# Patient Record
Sex: MALE | Race: WHITE | NOT HISPANIC OR LATINO | Employment: OTHER
[De-identification: names, ages, dates, MRNs, and addresses within clinical notes are randomized per-mention and may not be internally consistent; named-entity substitution may affect disease eponyms.]

---

## 2017-01-18 ENCOUNTER — RECORDS - HEALTHEAST (OUTPATIENT)
Dept: ADMINISTRATIVE | Facility: OTHER | Age: 82
End: 2017-01-18

## 2017-02-08 ENCOUNTER — COMMUNICATION - HEALTHEAST (OUTPATIENT)
Dept: INTERNAL MEDICINE | Facility: CLINIC | Age: 82
End: 2017-02-08

## 2017-02-08 DIAGNOSIS — I10 ESSENTIAL HYPERTENSION, MALIGNANT: ICD-10-CM

## 2017-02-09 ENCOUNTER — COMMUNICATION - HEALTHEAST (OUTPATIENT)
Dept: INTERNAL MEDICINE | Facility: CLINIC | Age: 82
End: 2017-02-09

## 2017-02-13 ENCOUNTER — RECORDS - HEALTHEAST (OUTPATIENT)
Dept: ADMINISTRATIVE | Facility: OTHER | Age: 82
End: 2017-02-13

## 2017-03-02 ENCOUNTER — OFFICE VISIT - HEALTHEAST (OUTPATIENT)
Dept: INTERNAL MEDICINE | Facility: CLINIC | Age: 82
End: 2017-03-02

## 2017-03-02 DIAGNOSIS — E11.42 TYPE 2 DIABETES MELLITUS WITH PERIPHERAL NEUROPATHY (H): ICD-10-CM

## 2017-03-02 DIAGNOSIS — M70.72 BURSITIS, HIP, LEFT: ICD-10-CM

## 2017-03-02 DIAGNOSIS — N18.30 CHRONIC KIDNEY DISEASE, STAGE 3 (MODERATE): ICD-10-CM

## 2017-03-02 DIAGNOSIS — I10 ESSENTIAL HYPERTENSION: ICD-10-CM

## 2017-03-02 DIAGNOSIS — N18.9 ANEMIA IN CHRONIC KIDNEY DISEASE: ICD-10-CM

## 2017-03-02 DIAGNOSIS — D63.1 ANEMIA IN CHRONIC KIDNEY DISEASE: ICD-10-CM

## 2017-03-02 DIAGNOSIS — F32.A DEPRESSION: ICD-10-CM

## 2017-03-02 DIAGNOSIS — G47.33 OSA ON CPAP: ICD-10-CM

## 2017-03-02 DIAGNOSIS — E78.5 HYPERLIPIDEMIA: ICD-10-CM

## 2017-03-02 LAB
CHOLEST SERPL-MCNC: 138 MG/DL
FASTING STATUS PATIENT QL REPORTED: YES
HBA1C MFR BLD: 7.2 % (ref 3.5–6)
HDLC SERPL-MCNC: 25 MG/DL
LDLC SERPL CALC-MCNC: 83 MG/DL
TRIGL SERPL-MCNC: 149 MG/DL

## 2017-03-02 ASSESSMENT — MIFFLIN-ST. JEOR: SCORE: 2218.94

## 2017-03-06 ENCOUNTER — COMMUNICATION - HEALTHEAST (OUTPATIENT)
Dept: INTERNAL MEDICINE | Facility: CLINIC | Age: 82
End: 2017-03-06

## 2017-03-08 ENCOUNTER — COMMUNICATION - HEALTHEAST (OUTPATIENT)
Dept: INTERNAL MEDICINE | Facility: CLINIC | Age: 82
End: 2017-03-08

## 2017-03-28 ENCOUNTER — RECORDS - HEALTHEAST (OUTPATIENT)
Dept: ADMINISTRATIVE | Facility: OTHER | Age: 82
End: 2017-03-28

## 2017-04-19 ENCOUNTER — COMMUNICATION - HEALTHEAST (OUTPATIENT)
Dept: INTERNAL MEDICINE | Facility: CLINIC | Age: 82
End: 2017-04-19

## 2017-04-30 ENCOUNTER — COMMUNICATION - HEALTHEAST (OUTPATIENT)
Dept: INTERNAL MEDICINE | Facility: CLINIC | Age: 82
End: 2017-04-30

## 2017-04-30 DIAGNOSIS — I10 ESSENTIAL HYPERTENSION, MALIGNANT: ICD-10-CM

## 2017-05-25 ENCOUNTER — COMMUNICATION - HEALTHEAST (OUTPATIENT)
Dept: INTERNAL MEDICINE | Facility: CLINIC | Age: 82
End: 2017-05-25

## 2017-07-06 ENCOUNTER — OFFICE VISIT - HEALTHEAST (OUTPATIENT)
Dept: INTERNAL MEDICINE | Facility: CLINIC | Age: 82
End: 2017-07-06

## 2017-07-06 DIAGNOSIS — M70.72 BURSITIS, HIP, LEFT: ICD-10-CM

## 2017-07-06 DIAGNOSIS — M51.369 DEGENERATIVE DISC DISEASE, LUMBAR: ICD-10-CM

## 2017-07-06 DIAGNOSIS — D63.1 ANEMIA IN CHRONIC KIDNEY DISEASE: ICD-10-CM

## 2017-07-06 DIAGNOSIS — G47.33 OSA ON CPAP: ICD-10-CM

## 2017-07-06 DIAGNOSIS — N18.9 ANEMIA IN CHRONIC KIDNEY DISEASE: ICD-10-CM

## 2017-07-06 DIAGNOSIS — F32.A DEPRESSION: ICD-10-CM

## 2017-07-06 DIAGNOSIS — R53.83 FATIGUE: ICD-10-CM

## 2017-07-06 DIAGNOSIS — N18.30 CHRONIC KIDNEY DISEASE, STAGE 3 (MODERATE): ICD-10-CM

## 2017-07-06 DIAGNOSIS — I10 ESSENTIAL HYPERTENSION: ICD-10-CM

## 2017-07-06 DIAGNOSIS — J44.9 COPD (CHRONIC OBSTRUCTIVE PULMONARY DISEASE) (H): ICD-10-CM

## 2017-07-06 DIAGNOSIS — L60.0 INGROWN TOENAIL: ICD-10-CM

## 2017-07-06 DIAGNOSIS — E11.42 TYPE 2 DIABETES MELLITUS WITH PERIPHERAL NEUROPATHY (H): ICD-10-CM

## 2017-07-06 DIAGNOSIS — E78.5 HYPERLIPIDEMIA: ICD-10-CM

## 2017-07-06 LAB
CHOLEST SERPL-MCNC: 139 MG/DL
FASTING STATUS PATIENT QL REPORTED: YES
HBA1C MFR BLD: 6.8 % (ref 3.5–6)
HDLC SERPL-MCNC: 28 MG/DL
LDLC SERPL CALC-MCNC: 82 MG/DL
TRIGL SERPL-MCNC: 147 MG/DL

## 2017-07-06 ASSESSMENT — MIFFLIN-ST. JEOR: SCORE: 2210.23

## 2017-07-08 ENCOUNTER — COMMUNICATION - HEALTHEAST (OUTPATIENT)
Dept: INTERNAL MEDICINE | Facility: CLINIC | Age: 82
End: 2017-07-08

## 2017-07-20 ENCOUNTER — COMMUNICATION - HEALTHEAST (OUTPATIENT)
Dept: INTERNAL MEDICINE | Facility: CLINIC | Age: 82
End: 2017-07-20

## 2017-07-22 ENCOUNTER — COMMUNICATION - HEALTHEAST (OUTPATIENT)
Dept: INTERNAL MEDICINE | Facility: CLINIC | Age: 82
End: 2017-07-22

## 2017-07-25 ENCOUNTER — OFFICE VISIT - HEALTHEAST (OUTPATIENT)
Dept: PODIATRY | Age: 82
End: 2017-07-25

## 2017-07-25 DIAGNOSIS — B35.1 NAIL FUNGUS: ICD-10-CM

## 2017-07-25 DIAGNOSIS — L60.0 INGROWN TOENAIL: ICD-10-CM

## 2017-07-25 DIAGNOSIS — L60.2 ONYCHAUXIS: ICD-10-CM

## 2017-07-25 ASSESSMENT — MIFFLIN-ST. JEOR: SCORE: 2210.24

## 2017-08-24 ENCOUNTER — RECORDS - HEALTHEAST (OUTPATIENT)
Dept: ADMINISTRATIVE | Facility: OTHER | Age: 82
End: 2017-08-24

## 2017-08-25 ENCOUNTER — OFFICE VISIT - HEALTHEAST (OUTPATIENT)
Dept: GERIATRICS | Facility: CLINIC | Age: 82
End: 2017-08-25

## 2017-08-25 DIAGNOSIS — F32.A DEPRESSION: ICD-10-CM

## 2017-08-25 DIAGNOSIS — S42.002A CLOSED LEFT CLAVICULAR FRACTURE: ICD-10-CM

## 2017-08-25 DIAGNOSIS — E11.9 TYPE 2 DIABETES MELLITUS (H): ICD-10-CM

## 2017-08-25 DIAGNOSIS — I10 ESSENTIAL HYPERTENSION: ICD-10-CM

## 2017-08-25 DIAGNOSIS — I60.9 SAH (SUBARACHNOID HEMORRHAGE) (H): ICD-10-CM

## 2017-08-25 DIAGNOSIS — D64.9 ANEMIA: ICD-10-CM

## 2017-08-25 DIAGNOSIS — N17.9 AKI (ACUTE KIDNEY INJURY) (H): ICD-10-CM

## 2017-08-28 ENCOUNTER — AMBULATORY - HEALTHEAST (OUTPATIENT)
Dept: ADMINISTRATIVE | Facility: CLINIC | Age: 82
End: 2017-08-28

## 2017-08-28 ENCOUNTER — AMBULATORY - HEALTHEAST (OUTPATIENT)
Dept: GERIATRICS | Facility: CLINIC | Age: 82
End: 2017-08-28

## 2017-08-28 RX ORDER — LATANOPROST 50 UG/ML
1 SOLUTION/ DROPS OPHTHALMIC AT BEDTIME
Status: SHIPPED | COMMUNITY
Start: 2017-08-28

## 2017-08-28 RX ORDER — ACETAMINOPHEN 500 MG
1000 TABLET ORAL 2 TIMES DAILY
Status: SHIPPED | COMMUNITY
Start: 2017-08-28

## 2017-08-29 ENCOUNTER — OFFICE VISIT - HEALTHEAST (OUTPATIENT)
Dept: GERIATRICS | Facility: CLINIC | Age: 82
End: 2017-08-29

## 2017-08-29 ENCOUNTER — RECORDS - HEALTHEAST (OUTPATIENT)
Dept: ADMINISTRATIVE | Facility: OTHER | Age: 82
End: 2017-08-29

## 2017-08-29 DIAGNOSIS — E11.9 TYPE 2 DIABETES MELLITUS (H): ICD-10-CM

## 2017-08-29 DIAGNOSIS — I10 ESSENTIAL HYPERTENSION: ICD-10-CM

## 2017-08-29 DIAGNOSIS — S42.002A CLOSED LEFT CLAVICULAR FRACTURE: ICD-10-CM

## 2017-08-29 DIAGNOSIS — N17.9 AKI (ACUTE KIDNEY INJURY) (H): ICD-10-CM

## 2017-08-29 DIAGNOSIS — J44.9 COPD (CHRONIC OBSTRUCTIVE PULMONARY DISEASE) (H): ICD-10-CM

## 2017-08-29 DIAGNOSIS — F32.A DEPRESSION: ICD-10-CM

## 2017-08-29 DIAGNOSIS — I60.9 SAH (SUBARACHNOID HEMORRHAGE) (H): ICD-10-CM

## 2017-08-29 DIAGNOSIS — G47.33 OSA ON CPAP: ICD-10-CM

## 2017-08-29 DIAGNOSIS — E53.8 B12 DEFICIENCY: ICD-10-CM

## 2017-09-01 ENCOUNTER — OFFICE VISIT - HEALTHEAST (OUTPATIENT)
Dept: GERIATRICS | Facility: CLINIC | Age: 82
End: 2017-09-01

## 2017-09-01 DIAGNOSIS — N17.9 AKI (ACUTE KIDNEY INJURY) (H): ICD-10-CM

## 2017-09-01 DIAGNOSIS — E53.8 B12 DEFICIENCY: ICD-10-CM

## 2017-09-01 DIAGNOSIS — I10 ESSENTIAL HYPERTENSION: ICD-10-CM

## 2017-09-01 DIAGNOSIS — I60.9 SAH (SUBARACHNOID HEMORRHAGE) (H): ICD-10-CM

## 2017-09-01 DIAGNOSIS — E11.9 TYPE 2 DIABETES MELLITUS (H): ICD-10-CM

## 2017-09-01 DIAGNOSIS — G47.33 OSA ON CPAP: ICD-10-CM

## 2017-09-01 DIAGNOSIS — S42.002A CLOSED LEFT CLAVICULAR FRACTURE: ICD-10-CM

## 2017-09-05 ENCOUNTER — HOSPITAL ENCOUNTER (OUTPATIENT)
Dept: CT IMAGING | Facility: CLINIC | Age: 82
Discharge: HOME | End: 2017-09-05
Attending: INTERNAL MEDICINE

## 2017-09-05 DIAGNOSIS — Z91.81 HISTORY OF RECENT FALL: ICD-10-CM

## 2017-09-06 ENCOUNTER — OFFICE VISIT - HEALTHEAST (OUTPATIENT)
Dept: GERIATRICS | Facility: CLINIC | Age: 82
End: 2017-09-06

## 2017-09-06 DIAGNOSIS — E53.8 B12 DEFICIENCY: ICD-10-CM

## 2017-09-06 DIAGNOSIS — S42.002A CLOSED LEFT CLAVICULAR FRACTURE: ICD-10-CM

## 2017-09-06 DIAGNOSIS — E11.9 TYPE 2 DIABETES MELLITUS (H): ICD-10-CM

## 2017-09-06 DIAGNOSIS — I10 ESSENTIAL HYPERTENSION: ICD-10-CM

## 2017-09-06 DIAGNOSIS — N18.3 CRD (CHRONIC RENAL DISEASE), STAGE 3 (MODERATE): ICD-10-CM

## 2017-09-06 DIAGNOSIS — J44.9 COPD (CHRONIC OBSTRUCTIVE PULMONARY DISEASE) (H): ICD-10-CM

## 2017-09-06 DIAGNOSIS — I60.9 SAH (SUBARACHNOID HEMORRHAGE) (H): ICD-10-CM

## 2017-09-06 DIAGNOSIS — D64.9 ANEMIA: ICD-10-CM

## 2017-09-08 ENCOUNTER — HOME CARE/HOSPICE - HEALTHEAST (OUTPATIENT)
Dept: HOME HEALTH SERVICES | Facility: HOME HEALTH | Age: 82
End: 2017-09-08

## 2017-09-08 ENCOUNTER — OFFICE VISIT - HEALTHEAST (OUTPATIENT)
Dept: GERIATRICS | Facility: CLINIC | Age: 82
End: 2017-09-08

## 2017-09-08 DIAGNOSIS — M19.90 OSTEOARTHRITIS: ICD-10-CM

## 2017-09-08 DIAGNOSIS — G47.33 OSA ON CPAP: ICD-10-CM

## 2017-09-08 DIAGNOSIS — J44.9 COPD (CHRONIC OBSTRUCTIVE PULMONARY DISEASE) (H): ICD-10-CM

## 2017-09-08 DIAGNOSIS — E11.42 TYPE 2 DIABETES MELLITUS WITH PERIPHERAL NEUROPATHY (H): ICD-10-CM

## 2017-09-08 DIAGNOSIS — I10 ESSENTIAL HYPERTENSION: ICD-10-CM

## 2017-09-08 DIAGNOSIS — M53.80 OTHER SYMPTOMS REFERABLE TO BACK: ICD-10-CM

## 2017-09-08 DIAGNOSIS — M54.50 LOW BACK PAIN: ICD-10-CM

## 2017-09-09 ENCOUNTER — RECORDS - HEALTHEAST (OUTPATIENT)
Dept: ADMINISTRATIVE | Facility: OTHER | Age: 82
End: 2017-09-09

## 2017-09-11 ENCOUNTER — AMBULATORY - HEALTHEAST (OUTPATIENT)
Dept: GERIATRICS | Facility: CLINIC | Age: 82
End: 2017-09-11

## 2017-09-11 ENCOUNTER — COMMUNICATION - HEALTHEAST (OUTPATIENT)
Dept: GERIATRICS | Facility: CLINIC | Age: 82
End: 2017-09-11

## 2017-09-11 ENCOUNTER — COMMUNICATION - HEALTHEAST (OUTPATIENT)
Dept: INTERNAL MEDICINE | Facility: CLINIC | Age: 82
End: 2017-09-11

## 2017-09-11 ENCOUNTER — HOME CARE/HOSPICE - HEALTHEAST (OUTPATIENT)
Dept: HOME HEALTH SERVICES | Facility: HOME HEALTH | Age: 82
End: 2017-09-11

## 2017-09-13 ENCOUNTER — COMMUNICATION - HEALTHEAST (OUTPATIENT)
Dept: HOME HEALTH SERVICES | Facility: HOME HEALTH | Age: 82
End: 2017-09-13

## 2017-09-13 ENCOUNTER — HOME CARE/HOSPICE - HEALTHEAST (OUTPATIENT)
Dept: HOME HEALTH SERVICES | Facility: HOME HEALTH | Age: 82
End: 2017-09-13

## 2017-09-14 ENCOUNTER — COMMUNICATION - HEALTHEAST (OUTPATIENT)
Dept: PHYSICAL THERAPY | Age: 82
End: 2017-09-14

## 2017-09-14 ENCOUNTER — HOME CARE/HOSPICE - HEALTHEAST (OUTPATIENT)
Dept: HOME HEALTH SERVICES | Facility: HOME HEALTH | Age: 82
End: 2017-09-14

## 2017-09-15 ENCOUNTER — HOME CARE/HOSPICE - HEALTHEAST (OUTPATIENT)
Dept: HOME HEALTH SERVICES | Facility: HOME HEALTH | Age: 82
End: 2017-09-15

## 2017-09-19 ENCOUNTER — HOME CARE/HOSPICE - HEALTHEAST (OUTPATIENT)
Dept: HOME HEALTH SERVICES | Facility: HOME HEALTH | Age: 82
End: 2017-09-19

## 2017-09-20 ENCOUNTER — HOME CARE/HOSPICE - HEALTHEAST (OUTPATIENT)
Dept: HOME HEALTH SERVICES | Facility: HOME HEALTH | Age: 82
End: 2017-09-20

## 2017-09-21 ENCOUNTER — OFFICE VISIT - HEALTHEAST (OUTPATIENT)
Dept: INTERNAL MEDICINE | Facility: CLINIC | Age: 82
End: 2017-09-21

## 2017-09-21 ENCOUNTER — HOME CARE/HOSPICE - HEALTHEAST (OUTPATIENT)
Dept: HOME HEALTH SERVICES | Facility: HOME HEALTH | Age: 82
End: 2017-09-21

## 2017-09-21 DIAGNOSIS — S42.002D CLOSED DISPLACED FRACTURE OF LEFT CLAVICLE WITH ROUTINE HEALING, UNSPECIFIED PART OF CLAVICLE, SUBSEQUENT ENCOUNTER: ICD-10-CM

## 2017-09-21 DIAGNOSIS — E11.42 TYPE 2 DIABETES MELLITUS WITH PERIPHERAL NEUROPATHY (H): ICD-10-CM

## 2017-09-21 DIAGNOSIS — S06.6X1A: ICD-10-CM

## 2017-09-21 DIAGNOSIS — I10 ESSENTIAL HYPERTENSION: ICD-10-CM

## 2017-09-21 ASSESSMENT — MIFFLIN-ST. JEOR: SCORE: 2146.37

## 2017-09-22 ENCOUNTER — HOME CARE/HOSPICE - HEALTHEAST (OUTPATIENT)
Dept: HOME HEALTH SERVICES | Facility: HOME HEALTH | Age: 82
End: 2017-09-22

## 2017-09-25 ENCOUNTER — COMMUNICATION - HEALTHEAST (OUTPATIENT)
Dept: OCCUPATIONAL THERAPY | Facility: CLINIC | Age: 82
End: 2017-09-25

## 2017-09-27 ENCOUNTER — HOME CARE/HOSPICE - HEALTHEAST (OUTPATIENT)
Dept: HOME HEALTH SERVICES | Facility: HOME HEALTH | Age: 82
End: 2017-09-27

## 2017-09-28 ENCOUNTER — HOME CARE/HOSPICE - HEALTHEAST (OUTPATIENT)
Dept: HOME HEALTH SERVICES | Facility: HOME HEALTH | Age: 82
End: 2017-09-28

## 2017-09-28 ENCOUNTER — COMMUNICATION - HEALTHEAST (OUTPATIENT)
Dept: INTERNAL MEDICINE | Facility: CLINIC | Age: 82
End: 2017-09-28

## 2017-09-28 DIAGNOSIS — F32.A DEPRESSION: ICD-10-CM

## 2017-10-03 ENCOUNTER — HOME CARE/HOSPICE - HEALTHEAST (OUTPATIENT)
Dept: HOME HEALTH SERVICES | Facility: HOME HEALTH | Age: 82
End: 2017-10-03

## 2017-10-05 ENCOUNTER — HOME CARE/HOSPICE - HEALTHEAST (OUTPATIENT)
Dept: HOME HEALTH SERVICES | Facility: HOME HEALTH | Age: 82
End: 2017-10-05

## 2017-10-19 ENCOUNTER — AMBULATORY - HEALTHEAST (OUTPATIENT)
Dept: PODIATRY | Facility: CLINIC | Age: 82
End: 2017-10-19

## 2017-10-19 DIAGNOSIS — B35.1 NAIL FUNGUS: ICD-10-CM

## 2017-10-19 DIAGNOSIS — L60.2 ONYCHAUXIS: ICD-10-CM

## 2017-10-19 DIAGNOSIS — Z79.4 TYPE 2 DIABETES MELLITUS WITHOUT COMPLICATION, WITH LONG-TERM CURRENT USE OF INSULIN (H): ICD-10-CM

## 2017-10-19 DIAGNOSIS — E11.9 TYPE 2 DIABETES MELLITUS WITHOUT COMPLICATION, WITH LONG-TERM CURRENT USE OF INSULIN (H): ICD-10-CM

## 2017-10-19 ASSESSMENT — MIFFLIN-ST. JEOR: SCORE: 2146.37

## 2017-11-09 ENCOUNTER — OFFICE VISIT - HEALTHEAST (OUTPATIENT)
Dept: INTERNAL MEDICINE | Facility: CLINIC | Age: 82
End: 2017-11-09

## 2017-11-09 DIAGNOSIS — M15.0 PRIMARY OSTEOARTHRITIS INVOLVING MULTIPLE JOINTS: ICD-10-CM

## 2017-11-09 DIAGNOSIS — E53.8 B12 DEFICIENCY: ICD-10-CM

## 2017-11-09 DIAGNOSIS — F32.A DEPRESSION: ICD-10-CM

## 2017-11-09 DIAGNOSIS — E11.42 TYPE 2 DIABETES MELLITUS WITH PERIPHERAL NEUROPATHY (H): ICD-10-CM

## 2017-11-09 DIAGNOSIS — E55.9 VITAMIN D DEFICIENCY: ICD-10-CM

## 2017-11-09 DIAGNOSIS — D63.1 ANEMIA IN STAGE 3 CHRONIC KIDNEY DISEASE (H): ICD-10-CM

## 2017-11-09 DIAGNOSIS — E78.5 HYPERLIPIDEMIA: ICD-10-CM

## 2017-11-09 DIAGNOSIS — H91.93 BILATERAL HEARING LOSS: ICD-10-CM

## 2017-11-09 DIAGNOSIS — S06.6X1D TRAUMATIC SUBARACHNOID HEMORRHAGE WITH LOSS OF CONSCIOUSNESS OF 30 MINUTES OR LESS, SUBSEQUENT ENCOUNTER: ICD-10-CM

## 2017-11-09 DIAGNOSIS — Z23 NEED FOR IMMUNIZATION AGAINST INFLUENZA: ICD-10-CM

## 2017-11-09 DIAGNOSIS — R53.82 CHRONIC FATIGUE: ICD-10-CM

## 2017-11-09 DIAGNOSIS — H40.9 GLAUCOMA: ICD-10-CM

## 2017-11-09 DIAGNOSIS — J45.20 INTERMITTENT ASTHMA WITHOUT COMPLICATION: ICD-10-CM

## 2017-11-09 DIAGNOSIS — N18.30 CHRONIC KIDNEY DISEASE, STAGE 3 (MODERATE): ICD-10-CM

## 2017-11-09 DIAGNOSIS — Z12.5 SCREENING FOR MALIGNANT NEOPLASM OF PROSTATE: ICD-10-CM

## 2017-11-09 DIAGNOSIS — Z00.00 MEDICARE ANNUAL WELLNESS VISIT, INITIAL: ICD-10-CM

## 2017-11-09 DIAGNOSIS — C44.92 SQUAMOUS CELL SKIN CANCER: ICD-10-CM

## 2017-11-09 DIAGNOSIS — I10 ESSENTIAL HYPERTENSION: ICD-10-CM

## 2017-11-09 DIAGNOSIS — S42.002A CLOSED DISPLACED FRACTURE OF LEFT CLAVICLE: ICD-10-CM

## 2017-11-09 DIAGNOSIS — N18.30 ANEMIA IN STAGE 3 CHRONIC KIDNEY DISEASE (H): ICD-10-CM

## 2017-11-09 DIAGNOSIS — G47.33 OSA ON CPAP: ICD-10-CM

## 2017-11-09 LAB
CHOLEST SERPL-MCNC: 141 MG/DL
FASTING STATUS PATIENT QL REPORTED: YES
HBA1C MFR BLD: 7 % (ref 3.5–6)
HDLC SERPL-MCNC: 28 MG/DL
LDLC SERPL CALC-MCNC: 81 MG/DL
PSA SERPL-MCNC: 1.6 NG/ML (ref 0–6.5)
TRIGL SERPL-MCNC: 162 MG/DL

## 2017-11-09 ASSESSMENT — MIFFLIN-ST. JEOR: SCORE: 2164.51

## 2017-11-10 ENCOUNTER — COMMUNICATION - HEALTHEAST (OUTPATIENT)
Dept: INTERNAL MEDICINE | Facility: CLINIC | Age: 82
End: 2017-11-10

## 2017-12-13 ENCOUNTER — COMMUNICATION - HEALTHEAST (OUTPATIENT)
Dept: INTERNAL MEDICINE | Facility: CLINIC | Age: 82
End: 2017-12-13

## 2017-12-13 DIAGNOSIS — F32.A DEPRESSION: ICD-10-CM

## 2018-01-02 ENCOUNTER — COMMUNICATION - HEALTHEAST (OUTPATIENT)
Dept: INTERNAL MEDICINE | Facility: CLINIC | Age: 83
End: 2018-01-02

## 2018-01-09 ENCOUNTER — OFFICE VISIT - HEALTHEAST (OUTPATIENT)
Dept: INTERNAL MEDICINE | Facility: CLINIC | Age: 83
End: 2018-01-09

## 2018-01-09 DIAGNOSIS — N18.30 CHRONIC KIDNEY DISEASE, STAGE 3 (MODERATE): ICD-10-CM

## 2018-01-09 DIAGNOSIS — H02.402 PTOSIS OF EYELID, LEFT: ICD-10-CM

## 2018-01-09 DIAGNOSIS — G47.33 OSA ON CPAP: ICD-10-CM

## 2018-01-09 DIAGNOSIS — Z01.818 PRE-OP EXAM: ICD-10-CM

## 2018-01-09 DIAGNOSIS — I10 ESSENTIAL HYPERTENSION: ICD-10-CM

## 2018-01-09 DIAGNOSIS — E11.42 TYPE 2 DIABETES MELLITUS WITH PERIPHERAL NEUROPATHY (H): ICD-10-CM

## 2018-01-09 LAB
ANION GAP SERPL CALCULATED.3IONS-SCNC: 10 MMOL/L (ref 5–18)
BUN SERPL-MCNC: 28 MG/DL (ref 8–28)
CALCIUM SERPL-MCNC: 9 MG/DL (ref 8.5–10.5)
CHLORIDE BLD-SCNC: 102 MMOL/L (ref 98–107)
CO2 SERPL-SCNC: 23 MMOL/L (ref 22–31)
CREAT SERPL-MCNC: 1.45 MG/DL (ref 0.7–1.3)
GFR SERPL CREATININE-BSD FRML MDRD: 46 ML/MIN/1.73M2
GLUCOSE BLD-MCNC: 231 MG/DL (ref 70–125)
POTASSIUM BLD-SCNC: 4.8 MMOL/L (ref 3.5–5)
SODIUM SERPL-SCNC: 135 MMOL/L (ref 136–145)

## 2018-01-09 ASSESSMENT — MIFFLIN-ST. JEOR: SCORE: 2173.58

## 2018-01-10 ENCOUNTER — RECORDS - HEALTHEAST (OUTPATIENT)
Dept: ADMINISTRATIVE | Facility: OTHER | Age: 83
End: 2018-01-10

## 2018-01-11 ENCOUNTER — AMBULATORY - HEALTHEAST (OUTPATIENT)
Dept: PODIATRY | Facility: CLINIC | Age: 83
End: 2018-01-11

## 2018-01-11 DIAGNOSIS — B35.1 NAIL FUNGUS: ICD-10-CM

## 2018-01-11 DIAGNOSIS — L60.2 ONYCHAUXIS: ICD-10-CM

## 2018-01-11 DIAGNOSIS — E11.9 TYPE 2 DIABETES MELLITUS WITHOUT COMPLICATION, WITHOUT LONG-TERM CURRENT USE OF INSULIN (H): ICD-10-CM

## 2018-01-16 ENCOUNTER — RECORDS - HEALTHEAST (OUTPATIENT)
Dept: ADMINISTRATIVE | Facility: OTHER | Age: 83
End: 2018-01-16

## 2018-01-18 ENCOUNTER — RECORDS - HEALTHEAST (OUTPATIENT)
Dept: ADMINISTRATIVE | Facility: OTHER | Age: 83
End: 2018-01-18

## 2018-02-15 ENCOUNTER — OFFICE VISIT - HEALTHEAST (OUTPATIENT)
Dept: INTERNAL MEDICINE | Facility: CLINIC | Age: 83
End: 2018-02-15

## 2018-02-15 DIAGNOSIS — E78.5 HYPERLIPIDEMIA: ICD-10-CM

## 2018-02-15 DIAGNOSIS — H02.402 PTOSIS OF EYELID, LEFT: ICD-10-CM

## 2018-02-15 DIAGNOSIS — E11.42 TYPE 2 DIABETES MELLITUS WITH PERIPHERAL NEUROPATHY (H): ICD-10-CM

## 2018-02-15 DIAGNOSIS — D63.1 ANEMIA IN STAGE 3 CHRONIC KIDNEY DISEASE (H): ICD-10-CM

## 2018-02-15 DIAGNOSIS — M75.122 COMPLETE TEAR OF LEFT ROTATOR CUFF: ICD-10-CM

## 2018-02-15 DIAGNOSIS — G47.33 OSA ON CPAP: ICD-10-CM

## 2018-02-15 DIAGNOSIS — I10 ESSENTIAL HYPERTENSION: ICD-10-CM

## 2018-02-15 DIAGNOSIS — E53.8 B12 DEFICIENCY: ICD-10-CM

## 2018-02-15 DIAGNOSIS — N18.30 CHRONIC KIDNEY DISEASE, STAGE 3 (MODERATE): ICD-10-CM

## 2018-02-15 DIAGNOSIS — J45.20 MILD INTERMITTENT ASTHMA WITHOUT COMPLICATION: ICD-10-CM

## 2018-02-15 DIAGNOSIS — N18.30 ANEMIA IN STAGE 3 CHRONIC KIDNEY DISEASE (H): ICD-10-CM

## 2018-02-15 LAB
ALBUMIN SERPL-MCNC: 3.5 G/DL (ref 3.5–5)
ALP SERPL-CCNC: 79 U/L (ref 45–120)
ALT SERPL W P-5'-P-CCNC: 31 U/L (ref 0–45)
ANION GAP SERPL CALCULATED.3IONS-SCNC: 12 MMOL/L (ref 5–18)
AST SERPL W P-5'-P-CCNC: 21 U/L (ref 0–40)
BILIRUB DIRECT SERPL-MCNC: 0.2 MG/DL
BILIRUB SERPL-MCNC: 0.5 MG/DL (ref 0–1)
BUN SERPL-MCNC: 19 MG/DL (ref 8–28)
CALCIUM SERPL-MCNC: 9.3 MG/DL (ref 8.5–10.5)
CHLORIDE BLD-SCNC: 102 MMOL/L (ref 98–107)
CHOLEST SERPL-MCNC: 144 MG/DL
CO2 SERPL-SCNC: 21 MMOL/L (ref 22–31)
CREAT SERPL-MCNC: 1.38 MG/DL (ref 0.7–1.3)
FASTING STATUS PATIENT QL REPORTED: YES
GFR SERPL CREATININE-BSD FRML MDRD: 49 ML/MIN/1.73M2
GLUCOSE BLD-MCNC: 101 MG/DL (ref 70–125)
HBA1C MFR BLD: 7.4 % (ref 3.5–6)
HDLC SERPL-MCNC: 32 MG/DL
HGB BLD-MCNC: 13.1 G/DL (ref 14–18)
LDLC SERPL CALC-MCNC: 83 MG/DL
POTASSIUM BLD-SCNC: 5.1 MMOL/L (ref 3.5–5)
PROT SERPL-MCNC: 7 G/DL (ref 6–8)
SODIUM SERPL-SCNC: 135 MMOL/L (ref 136–145)
TRIGL SERPL-MCNC: 146 MG/DL
VIT B12 SERPL-MCNC: 471 PG/ML (ref 213–816)

## 2018-02-15 ASSESSMENT — MIFFLIN-ST. JEOR: SCORE: 2159.98

## 2018-02-17 ENCOUNTER — COMMUNICATION - HEALTHEAST (OUTPATIENT)
Dept: INTERNAL MEDICINE | Facility: CLINIC | Age: 83
End: 2018-02-17

## 2018-04-05 ENCOUNTER — COMMUNICATION - HEALTHEAST (OUTPATIENT)
Dept: INTERNAL MEDICINE | Facility: CLINIC | Age: 83
End: 2018-04-05

## 2018-04-11 ENCOUNTER — RECORDS - HEALTHEAST (OUTPATIENT)
Dept: ADMINISTRATIVE | Facility: OTHER | Age: 83
End: 2018-04-11

## 2018-04-12 ENCOUNTER — AMBULATORY - HEALTHEAST (OUTPATIENT)
Dept: PODIATRY | Facility: CLINIC | Age: 83
End: 2018-04-12

## 2018-04-12 DIAGNOSIS — Z79.4 TYPE 2 DIABETES MELLITUS WITHOUT COMPLICATION, WITH LONG-TERM CURRENT USE OF INSULIN (H): ICD-10-CM

## 2018-04-12 DIAGNOSIS — E11.9 TYPE 2 DIABETES MELLITUS WITHOUT COMPLICATION, WITH LONG-TERM CURRENT USE OF INSULIN (H): ICD-10-CM

## 2018-04-12 DIAGNOSIS — B35.1 NAIL FUNGUS: ICD-10-CM

## 2018-04-12 DIAGNOSIS — L60.2 ONYCHAUXIS: ICD-10-CM

## 2018-05-11 ENCOUNTER — OFFICE VISIT - HEALTHEAST (OUTPATIENT)
Dept: INTERNAL MEDICINE | Facility: CLINIC | Age: 83
End: 2018-05-11

## 2018-05-11 ENCOUNTER — COMMUNICATION - HEALTHEAST (OUTPATIENT)
Dept: SCHEDULING | Facility: CLINIC | Age: 83
End: 2018-05-11

## 2018-05-11 DIAGNOSIS — M79.671 PAIN OF RIGHT HEEL: ICD-10-CM

## 2018-05-11 DIAGNOSIS — E11.42 TYPE 2 DIABETES MELLITUS WITH PERIPHERAL NEUROPATHY (H): ICD-10-CM

## 2018-05-11 LAB
ERYTHROCYTE [DISTWIDTH] IN BLOOD BY AUTOMATED COUNT: 12.2 % (ref 11–14.5)
HCT VFR BLD AUTO: 37.4 % (ref 40–54)
HGB BLD-MCNC: 12.4 G/DL (ref 14–18)
MCH RBC QN AUTO: 29.9 PG (ref 27–34)
MCHC RBC AUTO-ENTMCNC: 33 G/DL (ref 32–36)
MCV RBC AUTO: 91 FL (ref 80–100)
PLATELET # BLD AUTO: 166 THOU/UL (ref 140–440)
PMV BLD AUTO: 8.3 FL (ref 7–10)
RBC # BLD AUTO: 4.14 MILL/UL (ref 4.4–6.2)
URATE SERPL-MCNC: 7 MG/DL (ref 3–8)
WBC: 6.6 THOU/UL (ref 4–11)

## 2018-05-11 ASSESSMENT — MIFFLIN-ST. JEOR: SCORE: 2159.98

## 2018-05-22 ENCOUNTER — OFFICE VISIT - HEALTHEAST (OUTPATIENT)
Dept: INTERNAL MEDICINE | Facility: CLINIC | Age: 83
End: 2018-05-22

## 2018-05-22 DIAGNOSIS — E78.5 HYPERLIPIDEMIA: ICD-10-CM

## 2018-05-22 DIAGNOSIS — G47.33 OSA ON CPAP: ICD-10-CM

## 2018-05-22 DIAGNOSIS — I10 ESSENTIAL HYPERTENSION: ICD-10-CM

## 2018-05-22 DIAGNOSIS — N18.30 ANEMIA IN STAGE 3 CHRONIC KIDNEY DISEASE (H): ICD-10-CM

## 2018-05-22 DIAGNOSIS — N18.30 CHRONIC KIDNEY DISEASE, STAGE 3 (MODERATE): ICD-10-CM

## 2018-05-22 DIAGNOSIS — M79.671 PAIN OF RIGHT HEEL: ICD-10-CM

## 2018-05-22 DIAGNOSIS — F32.A DEPRESSION: ICD-10-CM

## 2018-05-22 DIAGNOSIS — E11.42 TYPE 2 DIABETES MELLITUS WITH PERIPHERAL NEUROPATHY (H): ICD-10-CM

## 2018-05-22 DIAGNOSIS — D63.1 ANEMIA IN STAGE 3 CHRONIC KIDNEY DISEASE (H): ICD-10-CM

## 2018-05-22 LAB
ALBUMIN SERPL-MCNC: 3.4 G/DL (ref 3.5–5)
ALP SERPL-CCNC: 79 U/L (ref 45–120)
ALT SERPL W P-5'-P-CCNC: 18 U/L (ref 0–45)
ANION GAP SERPL CALCULATED.3IONS-SCNC: 10 MMOL/L (ref 5–18)
AST SERPL W P-5'-P-CCNC: 16 U/L (ref 0–40)
BILIRUB DIRECT SERPL-MCNC: 0.2 MG/DL
BILIRUB SERPL-MCNC: 0.6 MG/DL (ref 0–1)
BUN SERPL-MCNC: 32 MG/DL (ref 8–28)
CALCIUM SERPL-MCNC: 9.3 MG/DL (ref 8.5–10.5)
CHLORIDE BLD-SCNC: 104 MMOL/L (ref 98–107)
CHOLEST SERPL-MCNC: 121 MG/DL
CO2 SERPL-SCNC: 23 MMOL/L (ref 22–31)
CREAT SERPL-MCNC: 1.38 MG/DL (ref 0.7–1.3)
FASTING STATUS PATIENT QL REPORTED: ABNORMAL
GFR SERPL CREATININE-BSD FRML MDRD: 49 ML/MIN/1.73M2
GLUCOSE BLD-MCNC: 99 MG/DL (ref 70–125)
HBA1C MFR BLD: 8.3 % (ref 3.5–6)
HDLC SERPL-MCNC: 27 MG/DL
HGB BLD-MCNC: 13 G/DL (ref 14–18)
LDLC SERPL CALC-MCNC: 62 MG/DL
POTASSIUM BLD-SCNC: 4.9 MMOL/L (ref 3.5–5)
PROT SERPL-MCNC: 6.5 G/DL (ref 6–8)
SODIUM SERPL-SCNC: 137 MMOL/L (ref 136–145)
TRIGL SERPL-MCNC: 160 MG/DL

## 2018-05-22 ASSESSMENT — MIFFLIN-ST. JEOR: SCORE: 2159.98

## 2018-05-23 ENCOUNTER — COMMUNICATION - HEALTHEAST (OUTPATIENT)
Dept: INTERNAL MEDICINE | Facility: CLINIC | Age: 83
End: 2018-05-23

## 2018-05-31 ENCOUNTER — COMMUNICATION - HEALTHEAST (OUTPATIENT)
Dept: INTERNAL MEDICINE | Facility: CLINIC | Age: 83
End: 2018-05-31

## 2018-05-31 DIAGNOSIS — I10 ESSENTIAL HYPERTENSION, MALIGNANT: ICD-10-CM

## 2018-07-13 ENCOUNTER — COMMUNICATION - HEALTHEAST (OUTPATIENT)
Dept: INTERNAL MEDICINE | Facility: CLINIC | Age: 83
End: 2018-07-13

## 2018-07-13 DIAGNOSIS — E11.9 DIABETES (H): ICD-10-CM

## 2018-07-19 ENCOUNTER — AMBULATORY - HEALTHEAST (OUTPATIENT)
Dept: INTERNAL MEDICINE | Facility: CLINIC | Age: 83
End: 2018-07-19

## 2018-07-19 ENCOUNTER — COMMUNICATION - HEALTHEAST (OUTPATIENT)
Dept: INTERNAL MEDICINE | Facility: CLINIC | Age: 83
End: 2018-07-19

## 2018-07-30 ENCOUNTER — COMMUNICATION - HEALTHEAST (OUTPATIENT)
Dept: INTERNAL MEDICINE | Facility: CLINIC | Age: 83
End: 2018-07-30

## 2018-07-31 ENCOUNTER — OFFICE VISIT - HEALTHEAST (OUTPATIENT)
Dept: INTERNAL MEDICINE | Facility: CLINIC | Age: 83
End: 2018-07-31

## 2018-07-31 DIAGNOSIS — N18.30 CHRONIC KIDNEY DISEASE, STAGE 3 (MODERATE): ICD-10-CM

## 2018-07-31 DIAGNOSIS — Z01.818 ENCOUNTER FOR PREOPERATIVE EXAMINATION FOR GENERAL SURGICAL PROCEDURE: ICD-10-CM

## 2018-07-31 DIAGNOSIS — G47.33 OSA ON CPAP: ICD-10-CM

## 2018-07-31 DIAGNOSIS — I10 ESSENTIAL HYPERTENSION: ICD-10-CM

## 2018-07-31 DIAGNOSIS — E11.42 TYPE 2 DIABETES MELLITUS WITH PERIPHERAL NEUROPATHY (H): ICD-10-CM

## 2018-07-31 DIAGNOSIS — H02.005 ENTROPION OF LEFT LOWER EYELID: ICD-10-CM

## 2018-07-31 LAB
ANION GAP SERPL CALCULATED.3IONS-SCNC: 10 MMOL/L (ref 5–18)
BUN SERPL-MCNC: 25 MG/DL (ref 8–28)
CALCIUM SERPL-MCNC: 9.1 MG/DL (ref 8.5–10.5)
CHLORIDE BLD-SCNC: 103 MMOL/L (ref 98–107)
CO2 SERPL-SCNC: 24 MMOL/L (ref 22–31)
CREAT SERPL-MCNC: 1.59 MG/DL (ref 0.7–1.3)
GFR SERPL CREATININE-BSD FRML MDRD: 42 ML/MIN/1.73M2
GLUCOSE BLD-MCNC: 253 MG/DL (ref 70–125)
POTASSIUM BLD-SCNC: 5 MMOL/L (ref 3.5–5)
SODIUM SERPL-SCNC: 137 MMOL/L (ref 136–145)

## 2018-07-31 ASSESSMENT — MIFFLIN-ST. JEOR: SCORE: 2187.19

## 2018-08-01 ENCOUNTER — COMMUNICATION - HEALTHEAST (OUTPATIENT)
Dept: INTERNAL MEDICINE | Facility: CLINIC | Age: 83
End: 2018-08-01

## 2018-08-01 DIAGNOSIS — E11.42 TYPE 2 DIABETES MELLITUS WITH PERIPHERAL NEUROPATHY (H): ICD-10-CM

## 2018-08-16 ENCOUNTER — RECORDS - HEALTHEAST (OUTPATIENT)
Dept: ADMINISTRATIVE | Facility: OTHER | Age: 83
End: 2018-08-16

## 2018-08-24 ENCOUNTER — COMMUNICATION - HEALTHEAST (OUTPATIENT)
Dept: INTERNAL MEDICINE | Facility: CLINIC | Age: 83
End: 2018-08-24

## 2018-08-24 ENCOUNTER — OFFICE VISIT - HEALTHEAST (OUTPATIENT)
Dept: INTERNAL MEDICINE | Facility: CLINIC | Age: 83
End: 2018-08-24

## 2018-08-24 DIAGNOSIS — D63.1 ANEMIA IN STAGE 3 CHRONIC KIDNEY DISEASE (H): ICD-10-CM

## 2018-08-24 DIAGNOSIS — N18.30 CHRONIC KIDNEY DISEASE, STAGE 3 (MODERATE): ICD-10-CM

## 2018-08-24 DIAGNOSIS — E11.42 TYPE 2 DIABETES MELLITUS WITH PERIPHERAL NEUROPATHY (H): ICD-10-CM

## 2018-08-24 DIAGNOSIS — E78.5 HYPERLIPIDEMIA: ICD-10-CM

## 2018-08-24 DIAGNOSIS — G47.33 OSA ON CPAP: ICD-10-CM

## 2018-08-24 DIAGNOSIS — E66.01 MORBID OBESITY (H): ICD-10-CM

## 2018-08-24 DIAGNOSIS — M75.122 COMPLETE TEAR OF LEFT ROTATOR CUFF: ICD-10-CM

## 2018-08-24 DIAGNOSIS — E53.8 B12 DEFICIENCY: ICD-10-CM

## 2018-08-24 DIAGNOSIS — M70.61 TROCHANTERIC BURSITIS OF RIGHT HIP: ICD-10-CM

## 2018-08-24 DIAGNOSIS — J45.20 MILD INTERMITTENT ASTHMA WITHOUT COMPLICATION: ICD-10-CM

## 2018-08-24 DIAGNOSIS — I10 ESSENTIAL HYPERTENSION: ICD-10-CM

## 2018-08-24 DIAGNOSIS — N18.30 ANEMIA IN STAGE 3 CHRONIC KIDNEY DISEASE (H): ICD-10-CM

## 2018-08-24 LAB
ALBUMIN SERPL-MCNC: 3.8 G/DL (ref 3.5–5)
ALP SERPL-CCNC: 74 U/L (ref 45–120)
ALT SERPL W P-5'-P-CCNC: 22 U/L (ref 0–45)
ANION GAP SERPL CALCULATED.3IONS-SCNC: 8 MMOL/L (ref 5–18)
AST SERPL W P-5'-P-CCNC: 18 U/L (ref 0–40)
BILIRUB DIRECT SERPL-MCNC: 0.2 MG/DL
BILIRUB SERPL-MCNC: 0.7 MG/DL (ref 0–1)
BUN SERPL-MCNC: 24 MG/DL (ref 8–28)
CALCIUM SERPL-MCNC: 9.7 MG/DL (ref 8.5–10.5)
CHLORIDE BLD-SCNC: 105 MMOL/L (ref 98–107)
CHOLEST SERPL-MCNC: 133 MG/DL
CO2 SERPL-SCNC: 25 MMOL/L (ref 22–31)
CREAT SERPL-MCNC: 1.35 MG/DL (ref 0.7–1.3)
FASTING STATUS PATIENT QL REPORTED: YES
GFR SERPL CREATININE-BSD FRML MDRD: 50 ML/MIN/1.73M2
GLUCOSE BLD-MCNC: 102 MG/DL (ref 70–125)
HBA1C MFR BLD: 7.5 % (ref 3.5–6)
HDLC SERPL-MCNC: 28 MG/DL
HGB BLD-MCNC: 13.4 G/DL (ref 14–18)
LDLC SERPL CALC-MCNC: 72 MG/DL
POTASSIUM BLD-SCNC: 4.6 MMOL/L (ref 3.5–5)
PROT SERPL-MCNC: 7 G/DL (ref 6–8)
SODIUM SERPL-SCNC: 138 MMOL/L (ref 136–145)
TRIGL SERPL-MCNC: 167 MG/DL

## 2018-08-24 ASSESSMENT — MIFFLIN-ST. JEOR: SCORE: 2173.58

## 2018-08-30 ENCOUNTER — RECORDS - HEALTHEAST (OUTPATIENT)
Dept: ADMINISTRATIVE | Facility: OTHER | Age: 83
End: 2018-08-30

## 2018-09-18 ENCOUNTER — COMMUNICATION - HEALTHEAST (OUTPATIENT)
Dept: INTERNAL MEDICINE | Facility: CLINIC | Age: 83
End: 2018-09-18

## 2018-10-08 ENCOUNTER — AMBULATORY - HEALTHEAST (OUTPATIENT)
Dept: INTERNAL MEDICINE | Facility: CLINIC | Age: 83
End: 2018-10-08

## 2018-10-09 ENCOUNTER — OFFICE VISIT - HEALTHEAST (OUTPATIENT)
Dept: INTERNAL MEDICINE | Facility: CLINIC | Age: 83
End: 2018-10-09

## 2018-10-09 DIAGNOSIS — Z23 NEED FOR IMMUNIZATION AGAINST INFLUENZA: ICD-10-CM

## 2018-10-09 DIAGNOSIS — E11.42 TYPE 2 DIABETES MELLITUS WITH PERIPHERAL NEUROPATHY (H): ICD-10-CM

## 2018-10-09 DIAGNOSIS — G62.9 PERIPHERAL NEUROPATHY: ICD-10-CM

## 2018-10-09 ASSESSMENT — MIFFLIN-ST. JEOR: SCORE: 2196.26

## 2018-11-30 ENCOUNTER — COMMUNICATION - HEALTHEAST (OUTPATIENT)
Dept: INTERNAL MEDICINE | Facility: CLINIC | Age: 83
End: 2018-11-30

## 2018-11-30 ENCOUNTER — OFFICE VISIT - HEALTHEAST (OUTPATIENT)
Dept: INTERNAL MEDICINE | Facility: CLINIC | Age: 83
End: 2018-11-30

## 2018-11-30 DIAGNOSIS — I10 ESSENTIAL HYPERTENSION: ICD-10-CM

## 2018-11-30 DIAGNOSIS — F32.A DEPRESSION, UNSPECIFIED DEPRESSION TYPE: ICD-10-CM

## 2018-11-30 DIAGNOSIS — R53.83 FATIGUE, UNSPECIFIED TYPE: ICD-10-CM

## 2018-11-30 DIAGNOSIS — Z00.00 MEDICARE ANNUAL WELLNESS VISIT, SUBSEQUENT: ICD-10-CM

## 2018-11-30 DIAGNOSIS — Z12.5 SCREENING FOR PROSTATE CANCER: ICD-10-CM

## 2018-11-30 DIAGNOSIS — D63.1 ANEMIA IN STAGE 3 CHRONIC KIDNEY DISEASE (H): ICD-10-CM

## 2018-11-30 DIAGNOSIS — E53.8 B12 DEFICIENCY: ICD-10-CM

## 2018-11-30 DIAGNOSIS — R41.89 COGNITIVE CHANGES: ICD-10-CM

## 2018-11-30 DIAGNOSIS — E11.42 TYPE 2 DIABETES MELLITUS WITH PERIPHERAL NEUROPATHY (H): ICD-10-CM

## 2018-11-30 DIAGNOSIS — Z51.81 MEDICATION MONITORING ENCOUNTER: ICD-10-CM

## 2018-11-30 DIAGNOSIS — N18.30 CHRONIC KIDNEY DISEASE, STAGE 3 (MODERATE): ICD-10-CM

## 2018-11-30 DIAGNOSIS — E66.01 MORBID OBESITY (H): ICD-10-CM

## 2018-11-30 DIAGNOSIS — N18.30 ANEMIA IN STAGE 3 CHRONIC KIDNEY DISEASE (H): ICD-10-CM

## 2018-11-30 DIAGNOSIS — G47.33 OSA ON CPAP: ICD-10-CM

## 2018-11-30 DIAGNOSIS — C44.92 SQUAMOUS CELL SKIN CANCER: ICD-10-CM

## 2018-11-30 DIAGNOSIS — E11.42 DIABETIC PERIPHERAL NEUROPATHY (H): ICD-10-CM

## 2018-11-30 DIAGNOSIS — M15.0 PRIMARY OSTEOARTHRITIS INVOLVING MULTIPLE JOINTS: ICD-10-CM

## 2018-11-30 DIAGNOSIS — J45.20 MILD INTERMITTENT ASTHMA WITHOUT COMPLICATION: ICD-10-CM

## 2018-11-30 DIAGNOSIS — E78.2 MIXED HYPERLIPIDEMIA: ICD-10-CM

## 2018-11-30 LAB
ALBUMIN SERPL-MCNC: 3.9 G/DL (ref 3.5–5)
ALP SERPL-CCNC: 99 U/L (ref 45–120)
ALT SERPL W P-5'-P-CCNC: 19 U/L (ref 0–45)
ANION GAP SERPL CALCULATED.3IONS-SCNC: 14 MMOL/L (ref 5–18)
AST SERPL W P-5'-P-CCNC: 18 U/L (ref 0–40)
BILIRUB DIRECT SERPL-MCNC: 0.2 MG/DL
BILIRUB SERPL-MCNC: 0.7 MG/DL (ref 0–1)
BUN SERPL-MCNC: 25 MG/DL (ref 8–28)
CALCIUM SERPL-MCNC: 9.5 MG/DL (ref 8.5–10.5)
CHLORIDE BLD-SCNC: 103 MMOL/L (ref 98–107)
CHOLEST SERPL-MCNC: 140 MG/DL
CO2 SERPL-SCNC: 22 MMOL/L (ref 22–31)
CREAT SERPL-MCNC: 1.39 MG/DL (ref 0.7–1.3)
CREAT UR-MCNC: 119.8 MG/DL
FASTING STATUS PATIENT QL REPORTED: YES
GFR SERPL CREATININE-BSD FRML MDRD: 49 ML/MIN/1.73M2
GLUCOSE BLD-MCNC: 124 MG/DL (ref 70–125)
HBA1C MFR BLD: 7.6 % (ref 3.5–6)
HDLC SERPL-MCNC: 29 MG/DL
HGB BLD-MCNC: 13.8 G/DL (ref 14–18)
LDLC SERPL CALC-MCNC: 76 MG/DL
MICROALBUMIN UR-MCNC: 8.59 MG/DL (ref 0–1.99)
MICROALBUMIN/CREAT UR: 71.7 MG/G
POTASSIUM BLD-SCNC: 5 MMOL/L (ref 3.5–5)
PROT SERPL-MCNC: 6.8 G/DL (ref 6–8)
PSA SERPL-MCNC: 1.3 NG/ML (ref 0–6.5)
SODIUM SERPL-SCNC: 139 MMOL/L (ref 136–145)
TRIGL SERPL-MCNC: 177 MG/DL
TSH SERPL DL<=0.005 MIU/L-ACNC: 3.74 UIU/ML (ref 0.3–5)

## 2018-11-30 ASSESSMENT — MIFFLIN-ST. JEOR: SCORE: 2196.26

## 2018-12-03 ENCOUNTER — COMMUNICATION - HEALTHEAST (OUTPATIENT)
Dept: EDUCATION SERVICES | Facility: CLINIC | Age: 83
End: 2018-12-03

## 2018-12-20 ENCOUNTER — COMMUNICATION - HEALTHEAST (OUTPATIENT)
Dept: INTERNAL MEDICINE | Facility: CLINIC | Age: 83
End: 2018-12-20

## 2018-12-20 DIAGNOSIS — E11.9 DM (DIABETES MELLITUS), TYPE 2 (H): ICD-10-CM

## 2018-12-21 ENCOUNTER — COMMUNICATION - HEALTHEAST (OUTPATIENT)
Dept: INTERNAL MEDICINE | Facility: CLINIC | Age: 83
End: 2018-12-21

## 2018-12-21 DIAGNOSIS — E11.9 DM (DIABETES MELLITUS), TYPE 2 (H): ICD-10-CM

## 2019-01-04 ENCOUNTER — COMMUNICATION - HEALTHEAST (OUTPATIENT)
Dept: INTERNAL MEDICINE | Facility: CLINIC | Age: 84
End: 2019-01-04

## 2019-01-04 DIAGNOSIS — E11.9 DM (DIABETES MELLITUS), TYPE 2 (H): ICD-10-CM

## 2019-01-07 ENCOUNTER — COMMUNICATION - HEALTHEAST (OUTPATIENT)
Dept: INTERNAL MEDICINE | Facility: CLINIC | Age: 84
End: 2019-01-07

## 2019-01-10 ENCOUNTER — RECORDS - HEALTHEAST (OUTPATIENT)
Dept: ADMINISTRATIVE | Facility: OTHER | Age: 84
End: 2019-01-10

## 2019-01-16 ENCOUNTER — RECORDS - HEALTHEAST (OUTPATIENT)
Dept: ADMINISTRATIVE | Facility: OTHER | Age: 84
End: 2019-01-16

## 2019-01-23 ENCOUNTER — COMMUNICATION - HEALTHEAST (OUTPATIENT)
Dept: INTERNAL MEDICINE | Facility: CLINIC | Age: 84
End: 2019-01-23

## 2019-01-23 DIAGNOSIS — E78.2 MIXED HYPERLIPIDEMIA: ICD-10-CM

## 2019-03-04 ENCOUNTER — OFFICE VISIT - HEALTHEAST (OUTPATIENT)
Dept: INTERNAL MEDICINE | Facility: CLINIC | Age: 84
End: 2019-03-04

## 2019-03-04 DIAGNOSIS — D63.1 ANEMIA IN STAGE 3 CHRONIC KIDNEY DISEASE (H): ICD-10-CM

## 2019-03-04 DIAGNOSIS — E11.42 TYPE 2 DIABETES MELLITUS WITH PERIPHERAL NEUROPATHY (H): ICD-10-CM

## 2019-03-04 DIAGNOSIS — I10 ESSENTIAL HYPERTENSION: ICD-10-CM

## 2019-03-04 DIAGNOSIS — N18.30 CHRONIC KIDNEY DISEASE, STAGE 3 (MODERATE): ICD-10-CM

## 2019-03-04 DIAGNOSIS — R26.81 UNSTEADY GAIT: ICD-10-CM

## 2019-03-04 DIAGNOSIS — E66.01 MORBID OBESITY (H): ICD-10-CM

## 2019-03-04 DIAGNOSIS — E78.5 HYPERLIPIDEMIA, UNSPECIFIED HYPERLIPIDEMIA TYPE: ICD-10-CM

## 2019-03-04 DIAGNOSIS — C44.92 SQUAMOUS CELL SKIN CANCER: ICD-10-CM

## 2019-03-04 DIAGNOSIS — G47.33 OSA ON CPAP: ICD-10-CM

## 2019-03-04 DIAGNOSIS — R41.89 COGNITIVE CHANGES: ICD-10-CM

## 2019-03-04 DIAGNOSIS — N18.30 ANEMIA IN STAGE 3 CHRONIC KIDNEY DISEASE (H): ICD-10-CM

## 2019-03-04 LAB
ANION GAP SERPL CALCULATED.3IONS-SCNC: 10 MMOL/L (ref 5–18)
BUN SERPL-MCNC: 29 MG/DL (ref 8–28)
CALCIUM SERPL-MCNC: 9.9 MG/DL (ref 8.5–10.5)
CHLORIDE BLD-SCNC: 101 MMOL/L (ref 98–107)
CO2 SERPL-SCNC: 27 MMOL/L (ref 22–31)
CREAT SERPL-MCNC: 1.44 MG/DL (ref 0.7–1.3)
GFR SERPL CREATININE-BSD FRML MDRD: 47 ML/MIN/1.73M2
GLUCOSE BLD-MCNC: 111 MG/DL (ref 70–125)
HBA1C MFR BLD: 7.6 % (ref 3.5–6)
HGB BLD-MCNC: 13.6 G/DL (ref 14–18)
POTASSIUM BLD-SCNC: 4.7 MMOL/L (ref 3.5–5)
SODIUM SERPL-SCNC: 138 MMOL/L (ref 136–145)

## 2019-03-04 ASSESSMENT — MIFFLIN-ST. JEOR: SCORE: 2182.66

## 2019-03-05 ENCOUNTER — COMMUNICATION - HEALTHEAST (OUTPATIENT)
Dept: INTERNAL MEDICINE | Facility: CLINIC | Age: 84
End: 2019-03-05

## 2019-03-22 ENCOUNTER — RECORDS - HEALTHEAST (OUTPATIENT)
Dept: ADMINISTRATIVE | Facility: OTHER | Age: 84
End: 2019-03-22

## 2019-03-29 ENCOUNTER — COMMUNICATION - HEALTHEAST (OUTPATIENT)
Dept: INTERNAL MEDICINE | Facility: CLINIC | Age: 84
End: 2019-03-29

## 2019-03-29 DIAGNOSIS — F32.A DEPRESSION: ICD-10-CM

## 2019-04-09 ENCOUNTER — RECORDS - HEALTHEAST (OUTPATIENT)
Dept: ADMINISTRATIVE | Facility: OTHER | Age: 84
End: 2019-04-09

## 2019-05-29 ENCOUNTER — COMMUNICATION - HEALTHEAST (OUTPATIENT)
Dept: INTERNAL MEDICINE | Facility: CLINIC | Age: 84
End: 2019-05-29

## 2019-05-29 DIAGNOSIS — E11.42 DIABETIC PERIPHERAL NEUROPATHY (H): ICD-10-CM

## 2019-06-03 ENCOUNTER — OFFICE VISIT - HEALTHEAST (OUTPATIENT)
Dept: INTERNAL MEDICINE | Facility: CLINIC | Age: 84
End: 2019-06-03

## 2019-06-03 DIAGNOSIS — E11.42 TYPE 2 DIABETES MELLITUS WITH PERIPHERAL NEUROPATHY (H): ICD-10-CM

## 2019-06-03 DIAGNOSIS — R53.83 FATIGUE, UNSPECIFIED TYPE: ICD-10-CM

## 2019-06-03 DIAGNOSIS — G47.33 OSA ON CPAP: ICD-10-CM

## 2019-06-03 DIAGNOSIS — F32.A DEPRESSION: ICD-10-CM

## 2019-06-03 DIAGNOSIS — E66.01 MORBID OBESITY (H): ICD-10-CM

## 2019-06-03 DIAGNOSIS — I10 ESSENTIAL HYPERTENSION: ICD-10-CM

## 2019-06-03 DIAGNOSIS — D63.1 ANEMIA IN STAGE 3 CHRONIC KIDNEY DISEASE (H): ICD-10-CM

## 2019-06-03 DIAGNOSIS — R41.89 COGNITIVE CHANGES: ICD-10-CM

## 2019-06-03 DIAGNOSIS — R26.81 UNSTEADY GAIT: ICD-10-CM

## 2019-06-03 DIAGNOSIS — N18.30 ANEMIA IN STAGE 3 CHRONIC KIDNEY DISEASE (H): ICD-10-CM

## 2019-06-03 DIAGNOSIS — N18.30 CHRONIC KIDNEY DISEASE, STAGE 3 (MODERATE): ICD-10-CM

## 2019-06-03 DIAGNOSIS — E78.5 HYPERLIPIDEMIA, UNSPECIFIED HYPERLIPIDEMIA TYPE: ICD-10-CM

## 2019-06-03 DIAGNOSIS — F32.1 MODERATE MAJOR DEPRESSION (H): ICD-10-CM

## 2019-06-03 LAB
ANION GAP SERPL CALCULATED.3IONS-SCNC: 12 MMOL/L (ref 5–18)
BUN SERPL-MCNC: 30 MG/DL (ref 8–28)
CALCIUM SERPL-MCNC: 9.6 MG/DL (ref 8.5–10.5)
CHLORIDE BLD-SCNC: 102 MMOL/L (ref 98–107)
CO2 SERPL-SCNC: 23 MMOL/L (ref 22–31)
CREAT SERPL-MCNC: 1.4 MG/DL (ref 0.7–1.3)
GFR SERPL CREATININE-BSD FRML MDRD: 48 ML/MIN/1.73M2
GLUCOSE BLD-MCNC: 118 MG/DL (ref 70–125)
HBA1C MFR BLD: 7.5 % (ref 3.5–6)
HGB BLD-MCNC: 13.6 G/DL (ref 14–18)
POTASSIUM BLD-SCNC: 4.7 MMOL/L (ref 3.5–5)
SODIUM SERPL-SCNC: 137 MMOL/L (ref 136–145)

## 2019-06-03 ASSESSMENT — MIFFLIN-ST. JEOR: SCORE: 2187.19

## 2019-06-04 ENCOUNTER — COMMUNICATION - HEALTHEAST (OUTPATIENT)
Dept: INTERNAL MEDICINE | Facility: CLINIC | Age: 84
End: 2019-06-04

## 2019-07-23 ENCOUNTER — COMMUNICATION - HEALTHEAST (OUTPATIENT)
Dept: INTERNAL MEDICINE | Facility: CLINIC | Age: 84
End: 2019-07-23

## 2019-07-23 DIAGNOSIS — E11.9 DIABETES (H): ICD-10-CM

## 2019-07-23 DIAGNOSIS — I10 ESSENTIAL HYPERTENSION: ICD-10-CM

## 2019-08-16 ENCOUNTER — COMMUNICATION - HEALTHEAST (OUTPATIENT)
Dept: INTERNAL MEDICINE | Facility: CLINIC | Age: 84
End: 2019-08-16

## 2019-08-16 DIAGNOSIS — E11.42 TYPE 2 DIABETES MELLITUS WITH PERIPHERAL NEUROPATHY (H): ICD-10-CM

## 2019-09-06 ENCOUNTER — OFFICE VISIT - HEALTHEAST (OUTPATIENT)
Dept: INTERNAL MEDICINE | Facility: CLINIC | Age: 84
End: 2019-09-06

## 2019-09-06 DIAGNOSIS — I10 ESSENTIAL HYPERTENSION: ICD-10-CM

## 2019-09-06 DIAGNOSIS — N18.30 CHRONIC KIDNEY DISEASE, STAGE 3 (MODERATE): ICD-10-CM

## 2019-09-06 DIAGNOSIS — E11.42 TYPE 2 DIABETES MELLITUS WITH PERIPHERAL NEUROPATHY (H): ICD-10-CM

## 2019-09-06 DIAGNOSIS — G47.33 OSA ON CPAP: ICD-10-CM

## 2019-09-06 DIAGNOSIS — E78.2 MIXED HYPERLIPIDEMIA: ICD-10-CM

## 2019-09-06 DIAGNOSIS — Z51.81 MEDICATION MONITORING ENCOUNTER: ICD-10-CM

## 2019-09-06 DIAGNOSIS — D63.1 ANEMIA IN STAGE 3 CHRONIC KIDNEY DISEASE (H): ICD-10-CM

## 2019-09-06 DIAGNOSIS — F32.1 MODERATE MAJOR DEPRESSION (H): ICD-10-CM

## 2019-09-06 DIAGNOSIS — N18.30 ANEMIA IN STAGE 3 CHRONIC KIDNEY DISEASE (H): ICD-10-CM

## 2019-09-06 DIAGNOSIS — G45.9 TIA (TRANSIENT ISCHEMIC ATTACK): ICD-10-CM

## 2019-09-06 LAB
HBA1C MFR BLD: 7.8 % (ref 3.5–6)
HGB BLD-MCNC: 12.7 G/DL (ref 14–18)

## 2019-09-06 ASSESSMENT — MIFFLIN-ST. JEOR: SCORE: 2191.73

## 2019-09-07 LAB
ANION GAP SERPL CALCULATED.3IONS-SCNC: 15 MMOL/L (ref 5–18)
BUN SERPL-MCNC: 23 MG/DL (ref 8–28)
CALCIUM SERPL-MCNC: 9.3 MG/DL (ref 8.5–10.5)
CHLORIDE BLD-SCNC: 103 MMOL/L (ref 98–107)
CO2 SERPL-SCNC: 20 MMOL/L (ref 22–31)
CREAT SERPL-MCNC: 1.47 MG/DL (ref 0.7–1.3)
GFR SERPL CREATININE-BSD FRML MDRD: 46 ML/MIN/1.73M2
GLUCOSE BLD-MCNC: 158 MG/DL (ref 70–125)
MAGNESIUM SERPL-MCNC: 1.6 MG/DL (ref 1.8–2.6)
POTASSIUM BLD-SCNC: 5.2 MMOL/L (ref 3.5–5)
SODIUM SERPL-SCNC: 138 MMOL/L (ref 136–145)

## 2019-09-08 ENCOUNTER — AMBULATORY - HEALTHEAST (OUTPATIENT)
Dept: INTERNAL MEDICINE | Facility: CLINIC | Age: 84
End: 2019-09-08

## 2019-09-08 ENCOUNTER — COMMUNICATION - HEALTHEAST (OUTPATIENT)
Dept: INTERNAL MEDICINE | Facility: CLINIC | Age: 84
End: 2019-09-08

## 2019-09-08 DIAGNOSIS — E83.42 HYPOMAGNESEMIA: ICD-10-CM

## 2019-09-12 ENCOUNTER — HOSPITAL ENCOUNTER (OUTPATIENT)
Dept: CARDIOLOGY | Facility: CLINIC | Age: 84
Discharge: HOME | End: 2019-09-12
Attending: INTERNAL MEDICINE

## 2019-09-12 ENCOUNTER — HOSPITAL ENCOUNTER (OUTPATIENT)
Dept: ULTRASOUND IMAGING | Facility: CLINIC | Age: 84
Discharge: HOME | End: 2019-09-12
Attending: INTERNAL MEDICINE

## 2019-09-12 ENCOUNTER — HOSPITAL ENCOUNTER (OUTPATIENT)
Dept: CT IMAGING | Facility: CLINIC | Age: 84
Discharge: HOME | End: 2019-09-12
Attending: INTERNAL MEDICINE

## 2019-09-12 DIAGNOSIS — G45.9 TIA (TRANSIENT ISCHEMIC ATTACK): ICD-10-CM

## 2019-09-12 ASSESSMENT — MIFFLIN-ST. JEOR: SCORE: 2191.73

## 2019-09-13 LAB
AORTIC ROOT: 3.6 CM
AORTIC VALVE MEAN VELOCITY: 92.3 CM/S
ASCENDING AORTA: 3.9 CM
AV DIMENSIONLESS INDEX VTI: 0.8
AV MEAN GRADIENT: 4 MMHG
AV PEAK GRADIENT: 9.5 MMHG
AV VALVE AREA: 3.2 CM2
AV VELOCITY RATIO: 0.7
BSA FOR ECHO PROCEDURE: 2.73 M2
CV BLOOD PRESSURE: ABNORMAL MMHG
CV ECHO HEIGHT: 70 IN
CV ECHO WEIGHT: 333 LBS
DOP CALC AO PEAK VEL: 154 CM/S
DOP CALC AO VTI: 33.9 CM
DOP CALC LVOT AREA: 3.8 CM2
DOP CALC LVOT DIAMETER: 2.2 CM
DOP CALC LVOT PEAK VEL: 109 CM/S
DOP CALC LVOT STROKE VOLUME: 108.7 CM3
DOP CALC MV VTI: 51.4 CM
DOP CALCLVOT PEAK VEL VTI: 28.6 CM
EJECTION FRACTION: 50 % (ref 55–75)
FRACTIONAL SHORTENING: 20.4 % (ref 28–44)
INTERVENTRICULAR SEPTUM IN END DIASTOLE: 1.3 CM (ref 0.6–1)
IVS/PW RATIO: 1.2
LA AREA 1: 32.4 CM2
LA AREA 2: 33.9 CM2
LEFT ATRIUM LENGTH: 7.35 CM
LEFT ATRIUM SIZE: 4.3 CM
LEFT ATRIUM TO AORTIC ROOT RATIO: 1.19 NO UNITS
LEFT ATRIUM VOLUME INDEX: 46.5 ML/M2
LEFT ATRIUM VOLUME: 127 ML
LEFT VENTRICLE CARDIAC INDEX: 2.9 L/MIN/M2
LEFT VENTRICLE CARDIAC OUTPUT: 8 L/MIN
LEFT VENTRICLE DIASTOLIC VOLUME INDEX: 50.9 CM3/M2 (ref 34–74)
LEFT VENTRICLE DIASTOLIC VOLUME: 139 CM3 (ref 62–150)
LEFT VENTRICLE HEART RATE: 74 BPM
LEFT VENTRICLE MASS INDEX: 82.9 G/M2
LEFT VENTRICLE SYSTOLIC VOLUME INDEX: 25.3 CM3/M2 (ref 11–31)
LEFT VENTRICLE SYSTOLIC VOLUME: 69 CM3 (ref 21–61)
LEFT VENTRICULAR INTERNAL DIMENSION IN DIASTOLE: 4.9 CM (ref 4.2–5.8)
LEFT VENTRICULAR INTERNAL DIMENSION IN SYSTOLE: 3.9 CM (ref 2.5–4)
LEFT VENTRICULAR MASS: 226.4 G
LEFT VENTRICULAR OUTFLOW TRACT MEAN GRADIENT: 2 MMHG
LEFT VENTRICULAR OUTFLOW TRACT MEAN VELOCITY: 73 CM/S
LEFT VENTRICULAR OUTFLOW TRACT PEAK GRADIENT: 5 MMHG
LEFT VENTRICULAR POSTERIOR WALL IN END DIASTOLE: 1.1 CM (ref 0.6–1)
LV STROKE VOLUME INDEX: 39.8 ML/M2
MITRAL VALVE DECELERATION SLOPE: 4720 MM/S2
MITRAL VALVE E/A RATIO: 1.3
MITRAL VALVE MEAN INFLOW VELOCITY: 116 CM/S
MITRAL VALVE PEAK VELOCITY: 170 CM/S
MITRAL VALVE PRESSURE HALF-TIME: 85 MS
MV AREA VTI: 2.11 CM2
MV AVERAGE E/E' RATIO: 22.5 CM/S
MV DECELERATION TIME: 341 MS
MV E'TISSUE VEL-LAT: 7.31 CM/S
MV E'TISSUE VEL-MED: 7.21 CM/S
MV LATERAL E/E' RATIO: 22.3
MV MEAN GRADIENT: 6 MMHG
MV MEDIAL E/E' RATIO: 22.6
MV PEAK A VELOCITY: 127 CM/S
MV PEAK E VELOCITY: 163 CM/S
MV PEAK GRADIENT: 11.6 MMHG
MV VALVE AREA BY CONTINUITY EQUATION: 2.1 CM2
MV VALVE AREA PRESSURE 1/2 METHOD: 2.6 CM2
NUC REST DIASTOLIC VOLUME INDEX: 5328 LBS
NUC REST SYSTOLIC VOLUME INDEX: 70 IN
TRICUSPID VALVE ANULAR PLANE SYSTOLIC EXCURSION: 2.1 CM

## 2019-09-16 ENCOUNTER — AMBULATORY - HEALTHEAST (OUTPATIENT)
Dept: INTERNAL MEDICINE | Facility: CLINIC | Age: 84
End: 2019-09-16

## 2019-09-16 ENCOUNTER — COMMUNICATION - HEALTHEAST (OUTPATIENT)
Dept: INTERNAL MEDICINE | Facility: CLINIC | Age: 84
End: 2019-09-16

## 2019-09-16 ENCOUNTER — COMMUNICATION - HEALTHEAST (OUTPATIENT)
Dept: SCHEDULING | Facility: CLINIC | Age: 84
End: 2019-09-16

## 2019-09-16 DIAGNOSIS — G45.9 TIA (TRANSIENT ISCHEMIC ATTACK): ICD-10-CM

## 2019-09-19 ENCOUNTER — RECORDS - HEALTHEAST (OUTPATIENT)
Dept: ADMINISTRATIVE | Facility: OTHER | Age: 84
End: 2019-09-19

## 2019-09-25 ENCOUNTER — COMMUNICATION - HEALTHEAST (OUTPATIENT)
Dept: INTERNAL MEDICINE | Facility: CLINIC | Age: 84
End: 2019-09-25

## 2019-09-26 ENCOUNTER — AMBULATORY - HEALTHEAST (OUTPATIENT)
Dept: INTERNAL MEDICINE | Facility: CLINIC | Age: 84
End: 2019-09-26

## 2019-09-26 DIAGNOSIS — E78.5 HYPERLIPIDEMIA, UNSPECIFIED HYPERLIPIDEMIA TYPE: ICD-10-CM

## 2019-09-27 ENCOUNTER — RECORDS - HEALTHEAST (OUTPATIENT)
Dept: HEALTH INFORMATION MANAGEMENT | Facility: CLINIC | Age: 84
End: 2019-09-27

## 2019-09-30 ENCOUNTER — COMMUNICATION - HEALTHEAST (OUTPATIENT)
Dept: INTERNAL MEDICINE | Facility: CLINIC | Age: 84
End: 2019-09-30

## 2019-09-30 DIAGNOSIS — E11.42 TYPE 2 DIABETES MELLITUS WITH PERIPHERAL NEUROPATHY (H): ICD-10-CM

## 2019-10-10 ENCOUNTER — OFFICE VISIT - HEALTHEAST (OUTPATIENT)
Dept: INTERNAL MEDICINE | Facility: CLINIC | Age: 84
End: 2019-10-10

## 2019-10-10 DIAGNOSIS — Z23 NEED FOR IMMUNIZATION AGAINST INFLUENZA: ICD-10-CM

## 2019-10-10 DIAGNOSIS — E11.42 TYPE 2 DIABETES MELLITUS WITH PERIPHERAL NEUROPATHY (H): ICD-10-CM

## 2019-10-10 DIAGNOSIS — I10 ESSENTIAL HYPERTENSION: ICD-10-CM

## 2019-10-10 DIAGNOSIS — M15.0 PRIMARY OSTEOARTHRITIS INVOLVING MULTIPLE JOINTS: ICD-10-CM

## 2019-10-10 DIAGNOSIS — G45.9 TIA (TRANSIENT ISCHEMIC ATTACK): ICD-10-CM

## 2019-10-10 ASSESSMENT — MIFFLIN-ST. JEOR: SCORE: 2205.34

## 2019-10-16 ENCOUNTER — COMMUNICATION - HEALTHEAST (OUTPATIENT)
Dept: INTERNAL MEDICINE | Facility: CLINIC | Age: 84
End: 2019-10-16

## 2019-10-16 DIAGNOSIS — E11.9 DIABETES (H): ICD-10-CM

## 2019-10-29 ENCOUNTER — RECORDS - HEALTHEAST (OUTPATIENT)
Dept: ADMINISTRATIVE | Facility: OTHER | Age: 84
End: 2019-10-29

## 2019-11-27 ENCOUNTER — COMMUNICATION - HEALTHEAST (OUTPATIENT)
Dept: INTERNAL MEDICINE | Facility: CLINIC | Age: 84
End: 2019-11-27

## 2019-11-27 DIAGNOSIS — E11.42 DIABETIC PERIPHERAL NEUROPATHY (H): ICD-10-CM

## 2019-11-29 ENCOUNTER — HOSPITAL ENCOUNTER (OUTPATIENT)
Dept: NEUROLOGY | Facility: CLINIC | Age: 84
Discharge: HOME | End: 2019-11-29

## 2019-11-29 DIAGNOSIS — G45.9 TIA ON MEDICATION: ICD-10-CM

## 2020-01-03 ENCOUNTER — RECORDS - HEALTHEAST (OUTPATIENT)
Dept: ADMINISTRATIVE | Facility: OTHER | Age: 85
End: 2020-01-03

## 2020-01-14 ENCOUNTER — OFFICE VISIT - HEALTHEAST (OUTPATIENT)
Dept: INTERNAL MEDICINE | Facility: CLINIC | Age: 85
End: 2020-01-14
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
## 2020-01-14 DIAGNOSIS — Z51.81 ENCOUNTER FOR THERAPEUTIC DRUG MONITORING: ICD-10-CM

## 2020-01-14 DIAGNOSIS — G47.33 OSA ON CPAP: ICD-10-CM

## 2020-01-14 DIAGNOSIS — I10 ESSENTIAL HYPERTENSION: ICD-10-CM

## 2020-01-14 DIAGNOSIS — E78.5 HYPERLIPIDEMIA, UNSPECIFIED HYPERLIPIDEMIA TYPE: ICD-10-CM

## 2020-01-14 DIAGNOSIS — T18.128D FOOD IMPACTION OF ESOPHAGUS, SUBSEQUENT ENCOUNTER: ICD-10-CM

## 2020-01-14 DIAGNOSIS — N18.30 CHRONIC KIDNEY DISEASE, STAGE 3 (MODERATE): ICD-10-CM

## 2020-01-14 DIAGNOSIS — E11.42 TYPE 2 DIABETES MELLITUS WITH PERIPHERAL NEUROPATHY (H): ICD-10-CM

## 2020-01-14 DIAGNOSIS — F32.1 MODERATE MAJOR DEPRESSION (H): ICD-10-CM

## 2020-01-14 DIAGNOSIS — N18.30 ANEMIA IN STAGE 3 CHRONIC KIDNEY DISEASE (H): ICD-10-CM

## 2020-01-14 DIAGNOSIS — D63.1 ANEMIA IN STAGE 3 CHRONIC KIDNEY DISEASE (H): ICD-10-CM

## 2020-01-14 LAB
ALBUMIN SERPL-MCNC: 3.7 G/DL (ref 3.5–5)
ALP SERPL-CCNC: 97 U/L (ref 45–120)
ALT SERPL W P-5'-P-CCNC: 44 U/L (ref 0–45)
ANION GAP SERPL CALCULATED.3IONS-SCNC: 10 MMOL/L (ref 5–18)
AST SERPL W P-5'-P-CCNC: 29 U/L (ref 0–40)
BILIRUB DIRECT SERPL-MCNC: 0.2 MG/DL
BILIRUB SERPL-MCNC: 0.5 MG/DL (ref 0–1)
BUN SERPL-MCNC: 28 MG/DL (ref 8–28)
CALCIUM SERPL-MCNC: 9.2 MG/DL (ref 8.5–10.5)
CHLORIDE BLD-SCNC: 105 MMOL/L (ref 98–107)
CHOLEST SERPL-MCNC: 118 MG/DL
CO2 SERPL-SCNC: 23 MMOL/L (ref 22–31)
CREAT SERPL-MCNC: 1.38 MG/DL (ref 0.7–1.3)
CREAT UR-MCNC: 167.2 MG/DL
ERYTHROCYTE [DISTWIDTH] IN BLOOD BY AUTOMATED COUNT: 13.2 % (ref 11–14.5)
FASTING STATUS PATIENT QL REPORTED: ABNORMAL
GFR SERPL CREATININE-BSD FRML MDRD: 49 ML/MIN/1.73M2
GLUCOSE BLD-MCNC: 67 MG/DL (ref 70–125)
HBA1C MFR BLD: 7.1 % (ref 3.5–6)
HCT VFR BLD AUTO: 39.5 % (ref 40–54)
HDLC SERPL-MCNC: 28 MG/DL
HGB BLD-MCNC: 13 G/DL (ref 14–18)
LDLC SERPL CALC-MCNC: 68 MG/DL
MCH RBC QN AUTO: 30.4 PG (ref 27–34)
MCHC RBC AUTO-ENTMCNC: 32.9 G/DL (ref 32–36)
MCV RBC AUTO: 93 FL (ref 80–100)
MICROALBUMIN UR-MCNC: 18.52 MG/DL (ref 0–1.99)
MICROALBUMIN/CREAT UR: 110.8 MG/G
PLATELET # BLD AUTO: 199 THOU/UL (ref 140–440)
PMV BLD AUTO: 11.8 FL (ref 8.5–12.5)
POTASSIUM BLD-SCNC: 4.6 MMOL/L (ref 3.5–5)
PROT SERPL-MCNC: 6.7 G/DL (ref 6–8)
RBC # BLD AUTO: 4.27 MILL/UL (ref 4.4–6.2)
SODIUM SERPL-SCNC: 138 MMOL/L (ref 136–145)
TRIGL SERPL-MCNC: 110 MG/DL
WBC: 7.4 THOU/UL (ref 4–11)

## 2020-01-14 ASSESSMENT — PATIENT HEALTH QUESTIONNAIRE - PHQ9: SUM OF ALL RESPONSES TO PHQ QUESTIONS 1-9: 8

## 2020-01-14 ASSESSMENT — MIFFLIN-ST. JEOR: SCORE: 2228.01

## 2020-01-15 ENCOUNTER — COMMUNICATION - HEALTHEAST (OUTPATIENT)
Dept: INTERNAL MEDICINE | Facility: CLINIC | Age: 85
End: 2020-01-15

## 2020-02-03 ENCOUNTER — RECORDS - HEALTHEAST (OUTPATIENT)
Dept: ADMINISTRATIVE | Facility: OTHER | Age: 85
End: 2020-02-03

## 2020-02-06 ENCOUNTER — AMBULATORY - HEALTHEAST (OUTPATIENT)
Dept: INTERNAL MEDICINE | Facility: CLINIC | Age: 85
End: 2020-02-06

## 2020-02-06 DIAGNOSIS — R55 NEAR SYNCOPE: ICD-10-CM

## 2020-02-07 ENCOUNTER — OFFICE VISIT - HEALTHEAST (OUTPATIENT)
Dept: GERIATRICS | Facility: CLINIC | Age: 85
End: 2020-02-07

## 2020-02-07 DIAGNOSIS — E11.42 TYPE 2 DIABETES MELLITUS WITH PERIPHERAL NEUROPATHY (H): ICD-10-CM

## 2020-02-07 DIAGNOSIS — N18.30 CHRONIC KIDNEY DISEASE, STAGE 3 (MODERATE): ICD-10-CM

## 2020-02-07 DIAGNOSIS — G45.9 TIA (TRANSIENT ISCHEMIC ATTACK): ICD-10-CM

## 2020-02-07 DIAGNOSIS — I10 ESSENTIAL HYPERTENSION: ICD-10-CM

## 2020-02-07 DIAGNOSIS — R26.81 UNSTEADY GAIT: ICD-10-CM

## 2020-02-07 DIAGNOSIS — E11.42 DIABETIC PERIPHERAL NEUROPATHY (H): ICD-10-CM

## 2020-02-07 DIAGNOSIS — G47.33 OSA ON CPAP: ICD-10-CM

## 2020-02-07 DIAGNOSIS — E66.01 MORBID OBESITY (H): ICD-10-CM

## 2020-02-07 DIAGNOSIS — F32.1 MODERATE MAJOR DEPRESSION (H): ICD-10-CM

## 2020-02-11 ENCOUNTER — HOSPITAL ENCOUNTER (OUTPATIENT)
Dept: CARDIOLOGY | Facility: CLINIC | Age: 85
Discharge: HOME | End: 2020-02-11
Attending: INTERNAL MEDICINE

## 2020-02-11 DIAGNOSIS — R55 NEAR SYNCOPE: ICD-10-CM

## 2020-02-12 ENCOUNTER — OFFICE VISIT - HEALTHEAST (OUTPATIENT)
Dept: GERIATRICS | Facility: CLINIC | Age: 85
End: 2020-02-12

## 2020-02-12 DIAGNOSIS — E66.01 MORBID OBESITY (H): ICD-10-CM

## 2020-02-12 DIAGNOSIS — G45.9 TIA (TRANSIENT ISCHEMIC ATTACK): ICD-10-CM

## 2020-02-12 DIAGNOSIS — R26.81 UNSTEADY GAIT: ICD-10-CM

## 2020-02-12 DIAGNOSIS — R53.81 PHYSICAL DECONDITIONING: ICD-10-CM

## 2020-02-12 DIAGNOSIS — R41.89 COGNITIVE CHANGES: ICD-10-CM

## 2020-02-12 DIAGNOSIS — Z71.89 ADVANCED CARE PLANNING/COUNSELING DISCUSSION: ICD-10-CM

## 2020-02-13 ENCOUNTER — RECORDS - HEALTHEAST (OUTPATIENT)
Dept: ADMINISTRATIVE | Facility: OTHER | Age: 85
End: 2020-02-13

## 2020-02-13 ENCOUNTER — HOME CARE/HOSPICE - HEALTHEAST (OUTPATIENT)
Dept: HOME HEALTH SERVICES | Facility: HOME HEALTH | Age: 85
End: 2020-02-13

## 2020-02-13 ENCOUNTER — OFFICE VISIT - HEALTHEAST (OUTPATIENT)
Dept: GERIATRICS | Facility: CLINIC | Age: 85
End: 2020-02-13

## 2020-02-13 DIAGNOSIS — G47.33 OSA ON CPAP: ICD-10-CM

## 2020-02-13 DIAGNOSIS — G45.9 TIA (TRANSIENT ISCHEMIC ATTACK): ICD-10-CM

## 2020-02-13 DIAGNOSIS — E11.42 DIABETIC PERIPHERAL NEUROPATHY (H): ICD-10-CM

## 2020-02-13 DIAGNOSIS — M15.0 PRIMARY OSTEOARTHRITIS INVOLVING MULTIPLE JOINTS: ICD-10-CM

## 2020-02-13 DIAGNOSIS — E78.5 HYPERLIPIDEMIA, UNSPECIFIED HYPERLIPIDEMIA TYPE: ICD-10-CM

## 2020-02-13 DIAGNOSIS — N18.30 CHRONIC KIDNEY DISEASE, STAGE 3 (MODERATE): ICD-10-CM

## 2020-02-13 DIAGNOSIS — R41.89 COGNITIVE CHANGES: ICD-10-CM

## 2020-02-13 DIAGNOSIS — F32.1 MODERATE MAJOR DEPRESSION (H): ICD-10-CM

## 2020-02-14 ENCOUNTER — OFFICE VISIT - HEALTHEAST (OUTPATIENT)
Dept: GERIATRICS | Facility: CLINIC | Age: 85
End: 2020-02-14

## 2020-02-14 DIAGNOSIS — R26.81 UNSTEADY GAIT: ICD-10-CM

## 2020-02-14 DIAGNOSIS — R53.81 PHYSICAL DECONDITIONING: ICD-10-CM

## 2020-02-14 DIAGNOSIS — R53.83 FATIGUE, UNSPECIFIED TYPE: ICD-10-CM

## 2020-02-14 DIAGNOSIS — G45.9 TIA (TRANSIENT ISCHEMIC ATTACK): ICD-10-CM

## 2020-02-15 ENCOUNTER — COMMUNICATION - HEALTHEAST (OUTPATIENT)
Dept: INTERNAL MEDICINE | Facility: CLINIC | Age: 85
End: 2020-02-15

## 2020-02-15 DIAGNOSIS — E11.9 DM (DIABETES MELLITUS), TYPE 2 (H): ICD-10-CM

## 2020-02-16 ENCOUNTER — COMMUNICATION - HEALTHEAST (OUTPATIENT)
Dept: HOME HEALTH SERVICES | Facility: HOME HEALTH | Age: 85
End: 2020-02-16

## 2020-02-16 ENCOUNTER — COMMUNICATION - HEALTHEAST (OUTPATIENT)
Dept: INTERNAL MEDICINE | Facility: CLINIC | Age: 85
End: 2020-02-16

## 2020-02-16 DIAGNOSIS — E11.9 DM (DIABETES MELLITUS), TYPE 2 (H): ICD-10-CM

## 2020-02-17 ENCOUNTER — AMBULATORY - HEALTHEAST (OUTPATIENT)
Dept: GERIATRICS | Facility: CLINIC | Age: 85
End: 2020-02-17

## 2020-02-17 ENCOUNTER — COMMUNICATION - HEALTHEAST (OUTPATIENT)
Dept: GERIATRICS | Facility: CLINIC | Age: 85
End: 2020-02-17

## 2020-02-18 ENCOUNTER — HOME CARE/HOSPICE - HEALTHEAST (OUTPATIENT)
Dept: HOME HEALTH SERVICES | Facility: HOME HEALTH | Age: 85
End: 2020-02-18

## 2020-02-18 ENCOUNTER — AMBULATORY - HEALTHEAST (OUTPATIENT)
Dept: INTERNAL MEDICINE | Facility: CLINIC | Age: 85
End: 2020-02-18

## 2020-02-18 ENCOUNTER — COMMUNICATION - HEALTHEAST (OUTPATIENT)
Dept: INTERNAL MEDICINE | Facility: CLINIC | Age: 85
End: 2020-02-18

## 2020-02-18 ENCOUNTER — COMMUNICATION - HEALTHEAST (OUTPATIENT)
Dept: HOME HEALTH SERVICES | Facility: HOME HEALTH | Age: 85
End: 2020-02-18

## 2020-02-18 DIAGNOSIS — E11.42 TYPE 2 DIABETES MELLITUS WITH PERIPHERAL NEUROPATHY (H): ICD-10-CM

## 2020-02-18 DIAGNOSIS — E83.42 HYPOMAGNESEMIA: ICD-10-CM

## 2020-02-19 RX ORDER — GLUCOSAMINE HCL/CHONDROITIN SU 500-400 MG
CAPSULE ORAL
Qty: 300 STRIP | Refills: 3 | Status: SHIPPED | OUTPATIENT
Start: 2020-02-19 | End: 2021-07-14

## 2020-02-20 ENCOUNTER — HOME CARE/HOSPICE - HEALTHEAST (OUTPATIENT)
Dept: HOME HEALTH SERVICES | Facility: HOME HEALTH | Age: 85
End: 2020-02-20

## 2020-02-20 ENCOUNTER — RECORDS - HEALTHEAST (OUTPATIENT)
Dept: ADMINISTRATIVE | Facility: OTHER | Age: 85
End: 2020-02-20

## 2020-02-21 ENCOUNTER — OFFICE VISIT - HEALTHEAST (OUTPATIENT)
Dept: INTERNAL MEDICINE | Facility: CLINIC | Age: 85
End: 2020-02-21

## 2020-02-21 DIAGNOSIS — F03.90 DEMENTIA WITHOUT BEHAVIORAL DISTURBANCE, UNSPECIFIED DEMENTIA TYPE: ICD-10-CM

## 2020-02-21 DIAGNOSIS — E11.42 DIABETIC PERIPHERAL NEUROPATHY (H): ICD-10-CM

## 2020-02-21 DIAGNOSIS — E83.42 HYPOMAGNESEMIA: ICD-10-CM

## 2020-02-21 DIAGNOSIS — N18.30 CHRONIC KIDNEY DISEASE, STAGE 3 (MODERATE): ICD-10-CM

## 2020-02-21 DIAGNOSIS — F32.1 MODERATE MAJOR DEPRESSION (H): ICD-10-CM

## 2020-02-21 DIAGNOSIS — E11.42 TYPE 2 DIABETES MELLITUS WITH PERIPHERAL NEUROPATHY (H): ICD-10-CM

## 2020-02-21 DIAGNOSIS — R53.81 PHYSICAL DECONDITIONING: ICD-10-CM

## 2020-02-21 DIAGNOSIS — G45.9 TIA (TRANSIENT ISCHEMIC ATTACK): ICD-10-CM

## 2020-02-21 LAB
ANION GAP SERPL CALCULATED.3IONS-SCNC: 10 MMOL/L (ref 5–18)
BUN SERPL-MCNC: 25 MG/DL (ref 8–28)
CALCIUM SERPL-MCNC: 9.1 MG/DL (ref 8.5–10.5)
CHLORIDE BLD-SCNC: 104 MMOL/L (ref 98–107)
CO2 SERPL-SCNC: 24 MMOL/L (ref 22–31)
CREAT SERPL-MCNC: 1.38 MG/DL (ref 0.7–1.3)
GFR SERPL CREATININE-BSD FRML MDRD: 49 ML/MIN/1.73M2
GLUCOSE BLD-MCNC: 156 MG/DL (ref 70–125)
HGB BLD-MCNC: 12.4 G/DL (ref 14–18)
MAGNESIUM SERPL-MCNC: 1.6 MG/DL (ref 1.8–2.6)
POTASSIUM BLD-SCNC: 5 MMOL/L (ref 3.5–5)
SODIUM SERPL-SCNC: 138 MMOL/L (ref 136–145)

## 2020-02-21 ASSESSMENT — MIFFLIN-ST. JEOR: SCORE: 2191.73

## 2020-02-22 ENCOUNTER — COMMUNICATION - HEALTHEAST (OUTPATIENT)
Dept: INTERNAL MEDICINE | Facility: CLINIC | Age: 85
End: 2020-02-22

## 2020-02-22 ENCOUNTER — AMBULATORY - HEALTHEAST (OUTPATIENT)
Dept: INTERNAL MEDICINE | Facility: CLINIC | Age: 85
End: 2020-02-22

## 2020-02-22 DIAGNOSIS — E83.42 HYPOMAGNESEMIA: ICD-10-CM

## 2020-02-22 RX ORDER — MAGNESIUM OXIDE 400 MG/1
400 TABLET ORAL 2 TIMES DAILY
Qty: 100 TABLET | Refills: 1 | Status: SHIPPED | COMMUNITY
Start: 2020-02-22 | End: 2022-01-01

## 2020-02-24 ENCOUNTER — HOME CARE/HOSPICE - HEALTHEAST (OUTPATIENT)
Dept: HOME HEALTH SERVICES | Facility: HOME HEALTH | Age: 85
End: 2020-02-24

## 2020-02-24 ENCOUNTER — COMMUNICATION - HEALTHEAST (OUTPATIENT)
Dept: HOME HEALTH SERVICES | Facility: HOME HEALTH | Age: 85
End: 2020-02-24

## 2020-02-25 ENCOUNTER — HOME CARE/HOSPICE - HEALTHEAST (OUTPATIENT)
Dept: HOME HEALTH SERVICES | Facility: HOME HEALTH | Age: 85
End: 2020-02-25

## 2020-02-27 ENCOUNTER — HOME CARE/HOSPICE - HEALTHEAST (OUTPATIENT)
Dept: HOME HEALTH SERVICES | Facility: HOME HEALTH | Age: 85
End: 2020-02-27

## 2020-02-28 ENCOUNTER — HOME CARE/HOSPICE - HEALTHEAST (OUTPATIENT)
Dept: HOME HEALTH SERVICES | Facility: HOME HEALTH | Age: 85
End: 2020-02-28

## 2020-03-02 ENCOUNTER — HOME CARE/HOSPICE - HEALTHEAST (OUTPATIENT)
Dept: HOME HEALTH SERVICES | Facility: HOME HEALTH | Age: 85
End: 2020-03-02

## 2020-03-02 ENCOUNTER — COMMUNICATION - HEALTHEAST (OUTPATIENT)
Dept: SCHEDULING | Facility: CLINIC | Age: 85
End: 2020-03-02

## 2020-03-05 ENCOUNTER — HOME CARE/HOSPICE - HEALTHEAST (OUTPATIENT)
Dept: HOME HEALTH SERVICES | Facility: HOME HEALTH | Age: 85
End: 2020-03-05

## 2020-03-09 ENCOUNTER — HOME CARE/HOSPICE - HEALTHEAST (OUTPATIENT)
Dept: HOME HEALTH SERVICES | Facility: HOME HEALTH | Age: 85
End: 2020-03-09

## 2020-03-12 ENCOUNTER — AMBULATORY - HEALTHEAST (OUTPATIENT)
Dept: INTERNAL MEDICINE | Facility: CLINIC | Age: 85
End: 2020-03-12

## 2020-03-12 ENCOUNTER — HOME CARE/HOSPICE - HEALTHEAST (OUTPATIENT)
Dept: HOME HEALTH SERVICES | Facility: HOME HEALTH | Age: 85
End: 2020-03-12

## 2020-03-12 ENCOUNTER — COMMUNICATION - HEALTHEAST (OUTPATIENT)
Dept: INTERNAL MEDICINE | Facility: CLINIC | Age: 85
End: 2020-03-12

## 2020-03-16 ENCOUNTER — HOME CARE/HOSPICE - HEALTHEAST (OUTPATIENT)
Dept: HOME HEALTH SERVICES | Facility: HOME HEALTH | Age: 85
End: 2020-03-16

## 2020-03-24 ENCOUNTER — AMBULATORY - HEALTHEAST (OUTPATIENT)
Dept: INTERNAL MEDICINE | Facility: CLINIC | Age: 85
End: 2020-03-24

## 2020-03-24 ENCOUNTER — COMMUNICATION - HEALTHEAST (OUTPATIENT)
Dept: INTERNAL MEDICINE | Facility: CLINIC | Age: 85
End: 2020-03-24

## 2020-03-24 DIAGNOSIS — I49.5 SSS (SICK SINUS SYNDROME) (H): ICD-10-CM

## 2020-03-24 DIAGNOSIS — F03.90 DEMENTIA WITHOUT BEHAVIORAL DISTURBANCE, UNSPECIFIED DEMENTIA TYPE: ICD-10-CM

## 2020-04-09 ENCOUNTER — COMMUNICATION - HEALTHEAST (OUTPATIENT)
Dept: INTERNAL MEDICINE | Facility: CLINIC | Age: 85
End: 2020-04-09

## 2020-04-09 DIAGNOSIS — E11.42 TYPE 2 DIABETES MELLITUS WITH PERIPHERAL NEUROPATHY (H): ICD-10-CM

## 2020-05-07 ENCOUNTER — OFFICE VISIT - HEALTHEAST (OUTPATIENT)
Dept: INTERNAL MEDICINE | Facility: CLINIC | Age: 85
End: 2020-05-07

## 2020-05-07 DIAGNOSIS — E11.42 TYPE 2 DIABETES MELLITUS WITH PERIPHERAL NEUROPATHY (H): ICD-10-CM

## 2020-05-07 DIAGNOSIS — F03.90 DEMENTIA WITHOUT BEHAVIORAL DISTURBANCE, UNSPECIFIED DEMENTIA TYPE: ICD-10-CM

## 2020-05-07 DIAGNOSIS — I49.5 SICK SINUS SYNDROME (H): ICD-10-CM

## 2020-05-07 DIAGNOSIS — Z86.73 HISTORY OF RECURRENT TIAS: ICD-10-CM

## 2020-05-08 ENCOUNTER — COMMUNICATION - HEALTHEAST (OUTPATIENT)
Dept: SCHEDULING | Facility: CLINIC | Age: 85
End: 2020-05-08

## 2020-05-15 ENCOUNTER — RECORDS - HEALTHEAST (OUTPATIENT)
Dept: ADMINISTRATIVE | Facility: OTHER | Age: 85
End: 2020-05-15

## 2020-05-26 ENCOUNTER — COMMUNICATION - HEALTHEAST (OUTPATIENT)
Dept: INTERNAL MEDICINE | Facility: CLINIC | Age: 85
End: 2020-05-26

## 2020-05-26 DIAGNOSIS — E11.42 TYPE 2 DIABETES MELLITUS WITH PERIPHERAL NEUROPATHY (H): ICD-10-CM

## 2020-05-29 RX ORDER — PEN NEEDLE, DIABETIC 30 GX5/16"
NEEDLE, DISPOSABLE MISCELLANEOUS
Qty: 300 EACH | Refills: 3 | Status: SHIPPED | OUTPATIENT
Start: 2020-05-29 | End: 2021-07-14

## 2020-06-03 ENCOUNTER — COMMUNICATION - HEALTHEAST (OUTPATIENT)
Dept: CARDIOLOGY | Facility: CLINIC | Age: 85
End: 2020-06-03

## 2020-06-04 ENCOUNTER — OFFICE VISIT - HEALTHEAST (OUTPATIENT)
Dept: CARDIOLOGY | Facility: CLINIC | Age: 85
End: 2020-06-04

## 2020-06-04 DIAGNOSIS — G45.9 TIA (TRANSIENT ISCHEMIC ATTACK): ICD-10-CM

## 2020-06-04 DIAGNOSIS — N18.30 CHRONIC KIDNEY DISEASE, STAGE 3 (MODERATE): ICD-10-CM

## 2020-06-04 DIAGNOSIS — R26.81 UNSTEADY GAIT: ICD-10-CM

## 2020-06-04 DIAGNOSIS — I48.0 PAROXYSMAL ATRIAL FIBRILLATION (H): ICD-10-CM

## 2020-06-04 DIAGNOSIS — G47.33 OSA ON CPAP: ICD-10-CM

## 2020-06-04 DIAGNOSIS — I49.5 SICK SINUS SYNDROME (H): ICD-10-CM

## 2020-06-04 DIAGNOSIS — E11.42 TYPE 2 DIABETES MELLITUS WITH PERIPHERAL NEUROPATHY (H): ICD-10-CM

## 2020-06-04 DIAGNOSIS — E66.01 MORBID OBESITY (H): ICD-10-CM

## 2020-06-04 DIAGNOSIS — E78.5 HYPERLIPIDEMIA, UNSPECIFIED HYPERLIPIDEMIA TYPE: ICD-10-CM

## 2020-06-18 ENCOUNTER — RECORDS - HEALTHEAST (OUTPATIENT)
Dept: ADMINISTRATIVE | Facility: OTHER | Age: 85
End: 2020-06-18

## 2020-06-18 LAB — RETINOPATHY: NORMAL

## 2020-07-08 ENCOUNTER — OFFICE VISIT - HEALTHEAST (OUTPATIENT)
Dept: INTERNAL MEDICINE | Facility: CLINIC | Age: 85
End: 2020-07-08

## 2020-07-08 DIAGNOSIS — E11.42 DIABETIC PERIPHERAL NEUROPATHY (H): ICD-10-CM

## 2020-07-08 DIAGNOSIS — N18.30 ANEMIA IN STAGE 3 CHRONIC KIDNEY DISEASE (H): ICD-10-CM

## 2020-07-08 DIAGNOSIS — R26.81 UNSTEADY GAIT: ICD-10-CM

## 2020-07-08 DIAGNOSIS — E78.2 MIXED HYPERLIPIDEMIA: ICD-10-CM

## 2020-07-08 DIAGNOSIS — E55.9 VITAMIN D DEFICIENCY: ICD-10-CM

## 2020-07-08 DIAGNOSIS — F01.50 VASCULAR DEMENTIA WITHOUT BEHAVIORAL DISTURBANCE (H): ICD-10-CM

## 2020-07-08 DIAGNOSIS — I49.5 SICK SINUS SYNDROME (H): ICD-10-CM

## 2020-07-08 DIAGNOSIS — I48.0 PAROXYSMAL ATRIAL FIBRILLATION (H): ICD-10-CM

## 2020-07-08 DIAGNOSIS — D63.1 ANEMIA IN STAGE 3 CHRONIC KIDNEY DISEASE (H): ICD-10-CM

## 2020-07-08 DIAGNOSIS — N18.30 CHRONIC KIDNEY DISEASE, STAGE 3 (MODERATE): ICD-10-CM

## 2020-07-08 DIAGNOSIS — E11.42 TYPE 2 DIABETES MELLITUS WITH PERIPHERAL NEUROPATHY (H): ICD-10-CM

## 2020-07-08 DIAGNOSIS — F32.1 MODERATE MAJOR DEPRESSION (H): ICD-10-CM

## 2020-07-08 DIAGNOSIS — E83.42 HYPOMAGNESEMIA: ICD-10-CM

## 2020-07-08 DIAGNOSIS — G47.33 OSA ON CPAP: ICD-10-CM

## 2020-07-08 LAB
ANION GAP SERPL CALCULATED.3IONS-SCNC: 11 MMOL/L (ref 5–18)
BUN SERPL-MCNC: 27 MG/DL (ref 8–28)
CALCIUM SERPL-MCNC: 9.1 MG/DL (ref 8.5–10.5)
CHLORIDE BLD-SCNC: 103 MMOL/L (ref 98–107)
CO2 SERPL-SCNC: 22 MMOL/L (ref 22–31)
CREAT SERPL-MCNC: 1.58 MG/DL (ref 0.7–1.3)
ERYTHROCYTE [DISTWIDTH] IN BLOOD BY AUTOMATED COUNT: 12.4 % (ref 11–14.5)
GFR SERPL CREATININE-BSD FRML MDRD: 42 ML/MIN/1.73M2
GLUCOSE BLD-MCNC: 123 MG/DL (ref 70–125)
HBA1C MFR BLD: 7.3 % (ref 3.5–6)
HCT VFR BLD AUTO: 36.8 % (ref 40–54)
HGB BLD-MCNC: 12.6 G/DL (ref 14–18)
MAGNESIUM SERPL-MCNC: 2 MG/DL (ref 1.8–2.6)
MCH RBC QN AUTO: 30.6 PG (ref 27–34)
MCHC RBC AUTO-ENTMCNC: 34.3 G/DL (ref 32–36)
MCV RBC AUTO: 89 FL (ref 80–100)
PLATELET # BLD AUTO: 216 THOU/UL (ref 140–440)
PMV BLD AUTO: 8.5 FL (ref 7–10)
POTASSIUM BLD-SCNC: 5.4 MMOL/L (ref 3.5–5)
RBC # BLD AUTO: 4.13 MILL/UL (ref 4.4–6.2)
SODIUM SERPL-SCNC: 136 MMOL/L (ref 136–145)
WBC: 8.7 THOU/UL (ref 4–11)

## 2020-07-08 ASSESSMENT — MIFFLIN-ST. JEOR: SCORE: 2187.19

## 2020-07-09 ENCOUNTER — COMMUNICATION - HEALTHEAST (OUTPATIENT)
Dept: INTERNAL MEDICINE | Facility: CLINIC | Age: 85
End: 2020-07-09

## 2020-07-09 LAB — 25(OH)D3 SERPL-MCNC: 37.7 NG/ML (ref 30–80)

## 2020-07-28 ENCOUNTER — OFFICE VISIT - HEALTHEAST (OUTPATIENT)
Dept: INTERNAL MEDICINE | Facility: CLINIC | Age: 85
End: 2020-07-28

## 2020-07-28 DIAGNOSIS — G45.9 TIA (TRANSIENT ISCHEMIC ATTACK): ICD-10-CM

## 2020-07-28 DIAGNOSIS — Z00.00 MEDICARE ANNUAL WELLNESS VISIT, SUBSEQUENT: ICD-10-CM

## 2020-07-28 DIAGNOSIS — H90.3 BILATERAL SENSORINEURAL HEARING LOSS: ICD-10-CM

## 2020-07-28 ASSESSMENT — PATIENT HEALTH QUESTIONNAIRE - PHQ9: SUM OF ALL RESPONSES TO PHQ QUESTIONS 1-9: 6

## 2020-08-21 ENCOUNTER — COMMUNICATION - HEALTHEAST (OUTPATIENT)
Dept: INTERNAL MEDICINE | Facility: CLINIC | Age: 85
End: 2020-08-21

## 2020-08-21 DIAGNOSIS — F32.A DEPRESSION: ICD-10-CM

## 2020-08-21 DIAGNOSIS — E10.9 TYPE I DIABETES MELLITUS (H): ICD-10-CM

## 2020-08-24 RX ORDER — LOSARTAN POTASSIUM 100 MG/1
TABLET ORAL
Qty: 90 TABLET | Refills: 3 | Status: SHIPPED | OUTPATIENT
Start: 2020-08-24 | End: 2021-07-14

## 2020-08-24 RX ORDER — HYDROCHLOROTHIAZIDE 25 MG/1
TABLET ORAL
Qty: 90 TABLET | Refills: 3 | Status: SHIPPED | OUTPATIENT
Start: 2020-08-24 | End: 2021-11-02

## 2020-08-24 RX ORDER — CITALOPRAM HYDROBROMIDE 20 MG/1
TABLET ORAL
Qty: 90 TABLET | Refills: 3 | Status: SHIPPED | OUTPATIENT
Start: 2020-08-24 | End: 2021-07-14

## 2020-09-09 ENCOUNTER — COMMUNICATION - HEALTHEAST (OUTPATIENT)
Dept: INTERNAL MEDICINE | Facility: CLINIC | Age: 85
End: 2020-09-09

## 2020-09-09 DIAGNOSIS — E11.42 TYPE 2 DIABETES MELLITUS WITH PERIPHERAL NEUROPATHY (H): ICD-10-CM

## 2020-10-07 ENCOUNTER — RECORDS - HEALTHEAST (OUTPATIENT)
Dept: ADMINISTRATIVE | Facility: OTHER | Age: 85
End: 2020-10-07

## 2020-10-16 ENCOUNTER — COMMUNICATION - HEALTHEAST (OUTPATIENT)
Dept: INTERNAL MEDICINE | Facility: CLINIC | Age: 85
End: 2020-10-16

## 2020-10-16 DIAGNOSIS — E78.5 HYPERLIPIDEMIA, UNSPECIFIED HYPERLIPIDEMIA TYPE: ICD-10-CM

## 2020-10-19 RX ORDER — PRAVASTATIN SODIUM 20 MG
TABLET ORAL
Qty: 90 TABLET | Refills: 3 | Status: SHIPPED | OUTPATIENT
Start: 2020-10-19 | End: 2021-07-14

## 2020-11-09 ENCOUNTER — OFFICE VISIT - HEALTHEAST (OUTPATIENT)
Dept: INTERNAL MEDICINE | Facility: CLINIC | Age: 85
End: 2020-11-09

## 2020-11-09 DIAGNOSIS — D63.1 ANEMIA IN STAGE 3 CHRONIC KIDNEY DISEASE, UNSPECIFIED WHETHER STAGE 3A OR 3B CKD (H): ICD-10-CM

## 2020-11-09 DIAGNOSIS — F03.90 DEMENTIA WITHOUT BEHAVIORAL DISTURBANCE, UNSPECIFIED DEMENTIA TYPE: ICD-10-CM

## 2020-11-09 DIAGNOSIS — N18.30 ANEMIA IN STAGE 3 CHRONIC KIDNEY DISEASE, UNSPECIFIED WHETHER STAGE 3A OR 3B CKD (H): ICD-10-CM

## 2020-11-09 DIAGNOSIS — G47.33 OSA ON CPAP: ICD-10-CM

## 2020-11-09 DIAGNOSIS — I49.5 SICK SINUS SYNDROME (H): ICD-10-CM

## 2020-11-09 DIAGNOSIS — E11.42 DIABETIC PERIPHERAL NEUROPATHY (H): ICD-10-CM

## 2020-11-09 DIAGNOSIS — E11.42 TYPE 2 DIABETES MELLITUS WITH PERIPHERAL NEUROPATHY (H): ICD-10-CM

## 2020-11-09 DIAGNOSIS — Z23 NEED FOR IMMUNIZATION AGAINST INFLUENZA: ICD-10-CM

## 2020-11-09 DIAGNOSIS — I48.0 PAROXYSMAL ATRIAL FIBRILLATION (H): ICD-10-CM

## 2020-11-09 DIAGNOSIS — E78.2 MIXED HYPERLIPIDEMIA: ICD-10-CM

## 2020-11-09 DIAGNOSIS — N18.30 STAGE 3 CHRONIC KIDNEY DISEASE, UNSPECIFIED WHETHER STAGE 3A OR 3B CKD (H): ICD-10-CM

## 2020-11-09 DIAGNOSIS — F32.1 MODERATE MAJOR DEPRESSION (H): ICD-10-CM

## 2020-11-09 LAB
ANION GAP SERPL CALCULATED.3IONS-SCNC: 11 MMOL/L (ref 5–18)
BUN SERPL-MCNC: 27 MG/DL (ref 8–28)
CALCIUM SERPL-MCNC: 9 MG/DL (ref 8.5–10.5)
CHLORIDE BLD-SCNC: 104 MMOL/L (ref 98–107)
CO2 SERPL-SCNC: 24 MMOL/L (ref 22–31)
CREAT SERPL-MCNC: 1.39 MG/DL (ref 0.7–1.3)
GFR SERPL CREATININE-BSD FRML MDRD: 48 ML/MIN/1.73M2
GLUCOSE BLD-MCNC: 90 MG/DL (ref 70–125)
HBA1C MFR BLD: 6.6 %
HGB BLD-MCNC: 13.4 G/DL (ref 14–18)
POTASSIUM BLD-SCNC: 4.9 MMOL/L (ref 3.5–5)
SODIUM SERPL-SCNC: 139 MMOL/L (ref 136–145)

## 2020-11-09 ASSESSMENT — MIFFLIN-ST. JEOR: SCORE: 2164.51

## 2020-11-11 ENCOUNTER — COMMUNICATION - HEALTHEAST (OUTPATIENT)
Dept: INTERNAL MEDICINE | Facility: CLINIC | Age: 85
End: 2020-11-11

## 2020-11-23 ENCOUNTER — RECORDS - HEALTHEAST (OUTPATIENT)
Dept: HEALTH INFORMATION MANAGEMENT | Facility: CLINIC | Age: 85
End: 2020-11-23

## 2020-11-30 ENCOUNTER — COMMUNICATION - HEALTHEAST (OUTPATIENT)
Dept: INTERNAL MEDICINE | Facility: CLINIC | Age: 85
End: 2020-11-30

## 2020-11-30 DIAGNOSIS — E11.9 DIABETES (H): ICD-10-CM

## 2020-12-17 ENCOUNTER — RECORDS - HEALTHEAST (OUTPATIENT)
Dept: ADMINISTRATIVE | Facility: OTHER | Age: 85
End: 2020-12-17

## 2020-12-17 LAB — RETINOPATHY: NEGATIVE

## 2020-12-21 ENCOUNTER — RECORDS - HEALTHEAST (OUTPATIENT)
Dept: HEALTH INFORMATION MANAGEMENT | Facility: CLINIC | Age: 85
End: 2020-12-21

## 2021-01-01 ENCOUNTER — OFFICE VISIT (OUTPATIENT)
Dept: INTERNAL MEDICINE | Facility: CLINIC | Age: 86
End: 2021-01-01
Payer: MEDICARE

## 2021-01-01 ENCOUNTER — TELEPHONE (OUTPATIENT)
Dept: INTERNAL MEDICINE | Facility: CLINIC | Age: 86
End: 2021-01-01
Payer: MEDICARE

## 2021-01-01 VITALS
HEART RATE: 53 BPM | SYSTOLIC BLOOD PRESSURE: 138 MMHG | BODY MASS INDEX: 40.09 KG/M2 | WEIGHT: 296 LBS | DIASTOLIC BLOOD PRESSURE: 68 MMHG | OXYGEN SATURATION: 98 % | HEIGHT: 72 IN

## 2021-01-01 DIAGNOSIS — N30.00 ACUTE CYSTITIS WITHOUT HEMATURIA: ICD-10-CM

## 2021-01-01 DIAGNOSIS — E78.5 HYPERLIPIDEMIA, UNSPECIFIED HYPERLIPIDEMIA TYPE: ICD-10-CM

## 2021-01-01 DIAGNOSIS — Z86.73 HISTORY OF CVA (CEREBROVASCULAR ACCIDENT): ICD-10-CM

## 2021-01-01 DIAGNOSIS — E11.42 DIABETIC PERIPHERAL NEUROPATHY (H): ICD-10-CM

## 2021-01-01 DIAGNOSIS — I10 ESSENTIAL HYPERTENSION: ICD-10-CM

## 2021-01-01 DIAGNOSIS — F32.5 MAJOR DEPRESSIVE DISORDER IN FULL REMISSION, UNSPECIFIED WHETHER RECURRENT (H): ICD-10-CM

## 2021-01-01 DIAGNOSIS — E78.5 HYPERLIPIDEMIA, UNSPECIFIED HYPERLIPIDEMIA TYPE: Primary | ICD-10-CM

## 2021-01-01 DIAGNOSIS — N18.31 CHRONIC KIDNEY DISEASE, STAGE 3A (H): ICD-10-CM

## 2021-01-01 DIAGNOSIS — G47.33 OSA ON CPAP: ICD-10-CM

## 2021-01-01 DIAGNOSIS — E11.42 TYPE 2 DIABETES MELLITUS WITH DIABETIC POLYNEUROPATHY, WITH LONG-TERM CURRENT USE OF INSULIN (H): Primary | ICD-10-CM

## 2021-01-01 DIAGNOSIS — I48.0 PAROXYSMAL ATRIAL FIBRILLATION (H): ICD-10-CM

## 2021-01-01 DIAGNOSIS — E55.9 VITAMIN D DEFICIENCY: ICD-10-CM

## 2021-01-01 DIAGNOSIS — F01.50 VASCULAR DEMENTIA WITHOUT BEHAVIORAL DISTURBANCE (H): ICD-10-CM

## 2021-01-01 DIAGNOSIS — N39.0 URINARY TRACT INFECTION WITHOUT HEMATURIA, SITE UNSPECIFIED: Primary | ICD-10-CM

## 2021-01-01 DIAGNOSIS — Z79.4 TYPE 2 DIABETES MELLITUS WITH DIABETIC POLYNEUROPATHY, WITH LONG-TERM CURRENT USE OF INSULIN (H): Primary | ICD-10-CM

## 2021-01-01 DIAGNOSIS — Z23 HIGH PRIORITY FOR 2019-NCOV VACCINE: ICD-10-CM

## 2021-01-01 LAB
ALBUMIN SERPL-MCNC: 3 G/DL (ref 3.5–5)
ALBUMIN UR-MCNC: 100 MG/DL
ALP SERPL-CCNC: 117 U/L (ref 45–120)
ALT SERPL W P-5'-P-CCNC: 39 U/L (ref 0–45)
ANION GAP SERPL CALCULATED.3IONS-SCNC: 10 MMOL/L (ref 5–18)
APPEARANCE UR: CLEAR
AST SERPL W P-5'-P-CCNC: 40 U/L (ref 0–40)
BILIRUB SERPL-MCNC: 0.4 MG/DL (ref 0–1)
BILIRUB UR QL STRIP: NEGATIVE
BUN SERPL-MCNC: 27 MG/DL (ref 8–28)
CALCIUM SERPL-MCNC: 8.7 MG/DL (ref 8.5–10.5)
CHLORIDE BLD-SCNC: 100 MMOL/L (ref 98–107)
CHOLEST SERPL-MCNC: 117 MG/DL
CO2 SERPL-SCNC: 26 MMOL/L (ref 22–31)
COLOR UR AUTO: YELLOW
CREAT SERPL-MCNC: 1.71 MG/DL (ref 0.7–1.3)
ERYTHROCYTE [DISTWIDTH] IN BLOOD BY AUTOMATED COUNT: 12.2 % (ref 10–15)
FASTING STATUS PATIENT QL REPORTED: YES
GFR SERPL CREATININE-BSD FRML MDRD: 35 ML/MIN/1.73M2
GLUCOSE BLD-MCNC: 198 MG/DL (ref 70–125)
GLUCOSE UR STRIP-MCNC: NEGATIVE MG/DL
HBA1C MFR BLD: 7.9 % (ref 0–5.6)
HCT VFR BLD AUTO: 35.1 % (ref 40–53)
HDLC SERPL-MCNC: 26 MG/DL
HGB BLD-MCNC: 11.5 G/DL (ref 13.3–17.7)
HGB UR QL STRIP: ABNORMAL
HYALINE CASTS #/AREA URNS LPF: ABNORMAL /LPF
KETONES UR STRIP-MCNC: NEGATIVE MG/DL
LDLC SERPL CALC-MCNC: 56 MG/DL
LEUKOCYTE ESTERASE UR QL STRIP: NEGATIVE
MCH RBC QN AUTO: 29.3 PG (ref 26.5–33)
MCHC RBC AUTO-ENTMCNC: 32.8 G/DL (ref 31.5–36.5)
MCV RBC AUTO: 89 FL (ref 78–100)
MUCOUS THREADS #/AREA URNS LPF: PRESENT /LPF
NITRATE UR QL: NEGATIVE
PH UR STRIP: 7 [PH] (ref 5–8)
PLATELET # BLD AUTO: 311 10E3/UL (ref 150–450)
POTASSIUM BLD-SCNC: 5.5 MMOL/L (ref 3.5–5)
PROT SERPL-MCNC: 6.1 G/DL (ref 6–8)
RBC # BLD AUTO: 3.93 10E6/UL (ref 4.4–5.9)
RBC #/AREA URNS AUTO: ABNORMAL /HPF
SODIUM SERPL-SCNC: 136 MMOL/L (ref 136–145)
SP GR UR STRIP: 1.02 (ref 1–1.03)
TRIGL SERPL-MCNC: 176 MG/DL
UROBILINOGEN UR STRIP-ACNC: 0.2 E.U./DL
WBC # BLD AUTO: 7.9 10E3/UL (ref 4–11)
WBC #/AREA URNS AUTO: ABNORMAL /HPF

## 2021-01-01 PROCEDURE — 80061 LIPID PANEL: CPT | Performed by: INTERNAL MEDICINE

## 2021-01-01 PROCEDURE — 83036 HEMOGLOBIN GLYCOSYLATED A1C: CPT | Performed by: INTERNAL MEDICINE

## 2021-01-01 PROCEDURE — 91306 COVID-19,PF,MODERNA (18+ YRS BOOSTER .25ML): CPT | Performed by: INTERNAL MEDICINE

## 2021-01-01 PROCEDURE — 0064A COVID-19,PF,MODERNA (18+ YRS BOOSTER .25ML): CPT | Performed by: INTERNAL MEDICINE

## 2021-01-01 PROCEDURE — 99215 OFFICE O/P EST HI 40 MIN: CPT | Mod: 25 | Performed by: INTERNAL MEDICINE

## 2021-01-01 PROCEDURE — 80053 COMPREHEN METABOLIC PANEL: CPT | Performed by: INTERNAL MEDICINE

## 2021-01-01 PROCEDURE — 36415 COLL VENOUS BLD VENIPUNCTURE: CPT | Performed by: INTERNAL MEDICINE

## 2021-01-01 PROCEDURE — G0008 ADMIN INFLUENZA VIRUS VAC: HCPCS | Performed by: INTERNAL MEDICINE

## 2021-01-01 PROCEDURE — 90662 IIV NO PRSV INCREASED AG IM: CPT | Performed by: INTERNAL MEDICINE

## 2021-01-01 PROCEDURE — 85027 COMPLETE CBC AUTOMATED: CPT | Performed by: INTERNAL MEDICINE

## 2021-01-01 PROCEDURE — 81001 URINALYSIS AUTO W/SCOPE: CPT | Performed by: INTERNAL MEDICINE

## 2021-01-01 RX ORDER — PREGABALIN 50 MG/1
CAPSULE ORAL
Qty: 180 CAPSULE | Refills: 3 | Status: SHIPPED | OUTPATIENT
Start: 2021-01-01 | End: 2021-01-01

## 2021-01-01 RX ORDER — PRAVASTATIN SODIUM 20 MG
20 TABLET ORAL AT BEDTIME
Qty: 90 TABLET | Refills: 1 | Status: SHIPPED | OUTPATIENT
Start: 2021-01-01 | End: 2022-01-01

## 2021-01-01 RX ORDER — CIPROFLOXACIN 500 MG/1
500 TABLET, FILM COATED ORAL 2 TIMES DAILY
Qty: 14 TABLET | Refills: 0 | Status: SHIPPED | OUTPATIENT
Start: 2021-01-01 | End: 2022-01-01

## 2021-01-01 RX ORDER — INSULIN GLARGINE 100 [IU]/ML
44 INJECTION, SOLUTION SUBCUTANEOUS AT BEDTIME
Qty: 45 ML | Refills: 3
Start: 2021-01-01 | End: 2022-01-01

## 2021-01-01 RX ORDER — PREGABALIN 50 MG/1
50-100 CAPSULE ORAL AT BEDTIME
Qty: 180 CAPSULE | Refills: 3
Start: 2021-01-01 | End: 2022-01-01

## 2021-01-01 ASSESSMENT — MIFFLIN-ST. JEOR: SCORE: 2055.65

## 2021-01-12 ENCOUNTER — RECORDS - HEALTHEAST (OUTPATIENT)
Dept: LAB | Facility: CLINIC | Age: 86
End: 2021-01-12

## 2021-01-13 LAB
ALBUMIN SERPL-MCNC: 3.1 G/DL (ref 3.5–5)
ALP SERPL-CCNC: 95 U/L (ref 45–120)
ALT SERPL W P-5'-P-CCNC: 86 U/L (ref 0–45)
ANION GAP SERPL CALCULATED.3IONS-SCNC: 10 MMOL/L (ref 5–18)
AST SERPL W P-5'-P-CCNC: 80 U/L (ref 0–40)
BILIRUB SERPL-MCNC: 0.4 MG/DL (ref 0–1)
BUN SERPL-MCNC: 37 MG/DL (ref 8–28)
CALCIUM SERPL-MCNC: 8.4 MG/DL (ref 8.5–10.5)
CHLORIDE BLD-SCNC: 102 MMOL/L (ref 98–107)
CO2 SERPL-SCNC: 25 MMOL/L (ref 22–31)
CREAT SERPL-MCNC: 1.65 MG/DL (ref 0.7–1.3)
ERYTHROCYTE [DISTWIDTH] IN BLOOD BY AUTOMATED COUNT: 13.1 % (ref 11–14.5)
GFR SERPL CREATININE-BSD FRML MDRD: 40 ML/MIN/1.73M2
GLUCOSE BLD-MCNC: 71 MG/DL (ref 70–125)
HBA1C MFR BLD: 7.1 %
HCT VFR BLD AUTO: 36.6 % (ref 40–54)
HGB BLD-MCNC: 12.2 G/DL (ref 14–18)
MAGNESIUM SERPL-MCNC: 1.9 MG/DL (ref 1.8–2.6)
MCH RBC QN AUTO: 29.5 PG (ref 27–34)
MCHC RBC AUTO-ENTMCNC: 33.3 G/DL (ref 32–36)
MCV RBC AUTO: 89 FL (ref 80–100)
PLATELET # BLD AUTO: 368 THOU/UL (ref 140–440)
PMV BLD AUTO: 9.8 FL (ref 8.5–12.5)
POTASSIUM BLD-SCNC: 4.9 MMOL/L (ref 3.5–5)
PROT SERPL-MCNC: 6 G/DL (ref 6–8)
RBC # BLD AUTO: 4.13 MILL/UL (ref 4.4–6.2)
SODIUM SERPL-SCNC: 137 MMOL/L (ref 136–145)
TSH SERPL DL<=0.005 MIU/L-ACNC: 2.07 UIU/ML (ref 0.3–5)
VIT B12 SERPL-MCNC: 1189 PG/ML (ref 213–816)
WBC: 8.4 THOU/UL (ref 4–11)

## 2021-01-16 ENCOUNTER — RECORDS - HEALTHEAST (OUTPATIENT)
Dept: LAB | Facility: CLINIC | Age: 86
End: 2021-01-16

## 2021-01-18 LAB
ALBUMIN SERPL-MCNC: 3 G/DL (ref 3.5–5)
ALP SERPL-CCNC: 78 U/L (ref 45–120)
ALT SERPL W P-5'-P-CCNC: 55 U/L (ref 0–45)
ANION GAP SERPL CALCULATED.3IONS-SCNC: 9 MMOL/L (ref 5–18)
AST SERPL W P-5'-P-CCNC: 44 U/L (ref 0–40)
BILIRUB SERPL-MCNC: 0.4 MG/DL (ref 0–1)
BUN SERPL-MCNC: 35 MG/DL (ref 8–28)
CALCIUM SERPL-MCNC: 8.6 MG/DL (ref 8.5–10.5)
CHLORIDE BLD-SCNC: 102 MMOL/L (ref 98–107)
CO2 SERPL-SCNC: 23 MMOL/L (ref 22–31)
CREAT SERPL-MCNC: 1.88 MG/DL (ref 0.7–1.3)
GFR SERPL CREATININE-BSD FRML MDRD: 34 ML/MIN/1.73M2
GLUCOSE BLD-MCNC: 39 MG/DL (ref 70–125)
HGB BLD-MCNC: 12.1 G/DL (ref 14–18)
IRON SATN MFR SERPL: 26 % (ref 20–50)
IRON SERPL-MCNC: 59 UG/DL (ref 42–175)
POTASSIUM BLD-SCNC: 4.8 MMOL/L (ref 3.5–5)
PROT SERPL-MCNC: 6.2 G/DL (ref 6–8)
SODIUM SERPL-SCNC: 134 MMOL/L (ref 136–145)
TIBC SERPL-MCNC: 225 UG/DL (ref 313–563)
TRANSFERRIN SERPL-MCNC: 170 MG/DL (ref 212–360)

## 2021-01-22 ENCOUNTER — RECORDS - HEALTHEAST (OUTPATIENT)
Dept: LAB | Facility: CLINIC | Age: 86
End: 2021-01-22

## 2021-01-25 LAB
ALBUMIN SERPL-MCNC: 3.4 G/DL (ref 3.5–5)
ALP SERPL-CCNC: 89 U/L (ref 45–120)
ALT SERPL W P-5'-P-CCNC: 41 U/L (ref 0–45)
ANION GAP SERPL CALCULATED.3IONS-SCNC: 8 MMOL/L (ref 5–18)
AST SERPL W P-5'-P-CCNC: 37 U/L (ref 0–40)
BILIRUB SERPL-MCNC: 0.7 MG/DL (ref 0–1)
BUN SERPL-MCNC: 25 MG/DL (ref 8–28)
CALCIUM SERPL-MCNC: 8.9 MG/DL (ref 8.5–10.5)
CHLORIDE BLD-SCNC: 102 MMOL/L (ref 98–107)
CO2 SERPL-SCNC: 24 MMOL/L (ref 22–31)
CREAT SERPL-MCNC: 1.37 MG/DL (ref 0.7–1.3)
GFR SERPL CREATININE-BSD FRML MDRD: 49 ML/MIN/1.73M2
GLUCOSE BLD-MCNC: 145 MG/DL (ref 70–125)
POTASSIUM BLD-SCNC: 5.2 MMOL/L (ref 3.5–5)
PROT SERPL-MCNC: 6.8 G/DL (ref 6–8)
SODIUM SERPL-SCNC: 134 MMOL/L (ref 136–145)

## 2021-01-29 ENCOUNTER — RECORDS - HEALTHEAST (OUTPATIENT)
Dept: LAB | Facility: CLINIC | Age: 86
End: 2021-01-29

## 2021-02-01 LAB
ANION GAP SERPL CALCULATED.3IONS-SCNC: 9 MMOL/L (ref 5–18)
BUN SERPL-MCNC: 25 MG/DL (ref 8–28)
CALCIUM SERPL-MCNC: 9 MG/DL (ref 8.5–10.5)
CHLORIDE BLD-SCNC: 103 MMOL/L (ref 98–107)
CO2 SERPL-SCNC: 24 MMOL/L (ref 22–31)
CREAT SERPL-MCNC: 1.2 MG/DL (ref 0.7–1.3)
GFR SERPL CREATININE-BSD FRML MDRD: 57 ML/MIN/1.73M2
GLUCOSE BLD-MCNC: 69 MG/DL (ref 70–125)
HGB BLD-MCNC: 13.2 G/DL (ref 14–18)
POTASSIUM BLD-SCNC: 4.9 MMOL/L (ref 3.5–5)
SODIUM SERPL-SCNC: 136 MMOL/L (ref 136–145)

## 2021-02-08 ENCOUNTER — RECORDS - HEALTHEAST (OUTPATIENT)
Dept: LAB | Facility: CLINIC | Age: 86
End: 2021-02-08

## 2021-02-08 LAB
ANION GAP SERPL CALCULATED.3IONS-SCNC: 11 MMOL/L (ref 5–18)
BUN SERPL-MCNC: 37 MG/DL (ref 8–28)
CALCIUM SERPL-MCNC: 9.2 MG/DL (ref 8.5–10.5)
CHLORIDE BLD-SCNC: 104 MMOL/L (ref 98–107)
CO2 SERPL-SCNC: 23 MMOL/L (ref 22–31)
CREAT SERPL-MCNC: 1.5 MG/DL (ref 0.7–1.3)
GFR SERPL CREATININE-BSD FRML MDRD: 44 ML/MIN/1.73M2
GLUCOSE BLD-MCNC: 177 MG/DL (ref 70–125)
POTASSIUM BLD-SCNC: 5.9 MMOL/L (ref 3.5–5)
SODIUM SERPL-SCNC: 138 MMOL/L (ref 136–145)

## 2021-02-09 ENCOUNTER — RECORDS - HEALTHEAST (OUTPATIENT)
Dept: LAB | Facility: CLINIC | Age: 86
End: 2021-02-09

## 2021-02-09 LAB
ERYTHROCYTE [DISTWIDTH] IN BLOOD BY AUTOMATED COUNT: 13.3 % (ref 11–14.5)
HCT VFR BLD AUTO: 34.7 % (ref 40–54)
HGB BLD-MCNC: 11.3 G/DL (ref 14–18)
MCH RBC QN AUTO: 29.4 PG (ref 27–34)
MCHC RBC AUTO-ENTMCNC: 32.6 G/DL (ref 32–36)
MCV RBC AUTO: 90 FL (ref 80–100)
PLATELET # BLD AUTO: 213 THOU/UL (ref 140–440)
PMV BLD AUTO: 11.4 FL (ref 8.5–12.5)
RBC # BLD AUTO: 3.84 MILL/UL (ref 4.4–6.2)
WBC: 9.5 THOU/UL (ref 4–11)

## 2021-02-10 ENCOUNTER — RECORDS - HEALTHEAST (OUTPATIENT)
Dept: LAB | Facility: CLINIC | Age: 86
End: 2021-02-10

## 2021-02-10 LAB
ANION GAP SERPL CALCULATED.3IONS-SCNC: 7 MMOL/L (ref 5–18)
BUN SERPL-MCNC: 37 MG/DL (ref 8–28)
CALCIUM SERPL-MCNC: 8.4 MG/DL (ref 8.5–10.5)
CHLORIDE BLD-SCNC: 106 MMOL/L (ref 98–107)
CO2 SERPL-SCNC: 23 MMOL/L (ref 22–31)
CREAT SERPL-MCNC: 1.33 MG/DL (ref 0.7–1.3)
GFR SERPL CREATININE-BSD FRML MDRD: 51 ML/MIN/1.73M2
GLUCOSE BLD-MCNC: 57 MG/DL (ref 70–125)
MAGNESIUM SERPL-MCNC: 1.9 MG/DL (ref 1.8–2.6)
POTASSIUM BLD-SCNC: 5.4 MMOL/L (ref 3.5–5)
POTASSIUM BLD-SCNC: 5.5 MMOL/L (ref 3.5–5)
SODIUM SERPL-SCNC: 136 MMOL/L (ref 136–145)

## 2021-02-11 LAB
BASOPHILS # BLD AUTO: 0.1 THOU/UL (ref 0–0.2)
BASOPHILS NFR BLD AUTO: 1 % (ref 0–2)
EOSINOPHIL # BLD AUTO: 0.8 THOU/UL (ref 0–0.4)
EOSINOPHIL NFR BLD AUTO: 8 % (ref 0–6)
ERYTHROCYTE [DISTWIDTH] IN BLOOD BY AUTOMATED COUNT: 13.2 % (ref 11–14.5)
HCT VFR BLD AUTO: 40.6 % (ref 40–54)
HGB BLD-MCNC: 13.1 G/DL (ref 14–18)
IMM GRANULOCYTES # BLD: 0 THOU/UL
IMM GRANULOCYTES NFR BLD: 0 %
LYMPHOCYTES # BLD AUTO: 1.4 THOU/UL (ref 0.8–4.4)
LYMPHOCYTES NFR BLD AUTO: 15 % (ref 20–40)
MCH RBC QN AUTO: 29 PG (ref 27–34)
MCHC RBC AUTO-ENTMCNC: 32.3 G/DL (ref 32–36)
MCV RBC AUTO: 90 FL (ref 80–100)
MONOCYTES # BLD AUTO: 0.6 THOU/UL (ref 0–0.9)
MONOCYTES NFR BLD AUTO: 6 % (ref 2–10)
NEUTROPHILS # BLD AUTO: 6.3 THOU/UL (ref 2–7.7)
NEUTROPHILS NFR BLD AUTO: 70 % (ref 50–70)
PLATELET # BLD AUTO: 252 THOU/UL (ref 140–440)
PMV BLD AUTO: 11.2 FL (ref 8.5–12.5)
RBC # BLD AUTO: 4.51 MILL/UL (ref 4.4–6.2)
WBC: 9.1 THOU/UL (ref 4–11)

## 2021-02-12 ENCOUNTER — RECORDS - HEALTHEAST (OUTPATIENT)
Dept: LAB | Facility: CLINIC | Age: 86
End: 2021-02-12

## 2021-02-15 LAB — HGB BLD-MCNC: 12.3 G/DL (ref 14–18)

## 2021-02-16 ENCOUNTER — COMMUNICATION - HEALTHEAST (OUTPATIENT)
Dept: ADMINISTRATIVE | Facility: CLINIC | Age: 86
End: 2021-02-16

## 2021-02-22 ENCOUNTER — COMMUNICATION - HEALTHEAST (OUTPATIENT)
Dept: ADMINISTRATIVE | Facility: CLINIC | Age: 86
End: 2021-02-22

## 2021-02-22 ENCOUNTER — COMMUNICATION - HEALTHEAST (OUTPATIENT)
Dept: INTERNAL MEDICINE | Facility: CLINIC | Age: 86
End: 2021-02-22

## 2021-02-22 DIAGNOSIS — I10 ESSENTIAL HYPERTENSION: ICD-10-CM

## 2021-02-26 ENCOUNTER — RECORDS - HEALTHEAST (OUTPATIENT)
Dept: ADMINISTRATIVE | Facility: OTHER | Age: 86
End: 2021-02-26

## 2021-03-05 ENCOUNTER — OFFICE VISIT - HEALTHEAST (OUTPATIENT)
Dept: INTERNAL MEDICINE | Facility: CLINIC | Age: 86
End: 2021-03-05

## 2021-03-05 DIAGNOSIS — N18.31 STAGE 3A CHRONIC KIDNEY DISEASE (H): ICD-10-CM

## 2021-03-05 DIAGNOSIS — R29.6 RECURRENT FALLS: ICD-10-CM

## 2021-03-05 DIAGNOSIS — F03.90 DEMENTIA WITHOUT BEHAVIORAL DISTURBANCE, UNSPECIFIED DEMENTIA TYPE: ICD-10-CM

## 2021-03-05 DIAGNOSIS — E11.42 TYPE 2 DIABETES MELLITUS WITH PERIPHERAL NEUROPATHY (H): ICD-10-CM

## 2021-03-05 DIAGNOSIS — N39.0 SEPSIS SECONDARY TO UTI (H): ICD-10-CM

## 2021-03-05 DIAGNOSIS — I10 ESSENTIAL HYPERTENSION: ICD-10-CM

## 2021-03-05 DIAGNOSIS — A41.9 SEPSIS SECONDARY TO UTI (H): ICD-10-CM

## 2021-03-05 ASSESSMENT — PATIENT HEALTH QUESTIONNAIRE - PHQ9: SUM OF ALL RESPONSES TO PHQ QUESTIONS 1-9: 0

## 2021-03-08 ENCOUNTER — RECORDS - HEALTHEAST (OUTPATIENT)
Dept: ADMINISTRATIVE | Facility: OTHER | Age: 86
End: 2021-03-08

## 2021-03-16 ENCOUNTER — RECORDS - HEALTHEAST (OUTPATIENT)
Dept: ADMINISTRATIVE | Facility: OTHER | Age: 86
End: 2021-03-16

## 2021-03-18 ENCOUNTER — RECORDS - HEALTHEAST (OUTPATIENT)
Dept: ADMINISTRATIVE | Facility: OTHER | Age: 86
End: 2021-03-18

## 2021-03-24 ENCOUNTER — AMBULATORY - HEALTHEAST (OUTPATIENT)
Dept: CARDIOLOGY | Facility: CLINIC | Age: 86
End: 2021-03-24

## 2021-03-26 ENCOUNTER — RECORDS - HEALTHEAST (OUTPATIENT)
Dept: ADMINISTRATIVE | Facility: OTHER | Age: 86
End: 2021-03-26

## 2021-03-31 ENCOUNTER — OFFICE VISIT - HEALTHEAST (OUTPATIENT)
Dept: CARDIOLOGY | Facility: CLINIC | Age: 86
End: 2021-03-31

## 2021-03-31 DIAGNOSIS — E66.01 MORBID OBESITY (H): ICD-10-CM

## 2021-03-31 DIAGNOSIS — N18.31 STAGE 3A CHRONIC KIDNEY DISEASE (H): ICD-10-CM

## 2021-03-31 DIAGNOSIS — I49.5 SICK SINUS SYNDROME (H): ICD-10-CM

## 2021-03-31 DIAGNOSIS — F03.90 DEMENTIA WITHOUT BEHAVIORAL DISTURBANCE, UNSPECIFIED DEMENTIA TYPE: ICD-10-CM

## 2021-03-31 DIAGNOSIS — I10 ESSENTIAL HYPERTENSION: ICD-10-CM

## 2021-03-31 DIAGNOSIS — E11.42 TYPE 2 DIABETES MELLITUS WITH PERIPHERAL NEUROPATHY (H): ICD-10-CM

## 2021-03-31 DIAGNOSIS — G47.33 OSA ON CPAP: ICD-10-CM

## 2021-03-31 DIAGNOSIS — R26.81 UNSTEADY GAIT: ICD-10-CM

## 2021-03-31 DIAGNOSIS — I48.0 PAROXYSMAL ATRIAL FIBRILLATION (H): ICD-10-CM

## 2021-03-31 ASSESSMENT — MIFFLIN-ST. JEOR: SCORE: 2141.83

## 2021-04-05 ENCOUNTER — COMMUNICATION - HEALTHEAST (OUTPATIENT)
Dept: INTERNAL MEDICINE | Facility: CLINIC | Age: 86
End: 2021-04-05

## 2021-04-05 DIAGNOSIS — G45.9 TIA (TRANSIENT ISCHEMIC ATTACK): ICD-10-CM

## 2021-04-05 DIAGNOSIS — E11.42 DIABETIC PERIPHERAL NEUROPATHY (H): ICD-10-CM

## 2021-04-05 RX ORDER — PREGABALIN 50 MG/1
CAPSULE ORAL
Qty: 180 CAPSULE | Refills: 3 | Status: SHIPPED | OUTPATIENT
Start: 2021-04-05 | End: 2021-09-17

## 2021-04-05 RX ORDER — CLOPIDOGREL BISULFATE 75 MG/1
TABLET ORAL
Qty: 90 TABLET | Refills: 1 | Status: SHIPPED | OUTPATIENT
Start: 2021-04-05 | End: 2021-09-17

## 2021-04-14 ENCOUNTER — HOSPITAL ENCOUNTER (OUTPATIENT)
Dept: CARDIOLOGY | Facility: CLINIC | Age: 86
Discharge: HOME | End: 2021-04-14
Attending: INTERNAL MEDICINE

## 2021-04-14 DIAGNOSIS — I48.0 PAROXYSMAL ATRIAL FIBRILLATION (H): ICD-10-CM

## 2021-04-14 DIAGNOSIS — I49.5 SICK SINUS SYNDROME (H): ICD-10-CM

## 2021-04-17 ENCOUNTER — NURSE TRIAGE (OUTPATIENT)
Dept: NURSING | Facility: CLINIC | Age: 86
End: 2021-04-17

## 2021-04-17 NOTE — TELEPHONE ENCOUNTER
Nely (wife) calling said Sonu had serious UTI earlier this year, needed to change antibiotic. She is concerned another one is starting. Recommended he be seen in [facility] so culture could be started if recommended. She verbalized understanding and they plan to come to [facility] this am.

## 2021-04-28 ENCOUNTER — RECORDS - HEALTHEAST (OUTPATIENT)
Dept: INTERNAL MEDICINE | Facility: CLINIC | Age: 86
End: 2021-04-28

## 2021-04-28 DIAGNOSIS — E11.9 DIABETES (H): ICD-10-CM

## 2021-04-29 RX ORDER — METFORMIN HCL 500 MG
TABLET, EXTENDED RELEASE 24 HR ORAL
Qty: 270 TABLET | Refills: 3 | Status: SHIPPED | OUTPATIENT
Start: 2021-04-29 | End: 2021-07-14

## 2021-05-04 ENCOUNTER — NURSE TRIAGE (OUTPATIENT)
Dept: NURSING | Facility: CLINIC | Age: 86
End: 2021-05-04

## 2021-05-04 ENCOUNTER — COMMUNICATION - HEALTHEAST (OUTPATIENT)
Dept: SCHEDULING | Facility: CLINIC | Age: 86
End: 2021-05-04

## 2021-05-05 ENCOUNTER — OFFICE VISIT - HEALTHEAST (OUTPATIENT)
Dept: INTERNAL MEDICINE | Facility: CLINIC | Age: 86
End: 2021-05-05

## 2021-05-05 DIAGNOSIS — N39.0 URINARY TRACT INFECTION: ICD-10-CM

## 2021-05-05 DIAGNOSIS — N41.9 PROSTATITIS, UNSPECIFIED PROSTATITIS TYPE: ICD-10-CM

## 2021-05-05 LAB
ALBUMIN UR-MCNC: ABNORMAL G/DL
APPEARANCE UR: CLEAR
BACTERIA #/AREA URNS HPF: ABNORMAL /[HPF]
BILIRUB UR QL STRIP: NEGATIVE
COLOR UR AUTO: YELLOW
GLUCOSE UR STRIP-MCNC: NEGATIVE MG/DL
HGB UR QL STRIP: NEGATIVE
KETONES UR STRIP-MCNC: NEGATIVE MG/DL
LEUKOCYTE ESTERASE UR QL STRIP: NEGATIVE
NITRATE UR QL: NEGATIVE
PH UR STRIP: 6 [PH] (ref 5–8)
RBC #/AREA URNS AUTO: ABNORMAL HPF
SP GR UR STRIP: 1.02 (ref 1–1.03)
SQUAMOUS #/AREA URNS AUTO: ABNORMAL LPF
UROBILINOGEN UR STRIP-ACNC: ABNORMAL
WBC #/AREA URNS AUTO: ABNORMAL HPF

## 2021-05-05 ASSESSMENT — MIFFLIN-ST. JEOR: SCORE: 2159.97

## 2021-05-07 ENCOUNTER — OFFICE VISIT - HEALTHEAST (OUTPATIENT)
Dept: INTERNAL MEDICINE | Facility: CLINIC | Age: 86
End: 2021-05-07

## 2021-05-07 ENCOUNTER — COMMUNICATION - HEALTHEAST (OUTPATIENT)
Dept: INTERNAL MEDICINE | Facility: CLINIC | Age: 86
End: 2021-05-07

## 2021-05-07 DIAGNOSIS — E11.42 TYPE 2 DIABETES MELLITUS WITH PERIPHERAL NEUROPATHY (H): ICD-10-CM

## 2021-05-07 DIAGNOSIS — N41.9 PROSTATITIS, UNSPECIFIED PROSTATITIS TYPE: ICD-10-CM

## 2021-05-07 DIAGNOSIS — N18.31 CHRONIC KIDNEY DISEASE, STAGE 3A (H): ICD-10-CM

## 2021-05-07 DIAGNOSIS — R60.0 PERIPHERAL EDEMA: ICD-10-CM

## 2021-05-07 DIAGNOSIS — I10 ESSENTIAL HYPERTENSION: ICD-10-CM

## 2021-05-07 DIAGNOSIS — F03.90 DEMENTIA WITHOUT BEHAVIORAL DISTURBANCE, UNSPECIFIED DEMENTIA TYPE: ICD-10-CM

## 2021-05-07 DIAGNOSIS — G47.33 OSA ON CPAP: ICD-10-CM

## 2021-05-07 DIAGNOSIS — I49.5 SICK SINUS SYNDROME (H): ICD-10-CM

## 2021-05-07 DIAGNOSIS — E78.2 MIXED HYPERLIPIDEMIA: ICD-10-CM

## 2021-05-07 DIAGNOSIS — E11.42 DIABETIC PERIPHERAL NEUROPATHY (H): ICD-10-CM

## 2021-05-07 DIAGNOSIS — I48.0 PAROXYSMAL ATRIAL FIBRILLATION (H): ICD-10-CM

## 2021-05-07 DIAGNOSIS — F32.1 MODERATE MAJOR DEPRESSION (H): ICD-10-CM

## 2021-05-07 LAB — HBA1C MFR BLD: 6.8 %

## 2021-05-07 RX ORDER — AMLODIPINE BESYLATE 2.5 MG/1
2.5 TABLET ORAL DAILY
Qty: 90 TABLET | Refills: 3 | Status: SHIPPED | OUTPATIENT
Start: 2021-05-07 | End: 2022-01-01

## 2021-05-11 ENCOUNTER — AMBULATORY - HEALTHEAST (OUTPATIENT)
Dept: LAB | Facility: CLINIC | Age: 86
End: 2021-05-11

## 2021-05-11 DIAGNOSIS — I10 ESSENTIAL HYPERTENSION: ICD-10-CM

## 2021-05-11 DIAGNOSIS — N18.31 CHRONIC KIDNEY DISEASE, STAGE 3A (H): ICD-10-CM

## 2021-05-11 DIAGNOSIS — E78.2 MIXED HYPERLIPIDEMIA: ICD-10-CM

## 2021-05-11 LAB
ALBUMIN SERPL-MCNC: 3.8 G/DL (ref 3.5–5)
ALP SERPL-CCNC: 106 U/L (ref 45–120)
ALT SERPL W P-5'-P-CCNC: 25 U/L (ref 0–45)
ANION GAP SERPL CALCULATED.3IONS-SCNC: 11 MMOL/L (ref 5–18)
AST SERPL W P-5'-P-CCNC: 19 U/L (ref 0–40)
BILIRUB SERPL-MCNC: 0.5 MG/DL (ref 0–1)
BUN SERPL-MCNC: 31 MG/DL (ref 8–28)
CALCIUM SERPL-MCNC: 9.1 MG/DL (ref 8.5–10.5)
CHLORIDE BLD-SCNC: 101 MMOL/L (ref 98–107)
CHOLEST SERPL-MCNC: 141 MG/DL
CO2 SERPL-SCNC: 22 MMOL/L (ref 22–31)
CREAT SERPL-MCNC: 1.52 MG/DL (ref 0.7–1.3)
ERYTHROCYTE [DISTWIDTH] IN BLOOD BY AUTOMATED COUNT: 13 % (ref 11–14.5)
FASTING STATUS PATIENT QL REPORTED: YES
GFR SERPL CREATININE-BSD FRML MDRD: 44 ML/MIN/1.73M2
GLUCOSE BLD-MCNC: 130 MG/DL (ref 70–125)
HCT VFR BLD AUTO: 37.8 % (ref 40–54)
HDLC SERPL-MCNC: 33 MG/DL
HGB BLD-MCNC: 12.3 G/DL (ref 14–18)
LDLC SERPL CALC-MCNC: 82 MG/DL
MCH RBC QN AUTO: 29.8 PG (ref 27–34)
MCHC RBC AUTO-ENTMCNC: 32.5 G/DL (ref 32–36)
MCV RBC AUTO: 92 FL (ref 80–100)
PLATELET # BLD AUTO: 235 THOU/UL (ref 140–440)
PMV BLD AUTO: 10.1 FL (ref 7–10)
POTASSIUM BLD-SCNC: 5.2 MMOL/L (ref 3.5–5)
PROT SERPL-MCNC: 6.8 G/DL (ref 6–8)
RBC # BLD AUTO: 4.13 MILL/UL (ref 4.4–6.2)
SODIUM SERPL-SCNC: 134 MMOL/L (ref 136–145)
TRIGL SERPL-MCNC: 130 MG/DL
WBC: 9.3 THOU/UL (ref 4–11)

## 2021-05-13 ENCOUNTER — COMMUNICATION - HEALTHEAST (OUTPATIENT)
Dept: INTERNAL MEDICINE | Facility: CLINIC | Age: 86
End: 2021-05-13

## 2021-05-14 ENCOUNTER — COMMUNICATION - HEALTHEAST (OUTPATIENT)
Dept: INTERNAL MEDICINE | Facility: CLINIC | Age: 86
End: 2021-05-14

## 2021-05-14 ENCOUNTER — RECORDS - HEALTHEAST (OUTPATIENT)
Dept: INTERNAL MEDICINE | Facility: CLINIC | Age: 86
End: 2021-05-14

## 2021-05-14 DIAGNOSIS — E11.42 TYPE 2 DIABETES MELLITUS WITH PERIPHERAL NEUROPATHY (H): ICD-10-CM

## 2021-05-17 RX ORDER — INSULIN GLARGINE 100 [IU]/ML
55 INJECTION, SOLUTION SUBCUTANEOUS DAILY
Qty: 15 ML | Refills: 3 | Status: SHIPPED | OUTPATIENT
Start: 2021-05-17 | End: 2021-07-14

## 2021-05-24 ENCOUNTER — RECORDS - HEALTHEAST (OUTPATIENT)
Dept: ADMINISTRATIVE | Facility: CLINIC | Age: 86
End: 2021-05-24

## 2021-05-26 ENCOUNTER — RECORDS - HEALTHEAST (OUTPATIENT)
Dept: ADMINISTRATIVE | Facility: CLINIC | Age: 86
End: 2021-05-26

## 2021-05-26 VITALS
SYSTOLIC BLOOD PRESSURE: 135 MMHG | RESPIRATION RATE: 18 BRPM | TEMPERATURE: 97.1 F | OXYGEN SATURATION: 94 % | DIASTOLIC BLOOD PRESSURE: 62 MMHG | HEART RATE: 57 BPM

## 2021-05-26 VITALS
HEART RATE: 60 BPM | TEMPERATURE: 98.7 F | SYSTOLIC BLOOD PRESSURE: 132 MMHG | RESPIRATION RATE: 16 BRPM | DIASTOLIC BLOOD PRESSURE: 64 MMHG | OXYGEN SATURATION: 98 %

## 2021-05-26 ASSESSMENT — PATIENT HEALTH QUESTIONNAIRE - PHQ9: SUM OF ALL RESPONSES TO PHQ QUESTIONS 1-9: 8

## 2021-05-27 ENCOUNTER — RECORDS - HEALTHEAST (OUTPATIENT)
Dept: ADMINISTRATIVE | Facility: CLINIC | Age: 86
End: 2021-05-27

## 2021-05-27 VITALS
TEMPERATURE: 96.6 F | DIASTOLIC BLOOD PRESSURE: 60 MMHG | SYSTOLIC BLOOD PRESSURE: 124 MMHG | OXYGEN SATURATION: 97 % | HEART RATE: 55 BPM

## 2021-05-27 VITALS
HEART RATE: 58 BPM | DIASTOLIC BLOOD PRESSURE: 53 MMHG | OXYGEN SATURATION: 96 % | TEMPERATURE: 98 F | SYSTOLIC BLOOD PRESSURE: 144 MMHG

## 2021-05-27 VITALS
HEART RATE: 62 BPM | RESPIRATION RATE: 16 BRPM | SYSTOLIC BLOOD PRESSURE: 126 MMHG | TEMPERATURE: 98.7 F | DIASTOLIC BLOOD PRESSURE: 66 MMHG

## 2021-05-27 VITALS
SYSTOLIC BLOOD PRESSURE: 128 MMHG | DIASTOLIC BLOOD PRESSURE: 78 MMHG | HEART RATE: 64 BPM | RESPIRATION RATE: 16 BRPM | TEMPERATURE: 98.6 F

## 2021-05-27 VITALS
WEIGHT: 315 LBS | HEART RATE: 62 BPM | OXYGEN SATURATION: 96 % | HEIGHT: 72 IN | SYSTOLIC BLOOD PRESSURE: 132 MMHG | DIASTOLIC BLOOD PRESSURE: 78 MMHG | BODY MASS INDEX: 42.66 KG/M2 | TEMPERATURE: 96.8 F

## 2021-05-27 VITALS
SYSTOLIC BLOOD PRESSURE: 120 MMHG | HEART RATE: 61 BPM | RESPIRATION RATE: 16 BRPM | TEMPERATURE: 98.6 F | DIASTOLIC BLOOD PRESSURE: 64 MMHG

## 2021-05-27 ASSESSMENT — PATIENT HEALTH QUESTIONNAIRE - PHQ9
SUM OF ALL RESPONSES TO PHQ QUESTIONS 1-9: 0
SUM OF ALL RESPONSES TO PHQ QUESTIONS 1-9: 6

## 2021-05-27 NOTE — TELEPHONE ENCOUNTER
Refill Approved    Rx renewed per Medication Renewal Policy. Medication was last renewed on 12/15/2017 with 3 refills.  Last [facility] visit: 3[facility]/30/2019     Requested Prescriptions   Pending Prescriptions Disp Refills     citalopram (CELEXA) 20 MG tablet [Pharmacy Med Name: CITALOPRAM 20MG TAB TABLET] 90 tablet 3     Sig: TAKE ONE TABLET BY MOUTH ONCE DAILY    SS[facility] Refill Protocol  Passed - 3/29/2019 11:15 AM       Passed - PCP or prescribing provider visit in last year    Last office visit with prescriber/PCP: 3/4/2019 Ld Moy MD OR same dept: 3/4/2019 Ld Moy MD OR same specialty: 3/4/2019 Ld Moy MD  Last physical: 11/30/2018 Last MTM visit: Visit date not found   Next visit within 3 mo: Visit date not found  Next physical within 3 mo: Visit date not found  Prescriber OR PCP: Ld Moy MD  Last diagnosis associated with med order: 1. Depression  - citalopram (CELEXA) 20 MG tablet [Pharmacy Med Name: CITALOPRAM 20MG TAB TABLET]; TAKE ONE TABLET BY MOUTH ONCE DAILY  Dispense: 90 tablet; Refill: 3    If protocol passes may refill for 12 months if within 3 months of last provider visit (or a total of 15 months).

## 2021-05-28 ENCOUNTER — RECORDS - HEALTHEAST (OUTPATIENT)
Dept: ADMINISTRATIVE | Facility: CLINIC | Age: 86
End: 2021-05-28

## 2021-05-28 ASSESSMENT — ASTHMA QUESTIONNAIRES
ACT_TOTALSCORE: 21
ACT_TOTALSCORE: 25

## 2021-05-29 NOTE — TELEPHONE ENCOUNTER
Prescription Monitoring Program activity reviewed with no discrepancies noted.      Last fill per : 10/9/18  Quantity/days supply: 60 tablets for 30 days    Controlled Substance Agreement on file: No  Date: N/A    Last office visit with provider:  3/4/2019 Ld Moy MD    Please advise.

## 2021-05-29 NOTE — PROGRESS NOTES
Office Visit - Follow Up   Robert E Schamberger   85 y.o. male    Date of Visit: 6/3/2019    Chief Complaint   Patient presents with     Hypertension     medication question     celexa        Assessment and Plan   1. 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  Reviewed diabetic log.  Will check A1c.  Continue annual eye exams.  He will follow-up with his podiatrist.  He remains on a statin.  - Glycosylated Hemoglobin A1c    2. Moderate major depression (H)  His mood is generally stable.  Citalopram could certainly be causing some fatigue and it is reasonable to cut the dose back to 10 mg daily and switch to taking it at bedtime.    3. Chronic kidney disease, stage 3 (moderate) (H)  Monitor renal function.  Avoid NSAIDs  - Basic Metabolic Panel    4. 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 Morbid obesity (H)  Continue efforts at getting regular exercise and modifying diet    6. Fatigue, unspecified type  Multifactorial.  Citalopram could be contributing factor as above    7. Cognitive changes  We will continue to monitor.  Wife reports no significant change in cognitive function over the last few months    8. Essential hypertension  Blood pressure reasonably well controlled    9. Hyperlipidemia, unspecified hyperlipidemia type  Continue current dose of pravastatin.  Lipids well controlled at his physical in November    10. Unsteady gait  Related to peripheral neuropathy.  Using cane or walker whenever up ambulating.  No falls in the last 3 months.    11. QUETA on CPAP  Wearing CPAP faithfully at night    Return in about 3 months (around 9/3/2019) for Recheck.     History of Present Illness   This 85 y.o. old gentleman is here to follow-up chronic medical problems including type 2 diabetes, peripheral neuropathy, unsteady gait, hyperlipidemia, chronic kidney disease, and depression.  The pain is wife are asking  about his dose of citalopram and whether this can be discontinued.  Questioning whether it is contributing to some of his symptoms of fatigue and even some of his cognitive changes.  Mood is relatively stable with PHQ 9 score of 7 today.  He stopped the medication for 1 week and seemed to feel partially better during that time.  I reviewed his diabetic log and his sugars look to be under good control most days.  He is up-to-date with his eye exams.  He sees a foot doctor regularly with peripheral neuropathy.  No new sores or lesions in his feet.  Ongoing problems with unsteady gait using a cane or walker whenever up ambulating.  Wearing his CPAP faithfully at night.  Wife reports no significant changes in cognitive decline over the last few months.    Review of Systems:  Otherwise, a comprehensive review of systems was negative except as noted.     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)     Hyperlipidemia     Osteoarthritis     Low back pain     HTN (hypertension)     Glaucoma     QUETA on CPAP     Erectile dysfunction     Morbid obesity (H)     Degenerative disc disease, lumbar     Osteoarthritis of both knees     Osteoarthritis of both hands     Squamous cell skin cancer     Anemia in chronic kidney disease     Bilateral hearing loss     Chronic kidney disease, stage 3 (moderate) (H)     Bursitis, hip, left     Fatigue     B12 deficiency     Vitamin D deficiency     Intermittent asthma without complication     Complete tear of left rotator cuff     Entropion of left lower eyelid     Trochanteric bursitis of right hip     Cognitive changes     Diabetic peripheral neuropathy (H)     Unsteady gait     Moderate major depression (H)       He has a past surgical history that includes pr removal gallbladder; pr revise median n/carpal tunnel surg; pr excis stomach ulcer,lesn;local; Total knee  arthroplasty (Right, 2014); Carpal tunnel release (Bilateral); Eye surgery; Colonoscopy (2003); and Blepharoplasty (2018).    Allergies   Allergen Reactions     Actos [Pioglitazone] Swelling     Edema     Gabapentin Diarrhea     Diarrhea     Lipitor [Atorvastatin]      Back pain     Niacin      Hyperglycemia     Simvastatin      Back pain       Current Outpatient Medications   Medication Sig Dispense Refill     acetaminophen (TYLENOL) 500 MG tablet Take 1,000 mg by mouth 3 (three) times a day. Special Instructions: max: 4000 mg in 24 hours Dx: pain  Three Times A Day; 08:00 AM, 12:00 PM, 04:00 PM       aspirin 81 mg chewable tablet Chew 81 mg daily.       BASAGLAR KWIKPEN U-100 INSULIN 100 unit/mL (3 mL) pen INJECT 75 UNITS UNDER THE SKIN AT BEDTIME -REPLACES LANTUS 60 mL 11     blood glucose test (GLUCOSE BLOOD) strips Test up to 3 times daily 300 strip 3     cholecalciferol, vitamin D3, (VITAMIN D3) 2,000 unit Tab Take 1 tablet (2,000 Units total) by mouth daily. 100 tablet 3     citalopram (CELEXA) 20 MG tablet Take 1 tablet (20 mg total) by mouth daily. 90 tablet 3     cyanocobalamin 1000 MCG tablet Take 1 tablet (1,000 mcg total) by mouth daily. 100 tablet 3     diclofenac sodium (VOLTAREN) 1 % Gel Place 2-4 g on the skin.       latanoprost (XALATAN) 0.005 % ophthalmic solution Administer 1 drop to both eyes at bedtime.       losartan-hydrochlorothiazide (HYZAAR) 100-25 mg per tablet Take 1 tablet by mouth daily. 90 tablet 3     LYRICA 50 mg capsule TAKE 1-2 CAPSULES ( MG TOTAL) BY MOUTH AT BEDTIME. 60 capsule 11     metFORMIN (GLUCOPHAGE-XR) 500 MG 24 hr tablet TAKE THREE TABLETS (1500MG) BY MOUTH DAILY 270 tablet 3     NOVOLOG FLEXPEN U-100 INSULIN 100 unit/mL injection pen INJECT 20 UNITS SQ WITH BREAKFAST 25 UNITS WITH LUNCH AND 25 UNITS WITH DINNER (Patient taking differently: INJECT 20 UNITS SQ WITH BREAKFAST 20 UNITS WITH LUNCH AND 20 UNITS WITH DINNER) 60 mL 11     pravastatin (PRAVACHOL) 10 MG  "tablet TAKE ONE TABLET BY MOUTH AT BEDTIME 90 tablet 2     UNIFINE PENTIPS 31 gauge x 5/16\" Ndle USE AS DIRECTED 3 TIMES DAILY 300 each 3     No current facility-administered medications for this visit.         Physical Exam   General Appearance:   Obese elderly male who otherwise appears well    /80   Pulse 73   Ht 5' 10\" (1.778 m)   Wt (!) 332 lb (150.6 kg)   SpO2 98%   BMI 47.64 kg/m        Respiratory: Normal respiratory effort.  Lungs are clear with no rales or wheezes.  Heart: Regular rate and rhythm without murmurs, rubs, or gallops.  No carotid bruits.  Extremities: No peripheral edema.  Neurologic: Grossly nonfocal  Skin: No cyanosis or pallor  Psych: PHQ 9 score is 7         Additional Information   Social History     Tobacco Use     Smoking status: Former Smoker     Smokeless tobacco: Never Used   Substance Use Topics     Alcohol use: Yes     Alcohol/week: 8.4 oz     Types: 7 Cans of beer, 7 Shots of liquor per week     Drug use: No            Ld Moy MD  "

## 2021-05-29 NOTE — TELEPHONE ENCOUNTER
Controlled Substance Refill Request  Medication:   Requested Prescriptions     Pending Prescriptions Disp Refills     LYRICA 50 mg capsule [Pharmacy Med Name: LYRICA 50MG CAPS CAPSULE] 60 capsule      Sig: TAKE 1-2 CAPSULES ( MG TOTAL) BY MOUTH AT BEDTIME.     Date Last Fill: 10/9/18  Pharmacy: [facility]   Submit electronically to pharmacy  Controlled Substance Agreement on File:   Encounter-Level CSA Scan Date:    There are no encounter-level csa scan date.       Last office visit: Last office visit pertaining to requested medication was 3/4/19.

## 2021-05-30 ENCOUNTER — RECORDS - HEALTHEAST (OUTPATIENT)
Dept: ADMINISTRATIVE | Facility: CLINIC | Age: 86
End: 2021-05-30

## 2021-05-30 VITALS — BODY MASS INDEX: 45.1 KG/M2 | HEIGHT: 70 IN | WEIGHT: 315 LBS

## 2021-05-30 NOTE — TELEPHONE ENCOUNTER
Refill Approved    Rx renewed per Medication Renewal Policy. Medication was last renewed on 7/19/18.    Taniya Mcpherson, [facility] Triage/Med Refill 7/24/2019     Requested Prescriptions   Pending Prescriptions Disp Refills     losartan-hydrochlorothiazide (HYZAAR) 100-25 mg per tablet [Pharmacy Med Name: LOSARTAN HCTZTB 100/2590NSTAR@ 100MG-25MG TABLET] 90 tablet 3     Sig: TAKE ONE TABLET BY MOUTH ONCE DAILY       Diuretics/Combination Diuretics Refill Protocol  Passed - 7/23/2019 11:32 AM        Passed - Visit with PCP or prescribing provider visit in past 12 months     Last office visit with prescriber/PCP: 6/3/2019 Ld Moy MD OR same dept: 6/3/2019 Ld Moy MD OR same specialty: 6/3/2019 Ld Moy MD  Last physical: 11/30/2018 Last MTM visit: Visit date not found   Next visit within 3 mo: Visit date not found  Next physical within 3 mo: Visit date not found  Prescriber OR PCP: Ld Moy MD  Last diagnosis associated with med order: 1. Diabetes (H)  - metFORMIN (GLUCOPHAGE-XR) 500 MG 24 hr tablet [Pharmacy Med Name: METFORMIN ER 500MG TAB TABLET]; TAKE THREE TABLETS (1500MG) BY MOUTH DAILY  Dispense: 270 tablet; Refill: 3    If protocol passes may refill for 12 months if within 3 months of last provider visit (or a total of 15 months).             Passed - Serum Potassium in past 12 months      Lab Results   Component Value Date    Potassium 4.7 06/03/2019             Passed - Serum Sodium in past 12 months      Lab Results   Component Value Date    Sodium 137 06/03/2019             Passed - Blood pressure on file in past 12 months     BP Readings from Last 1 Encounters:   06/03/19 138/80             Passed - Serum Creatinine in past 12 months      Creatinine   Date Value Ref Range Status   06/03/2019 1.40 (H) 0.70 - 1.30 mg/dL Final             metFORMIN (GLUCOPHAGE-XR) 500 MG 24 hr tablet [Pharmacy Med Name: METFORMIN ER 500MG TAB TABLET] 270 tablet 3      Sig: TAKE THREE TABLETS (1500MG) BY MOUTH DAILY       Metformin Refill Protocol Passed - 7/23/2019 11:32 AM        Passed - Blood pressure in last 12 months     BP Readings from Last 1 Encounters:   06/03/19 138/80             Passed - LFT or AST or ALT in last 12 months     Albumin   Date Value Ref Range Status   11/30/2018 3.9 3.5 - 5.0 g/dL Final     Bilirubin, Total   Date Value Ref Range Status   11/30/2018 0.7 0.0 - 1.0 mg/dL Final     Bilirubin, Direct   Date Value Ref Range Status   11/30/2018 0.2 <=0.5 mg/dL Final     Alkaline Phosphatase   Date Value Ref Range Status   11/30/2018 99 45 - 120 U/L Final     AST   Date Value Ref Range Status   11/30/2018 18 0 - 40 U/L Final     ALT   Date Value Ref Range Status   11/30/2018 19 0 - 45 U/L Final     Protein, Total   Date Value Ref Range Status   11/30/2018 6.8 6.0 - 8.0 g/dL Final                Passed - GFR or Serum Creatinine in last 6 months     GFR MDRD Non Af Amer   Date Value Ref Range Status   06/03/2019 48 (L) >60 mL/min/1.73m2 Final     GFR MDRD Af Amer   Date Value Ref Range Status   06/03/2019 58 (L) >60 mL/min/1.73m2 Final             Passed - Visit with PCP or prescribing provider visit in last 6 months or next 3 months     Last office visit with prescriber/PCP: 6/3/2019 OR same dept: 6/3/2019 Ld Moy MD OR same specialty: 6/3/2019 Ld Moy MD Last physical: Visit date not found Last MTM visit: Visit date not found         Next appt within 3 mo: Visit date not found  Next physical within 3 mo: Visit date not found  Prescriber OR PCP: Ld Moy MD  Last diagnosis associated with med order: 1. Diabetes (H)  - metFORMIN (GLUCOPHAGE-XR) 500 MG 24 hr tablet [Pharmacy Med Name: METFORMIN ER 500MG TAB TABLET]; TAKE THREE TABLETS (1500MG) BY MOUTH DAILY  Dispense: 270 tablet; Refill: 3     If protocol passes may refill for 12 months if within 3 months of last provider visit (or a total of 15 months).           Passed -  A1C in last 6 months     Hemoglobin A1c   Date Value Ref Range Status   06/03/2019 7.5 (H) 3.5 - 6.0 % Final               Passed - Microalbumin in last year      Microalbumin, Random Urine   Date Value Ref Range Status   11/30/2018 8.59 (H) 0.00 - 1.99 mg/dL Final

## 2021-05-31 VITALS — WEIGHT: 315 LBS | HEIGHT: 70 IN | BODY MASS INDEX: 45.1 KG/M2

## 2021-05-31 VITALS — WEIGHT: 315 LBS | BODY MASS INDEX: 45.1 KG/M2 | HEIGHT: 70 IN

## 2021-05-31 VITALS — BODY MASS INDEX: 45.43 KG/M2 | WEIGHT: 315 LBS

## 2021-05-31 VITALS — BODY MASS INDEX: 45 KG/M2 | WEIGHT: 313.6 LBS

## 2021-05-31 VITALS — HEIGHT: 70 IN | BODY MASS INDEX: 45.1 KG/M2 | WEIGHT: 315 LBS

## 2021-05-31 VITALS — BODY MASS INDEX: 45.57 KG/M2 | WEIGHT: 315 LBS

## 2021-05-31 VITALS — BODY MASS INDEX: 45.1 KG/M2 | HEIGHT: 70 IN | WEIGHT: 315 LBS

## 2021-05-31 NOTE — TELEPHONE ENCOUNTER
RN cannot approve Refill Request    RN can NOT refill this medication Pt reports taking differently than Rx directions. PCP to clarify. . Last office visit: 6/3/2019 Ld Moy MD Last Physical: 11/30/2018 Last MTM visit: Visit date not found Last visit same specialty: 6/3/2019 Ld Moy MD.  Next visit within 3 mo: Visit date not found  Next physical within 3 mo: Visit date not found      [facility] Triage/Med Refill 8/16/2019    Requested Prescriptions   Pending Prescriptions Disp Refills     NOVOLOG FLEXPEN U-100 INSULIN 100 unit/mL (3 mL) injection pen [Pharmacy Med Name: NOVOLOG FLEXPEN 3ML MG INJECTABLE] 60 mL 11     Sig: INJECT 20 UNITS SQ WITH BREAKFAST 25 UNITS WITH LUNCH AND 25 UNITS WITH DINNER       Insulin/GLP-1 Refill Protocol Passed - 8/16/2019 10:34 AM        Passed - Visit with PCP or prescribing provider visit in last 6 months     Last office visit with prescriber/PCP: 6/3/2019 OR same dept: 6/3/2019 Ld Moy MD OR same specialty: 6/3/2019 Ld Moy MD Last physical: Visit date not found Last MTM visit: Visit date not found     Next appt within 3 mo: Visit date not found  Next physical within 3 mo: Visit date not found  Prescriber OR PCP: Ld Moy MD  Last diagnosis associated with med order: 1. 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)  - NOVOLOG FLEXPEN U-100 INSULIN 100 unit/mL (3 mL) injection pen [Pharmacy Med Name: NOVOLOG FLEXPEN 3ML MG INJECTABLE]; INJECT 20 UNITS SQ WITH BREAKFAST 25 UNITS WITH LUNCH AND 25 UNITS WITH DINNER  Dispense: 60 mL; Refill: 11    If protocol passes may refill for 6 months if within 3 months of last provider visit (or a total of 9 months).              Passed - A1C in last 6 months     Hemoglobin A1c   Date Value Ref Range Status   06/03/2019 7.5 (H) 3.5 - 6.0 % Final               Passed - Microalbumin in last year     Microalbumin, Random Urine   Date Value Ref Range  Status   11/30/2018 8.59 (H) 0.00 - 1.99 mg/dL Final                  Passed - Blood pressure in last year     BP Readings from Last 1 Encounters:   06/03/19 138/80             Passed - Creatinine done in last year     Creatinine   Date Value Ref Range Status   06/03/2019 1.40 (H) 0.70 - 1.30 mg/dL Final

## 2021-06-01 VITALS — HEIGHT: 70 IN | WEIGHT: 315 LBS | BODY MASS INDEX: 45.1 KG/M2

## 2021-06-01 VITALS — WEIGHT: 315 LBS | HEIGHT: 70 IN | BODY MASS INDEX: 45.1 KG/M2

## 2021-06-01 VITALS — BODY MASS INDEX: 45.1 KG/M2 | HEIGHT: 70 IN | WEIGHT: 315 LBS

## 2021-06-01 VITALS — WEIGHT: 315 LBS | BODY MASS INDEX: 45.1 KG/M2 | HEIGHT: 70 IN

## 2021-06-01 VITALS — HEIGHT: 70 IN | BODY MASS INDEX: 45.1 KG/M2 | WEIGHT: 315 LBS

## 2021-06-01 NOTE — TELEPHONE ENCOUNTER
RN Triage:    Spoke with pt's wife, Nely, about 85 yr old Sonu who was dx with TIA and evaluated 9/6/19 by PCP.     Wife reports pt had testing last Thursday, 9/12/19, and is looking for results.    Wife reports pt also had another TIA spell yesterday morning around 9:00 am, similar to previous episodes.    Pt would start a sentence and had difficulty coming up with words.  Symptom resolved after 10 minutes.    Wife and pt deny any weakness on one side of the body or any neurological symptoms currently.    PLAN:  Protocol advises OV now.  Will consult with PCP regarding test results per patient request and also level of care.  OV today for yesterday's symptoms or other instructions. Please advise.   Advised patient/wife to call back if symptoms worsen.    Samia Bauer RN   [facility] RN Triage    Reason for Disposition    Neurologic deficit that was brief (now gone), ANY of the following: * Weakness of the face, arm, or leg on one side of the body * Numbness of the face, arm, or leg on one side of the body * Loss of speech or garbled speech    Protocols used: NEUROLOGIC DEFICIT-A-OH

## 2021-06-01 NOTE — TELEPHONE ENCOUNTER
Refill Approved    Rx renewed per Medication Renewal Policy. Medication was last renewed on 9/19/18.    Geovanna Hull, [facility] Triage/Med Refill 9/30/2019     Requested Prescriptions   Pending Prescriptions Disp Refills     insulin glargine (BASAGLAR KWIKPEN) 100 unit/mL (3 mL) pen [Pharmacy Med Name: BASAGLAR KWIKPEN 100 U/ML MG INJECTABLE]       Sig: INJECT 75 UNITS UNDER THE SKIN AT BEDTIME -REPLACES LANTUS       Insulin/GLP-1 Refill Protocol Passed - 9/30/2019 10:17 AM        Passed - Visit with PCP or prescribing provider visit in last 6 months     Last office visit with prescriber/PCP: 9/6/2019 OR same dept: 9/6/2019 Ld Moy MD OR same specialty: 9/6/2019 Ld Moy MD Last physical: Visit date not found Last MTM visit: Visit date not found     Next appt within 3 mo: Visit date not found  Next physical within 3 mo: Visit date not found  Prescriber OR PCP: Ld Moy MD  Last diagnosis associated with med order: 1. 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)  - insulin glargine (BASAGLAR KWIKPEN) 100 unit/mL (3 mL) pen [Pharmacy Med Name: BASAGLAR KWIKPEN 100 U/ML MG INJECTABLE]; INJECT 75 UNITS UNDER THE SKIN AT BEDTIME -REPLACES LANTUS    If protocol passes may refill for 6 months if within 3 months of last provider visit (or a total of 9 months).              Passed - A1C in last 6 months     Hemoglobin A1c   Date Value Ref Range Status   09/06/2019 7.8 (H) 3.5 - 6.0 % Final               Passed - Microalbumin in last year     Microalbumin, Random Urine   Date Value Ref Range Status   11/30/2018 8.59 (H) 0.00 - 1.99 mg/dL Final                  Passed - Blood pressure in last year     BP Readings from Last 1 Encounters:   09/12/19 134/80             Passed - Creatinine done in last year     Creatinine   Date Value Ref Range Status   09/06/2019 1.47 (H) 0.70 - 1.30 mg/dL Final

## 2021-06-01 NOTE — TELEPHONE ENCOUNTER
Please forward message to Sophie.  He is to be called with results of his studies all of which looked okay.  I will refer him to neurology.

## 2021-06-01 NOTE — TELEPHONE ENCOUNTER
Medication Request  Medication name: Pravastatin 20 mg  Pharmacy Name and Location: [facility]  Reason for request: patient states they were told to increase to 20 mg daily, please send updated prescription to pharmacy  When did you use medication last?:  currently   Patient offered appointment:  n/a  Okay to leave a detailed message: yes

## 2021-06-01 NOTE — TELEPHONE ENCOUNTER
----- Message from Ld Moy MD sent at 9/13/2019  6:13 PM CDT -----  In addition to his carotid ultrasound and CT, let him and his wife know that his echo looked okay.  No clots seen

## 2021-06-01 NOTE — TELEPHONE ENCOUNTER
----- Message from Ld Moy MD sent at 9/13/2019  6:03 PM CDT -----  Please call Domenico and his wife.  Carotid ultrasound showing no severe blockage.  CT scan of head showing old chronic strokes but nothing new.

## 2021-06-01 NOTE — PROGRESS NOTES
Office Visit - Follow Up   Robert E Schamberger   85 y.o. male    Date of Visit: 9/6/2019    Chief Complaint   Patient presents with     Diabetes     hard time finishing sentences     possible mini strokes?        Assessment and Plan   1. TIA (transient ischemic attack)  Symptoms concerning for recurrent TIA.  We discussed the potential risk for stroke.  I agree with his decision to no longer drive.  Will get CT scan of head and arrange for bilateral carotid ultrasound and echocardiogram.  Increase aspirin to 325 mg daily and try to push dose of statin to 20 mg.  He has not tolerated other statins because of side effects including back pain and myalgia.  We discussed that 911 should be called if any recurrent symptoms lasting longer than 15 minutes.  - CT Head Without Contrast; Future  - US Carotid Bilateral; Future  - Echo Complete; Future  - aspirin 325 MG EC tablet; Take 1 tablet (325 mg total) by mouth daily.  Dispense: 100 tablet; Refill: 3    2. 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  Continue current insulin regimen with NovoLog and Basaglar.  Remains on statin.  Was getting his eyes examined annually.  - Glycosylated Hemoglobin A1c    3. Chronic kidney disease, stage 3 (moderate) (H)  Monitor renal function  - Basic Metabolic Panel    4. Moderate major depression (H)  Mood is stable with current dose of citalopram which was reduced to 10 mg daily last visit.  Less fatigue since making that change.    5. Anemia in stage 3 chronic kidney disease (H)  Monitor hemoglobin  - Hemoglobin    6. Essential hypertension  Blood pressure well controlled with current medications    7. Mixed hyperlipidemia  Will increase dose of pravastatin to 20 mg  - pravastatin (PRAVACHOL) 10 MG tablet; Take 2 tablets (20 mg total) by mouth at bedtime.  Dispense: 90 tablet; Refill: 2    8. QUETA on CPAP  Wearing CPAP faithfully    9. Medication monitoring encounter  Monitor electrolytes while on  diuretic  - Magnesium    Return in about 4 weeks (around 10/4/2019) for Recheck.     History of Present Illness   This 85 y.o. old male with type 2 diabetes, hypertension, morbid obesity, hyperlipidemia, depression, and chronic kidney disease here to follow-up these problems and to discuss new concerns including possible TIA.  On 2 separate occasions, he developed sudden inability to complete his sentences.  Sounding like expressive aphasia.  He would say a couple words and then nothing more would come out.  No other associated neurologic symptoms such as vision change or unilateral weakness or facial droop.  No associated headache.  On one occasion, it occurred while he was driving.  Symptoms lasted less than 10 minutes.  He already takes aspirin 81 mg daily and is on 10 mg of pravastatin.  No history of atrial fibrillation.  Denies any palpitations.  No previous history of CVA.    We discussed his diabetes control.  Last A1c 7.5%.  Continues on same insulin regimen.  I reviewed his diabetic log and sugars look reasonably well controlled most mornings although occasionally with hyperglycemia.  He has had hypoglycemia and those symptoms are different than what he experienced during the above episodes.     Denies any chest pain.  No increasing dyspnea.  Wearing CPAP faithfully at night.    Review of Systems:  Otherwise, a comprehensive review of systems was negative except as noted.     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)     Hyperlipidemia     Osteoarthritis     Low back pain     HTN (hypertension)     Glaucoma     QUETA on CPAP     Erectile dysfunction     Morbid obesity (H)     Degenerative disc disease, lumbar     Osteoarthritis of both knees     Osteoarthritis of both hands     Squamous cell skin cancer     Anemia in chronic kidney disease     Bilateral hearing loss     Chronic kidney  disease, stage 3 (moderate) (H)     Bursitis of left hip     Fatigue     B12 deficiency     Vitamin D deficiency     Intermittent asthma without complication     Complete tear of left rotator cuff     Entropion of left lower eyelid     Trochanteric bursitis of right hip     Cognitive changes     Diabetic peripheral neuropathy (H)     Unsteady gait     Moderate major depression (H)     TIA (transient ischemic attack)       He has a past surgical history that includes pr removal gallbladder; pr revise median n/carpal tunnel surg; pr excis stomach ulcer,lesn;local; Total knee arthroplasty (Right, 2014); Carpal tunnel release (Bilateral); Eye surgery; Colonoscopy (2003); and Blepharoplasty (2018).    Allergies   Allergen Reactions     Actos [Pioglitazone] Swelling     Edema     Gabapentin Diarrhea     Diarrhea     Lipitor [Atorvastatin]      Back pain     Niacin      Hyperglycemia     Simvastatin      Back pain       Current Outpatient Medications   Medication Sig Dispense Refill     acetaminophen (TYLENOL) 500 MG tablet Take 1,000 mg by mouth 3 (three) times a day. Special Instructions: max: 4000 mg in 24 hours Dx: pain  Three Times A Day; 08:00 AM, 12:00 PM, 04:00 PM       BASAGLAR KWIKPEN U-100 INSULIN 100 unit/mL (3 mL) pen INJECT 75 UNITS UNDER THE SKIN AT BEDTIME -REPLACES LANTUS 60 mL 11     blood glucose test (GLUCOSE BLOOD) strips Test up to 3 times daily 300 strip 3     cholecalciferol, vitamin D3, (VITAMIN D3) 2,000 unit Tab Take 1 tablet (2,000 Units total) by mouth daily. 100 tablet 3     citalopram (CELEXA) 20 MG tablet Take 0.5 tablets (10 mg total) by mouth at bedtime. 90 tablet 3     cyanocobalamin 1000 MCG tablet Take 1 tablet (1,000 mcg total) by mouth daily. 100 tablet 3     diclofenac sodium (VOLTAREN) 1 % Gel Place 2-4 g on the skin.       latanoprost (XALATAN) 0.005 % ophthalmic solution Administer 1 drop to both eyes at bedtime.       losartan-hydrochlorothiazide (HYZAAR) 100-25 mg per tablet  "TAKE ONE TABLET BY MOUTH ONCE DAILY 90 tablet 3     LYRICA 50 mg capsule TAKE 1-2 CAPSULES ( MG TOTAL) BY MOUTH AT BEDTIME. 60 capsule 11     metFORMIN (GLUCOPHAGE-XR) 500 MG 24 hr tablet TAKE THREE TABLETS (1500MG) BY MOUTH DAILY 270 tablet 0     NOVOLOG FLEXPEN U-100 INSULIN 100 unit/mL (3 mL) injection pen INJECT 20 UNITS SQ WITH BREAKFAST 25 UNITS WITH LUNCH AND 25 UNITS WITH DINNER 60 mL 11     pravastatin (PRAVACHOL) 10 MG tablet Take 2 tablets (20 mg total) by mouth at bedtime. 90 tablet 2     UNIFINE PENTIPS 31 gauge x 5/16\" Ndle USE AS DIRECTED 3 TIMES DAILY 300 each 3     aspirin 325 MG EC tablet Take 1 tablet (325 mg total) by mouth daily. 100 tablet 3     No current facility-administered medications for this visit.         Physical Exam   General Appearance:   Pleasant morbidly obese elderly male    /60   Pulse 74   Ht 5' 10\" (1.778 m)   Wt (!) 333 lb (151 kg)   SpO2 95%   BMI 47.78 kg/m      HEENT: Normal  Respiratory: Normal respiratory effort.  Lungs are clear with no rales or wheezes.  Heart: Regular rate and rhythm without murmurs, rubs, or gallops.  No carotid bruits.  Extremities: 1+ edema  Neurologic: Grossly nonfocal  Skin: No cyanosis or pallor  Psych: Alert and oriented ×3, mood appropriate         Additional Information   Social History     Tobacco Use     Smoking status: Former Smoker     Smokeless tobacco: Never Used   Substance Use Topics     Alcohol use: Yes     Alcohol/week: 8.4 oz     Types: 7 Cans of beer, 7 Shots of liquor per week     Drug use: No         Review and/or order of clinical lab tests: Rechecking A1c and BMP  Review and/or order of radiology tests: Ordering bilateral carotid ultrasound and CT scan of head  Review and/or order of medicine tests: Ordering echocardiogram      Time: total time spent with the patient was 40 minutes of which >50% was spent in counseling and coordination of care     Ld Moy MD  "

## 2021-06-02 VITALS — HEIGHT: 70 IN | WEIGHT: 315 LBS | BODY MASS INDEX: 45.1 KG/M2

## 2021-06-02 VITALS — WEIGHT: 315 LBS | HEIGHT: 70 IN | BODY MASS INDEX: 45.1 KG/M2

## 2021-06-02 NOTE — TELEPHONE ENCOUNTER
Refill Approved    Rx renewed per Medication Renewal Policy. Medication was last renewed on 7/24/19.    Geovanna Hull, [facility] Triage/Med Refill 10/17/2019     Requested Prescriptions   Pending Prescriptions Disp Refills     metFORMIN (GLUCOPHAGE-XR) 500 MG 24 hr tablet [Pharmacy Med Name: METFORMIN ER 500MG TAB 500MG ER TABLET] 270 tablet 0     Sig: TAKE THREE TABLETS (1500MG) BY MOUTH DAILY       Metformin Refill Protocol Passed - 10/16/2019 11:40 AM        Passed - Blood pressure in last 12 months     BP Readings from Last 1 Encounters:   10/10/19 130/70             Passed - LFT or AST or ALT in last 12 months     Albumin   Date Value Ref Range Status   11/30/2018 3.9 3.5 - 5.0 g/dL Final     Bilirubin, Total   Date Value Ref Range Status   11/30/2018 0.7 0.0 - 1.0 mg/dL Final     Bilirubin, Direct   Date Value Ref Range Status   11/30/2018 0.2 <=0.5 mg/dL Final     Alkaline Phosphatase   Date Value Ref Range Status   11/30/2018 99 45 - 120 U/L Final     AST   Date Value Ref Range Status   11/30/2018 18 0 - 40 U/L Final     ALT   Date Value Ref Range Status   11/30/2018 19 0 - 45 U/L Final     Protein, Total   Date Value Ref Range Status   11/30/2018 6.8 6.0 - 8.0 g/dL Final                Passed - GFR or Serum Creatinine in last 6 months     GFR MDRD Non Af Amer   Date Value Ref Range Status   09/06/2019 46 (L) >60 mL/min/1.73m2 Final     GFR MDRD Af Amer   Date Value Ref Range Status   09/06/2019 55 (L) >60 mL/min/1.73m2 Final             Passed - Visit with PCP or prescribing provider visit in last 6 months or next 3 months     Last office visit with prescriber/PCP: 10/10/2019 OR same dept: 10/10/2019 Ld Moy MD OR same specialty: 10/10/2019 Ld Moy MD Last physical: Visit date not found Last MTM visit: Visit date not found         Next appt within 3 mo: Visit date not found  Next physical within 3 mo: Visit date not found  Prescriber OR PCP: Ld Moy MD  Last  diagnosis associated with med order: 1. Diabetes (H)  - metFORMIN (GLUCOPHAGE-XR) 500 MG 24 hr tablet [Pharmacy Med Name: METFORMIN ER 500MG TAB 500MG ER TABLET]; TAKE THREE TABLETS (1500MG) BY MOUTH DAILY  Dispense: 270 tablet; Refill: 0     If protocol passes may refill for 12 months if within 3 months of last provider visit (or a total of 15 months).           Passed - A1C in last 6 months     Hemoglobin A1c   Date Value Ref Range Status   09/06/2019 7.8 (H) 3.5 - 6.0 % Final               Passed - Microalbumin in last year      Microalbumin, Random Urine   Date Value Ref Range Status   11/30/2018 8.59 (H) 0.00 - 1.99 mg/dL Final

## 2021-06-02 NOTE — PROGRESS NOTES
Office Visit - Follow Up   Robert E Schamberger   85 y.o. male    Date of Visit: 10/10/2019    Chief Complaint   Patient presents with     Follow-up     following up on tests he had        Assessment and Plan   1. TIA (transient ischemic attack)  2 brief episodes of expressive aphasia concerning for TIA.  Carotid ultrasound with plaque but no severe stenosis.  CT head with chronic lacunar infarcts but no acute findings.  No recurrent symptoms over the last 4 weeks.  He will see neurology.  We will continue higher dose of pravastatin.  Reluctant to increase further than 20 mg as he has not tolerated statins in the past causing muscle pain.  He will continue on higher dose of aspirin 325 mg daily.  Blood pressure looks well controlled.  Needing better diabetes control.    2. 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)  A1c 7.8%.  Needs to modify diet.  Continue current insulin regimen.  I have asked his wife to check his sugar if any recurrent symptoms looking like TIA as this could also have represented hypoglycemia.    3. Essential hypertension  Blood pressure looks well controlled with current medications    4. Primary osteoarthritis involving multiple joints  Recommending that he try extra strength Tylenol 1000 mg 3 times daily schedule daily to help control his chronic pain related to arthritis including chronic low back pain.  He should avoid ibuprofen    5. Need for immunization against influenza    - Influenza High Dose, Seasonal 65+ yrs    Return in about 3 months (around 1/10/2020) for Recheck.     History of Present Illness   This 85 y.o. old gentleman with type 2 diabetes, hypertension, osteoarthritis and morbid obesity.  Here to follow-up after evaluation last month with concerns for possible TIA.  At that time, on 2 separate occasions, he suddenly developed inability to complete his sentences.  No other associated neurologic symptoms.  No associated headache.  Symptoms lasted less than 10  minutes.  Aspirin increased to 325 mg daily and pravastatin increased to 20 mg daily.  He has not tolerated higher doses of statin previously.  He was sent for CT of head showing no acute findings.  Bilateral carotid ultrasound with plaque but no severe stenosis.  He has had no recurrent symptoms since his last evaluation.  He is scheduled to see neurology later this month.    We also discussed his chronic pain related to arthritis including chronic low back pain.  We discussed diabetes with A1c higher at 7.8% last month.  He continues on same insulin regimen and is trying to watch his diet more carefully.  Sugars seem better controlled.    Review of Systems:  Otherwise, a comprehensive review of systems was negative except as noted.     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)     Hyperlipidemia     Osteoarthritis     Low back pain     HTN (hypertension)     Glaucoma     QUETA on CPAP     Erectile dysfunction     Morbid obesity (H)     Degenerative disc disease, lumbar     Osteoarthritis of both knees     Osteoarthritis of both hands     Squamous cell skin cancer     Anemia in chronic kidney disease     Bilateral hearing loss     Chronic kidney disease, stage 3 (moderate) (H)     Bursitis of left hip     Fatigue     B12 deficiency     Vitamin D deficiency     Intermittent asthma without complication     Complete tear of left rotator cuff     Entropion of left lower eyelid     Trochanteric bursitis of right hip     Cognitive changes     Diabetic peripheral neuropathy (H)     Unsteady gait     Moderate major depression (H)     TIA (transient ischemic attack)       He has a past surgical history that includes pr removal gallbladder; pr revise median n/carpal tunnel surg; pr excis stomach ulcer,lesn;local; Total knee arthroplasty (Right, 2014); Carpal tunnel release (Bilateral); Eye surgery;  Colonoscopy (2003); and Blepharoplasty (2018).    Allergies   Allergen Reactions     Actos [Pioglitazone] Swelling     Edema     Gabapentin Diarrhea     Diarrhea     Lipitor [Atorvastatin]      Back pain     Niacin      Hyperglycemia     Simvastatin      Back pain       Current Outpatient Medications   Medication Sig Dispense Refill     acetaminophen (TYLENOL) 500 MG tablet Take 1,000 mg by mouth 3 (three) times a day. Special Instructions: max: 4000 mg in 24 hours Dx: pain  Three Times A Day; 08:00 AM, 12:00 PM, 04:00 PM       aspirin 325 MG EC tablet Take 1 tablet (325 mg total) by mouth daily. 100 tablet 3     blood glucose test (GLUCOSE BLOOD) strips Test up to 3 times daily 300 strip 3     cholecalciferol, vitamin D3, (VITAMIN D3) 2,000 unit Tab Take 1 tablet (2,000 Units total) by mouth daily. 100 tablet 3     citalopram (CELEXA) 20 MG tablet Take 0.5 tablets (10 mg total) by mouth at bedtime. 90 tablet 3     cyanocobalamin 1000 MCG tablet Take 1 tablet (1,000 mcg total) by mouth daily. 100 tablet 3     diclofenac sodium (VOLTAREN) 1 % Gel Place 2-4 g on the skin.       insulin glargine (BASAGLAR KWIKPEN) 100 unit/mL (3 mL) pen INJECT 75 UNITS UNDER THE SKIN AT BEDTIME -REPLACES LANTUS 60 mL 1     latanoprost (XALATAN) 0.005 % ophthalmic solution Administer 1 drop to both eyes at bedtime.       losartan-hydrochlorothiazide (HYZAAR) 100-25 mg per tablet TAKE ONE TABLET BY MOUTH ONCE DAILY 90 tablet 3     LYRICA 50 mg capsule TAKE 1-2 CAPSULES ( MG TOTAL) BY MOUTH AT BEDTIME. 60 capsule 11     magnesium oxide (MAGOX) 400 mg (241.3 mg magnesium) tablet Take 1 tablet (400 mg total) by mouth daily. 100 tablet 1     metFORMIN (GLUCOPHAGE-XR) 500 MG 24 hr tablet TAKE THREE TABLETS (1500MG) BY MOUTH DAILY 270 tablet 0     NOVOLOG FLEXPEN U-100 INSULIN 100 unit/mL (3 mL) injection pen INJECT 20 UNITS SQ WITH BREAKFAST 25 UNITS WITH LUNCH AND 25 UNITS WITH DINNER 60 mL 11     pravastatin (PRAVACHOL) 20 MG tablet  "Take 1 tablet (20 mg total) by mouth daily. 90 tablet 3     UNIFINE PENTIPS 31 gauge x 5/16\" Ndle USE AS DIRECTED 3 TIMES DAILY 300 each 3     No current facility-administered medications for this visit.         Physical Exam   General Appearance:   Obese elderly male who otherwise appears well    /70   Pulse 75   Ht 5' 10\" (1.778 m)   Wt (!) 336 lb (152.4 kg)   SpO2 95%   BMI 48.21 kg/m        Neurologic exam grossly nonfocal     Additional Information   Social History     Tobacco Use     Smoking status: Former Smoker     Smokeless tobacco: Never Used   Substance Use Topics     Alcohol use: Yes     Alcohol/week: 14.0 standard drinks     Types: 7 Cans of beer, 7 Shots of liquor per week     Drug use: No              Ld Moy MD  "

## 2021-06-03 ENCOUNTER — RECORDS - HEALTHEAST (OUTPATIENT)
Dept: ADMINISTRATIVE | Facility: CLINIC | Age: 86
End: 2021-06-03

## 2021-06-03 VITALS
OXYGEN SATURATION: 95 % | WEIGHT: 315 LBS | HEART RATE: 74 BPM | SYSTOLIC BLOOD PRESSURE: 120 MMHG | HEIGHT: 70 IN | BODY MASS INDEX: 45.1 KG/M2 | DIASTOLIC BLOOD PRESSURE: 60 MMHG

## 2021-06-03 VITALS — HEIGHT: 70 IN | WEIGHT: 315 LBS | BODY MASS INDEX: 45.1 KG/M2

## 2021-06-03 VITALS
HEART RATE: 75 BPM | HEIGHT: 70 IN | WEIGHT: 315 LBS | BODY MASS INDEX: 45.1 KG/M2 | OXYGEN SATURATION: 95 % | SYSTOLIC BLOOD PRESSURE: 130 MMHG | DIASTOLIC BLOOD PRESSURE: 70 MMHG

## 2021-06-03 NOTE — TELEPHONE ENCOUNTER
Controlled Substance Refill Request  Medication:   Requested Prescriptions     Pending Prescriptions Disp Refills     pregabalin (LYRICA) 50 MG capsule [Pharmacy Med Name: PREGABALIN   CAP 50MG CAPSULE] 60 capsule      Sig: TAKE 1-2 CAPSULES ( MG TOTAL) BY MOUTH AT BEDTIME.     Date Last Fill: 5/30/19  Pharmacy: [facility]   Submit electronically to pharmacy  Controlled Substance Agreement on File:   Encounter-Level CSA Scan Date:    There are no encounter-level csa scan date.       Last office visit: 10/10/2019 Ld Moy MD Leslie White, RN BSBA [facility] Triage/Med Refill 11/29/2019 8:50 AM

## 2021-06-04 VITALS
HEART RATE: 61 BPM | SYSTOLIC BLOOD PRESSURE: 140 MMHG | OXYGEN SATURATION: 97 % | WEIGHT: 315 LBS | BODY MASS INDEX: 43.4 KG/M2 | RESPIRATION RATE: 14 BRPM | DIASTOLIC BLOOD PRESSURE: 62 MMHG

## 2021-06-04 VITALS
WEIGHT: 315 LBS | SYSTOLIC BLOOD PRESSURE: 158 MMHG | HEART RATE: 63 BPM | DIASTOLIC BLOOD PRESSURE: 63 MMHG | RESPIRATION RATE: 18 BRPM | OXYGEN SATURATION: 96 % | TEMPERATURE: 97.1 F | BODY MASS INDEX: 43.54 KG/M2

## 2021-06-04 VITALS
HEART RATE: 78 BPM | HEIGHT: 72 IN | DIASTOLIC BLOOD PRESSURE: 60 MMHG | WEIGHT: 315 LBS | SYSTOLIC BLOOD PRESSURE: 136 MMHG | OXYGEN SATURATION: 96 % | BODY MASS INDEX: 42.66 KG/M2

## 2021-06-04 VITALS
WEIGHT: 315 LBS | BODY MASS INDEX: 43.37 KG/M2 | DIASTOLIC BLOOD PRESSURE: 63 MMHG | HEART RATE: 63 BPM | RESPIRATION RATE: 18 BRPM | SYSTOLIC BLOOD PRESSURE: 158 MMHG | OXYGEN SATURATION: 96 % | TEMPERATURE: 97.1 F

## 2021-06-04 VITALS
DIASTOLIC BLOOD PRESSURE: 60 MMHG | WEIGHT: 315 LBS | BODY MASS INDEX: 42.66 KG/M2 | HEART RATE: 59 BPM | HEIGHT: 72 IN | SYSTOLIC BLOOD PRESSURE: 124 MMHG | OXYGEN SATURATION: 97 %

## 2021-06-04 VITALS
RESPIRATION RATE: 15 BRPM | BODY MASS INDEX: 43.45 KG/M2 | SYSTOLIC BLOOD PRESSURE: 125 MMHG | DIASTOLIC BLOOD PRESSURE: 55 MMHG | HEART RATE: 60 BPM | WEIGHT: 315 LBS | TEMPERATURE: 97.7 F | OXYGEN SATURATION: 94 %

## 2021-06-04 VITALS
BODY MASS INDEX: 42.66 KG/M2 | HEART RATE: 54 BPM | OXYGEN SATURATION: 94 % | HEIGHT: 72 IN | WEIGHT: 315 LBS | DIASTOLIC BLOOD PRESSURE: 80 MMHG | SYSTOLIC BLOOD PRESSURE: 140 MMHG

## 2021-06-05 VITALS
HEART RATE: 56 BPM | DIASTOLIC BLOOD PRESSURE: 50 MMHG | SYSTOLIC BLOOD PRESSURE: 126 MMHG | WEIGHT: 315 LBS | BODY MASS INDEX: 42.66 KG/M2 | RESPIRATION RATE: 16 BRPM | HEIGHT: 72 IN

## 2021-06-05 VITALS
HEART RATE: 51 BPM | DIASTOLIC BLOOD PRESSURE: 60 MMHG | SYSTOLIC BLOOD PRESSURE: 128 MMHG | HEIGHT: 72 IN | BODY MASS INDEX: 42.66 KG/M2 | OXYGEN SATURATION: 99 % | WEIGHT: 315 LBS

## 2021-06-05 NOTE — PROGRESS NOTES
[facility]    DATE: 2020    NAME:  Robert E Schamberger             :  1934  MRN: [id number]  CODE STATUS:  FULL CODE    VISIT TYPE: Problem Visit (hospital f/u)     FACILITY:  [facility] [[id number]]       CHIEF COMPLAIN/REASON FOR VISIT:    Chief Complaint   Patient presents with     Problem Visit     hospital f/u               HISTORY OF PRESENT ILLNESS: Robert E Schamberger is a 85 y.o. male who was admitted 2/3- for numbness and tingling in bue and ble, garbled speech, unsteady gait. CT negative and later MRI negative as well. His vitals were stable and blood glucose within normal limits. He was suspected of having TIA, as he has previously had. Neurology followed and EEG showed mild encephalopathy. Echo and carotid US stable. He passed swallow eval. He did have some bradycardia with concerns for sick sinus syndrome. He was recommended 30 day cardiac monitor. He was started on aspirin x 3 weeks with plavix and then will continue only plavix. He scored 10/30 on slums. His novolog dose was reduced with meals due to hypoglycemic during inpatient stay. [facility]. He has PMH of obesity, type 2 DM, OA, HTN.     Today Mr. Schamberger is seen for hospital follow up. He says his speech has been fine since he has been here and no further numbness or tingling in his arms or legs. He is not having any headaches or difficulty swallowing. He denies recent cough or cold symptoms. He thinks therapy is going well. He thinks his bowels are moving fine. He does not feel constipated. His appetite is great and no issues there. No breathing concerns or pain today. He says he is wearing his cpap at night and no issues with his machine. He did not sleep last night but says he does not sleep well in places like this. He denies any blood in stool or urine and no urinary issues. He offers no acute complaints today.     REVIEW OF  SYSTEMS:  PROBLEMS AND REVIEW OF SYSTEMS:   Today on ROS:   Currently, no fever, chills, or rigors. Decreased vision and hearing. Denies any chest pain, headaches, palpitations, lightheadedness, dizziness, shortness of breath, or cough. Appetite is good. Denies any GERD symptoms. Denies any difficulty with swallowing, nausea, or vomiting.  Denies any abdominal pain, diarrhea or constipation. Denies any urinary symptoms. No active bleeding. No rash. Positive for insomnia, cpap use      Allergies   Allergen Reactions     Actos [Pioglitazone] Swelling     Edema     Gabapentin Diarrhea     Diarrhea     Lipitor [Atorvastatin]      Back pain     Niacin      Hyperglycemia     Simvastatin      Back pain     Current Outpatient Medications   Medication Sig     acetaminophen (TYLENOL) 500 MG tablet Take 1,000 mg by mouth 3 (three) times a day. Special Instructions: max: 4000 mg in 24 hours Dx: pain  Three Times A Day; 08:00 AM, 12:00 PM, 04:00 PM     aspirin 81 MG EC tablet Take 1 tablet (81 mg total) by mouth daily for 21 days. Then discontinue     blood glucose test (GLUCOSE BLOOD) strips Test up to 3 times daily     cholecalciferol, vitamin D3, (VITAMIN D3) 2,000 unit Tab Take 1 tablet (2,000 Units total) by mouth daily.     citalopram (CELEXA) 20 MG tablet Take 0.5 tablets (10 mg total) by mouth at bedtime.     clopidogreL (PLAVIX) 75 mg tablet Take 1 tablet (75 mg total) by mouth daily.     cyanocobalamin 1000 MCG tablet Take 1 tablet (1,000 mcg total) by mouth daily.     diclofenac sodium (VOLTAREN) 1 % Gel Place 2-4 g on the skin 4 (four) times a day as needed.      hydroCHLOROthiazide (HYDRODIURIL) 25 MG tablet Take 25 mg by mouth daily.     insulin aspart U-100 (NOVOLOG FLEXPEN U-100 INSULIN) 100 unit/mL (3 mL) injection pen Inject 10 Units under the skin 3 (three) times a day before meals.     insulin glargine (BASAGLAR KWIKPEN) 100 unit/mL (3 mL) pen INJECT 75 UNITS UNDER THE SKIN AT BEDTIME -REPLACES LANTUS  "(Patient taking differently: Inject 75 Units under the skin at bedtime. )     latanoprost (XALATAN) 0.005 % ophthalmic solution Administer 1 drop to both eyes at bedtime.     losartan (COZAAR) 100 MG tablet Take 100 mg by mouth daily.     magnesium oxide (MAGOX) 400 mg (241.3 mg magnesium) tablet Take 1 tablet (400 mg total) by mouth 2 (two) times a day.     metFORMIN (GLUCOPHAGE-XR) 500 MG 24 hr tablet TAKE THREE TABLETS (1500MG) BY MOUTH DAILY     pravastatin (PRAVACHOL) 20 MG tablet Take 1 tablet (20 mg total) by mouth daily.     pregabalin (LYRICA) 50 MG capsule Take 1 capsule (50 mg total) by mouth at bedtime.     UNIFINE PENTIPS 31 gauge x 5/16\" Ndle USE AS DIRECTED 3 TIMES DAILY     Past Medical History:    Past Medical History:   Diagnosis Date     Anemia in chronic kidney disease      B12 deficiency 11/9/2017     Bilateral hearing loss 5/9/2016     Bursitis, hip, left 3/2/2017     Chest pain     Normal GXT 2006     Chronic kidney disease, stage 3 (moderate) (H)     Worsening with hyperkalemia on Relafen-discontinued August 2016     Closed displaced fracture of left clavicle 9/21/2017     Cognitive changes 11/30/2018    SLUMS 20/30 Nov 2018     Complete tear of left rotator cuff 2/15/2018    Associated with fall and clavicular fracture that occurred 2017, evaluated by orthopedics, given cortisone injection and physical therapy recommended     Degenerative disc disease, lumbar      Depression      Diabetic peripheral neuropathy (H) 11/30/2018     Erectile dysfunction      Fatigue 7/6/2017     Forearm tendonitis 11/29/2016     Glaucoma      Gout attack     Left foot     HTN (hypertension)      Hyperlipidemia      Ingrown toenail 7/6/2017     Intermittent asthma without complication 11/9/2017     Left leg cellulitis 2014     Low back pain      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     Morbid obesity (H)      QUETA on CPAP      Osteoarthritis      Osteoarthritis of both hands      Osteoarthritis of both knees     " Right TKA     Pain of right heel 5/11/2018     Peripheral neuropathy 10/9/2018     Rib fracture 2005     Right-sided chest pain 5/9/2016     Screening     No AAA on CT scan 2009     Squamous cell skin cancer 11/2/2015     TIA (transient ischemic attack) 09/06/2019    CT head showing chronic lacunar infarcts.  Carotid ultrasound with atherosclerotic plaque but no severe stenosis.     Traumatic subarachnoid hemorrhage with loss of consciousness of 30 minutes or less (H) 9/21/2017    Fall down the stairs following alcohol use with traumatic subarachnoid hemorrhage treated conservatively at [facility] followed by stay at [facility] with no residual neurologic deficits     Trochanteric bursitis of right hip 8/24/2018     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)      Unsteady gait 3/4/2019     Vitamin D deficiency 11/9/2017           PHYSICAL EXAMINATION  Vitals:    02/07/20 0700   BP: 135/62   Pulse: (!) 57   Resp: 18   Temp: 97.1  F (36.2  C)   SpO2: 94%       Today on physical exam:     GENERAL: Awake, Alert, not in any form of acute distress, answers questions appropriately, follows simple commands, conversant, obese  HEENT: Head is normocephalic with normal hair distribution. No evidence of trauma. Ears: No acute purulent discharge. Eyes: Conjunctivae pink with no scleral jaundice. Nose: Normal mucosa and septum. NECK: Supple with no cervical or supraclavicular lymphadenopathy. Trachea is midline. [place], decreased vision  CHEST: No tenderness or deformity, no crepitus  LUNG: dim to auscultation with good chest expansion. There are no crackles or wheezes, normal AP diameter.   BACK: No kyphosis of the thoracic spine. Symmetric, no curvature, ROM normal, no CVA tenderness, no spinal tenderness   CVS: There is good S1  S2, regular rhythm, there are no murmurs, rubs, gallops, or heaves,  2+ pulses symmetric in all extremities.  ABDOMEN: obese and soft, nontender to palpation, non distended, no masses, no  organomegaly, good bowel sounds, no rebound or guarding, no peritoneal signs.   EXTREMITIES: trace ble pedal edema, no numb/tingling   SKIN: Warm and dry, dry and cracked skin on ble.  NEUROLOGICAL: The patient is oriented to person, place, forgetful. Most recent slums 10/30.            LABS:   Recent Results (from the past 168 hour(s))   ECG 12 lead nursing unit performed   Result Value Ref Range    SYSTOLIC BLOOD PRESSURE 149 mmHg    DIASTOLIC BLOOD PRESSURE 71 mmHg    VENTRICULAR RATE 79 BPM    ATRIAL RATE 79 BPM    P-R INTERVAL 222 ms    QRS DURATION 96 ms    Q-T INTERVAL 378 ms    QTC CALCULATION (BEZET) 433 ms    P Axis 63 degrees    R AXIS 60 degrees    T AXIS 62 degrees    MUSE DIAGNOSIS       Sinus rhythm with sinus arrhythmia with 1st degree A-V block  Otherwise normal ECG  No previous ECGs available  Confirmed by SEE ED PROVIDER NOTE FOR, ECG INTERPRETATION ([id number]),  Elmo Borrego ([id number]) on 2/3/2020 2:04:06 PM     HM2(CBC w/o Differential)   Result Value Ref Range    WBC 8.3 4.0 - 11.0 thou/uL    RBC 4.23 (L) 4.40 - 6.20 mill/uL    Hemoglobin 12.9 (L) 14.0 - 18.0 g/dL    Hematocrit 39.3 (L) 40.0 - 54.0 %    MCV 93 80 - 100 fL    MCH 30.5 27.0 - 34.0 pg    MCHC 32.8 32.0 - 36.0 g/dL    RDW 13.0 11.0 - 14.5 %    Platelets 220 140 - 440 thou/uL    MPV 11.6 8.5 - 12.5 fL   Comprehensive Metabolic Panel   Result Value Ref Range    Sodium 136 136 - 145 mmol/L    Potassium 4.6 3.5 - 5.0 mmol/L    Chloride 100 98 - 107 mmol/L    CO2 25 22 - 31 mmol/L    Anion Gap, Calculation 11 5 - 18 mmol/L    Glucose 179 (H) 70 - 125 mg/dL    BUN 28 8 - 28 mg/dL    Creatinine 1.48 (H) 0.70 - 1.30 mg/dL    GFR MDRD Af Amer 55 (L) >60 mL/min/1.73m2    GFR MDRD Non Af Amer 45 (L) >60 mL/min/1.73m2    Bilirubin, Total 0.6 0.0 - 1.0 mg/dL    Calcium 8.9 8.5 - 10.5 mg/dL    Protein, Total 6.6 6.0 - 8.0 g/dL    Albumin 3.5 3.5 - 5.0 g/dL    Alkaline Phosphatase 109 45 - 120 U/L    AST 22 0 - 40 U/L    ALT 35 0 - 45 U/L    POCT Glucose   Result Value Ref Range    Glucose 163 (H) 70 - 139 mg/dL   POCT Glucose   Result Value Ref Range    Glucose 212 (H) 70 - 139 mg/dL   POCT Glucose   Result Value Ref Range    Glucose 194 (H) 70 - 139 mg/dL   Echo Complete   Result Value Ref Range    LV volume diastolic 160 (!) 62 - 150 cm3    LV volume systolic 59.3 21 - 61 cm3    LVOT diam 2.3 cm    LVOT mean gradient 3 mmHg    LVOT peak VTI 29.1 cm    LVOT mean judson 82.1 cm/s    LVOT peak judson 122 cm/s    LVOT peak gradient 6 mmHg    MV E' lat judson 6.2 cm/s    MV E' med judson 5.66 cm/s    AV mean judson 78.5 cm/s    AV mean gradient 3 mmHg    AV VTI 30.3 cm    AV peak judson 130 cm/s    AO root 4.1 cm    AO ascending 3.8 cm    MV decel slope 3,910 mm/s2    MV decel time 384 ms    MV P 1/2 time 112 ms    MV peak A judson 153 cm/s    MV peak E judson 150 cm/s    BSA 2.75 m2    Hieght 72 in    Weight 5,196.8 lbs    /73 mmHg    HR 51 bpm    Echo LVEF calculated 63 55 - 75 %    LA volume 117.6 mL    AV area 4.0 cm2    AV DIM IND judson 0.9     MV area p 1/2 time 2.0 cm2    MV E/A Ratio 1.0     LVOT area 4.15 cm2    LVOT .8 cm3    AV peak gradient 6.8 mmHg    LV systolic volume index 21.6 11 - 31 cm3/m2    LV diastolic volume index 58.2 34 - 74 cm3/m2    LA volume index 42.7 mL/m2    LV SVi 43.9 ml/m2    TAPSE 2.3 cm    MV med E/e' ratio 26.5     MV lat E/e' ratio 24.2     LV CO 6.2 l/min    LV Ci 2.2 l/min/m2    LA area 2 37.2 cm2    LA area 1 29.0 cm2    Height 72.0 in    Weight 325 lbs    MV Avg E/e' Ratio 25.3 cm/s    LA length 7.8 cm    AV DIM IND VTI 1.0    POCT Glucose   Result Value Ref Range    Glucose 185 (H) 70 - 139 mg/dL   POCT Glucose   Result Value Ref Range    Glucose 163 (H) 70 - 139 mg/dL   POCT Glucose   Result Value Ref Range    Glucose 247 (H) 70 - 139 mg/dL   POCT Glucose   Result Value Ref Range    Glucose 94 70 - 139 mg/dL   Magnesium   Result Value Ref Range    Magnesium 1.7 (L) 1.8 - 2.6 mg/dL   Basic Metabolic Panel   Result Value Ref  Range    Sodium 137 136 - 145 mmol/L    Potassium 4.4 3.5 - 5.0 mmol/L    Chloride 104 98 - 107 mmol/L    CO2 25 22 - 31 mmol/L    Anion Gap, Calculation 8 5 - 18 mmol/L    Glucose 98 70 - 125 mg/dL    Calcium 8.7 8.5 - 10.5 mg/dL    BUN 24 8 - 28 mg/dL    Creatinine 1.41 (H) 0.70 - 1.30 mg/dL    GFR MDRD Af Amer 58 (L) >60 mL/min/1.73m2    GFR MDRD Non Af Amer 48 (L) >60 mL/min/1.73m2   Platelet Count - every other day x 3   Result Value Ref Range    Platelets 195 140 - 440 thou/uL   POCT Glucose   Result Value Ref Range    Glucose 78 70 - 139 mg/dL   POCT Glucose   Result Value Ref Range    Glucose 156 (H) 70 - 139 mg/dL   POCT Glucose   Result Value Ref Range    Glucose 184 (H) 70 - 139 mg/dL   POCT Glucose   Result Value Ref Range    Glucose 108 70 - 139 mg/dL   POCT Glucose   Result Value Ref Range    Glucose 183 (H) 70 - 139 mg/dL     Results for orders placed or performed during the hospital encounter of 02/03/20   Basic Metabolic Panel   Result Value Ref Range    Sodium 137 136 - 145 mmol/L    Potassium 4.4 3.5 - 5.0 mmol/L    Chloride 104 98 - 107 mmol/L    CO2 25 22 - 31 mmol/L    Anion Gap, Calculation 8 5 - 18 mmol/L    Glucose 98 70 - 125 mg/dL    Calcium 8.7 8.5 - 10.5 mg/dL    BUN 24 8 - 28 mg/dL    Creatinine 1.41 (H) 0.70 - 1.30 mg/dL    GFR MDRD Af Amer 58 (L) >60 mL/min/1.73m2    GFR MDRD Non Af Amer 48 (L) >60 mL/min/1.73m2         Lab Results   Component Value Date    WBC 8.3 02/03/2020    HGB 12.9 (L) 02/03/2020    HCT 39.3 (L) 02/03/2020    MCV 93 02/03/2020     02/05/2020       Lab Results   Component Value Date    [id number] 471 02/15/2018     Lab Results   Component Value Date    HGBA1C 7.1 (H) 01/14/2020     Lab Results   Component Value Date    INR 1.13 (H) 01/09/2014    INR 1.13 (H) 01/08/2014    INR 1.07 01/07/2014     Vitamin D, Total (25-Hydroxy)   Date Value Ref Range Status   08/29/2017 20.9 (L) 30.0 - 80.0 ng/mL Final     Lab Results   Component Value Date    TSH 3.74  11/30/2018           ASSESSMENT/PLAN:    1. TIA, recurrent: Garbled speech, numb/tingling bue and ble resolved. No dysarthria today. On aspirin x 3 weeks, plavix, pravastatin. F/u with neuro prn.   2. Type 2 DM: Accuchecks 128-231 recently, novolog recently reduced. On metformin, lantus.    3. QUETA: ON CPAP. No concerns.   4. HTN: SBP 130s. On losartan, HCTZ.   5. Cognitive impairment: a/o x2 today, forgetful and confused at times. Slums 10/30. ST and Ot following. Provide safe environment.   6. CKD stage 3: Cr 1.41 on 2/5. Stable.   7. Dry skin: cracked and dry on legs. Will order eucerin cream at bedtime and daily prn.   8. Depression: controlled on citalopram.   9. Chronic pain, diabetic neuropathy: on tylenol, lyrica. Denies pain today.   10. Obesity: Weight 325lbs. Counseling provided on weight loss, improving fruit and vegetable intake, healthier dietary habits and increasing physical activity.      Electronically signed by: Brittany Davalos NP

## 2021-06-05 NOTE — PROGRESS NOTES
Office Visit - Follow Up   Robert E Schamberger   85 y.o. male    Date of Visit: 1/14/2020    Chief Complaint   Patient presents with     Diabetes     Throat issue     was in ER        Assessment and Plan   1. 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)  His last A1c was 7.8%.  Continues on 75 units of Basaglar at night and NovoLog 20 units with meals.  Trying to watch his diet more carefully.  Rechecking A1c today.  Continue annual diabetic eye exams.  Will perform foot exam at his upcoming physical.  - Glycosylated Hemoglobin A1c  - Microalbumin, Random Urine    2. Moderate major depression (H)  Mood appears stable with PHQ 9 score of 8 taking citalopram daily    3. Chronic kidney disease, stage 3 (moderate) (H)  Monitor renal function.  Avoid NSAIDs  - Basic Metabolic Panel    4. Anemia in stage 3 chronic kidney disease (H)  Monitor CBC  - HM2(CBC w/o Differential)    5. Food impaction of esophagus, subsequent encounter  ER evaluation with food impacted in esophagus.  Will make arrangements for evaluation by gastroenterology.  May need EGD versus swallowing study.  He normally does not have dysphagia.  We discussed the importance of chewing his food more diligently and eating slower.  - Ambulatory referral to Gastroenterology    6. Essential hypertension  Good blood pressure control with current medication    7. QUETA on CPAP  Wearing CPAP faithfully at night    8. Hyperlipidemia, unspecified hyperlipidemia type  Recheck lipid profile on higher dose of pravastatin  - Lipid Cascade    9. Encounter for therapeutic drug monitoring  Monitor LFTs while on statin  - Hepatic Profile    10.  Probable TIA with recurrent episodes of aphasia.  Will review neurologic consult and recommendations.  It sounds like 30-day monitor may have been recommended.    Return in about 3 months (around 4/14/2020) for Annual physical.     History of Present Illness   This 85 y.o. old gentleman with type 2 diabetes requiring  insulin, hypertension, morbid obesity, osteoarthritis, peripheral neuropathy, depression, and sleep apnea here to follow-up and to discuss these problems and several other concerns.  Recent episodes of aphasia worrisome for TIA.  Occurred on 2 separate occasions this fall.  CT showed lacunar infarcts.  Carotid ultrasound with plaque but no severe stenosis.  Evaluated by neurology but I do not have these records available.  Continues on higher dose of aspirin 325 mg every day dose of statin at 20 mg of pravastatin daily.  Fortunately, he has had no recurrent similar episodes.    He was in the ER last week with food impacted in his esophagus.  He had been eating pork chops and hashbrowns and felt something suddenly stuck in his throat.  Viscous lidocaine administered.  He was able to cough up a piece of pork chop and felt much better.  Imaging studies canceled.  Recommended to follow-up with gastroenterology for consideration of EGD.  He denies any regular problems with dysphagia.    Diabetic log reviewed.  Checking his sugar every morning but usually not later in the day as instructed.  Morning sugars look generally well controlled often in the 70s but occasionally over 200.  No hypoglycemia.  Continues to use 75 units of Basaglar every night and is taking NovoLog 20 units before meals.  His last A1c was 7.8%.    Review of Systems:  Otherwise, a comprehensive review of systems was negative except as noted.     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)     Hyperlipidemia     Osteoarthritis     Low back pain     HTN (hypertension)     Glaucoma     QUETA on CPAP     Erectile dysfunction     Morbid obesity (H)     Degenerative disc disease, lumbar     Osteoarthritis of both knees     Osteoarthritis of both hands     Squamous cell skin cancer     Anemia in chronic kidney disease     Bilateral hearing  loss     Chronic kidney disease, stage 3 (moderate) (H)     Bursitis of left hip     Fatigue     B12 deficiency     Vitamin D deficiency     Intermittent asthma without complication     Complete tear of left rotator cuff     Entropion of left lower eyelid     Trochanteric bursitis of right hip     Cognitive changes     Diabetic peripheral neuropathy (H)     Unsteady gait     Moderate major depression (H)     TIA (transient ischemic attack)       He has a past surgical history that includes pr removal gallbladder; pr revise median n/carpal tunnel surg; pr excis stomach ulcer,lesn;local; Total knee arthroplasty (Right, 2014); Carpal tunnel release (Bilateral); Eye surgery; Colonoscopy (2003); and Blepharoplasty (2018).    Allergies   Allergen Reactions     Actos [Pioglitazone] Swelling     Edema     Gabapentin Diarrhea     Diarrhea     Lipitor [Atorvastatin]      Back pain     Niacin      Hyperglycemia     Simvastatin      Back pain       Current Outpatient Medications   Medication Sig Dispense Refill     acetaminophen (TYLENOL) 500 MG tablet Take 1,000 mg by mouth 3 (three) times a day. Special Instructions: max: 4000 mg in 24 hours Dx: pain  Three Times A Day; 08:00 AM, 12:00 PM, 04:00 PM       aspirin 325 MG EC tablet Take 1 tablet (325 mg total) by mouth daily. 100 tablet 3     blood glucose test (GLUCOSE BLOOD) strips Test up to 3 times daily 300 strip 3     cholecalciferol, vitamin D3, (VITAMIN D3) 2,000 unit Tab Take 1 tablet (2,000 Units total) by mouth daily. 100 tablet 3     citalopram (CELEXA) 20 MG tablet Take 0.5 tablets (10 mg total) by mouth at bedtime. 90 tablet 3     cyanocobalamin 1000 MCG tablet Take 1 tablet (1,000 mcg total) by mouth daily. 100 tablet 3     diclofenac sodium (VOLTAREN) 1 % Gel Place 2-4 g on the skin.       insulin glargine (BASAGLAR KWIKPEN) 100 unit/mL (3 mL) pen INJECT 75 UNITS UNDER THE SKIN AT BEDTIME -REPLACES LANTUS 60 mL 1     latanoprost (XALATAN) 0.005 % ophthalmic  "solution Administer 1 drop to both eyes at bedtime.       losartan-hydrochlorothiazide (HYZAAR) 100-25 mg per tablet TAKE ONE TABLET BY MOUTH ONCE DAILY 90 tablet 3     magnesium oxide (MAGOX) 400 mg (241.3 mg magnesium) tablet Take 1 tablet (400 mg total) by mouth daily. 100 tablet 1     metFORMIN (GLUCOPHAGE-XR) 500 MG 24 hr tablet TAKE THREE TABLETS (1500MG) BY MOUTH DAILY 270 tablet 3     NOVOLOG FLEXPEN U-100 INSULIN 100 unit/mL (3 mL) injection pen INJECT 20 UNITS SQ WITH BREAKFAST 25 UNITS WITH LUNCH AND 25 UNITS WITH DINNER 60 mL 11     pravastatin (PRAVACHOL) 20 MG tablet Take 1 tablet (20 mg total) by mouth daily. 90 tablet 3     pregabalin (LYRICA) 50 MG capsule TAKE 1-2 CAPSULES ( MG TOTAL) BY MOUTH AT BEDTIME. 60 capsule 11     UNIFINE PENTIPS 31 gauge x 5/16\" Ndle USE AS DIRECTED 3 TIMES DAILY 300 each 3     No current facility-administered medications for this visit.         Physical Exam   General Appearance:   Pleasant but morbidly obese elderly male    /60   Pulse 78   Ht 6' (1.829 m)   Wt (!) 334 lb (151.5 kg)   SpO2 96%   BMI 45.30 kg/m        Respiratory: Normal respiratory effort.  Lungs are clear with no rales or wheezes.  Heart: Regular rate and rhythm without murmurs, rubs, or gallops.    Extremities: No peripheral edema.  Neurologic: Grossly nonfocal  Skin: No cyanosis or pallor  Psych: Alert and oriented ×3, mood appropriate         Additional Information   Social History     Tobacco Use     Smoking status: Former Smoker     Smokeless tobacco: Never Used   Substance Use Topics     Alcohol use: Yes     Alcohol/week: 14.0 standard drinks     Types: 7 Cans of beer, 7 Shots of liquor per week     Drug use: No         Review and/or order of clinical lab tests: Ordering A1c, BMP, lipid profile and microalbumin    Decision to obtain old records and/or obtain history from someone other than the patient: We will obtain records from recent neurology evaluation regarding recurrent " TIA symptoms    Review and summarization of old records and/or obtaining history from someone other than the patient and.or discussion of case with another health care provider: Reviewed records from ER evaluation on January 7 presenting with food impaction of esophagus       Ld Moy MD

## 2021-06-06 NOTE — PROGRESS NOTES
"[facility]    Facility:   [facility] [[id number]]   Code Status: FULL CODE      CHIEF COMPLAINT/REASON FOR VISIT:  Chief Complaint   Patient presents with     H & P       HISTORY:      HPI: Mr. 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    Hospital course: Patient was admitted at [facility] between February 3 and February 6 presenting with bilateral \"numbness tingling\" as well as change in speech he says slurring hospital notes as gibberish.  Evaluation included negative head CT followed by a nonacute brain MRI.  He also had negative VFSS.  Echocardiogram showed severe MAC with EF 65% normal LV function.  He had negative carotid ultrasounds.  He had EEG showing encephalopathy but no evidence of seizures.  He was seen by neurology who felt that this represented TIA/CVA.  Dual antiplatelet therapy was recommended for 3 weeks then Plavix alone to follow.    Subjective/review of systems:  -Compromised by patient's memory deficit  -Augmented by discussion with staff, record review     Patient reports he feels good today.  He thinks he is back to his baseline.  He is noticing no problems with his speech.  He denies headaches.  He denies change in vision swallowing hearing.  He denies chest pain orthopnea PND peripheral edema cough wheezing abdominal pain nausea vomiting diarrhea melena bright red blood per rectum dysuria.  He does have some bladder urgency which is chronic and no dysuria.  Denies falls or injuries.  Denies fever sweats or chills.  Denies exposure to known or named illness.  He says " that his blood sugars at home running really quite good 80-1 15 on his fasting check in the morning with no known hypoglycemia.  He says he does not always check it again but if he does it around suppertime and he hopes to be a 200 or less.  He says he does not feel like he is depressed.  Remainder 13 system ROS negative    Social history  Patient and his wife have lived on [address] [facility] in their home for 58 years.  He is going to be moving in September to the new facility at the [address] of [facility] which is been held right now, they have a unit reserved.  He says they are doing this more for his wife who has difficulty with steps at home than for him.  He says he has 1 son who lives in [place] and is helpful when needed.    Past Medical History:   Diagnosis Date     Anemia in chronic kidney disease      B12 deficiency 11/9/2017     Bilateral hearing loss 5/9/2016     Bursitis, hip, left 3/2/2017     Chest pain     Normal GXT 2006     Chronic kidney disease, stage 3 (moderate) (H)     Worsening with hyperkalemia on Relafen-discontinued August 2016     Closed displaced fracture of left clavicle 9/21/2017     Cognitive changes 11/30/2018    SLUMS 20/30 Nov 2018     Complete tear of left rotator cuff 2/15/2018    Associated with fall and clavicular fracture that occurred 2017, evaluated by orthopedics, given cortisone injection and physical therapy recommended     Degenerative disc disease, lumbar      Depression      Diabetic peripheral neuropathy (H) 11/30/2018     Erectile dysfunction      Fatigue 7/6/2017     Forearm tendonitis 11/29/2016     Glaucoma      Gout attack     Left foot     HTN (hypertension)      Hyperlipidemia      Ingrown toenail 7/6/2017     Intermittent asthma without complication 11/9/2017     Left leg cellulitis 2014     Low back pain      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     Morbid obesity (H)      QUETA on CPAP      Osteoarthritis      Osteoarthritis of both hands       Osteoarthritis of both knees     Right TKA     Pain of right heel 5/11/2018     Peripheral neuropathy 10/9/2018     Rib fracture 2005     Right-sided chest pain 5/9/2016     Screening     No AAA on CT scan 2009     Squamous cell skin cancer 11/2/2015     TIA (transient ischemic attack) 09/06/2019    CT head showing chronic lacunar infarcts.  Carotid ultrasound with atherosclerotic plaque but no severe stenosis.     Traumatic subarachnoid hemorrhage with loss of consciousness of 30 minutes or less (H) 9/21/2017    Fall down the stairs following alcohol use with traumatic subarachnoid hemorrhage treated conservatively at [facility] followed by stay at TCU with no residual neurologic deficits     Trochanteric bursitis of right hip 8/24/2018     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)      Unsteady gait 3/4/2019     Vitamin D deficiency 11/9/2017             No family history on file.  Social History     Socioeconomic History     Marital status:      Spouse name: None     Number of children: None     Years of education: None     Highest education level: None   Occupational History     None   Social Needs     Financial resource strain: None     Food insecurity:     Worry: None     Inability: None     Transportation needs:     Medical: None     Non-medical: None   Tobacco Use     Smoking status: Former Smoker     Smokeless tobacco: Never Used   Substance and Sexual Activity     Alcohol use: Yes     Alcohol/week: 14.0 standard drinks     Types: 7 Cans of beer, 7 Shots of liquor per week     Drug use: No     Sexual activity: None   Lifestyle     Physical activity:     Days per week: None     Minutes per session: None     Stress: None   Relationships     Social connections:     Talks on phone: None     Gets together: None     Attends Zoroastrian service: None     Active member of club or organization: None     Attends meetings of clubs or organizations: None     Relationship status: None      Intimate partner violence:     Fear of current or ex partner: None     Emotionally abused: None     Physically abused: None     Forced sexual activity: None   Other Topics Concern     None   Social History Narrative    Semi retired     x 47 years                 Review of Systems as above    Vitals:    02/13/20 1957   BP: 158/63   Pulse: 63   Resp: 18   Temp: 97.1  F (36.2  C)   SpO2: 96%   Weight: (!) 321 lb (145.6 kg)       Physical Exam  Patient is alert oriented x3 extremely pleasant and normally conversant.  His cognitive impairment does not seem to be as severe as his slums score would indicate an casual conversation, I did not do formal MMSE today.  Normocephalic atraumatic sclera clear nonicteric EOMI no facial droop.  Tongue movements are normal.  Oropharynx is crowded but without erythema or exudate.  Neck is supple no adenopathy or mass I cannot see neck veins.  Heart is distant S1-S2 but regular without audible murmur gallop or rub.  Lungs are clear and he moves air easily.  Difficult abdominal exam with very large taut abdomen but no guarding with normal bowel sounds no organomegaly or mass.  He has impressive T shaped abdominal surgical scar which she says was from his gastrectomy now 40 or 50 years ago.  Extremities show large thick legs but no pitting edema.  Skin is warm and pink with good cap refill in hands and and legs.  Antigravity strength in his legs.  LABS:   Results for SCHAMBERGER, ROBERT E (MRN [id number]) as of 2/13/2020 20:08   Ref. Range 2/5/2020 08:38   Sodium Latest Ref Range: 136 - 145 mmol/L 137   Potassium Latest Ref Range: 3.5 - 5.0 mmol/L 4.4   Chloride Latest Ref Range: 98 - 107 mmol/L 104   CO2 Latest Ref Range: 22 - 31 mmol/L 25   Anion Gap, Calculation Latest Ref Range: 5 - 18 mmol/L 8   BUN Latest Ref Range: 8 - 28 mg/dL 24   Creatinine Latest Ref Range: 0.70 - 1.30 mg/dL 1.41 (H)   GFR MDRD Af Amer Latest Ref Range: >60 mL/min/1.73m2 58 (L)   GFR MDRD Non Af Amer  Latest Ref Range: >60 mL/min/1.73m2 48 (L)   Calcium Latest Ref Range: 8.5 - 10.5 mg/dL 8.7   Magnesium Latest Ref Range: 1.8 - 2.6 mg/dL 1.7 (L)   Glucose Latest Ref Range: 70 - 125 mg/dL 98   Platelets Latest Ref Range: 140 - 440 thou/uL 195       ASSESSMENT:      ICD-10-CM    1. Diabetic peripheral neuropathy (H) E11.42    2. QUETA on CPAP G47.33     Z99.89    3. Hyperlipidemia, unspecified hyperlipidemia type E78.5    4. TIA (transient ischemic attack) G45.9    5. Primary osteoarthritis involving multiple joints M15.0    6. Chronic kidney disease, stage 3 (moderate) (H) N18.3    7. Cognitive changes R41.89    8. Moderate major depression (H) F32.1        Assessment/plan    Cerebrovascular disease  TIA/CVA     Patient appears back to baseline by history.  He has on aspirin 325+ Plavix 75 for 3 weeks and will be on Plavix alone at 75 mg daily thereafter.  -PT, OT,   -Statin therapy  -Blood pressure therapy  -Blood sugar control    Hypertension     Blood pressures look adequately controlled, continue hydrochlorothiazide and losartan.    Hyperlipidemia     Continue pravastatin 20 daily, outpatient follow-up    Insulin-dependent type 2 diabetes    Reports excellent control, supported by A1c below.   Lab Results   Component Value Date    HGBA1C 7.1 (H) 01/14/2020      Continue Lantus 75 at bedtime barring hypoglycemia.  Continue metformin 1500 daily.    Peripheral neuropathy     Continue Lyrica 50 mg    Cognitive impairment     Slums score was only 10 which is a bit surprising to me based on my conversation with him today.  OT consultation is ordered.  Social service consult ordered.    Asthma     By history patient is asymptomatic    Depression     Continue citalopram 10 mg daily    Glaucoma/gout/osteoarthritis, obesity, other chronic medical problems are reviewed, no changes were made today.        Electronically signed by: Wilmar Horowitz MD

## 2021-06-06 NOTE — TELEPHONE ENCOUNTER
Dr. Moy,  Spoke with the patient and relayed message below to the patient's wife, patient gave verbal permission to relay message.  She states that the patient's diarrhea has been better since stopping the aricept, but it is not completely resolved.  Patient has been off of the medication for 5 days.  Ellie GUARDADO CMA/GROVER....................3:08 PM

## 2021-06-06 NOTE — TELEPHONE ENCOUNTER
Consent to communicate in chart for wife but says scheduling only.    Wife calling stating patient stopped taking Aricept 5 days ago due to is recurrent diarrhea.  He has this issue often.  She wanted to update provider.    Pharmacy confirmed.    Vanessa Rae RN [facility] Triage Nurse Advisor    Reason for Disposition    Caller has URGENT medication question about med that PCP prescribed and triager unable to answer question    Protocols used: MEDICATION QUESTION CALL-A-AH

## 2021-06-06 NOTE — TELEPHONE ENCOUNTER
Refill Approved    Rx renewed per Medication Renewal Policy. Medication was last renewed on 2/19/2020: message sent to pharmacy.    Muna Murray, [facility] Triage/Med Refill 2/19/2020     Requested Prescriptions   Refused Prescriptions Disp Refills     blood glucose test strips [Pharmacy Med Name: OneTouch Verio In Vitro Strip] 50 strip 2     Sig: TEST ONCE DAILY       Diabetic Supplies Refill Protocol Passed - 2/16/2020  9:08 PM        Passed - Visit with PCP or prescribing provider visit in last 6 months     Last office visit with prescriber/PCP: 1/14/2020 Ld Moy MD OR same dept: 1/14/2020 Ld Moy MD OR same specialty: 1/14/2020 Ld Moy MD  Last physical: 11/30/2018 Last MTM visit: Visit date not found   Next visit within 3 mo: Visit date not found  Next physical within 3 mo: Visit date not found  Prescriber OR PCP: Ld Moy MD  Last diagnosis associated with med order: 1. DM (diabetes mellitus), type 2 (H)  - blood glucose test strips [Pharmacy Med Name: OneTouch Verio In Vitro Strip]; TEST ONCE DAILY  Dispense: 50 strip; Refill: 2    If protocol passes may refill for 12 months if within 3 months of last provider visit (or a total of 15 months).             Passed - A1C in last 6 months     Hemoglobin A1c   Date Value Ref Range Status   01/14/2020 7.1 (H) 3.5 - 6.0 % Final

## 2021-06-06 NOTE — PROGRESS NOTES
"[facility]    Facility:   [facility] [[id number]]   Code Status: FULL CODE and POLST AVAILABLE      CHIEF COMPLAINT/REASON FOR VISIT:  Chief Complaint   Patient presents with     Review Of Multiple Medical Conditions     TCU intake       HISTORY:      HPI: Sonu is a 85 y.o. male who  has a past medical history of Anemia in chronic kidney disease, B12 deficiency (11/9/2017), Bilateral hearing loss (5/9/2016), Bursitis, hip, left (3/2/2017), Chest pain, Chronic kidney disease, stage 3 (moderate) (H), Closed displaced fracture of left clavicle (9/21/2017), Cognitive changes (11/30/2018), Complete tear of left rotator cuff (2/15/2018), Degenerative disc disease, lumbar, Depression, Diabetic peripheral neuropathy (H) (11/30/2018), Erectile dysfunction, Fatigue (7/6/2017), Forearm tendonitis (11/29/2016), Glaucoma, Gout attack, HTN (hypertension), Hyperlipidemia, Ingrown toenail (7/6/2017), Intermittent asthma without complication (11/9/2017), Left leg cellulitis (2014), Low back pain, 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), Morbid obesity (H), QUETA on CPAP, Osteoarthritis, Osteoarthritis of both hands, Osteoarthritis of both knees, Pain of right heel (5/11/2018), Peripheral neuropathy (10/9/2018), Rib fracture (2005), Right-sided chest pain (5/9/2016), Screening, Squamous cell skin cancer (11/2/2015), TIA (transient ischemic attack) (09/06/2019), Traumatic subarachnoid hemorrhage with loss of consciousness of 30 minutes or less (H) (9/21/2017), Trochanteric bursitis of right hip (8/24/2018), 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  Sonu was recently admitted to the hospital for bilateral leg weakness and was found to have a possible TIA.  He was admitted at [facility] from 2/3/2020 to 2/6/2020.  The discharging provider summarized the hospitalization as follows:    \"85-year-old male with " hypertension, morbid obesity, type 2 diabetes, and severe osteoarthritis who presented with concerns for possible TIA.  Awoke feeling numbness and tingling in both arms and legs.  Unsteady on his feet when trying to ambulate.  Speech was gibberish.  He has had several similar episodes over the last 2 years usually lasting under 30 minutes although usually difficulties with word finding.  No associated headache, chest pain or palpitations.  He reported that blood sugar at home while symptomatic was normal.  Blood pressure was okay when paramedics arrived.  No reported bradycardia.  Work-up in ER included CT scan of head showing no acute findings.  Subsequent MRI of brain without infarct or other abnormality.  Neurology consulted. EEG showed mild encephalopathy.  Echocardiogram with normal left ventricular systolic function.  Carotid ultrasound without significant stenosis.  Normal swallowing study.  Monitored on cardiac telemetry.  A couple occasions with heart rate in the high 40s raising question of sick sinus syndrome.  Neurology recommending adding Plavix 75 mg daily and taking in combination with aspirin for the next 3 weeks.  Thereafter, he should discontinue aspirin but continue Plavix indefinitely.  He will continue his current dose of pravastatin.  Blood pressure adequately controlled.  No recurrent neurologic symptoms but did poorly with cognitive assessment performed by Occupational Therapy scoring 10/30 on SLUMS exam.  Also unsteady on his feet during physical therapy assessment and recommendation to continue PT/OT in a TCU setting before returning home.  Blood sugars running lower during hospitalization and cut back on his dose of NovoLog before meals.  He will continue 75 units of Lantus at bedtime.  He should wear CPAP at bedtime for sleep apnea.  Because of above concern for possible sick sinus syndrome as an alternative explanation for his recurrent symptoms, my office will arrange for a 30-day event  "monitor as an outpatient.  He is discharging to the TCU in stable condition.  I would like him to follow-up with me in 1 week after discharge.\"    Today Sonu is being evaluated for an intake into the transitional care unit.  He states that he has been doing well and has no new concerns or issues to report.  He denies any ongoing issues with TIA symptoms, weakness, or memory loss.  He states that he has been doing relatively well.  He has been working with physical and occupational therapies and feels that he is getting stronger.  He continues to eat well.  He has no problems with urinating or having bowel movements.  He will follow-up with cardiology and did have a Holter monitor placed.   Sonu denies any chest symptoms. Sonu denies any other concerns including fevers/chills, cough or cold symptoms, headaches, vision changes, chest pain/pressure, difficulty breathing, SOB, abdominal pain, nausea, vomiting, diarrhea, dysuria, increasing weakness, increasing pain.     Advanced Care Planning  Spoke with Sonu regarding code status and advanced care planning.  Sonu consented to discussion and is aware of possible copay. They are also aware of the necessity of this discussion due to TCU admission. Discussed that he would like to have full resuscitation efforts. he would also like to have treatment if he  were to fall ill. he would like full medical treatment for all medical issues.  His wife Nely would decide for her/him if he  was unable to make medical decisions. he agrees to IV/IM antibiotics.  He does not want a feeding tube but is agreeable if necessary and is appropriate.  He does request to have it noted that he is Denominational.    Past Medical History:   Diagnosis Date     Anemia in chronic kidney disease      B12 deficiency 11/9/2017     Bilateral hearing loss 5/9/2016     Bursitis, hip, left 3/2/2017     Chest pain     Normal GXT 2006     Chronic kidney disease, stage 3 (moderate) (H)     Worsening with " hyperkalemia on Relafen-discontinued August 2016     Closed displaced fracture of left clavicle 9/21/2017     Cognitive changes 11/30/2018    SLUMS 20/30 Nov 2018     Complete tear of left rotator cuff 2/15/2018    Associated with fall and clavicular fracture that occurred 2017, evaluated by orthopedics, given cortisone injection and physical therapy recommended     Degenerative disc disease, lumbar      Depression      Diabetic peripheral neuropathy (H) 11/30/2018     Erectile dysfunction      Fatigue 7/6/2017     Forearm tendonitis 11/29/2016     Glaucoma      Gout attack     Left foot     HTN (hypertension)      Hyperlipidemia      Ingrown toenail 7/6/2017     Intermittent asthma without complication 11/9/2017     Left leg cellulitis 2014     Low back pain      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     Morbid obesity (H)      QUETA on CPAP      Osteoarthritis      Osteoarthritis of both hands      Osteoarthritis of both knees     Right TKA     Pain of right heel 5/11/2018     Peripheral neuropathy 10/9/2018     Rib fracture 2005     Right-sided chest pain 5/9/2016     Screening     No AAA on CT scan 2009     Squamous cell skin cancer 11/2/2015     TIA (transient ischemic attack) 09/06/2019    CT head showing chronic lacunar infarcts.  Carotid ultrasound with atherosclerotic plaque but no severe stenosis.     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  Social History     Socioeconomic History     Marital status:      Spouse name: Not on file     Number of  children: Not on file     Years of education: Not on file     Highest education level: Not on file   Occupational History     Not on file   Social Needs     Financial resource strain: Not on file     Food insecurity:     Worry: Not on file     Inability: Not on file     Transportation needs:     Medical: Not on file     Non-medical: Not on file   Tobacco Use     Smoking status: Former Smoker     Smokeless tobacco: Never Used   Substance and Sexual Activity     Alcohol use: Yes     Alcohol/week: 14.0 standard drinks     Types: 7 Cans of beer, 7 Shots of liquor per week     Drug use: No     Sexual activity: Not on file   Lifestyle     Physical activity:     Days per week: Not on file     Minutes per session: Not on file     Stress: Not on file   Relationships     Social connections:     Talks on phone: Not on file     Gets together: Not on file     Attends Gnosticist service: Not on file     Active member of club or organization: Not on file     Attends meetings of clubs or organizations: Not on file     Relationship status: Not on file     Intimate partner violence:     Fear of current or ex partner: Not on file     Emotionally abused: Not on file     Physically abused: Not on file     Forced sexual activity: Not on file   Other Topics Concern     Not on file   Social History Narrative    Semi retired     x 47 years               REVIEW OF SYSTEM:  Per HPI    PHYSICAL EXAM:   /63   Pulse 63   Temp 97.1  F (36.2  C)   Resp 18   Wt (!) 319 lb 12.8 oz (145.1 kg)   SpO2 96%   BMI 43.37 kg/m     Morbid obesity significantly limits examination.'  General appearance: alert, appears stated age and cooperative  HEENT: Head is normocephalic with normal hair distribution. No evidence of trauma. Ears: Without lesions or deformity. No acute purulent discharge. Eyes: Conjunctivae pink with no scleral icterus or erythema. Nose: Normal mucosa and septum. Oropharnyx: mmm, no lesions present.  Lungs: clear to  auscultation bilaterally however diminished at bases, respirations without effort  Heart: regular rate and rhythm, S1, S2 normal, no murmur, click, rub or gallop  Abdomen: Obese, soft, non-tender; bowel sounds normal; no masses,  no organomegaly  Extremities: extremities normal, atraumatic, no cyanosis or edema  Pulses: 2+ and symmetric  Skin: Skin color, texture, turgor normal. No rashes or lesions  Neurologic: Grossly normal   Psych: interacts well with caregivers, exhibits logical thought processes and connections, pleasant    LABS:   None today.    ASSESSMENT:      ICD-10-CM    1. TIA (transient ischemic attack) G45.9    2. Physical deconditioning R53.81    3. Morbid obesity (H) E66.01    4. Cognitive changes R41.89    5. Unsteady gait R26.81    6. Advanced care planning/counseling discussion Z71.89        PLAN:    Physical Deconditioning  -Continue PT/OT and other therapies as per care plan.  -Encouraged good nutrition and movement habits.   -Discussed care plan and expected course of stay.   -Continue to follow-up per routine schedule or sooner if needed.     TIA/cognitive changes/unsteady gait  -SLP to eval and treat.  - mg by mouth daily for 21 days for TIA.  -Plavix 75 mg by mouth once daily.  -Pravastatin 20 mg by mouth daily.    Morbid obesity  -Dietitian to eval and treat.    Advanced Care Planning  -POLST reviewed and signed.   -Full Code.     Admission history and physical per MD in the next 30 days. At this time continue current care plan for all chronic medical conditions, as they are stable. Encouraged patient to engage in PT/OT for strengthening and conditioning. Encouraged patient to work closely with nursing staff to ensure any medical complaints are quickly addressed. Follow up this week or sooner if needed. Will continue to monitor patient and work with nursing staff collaboratively to work toward positive patient outcomes.    Total unit/floor time of 35 minutes time consisted of the  following: time spent with patient, examination of patient, reviewing the record including pertinent labs and completing documentation. More than 50% of this time was spent in coordination of care time with nursing staff and other healthcare providers, this time was spent on discussion/counseling on current care plan including medical management of chronic health problems and acute health problems, education pertaining to plan, and discussion of the goals of care pertaining to the current outlined plan with nursing staff and patient. An additional 16 minutes of time was spent discussing code status, wishes for end of life care and reviewing POLST from 11 35-11 51. POLST signed and left with nursing staff.     Electronically signed by: Tosha Albright CNP

## 2021-06-06 NOTE — TELEPHONE ENCOUNTER
Request for Orders    Who s Requesting: Home Care Physical Therapist    Orders being requested:  PT 1 more time this week, then 2x/wk x 2 weeks, then 1x/wk x 1 week for strengthening, gait and balance    Where to send Orders: Please reply through Epic    Thank you

## 2021-06-06 NOTE — PROGRESS NOTES
[facility]    Facility:   [facility] [[id number]]   Code Status: POLST AVAILABLE  PCP: Ld Moy MD   Phone: [phone]   Fax: [phone]      CHIEF COMPLAINT/REASON FOR VISIT:  Chief Complaint   Patient presents with     Discharge Summary       HISTORY COURSE:  Sonu is a 85 y.o. male who  has a past medical history of Anemia in chronic kidney disease, B12 deficiency (11/9/2017), Bilateral hearing loss (5/9/2016), Bursitis, hip, left (3/2/2017), Chest pain, Chronic kidney disease, stage 3 (moderate) (H), Closed displaced fracture of left clavicle (9/21/2017), Cognitive changes (11/30/2018), Complete tear of left rotator cuff (2/15/2018), Degenerative disc disease, lumbar, Depression, Diabetic peripheral neuropathy (H) (11/30/2018), Erectile dysfunction, Fatigue (7/6/2017), Forearm tendonitis (11/29/2016), Glaucoma, Gout attack, HTN (hypertension), Hyperlipidemia, Ingrown toenail (7/6/2017), Intermittent asthma without complication (11/9/2017), Left leg cellulitis (2014), Low back pain, 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), Morbid obesity (H), QUETA on CPAP, Osteoarthritis, Osteoarthritis of both hands, Osteoarthritis of both knees, Pain of right heel (5/11/2018), Peripheral neuropathy (10/9/2018), Rib fracture (2005), Right-sided chest pain (5/9/2016), Screening, Squamous cell skin cancer (11/2/2015), TIA (transient ischemic attack) (09/06/2019), Traumatic subarachnoid hemorrhage with loss of consciousness of 30 minutes or less (H) (9/21/2017), Trochanteric bursitis of right hip (8/24/2018), 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  Sonu was recently admitted to the hospital for bilateral leg weakness and was found to have a possible TIA.  He was admitted at [facility] from 2/3/2020 to 2/6/2020.  The discharging provider summarized the hospitalization in previous  notes.    Today Sonu is being evaluated for a discharge examination. Sonu states he has been doing quite well. He is very happy to be going home. He feels as if he will be successful. He does not have any new issues or problems to report. No significant medications changes while in the TCU. He will be on  mg by mouth daily for 21 days. Sonu denies any other concerns including fevers/chills, cough or cold symptoms, headaches, vision changes, chest pain/pressure, difficulty breathing, SOB, abdominal pain, nausea, vomiting, diarrhea, dysuria, increasing weakness, increasing pain.     PHYSICAL EXAM:   /55   Pulse 60   Temp 97.7  F (36.5  C)   Resp 15   Wt (!) 320 lb 6.4 oz (145.3 kg)   SpO2 94%   BMI 43.45 kg/m    Morbid obesity significantly limits examination.  General appearance: alert, appears stated age and cooperative  HEENT: Head is normocephalic with normal hair distribution. No evidence of trauma. Ears: Without lesions or deformity. No acute purulent discharge. Eyes: Conjunctivae pink with no scleral icterus or erythema. Nose: Normal mucosa and septum. Oropharnyx: mmm, no lesions present.  Lungs: clear to auscultation bilaterally, respirations without effort  Heart: regular rate and rhythm, S1, S2 normal, no murmur, click, rub or gallop  Abdomen: soft, non-tender; bowel sounds normal; no masses,  no organomegaly  Extremities: extremities normal, atraumatic, no cyanosis or edema  Pulses: 2+ and symmetric  Skin: Skin color, texture, turgor normal. No rashes or lesions  Neurologic: Grossly normal   Psych: interacts well with caregivers, exhibits logical thought processes and connections, pleasant    MEDICATION LIST:  Current Outpatient Medications   Medication Sig     acetaminophen (TYLENOL) 500 MG tablet Take 1,000 mg by mouth 3 (three) times a day. Special Instructions: max: 4000 mg in 24 hours Dx: pain  Three Times A Day; 08:00 AM, 12:00 PM, 04:00 PM     aspirin 81 MG EC tablet Take 1  "tablet (81 mg total) by mouth daily for 21 days. Then discontinue     blood glucose test (GLUCOSE BLOOD) strips Test up to 3 times daily     cholecalciferol, vitamin D3, (VITAMIN D3) 2,000 unit Tab Take 1 tablet (2,000 Units total) by mouth daily.     citalopram (CELEXA) 20 MG tablet Take 0.5 tablets (10 mg total) by mouth at bedtime.     clopidogreL (PLAVIX) 75 mg tablet Take 1 tablet (75 mg total) by mouth daily.     cyanocobalamin 1000 MCG tablet Take 1 tablet (1,000 mcg total) by mouth daily.     diclofenac sodium (VOLTAREN) 1 % Gel Place 2-4 g on the skin 4 (four) times a day as needed.      hydroCHLOROthiazide (HYDRODIURIL) 25 MG tablet Take 25 mg by mouth daily.     insulin aspart U-100 (NOVOLOG FLEXPEN U-100 INSULIN) 100 unit/mL (3 mL) injection pen Inject 10 Units under the skin 3 (three) times a day before meals.     insulin glargine (BASAGLAR KWIKPEN) 100 unit/mL (3 mL) pen INJECT 75 UNITS UNDER THE SKIN AT BEDTIME -REPLACES LANTUS (Patient taking differently: Inject 75 Units under the skin at bedtime. )     latanoprost (XALATAN) 0.005 % ophthalmic solution Administer 1 drop to both eyes at bedtime.     losartan (COZAAR) 100 MG tablet Take 100 mg by mouth daily.     magnesium oxide (MAGOX) 400 mg (241.3 mg magnesium) tablet Take 1 tablet (400 mg total) by mouth 2 (two) times a day.     metFORMIN (GLUCOPHAGE-XR) 500 MG 24 hr tablet TAKE THREE TABLETS (1500MG) BY MOUTH DAILY     pravastatin (PRAVACHOL) 20 MG tablet Take 1 tablet (20 mg total) by mouth daily.     pregabalin (LYRICA) 50 MG capsule Take 1 capsule (50 mg total) by mouth at bedtime.     UNIFINE PENTIPS 31 gauge x 5/16\" Ndle USE AS DIRECTED 3 TIMES DAILY       DISCHARGE DIAGNOSIS:    ICD-10-CM    1. Physical deconditioning R53.81    2. TIA (transient ischemic attack) G45.9    3. Unsteady gait R26.81    4. Fatigue, unspecified type R53.83      Physical Deconditioning  -Continue PT/OT and other therapies as per care plan.  -Encouraged good " nutrition and movement habits.   -Discussed care plan and expected course of stay.   -Continue to follow-up per routine schedule or sooner if needed.     TIA/cognitive changes/unsteady gait/Fatigue  -SLP to eval and treat.  - mg by mouth daily for 21 days for TIA.  -Plavix 75 mg by mouth once daily.  -Pravastatin 20 mg by mouth daily.    MEDICAL EQUIPMENT NEEDS:  None today.     DISCHARGE PLAN/FACE TO FACE:  I certify that services are/were furnished while this patient was under the care of a physician and that a physician or an allowed non-physician practitioner (NPP), had a face-to-face encounter that meets the physician face-to-face encounter requirements. The encounter was in whole, or in part, related to the primary reason for home health. The patient is confined to his/her home and needs intermittent skilled nursing, physical therapy, speech-language pathology, or the continued need for occupational therapy. A plan of care has been established by a physician and is periodically reviewed by a physician.  Date of Face-to-Face Encounter: 2/14/2020    I certify that, based on my findings, the following services are medically necessary home health services: home health aid for bathing and ADLs, skilled nursing (RN) for medication set-up and teaching, symptoms and disease process monitoring and education, PT for strengthening, balance, endurance and safety within the home, OT for strengthening, ADL needs, adaptive equipment and safety.    My clinical findings support the need for the above skilled services because: home health aid for bathing and ADLs, skilled nursing (RN) for medication set-up and teaching, symptoms and disease process monitoring and education, PT for strengthening, balance, endurance and safety within the home, OT for strengthening, ADL needs, adaptive equipment and safety.    This patient is homebound because: n/a    The patient is, or has been, under my care and I have initiated the  establishment of the plan of care. This patient will be followed by a physician who will periodically review the plan of care. Schedule follow up visit with primary care provider within 7 days to reestablish care.    Total unit/floor time of 40 minutes time spent on discharge planning, discharge follow-up discussion and discharge medication review.    Electronically signed by: Tosha Albright CNP

## 2021-06-06 NOTE — TELEPHONE ENCOUNTER
Dr. Moy or covering provider:    Opened patient up to [facility] today, requesting ongoing orders below:    Request for Orders    Who s Requesting: Home Care Registered Nurse    Orders being requested:     Skilled Nursing 2wk2 with emphasis on Medication management, Disease management and education, Diabetes education and assessment, pain management and assessment.     OT eval and treat with emphasis on safety with ADL's, need for assistive devices in home and cognitive assessment.     PT eval and treat with emphasis on gait, strengthening and endurance.     MSW eval with emphasis on looking into possible assistive living options, currently on wait list for new facility on [address] but would like to see if there are cheaper options.     Where to send Orders: Please respond here if in agreement to requested orders.     Thank you!  Rocío YEPEZ RN   [facility]

## 2021-06-06 NOTE — TELEPHONE ENCOUNTER
New verbal order needed for Skilled Nursing start of care on 2/18/20 due to agency capacity for 2/17. Pt/spouse declined to be vended to partnering agency. Spouse agreeable to a start of care visit on 2/18.    This is outside of the original 48 hour referral order from TCU.    Lina, RN  [facility]  [phone]

## 2021-06-06 NOTE — PROGRESS NOTES
[facility] Patient Outreach:     Discharge Date::  2/15/20      Reason for TCU stay (discharge diagnosis)::  TIA/CVA      Are you feeling better, the same or worse since your discharge?:  Patient is feeling better          As part of your discharge plan, did they discuss home care with you?: Yes        Have your seen them yet, or are they scheduled to visit?: Yes                Do you have any follow up visits scheduled with your PCP or Specialist?:  Yes, with PCP      (RN) Is it scheduled soon enough (3-5 days)?: Yes

## 2021-06-06 NOTE — TELEPHONE ENCOUNTER
Refill Approved    Rx renewed per Medication Renewal Policy. Medication was last renewed on 1/7/2019    Dhara Andersen, [facility] Triage/Med Refill 2/19/2020     Requested Prescriptions   Pending Prescriptions Disp Refills     blood glucose test (GLUCOSE BLOOD) strips 300 strip 3     Sig: Test up to 3 times daily       Diabetic Supplies Refill Protocol Passed - 2/15/2020 10:50 AM        Passed - Visit with PCP or prescribing provider visit in last 6 months     Last office visit with prescriber/PCP: 1/14/2020 Ld Moy MD OR same dept: 1/14/2020 Ld Moy MD OR same specialty: 1/14/2020 Ld Moy MD  Last physical: 11/30/2018 Last MTM visit: Visit date not found   Next visit within 3 mo: Visit date not found  Next physical within 3 mo: Visit date not found  Prescriber OR PCP: Ld Moy MD  Last diagnosis associated with med order: 1. DM (diabetes mellitus), type 2 (H)  - blood glucose test (GLUCOSE BLOOD) strips; Test up to 3 times daily  Dispense: 300 strip; Refill: 3    If protocol passes may refill for 12 months if within 3 months of last provider visit (or a total of 15 months).             Passed - A1C in last 6 months     Hemoglobin A1c   Date Value Ref Range Status   01/14/2020 7.1 (H) 3.5 - 6.0 % Final

## 2021-06-06 NOTE — TELEPHONE ENCOUNTER
I would continue to hold the Aricept for another 1 to 2 weeks and give me another update at that time

## 2021-06-06 NOTE — TELEPHONE ENCOUNTER
Spoke with the patient's wife and relayed message below from Dr. Moy.  She verbalized understanding and had no further questions at this time.  Ellie GUARDDAO, ROGELIO/GROVER....................3:40 PM

## 2021-06-06 NOTE — TELEPHONE ENCOUNTER
He should discontinue the donepezil if it is causing diarrhea.  It is not worth taking as there is some uncertainty whether any medication would be helpful.

## 2021-06-06 NOTE — PROGRESS NOTES
Office Visit - Follow Up   Robert E Schamberger   85 y.o. male    Date of Visit: 2/21/2020    Chief Complaint   Patient presents with     Hospital Visit Follow Up     TCU follow up        Assessment and Plan   1. Dementia without behavioral disturbance, unspecified dementia type (H)  He appears to have more than just some mild cognitive impairment and I suspect dementia.  Unclear if Alzheimer's versus related to vascular dementia.  In addition to scoring 17/30 family does report changes in his ability to perform usual activities at home including getting mixed up with his medications.  Recommending that he start donepezil 5 mg every night.  Potential side effects discussed.  Will reassess in 3 months and increase dose to 10 mg if tolerating.  - donepeziL (ARICEPT) 5 MG tablet; Take 1 tablet (5 mg total) by mouth at bedtime.  Dispense: 90 tablet; Refill: 3    2. 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)  Blood sugars are running low even after returning home.  Glucose was only 53 this morning.  Will cut back on his Basaglar to only 70 units at bedtime and possibly decrease to 65 units if sugars continue to run under 70 periodically.  Will use only 15 units of NovoLog before meals rather than his usual 20 units.  - insulin aspart U-100 (NOVOLOG FLEXPEN U-100 INSULIN) 100 unit/mL (3 mL) injection pen; Inject 15 Units under the skin 3 (three) times a day before meals.  Dispense: 60 mL; Refill: 11  - insulin glargine (BASAGLAR KWIKPEN) 100 unit/mL (3 mL) pen; Inject 70 Units under the skin at bedtime.  Dispense: 60 mL; Refill: 1    3. TIA (transient ischemic attack)  Hospitalization with altered mental status and altered speech suspicious for TIA.  Extensive neurologic evaluation.  MRI without infarct identified.  EEG with mild encephalopathy.  Echocardiogram and carotid ultrasound normal.  Now on aspirin with Plavix.  He will continue 325 mg of aspirin for another week.  Thereafter I would recommend  continuing combination of aspirin and Plavix indefinitely although he should change to just 81 mg of aspirin.  Continue pravastatin at current dose.    He has a 30-day event monitor in place evaluating for possible sick sinus syndrome    4. Chronic kidney disease, stage 3 (moderate) (H)  Monitor renal function  - Basic Metabolic Panel  - Hemoglobin    5. Moderate major depression (H)  Stable with current dose of citalopram    6. Diabetic peripheral neuropathy (H)  Continue Lyrica at bedtime    7. Hypomagnesemia  Recheck magnesium which was low during hospitalization.  He is on magnesium supplement  - Magnesium    8. Physical deconditioning  He did benefit from his stay at [facility] where he received physical and occupational therapy.  Now receiving home care.    Return in about 3 months (around 5/21/2020) for Annual physical.     History of Present Illness   85-year-old male with hypertension, morbid obesity, type 2 diabetes, and severe osteoarthritis here to follow-up after recent hospitalization with TIA..  Awoke feeling numbness and tingling in both arms and legs.  Unsteady on his feet when trying to ambulate.  Speech was gibberish.  He has had several similar episodes over the last 2 years usually lasting under 30 minutes although usually difficulties with word finding.  No associated headache, chest pain or palpitations.  He reported that blood sugar at home while symptomatic was normal.  Blood pressure was okay when paramedics arrived.  No reported bradycardia.  Work-up in ER included CT scan of head showing no acute findings.  Subsequent MRI of brain without infarct or other abnormality.  Neurology consulted. EEG showed mild encephalopathy.  Echocardiogram with normal left ventricular systolic function.  Carotid ultrasound without significant stenosis.  Normal swallowing study.  Monitored on cardiac telemetry.  A couple occasions with heart rate in the high 40s raising question of sick sinus syndrome.  Neurology  recommending adding Plavix 75 mg daily and taking in combination with aspirin for the next 3 weeks.  Thereafter, he should discontinue aspirin but continue Plavix indefinitely.  He will continue his current dose of pravastatin.  Blood pressure adequately controlled.  No recurrent neurologic symptoms but did poorly with cognitive assessment performed by Occupational Therapy scoring 10/30 on SLUMS exam.  Also unsteady on his feet during physical therapy assessment and recommendation to continue PT/OT in a TCU setting before returning home.  Blood sugars running lower during hospitalization and cut back on his dose of NovoLog before meals.  He was continued on 75 units of Lantus at bedtime.  Wearing CPAP at bedtime for sleep apnea.  Placed on 30-day event monitor as question of sick sinus syndrome was raised during hospitalization.    Now discharged from TCU.  Doing well up ambulating better.  Feels stronger.  No recurrent TIA symptoms.  Cognition seems improved as well according to son and wife.  However, they do notice changes of the last 2 years including changes in him performing his usual activities.  He gets easily mixed up with his medications which is unlike him.          Review of Systems:  Otherwise, a comprehensive review of systems was negative except as noted.     Medications, Allergies and Problem List   Patient Active Problem List   Diagnosis     Lumbar Facet Syndrome     Hyperlipidemia     Osteoarthritis     Low back pain     Glaucoma     QUETA on CPAP     Erectile dysfunction     Morbid obesity (H)     Degenerative disc disease, lumbar     Osteoarthritis of both knees     Osteoarthritis of both hands     Squamous cell skin cancer     Anemia in chronic kidney disease     Bilateral hearing loss     Chronic kidney disease, stage 3 (moderate) (H)     Bursitis of left hip     B12 deficiency     Vitamin D deficiency     Complete tear of left rotator cuff     Entropion of left lower eyelid     Trochanteric  bursitis of right hip     Diabetic peripheral neuropathy (H)     Unsteady gait     Moderate major depression (H)     TIA (transient ischemic attack)     Physical deconditioning     Advanced care planning/counseling discussion     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)     Dementia without behavioral disturbance (H)       He has a past surgical history that includes pr removal gallbladder; pr revise median n/carpal tunnel surg; pr excis stomach ulcer,lesn;local; Total knee arthroplasty (Right, 2014); Carpal tunnel release (Bilateral); Eye surgery; Colonoscopy (2003); and Blepharoplasty (2018).    Allergies   Allergen Reactions     Actos [Pioglitazone] Swelling     Edema     Gabapentin Diarrhea     Diarrhea     Lipitor [Atorvastatin]      Back pain     Niacin      Hyperglycemia     Simvastatin      Back pain       Current Outpatient Medications   Medication Sig Dispense Refill     acetaminophen (TYLENOL) 500 MG tablet Take 1,000 mg by mouth 3 (three) times a day. Special Instructions: max: 4000 mg in 24 hours Dx: pain  Three Times A Day; 08:00 AM, 12:00 PM, 04:00 PM       aspirin 81 MG EC tablet Take 1 tablet (81 mg total) by mouth daily for 21 days. Then discontinue 150 tablet 2     blood glucose test (GLUCOSE BLOOD) strips Test up to 3 times daily 300 strip 3     blood glucose test strips Use one test strip daily to monitor blood glucose three times a day. 300 strip 3     cholecalciferol, vitamin D3, (VITAMIN D3) 2,000 unit Tab Take 1 tablet (2,000 Units total) by mouth daily. 100 tablet 3     citalopram (CELEXA) 20 MG tablet Take 0.5 tablets (10 mg total) by mouth at bedtime. 90 tablet 3     clopidogreL (PLAVIX) 75 mg tablet Take 1 tablet (75 mg total) by mouth daily. 90 tablet 3     cyanocobalamin 1000 MCG tablet Take 1 tablet (1,000 mcg total) by mouth daily. 100 tablet 3     diclofenac sodium (VOLTAREN) 1 % Gel Place 2-4 g on the skin 4 (four) times a day as needed.        hydroCHLOROthiazide  "(HYDRODIURIL) 25 MG tablet Take 25 mg by mouth daily.       insulin aspart U-100 (NOVOLOG FLEXPEN U-100 INSULIN) 100 unit/mL (3 mL) injection pen Inject 15 Units under the skin 3 (three) times a day before meals. 60 mL 11     insulin glargine (BASAGLAR KWIKPEN) 100 unit/mL (3 mL) pen Inject 70 Units under the skin at bedtime. 60 mL 1     latanoprost (XALATAN) 0.005 % ophthalmic solution Administer 1 drop to both eyes at bedtime.       losartan (COZAAR) 100 MG tablet Take 100 mg by mouth daily.       magnesium oxide (MAGOX) 400 mg (241.3 mg magnesium) tablet Take 1 tablet (400 mg total) by mouth daily. 100 tablet 1     metFORMIN (GLUCOPHAGE-XR) 500 MG 24 hr tablet TAKE THREE TABLETS (1500MG) BY MOUTH DAILY 270 tablet 3     pravastatin (PRAVACHOL) 20 MG tablet Take 1 tablet (20 mg total) by mouth daily. 90 tablet 3     pregabalin (LYRICA) 50 MG capsule Take 1 capsule (50 mg total) by mouth at bedtime. 30 capsule 5     UNIFINE PENTIPS 31 gauge x 5/16\" Ndle USE AS DIRECTED 3 TIMES DAILY 300 each 3     donepeziL (ARICEPT) 5 MG tablet Take 1 tablet (5 mg total) by mouth at bedtime. 90 tablet 3     No current facility-administered medications for this visit.         Physical Exam   General Appearance:   Obese but otherwise well-appearing elderly male    /60 (Patient Site: Left Arm, Patient Position: Sitting, Cuff Size: Adult Large)   Pulse (!) 59   Ht 6' (1.829 m)   Wt (!) 326 lb (147.9 kg)   SpO2 97%   BMI 44.21 kg/m        Respiratory: Normal respiratory effort.  Lungs are clear with no rales or wheezes.  Heart: Regular rate and rhythm   Neurologic exam grossly nonfocal    SLUMS 17/30 remembering only 2 of 5 objects and struggling to drawing a clock correctly     Additional Information   Social History     Tobacco Use     Smoking status: Former Smoker     Smokeless tobacco: Never Used   Substance Use Topics     Alcohol use: Yes     Alcohol/week: 14.0 standard drinks     Types: 7 Cans of beer, 7 Shots of liquor " per week     Drug use: No               Time: total time spent with the patient was 40 minutes of which >50% was spent in counseling and coordination of care discussing recent hospitalization with TIA and concerns for dementia as well as discussing management of type 2 diabetes     Ld Moy MD

## 2021-06-06 NOTE — TELEPHONE ENCOUNTER
Since stopping Aricept is his symptoms of diarrhea any better?  How long has he been off the medication?

## 2021-06-07 NOTE — TELEPHONE ENCOUNTER
Refill Approved    Rx renewed per Medication Renewal Policy. Medication was last renewed on 2/21/20.    Taniya Mcpherson, [facility] Triage/Med Refill 4/10/2020     Requested Prescriptions   Pending Prescriptions Disp Refills     insulin glargine (BASAGLAR KWIKPEN) 100 unit/mL (3 mL) pen [Pharmacy Med Name: BASAGLAR KWIKPEN 100 U/ML MG INJECTABLE] 5 pen 0     Sig: INJECT 75 UNITS UNDER THE SKIN AT BEDTIME -REPLACES LANTUS       Insulin/GLP-1 Refill Protocol Passed - 4/9/2020  9:54 AM        Passed - Visit with PCP or prescribing provider visit in last 6 months     Last office visit with prescriber/PCP: 2/21/2020 OR same dept: 2/21/2020 Ld Moy MD OR same specialty: 2/21/2020 Ld Moy MD Last physical: Visit date not found Last MTM visit: Visit date not found     Next appt within 3 mo: Visit date not found  Next physical within 3 mo: Visit date not found  Prescriber OR PCP: Ld Moy MD  Last diagnosis associated with med order: 1. 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)  - insulin glargine (BASAGLAR KWIKPEN) 100 unit/mL (3 mL) pen [Pharmacy Med Name: BASAGLAR KWIKPEN 100 U/ML MG INJECTABLE]; INJECT 75 UNITS UNDER THE SKIN AT BEDTIME -REPLACES LANTUS  Dispense: 5 pen; Refill: 0    If protocol passes may refill for 6 months if within 3 months of last provider visit (or a total of 9 months).              Passed - A1C in last 6 months     Hemoglobin A1c   Date Value Ref Range Status   01/14/2020 7.1 (H) 3.5 - 6.0 % Final               Passed - Microalbumin in last year     Microalbumin, Random Urine   Date Value Ref Range Status   01/14/2020 18.52 (H) 0.00 - 1.99 mg/dL Final                  Passed - Blood pressure in last year     BP Readings from Last 1 Encounters:   02/28/20 120/64             Passed - Creatinine done in last year     Creatinine   Date Value Ref Range Status   02/21/2020 1.38 (H) 0.70 - 1.30 mg/dL Final

## 2021-06-07 NOTE — TELEPHONE ENCOUNTER
Discussed results of event monitor with Domenico and his wife.  Concern for sick sinus syndrome.  There was a 2.8 second sinus pause during which time he was asymptomatic.  He has had no recurrent episodes similar to what led to hospitalization in February.  Mild symptoms do not correlate with any abnormalities on monitor.  I would like him to meet with cardiology to further discuss but it would also be reasonable to wait until this summer if he remains asymptomatic.  We also discussed his side effects from donepezil causing diarrhea.  We will try Namenda as an alternative for cognitive impairment/early dementia.

## 2021-06-08 NOTE — PROGRESS NOTES
"Robert E Schamberger is a 86 y.o. male who is being evaluated via a billable telephone visit.      The patient has been notified of following:     \"This telephone visit will be conducted via a call between you and your physician/provider. We have found that certain health care needs can be provided without the need for a physical exam.  This service lets us provide the care you need with a short phone conversation.  If a prescription is necessary we can send it directly to your pharmacy.  If lab work is needed we can place an order for that and you can then stop by our lab to have the test done at a later time.    Telephone visits are billed at different rates depending on your insurance coverage. During this emergency period, for some insurers they may be billed the same as an in-person visit.  Please reach out to your insurance provider with any questions.    If during the course of the call the physician/provider feels a telephone visit is not appropriate, you will not be charged for this service.\"    Patient has given verbal consent to a Telephone visit? Yes    What phone number would you like to be contacted at? [phone]        Additional provider notes:   86-year-old male with hypertension, type 2 diabetes, osteoarthritis and hospitalization earlier this year with probable TIA.  He has had similar episodes over the past 2 years usually lasting under 30 minutes with word finding difficulty and confusion.  Not associated with hypoglycemia.  MRI without acute infarct although chronic microvascular ischemic changes noted along with moderate generalized cerebral atrophy.  Carotid ultrasound without significant stenosis.  EEG showing mild encephalopathy.  Neurology consulted.  Discharged with combination of aspirin and Plavix and continues on statin.  He had a couple brief recurrent episodes since his discharge.  Underwent 30-day event monitor which did show a 2.8-second sinus pause although not correlating with any " symptoms.  He was to meet with cardiology although this was interrupted with the current COVID-19 outbreak.  Also found to have significant dementia scoring 17/30 on slums evaluation.  Vascular dementia is suspected.  However, tried Aricept and Namenda but could not tolerate either both causing diarrhea.  Wife reports that he is doing about the same but he is clearly lost ground over the last 2 years much less talkative and interactive on the phone today than normal.  We discussed his diabetes control.  Blood sugars generally well controlled.  Lowest morning sugar was 64.  He has cut back on his Basaglar dose to only 65 units and is taking 15 units of NovoLog with meals.    Assessment/Plan:  1. History of recurrent TIAs  Hospitalization earlier this year with TIA suspected.  He has had recurrent episodes of altered mental status and confusion.  Suspect he is having recurrent TIAs.  Remains on Plavix and will resume aspirin 81 mg daily.  Continue statin.  Prognosis is guarded.    2. Sick sinus syndrome (H)  Abnormal event monitor with 2.8-second sinus pause suggesting sick sinus syndrome.  He does not take any beta-blockers or calcium channel blockers.  This did not correlate to any symptoms reported.  Nevertheless, I would like him to discuss with cardiology.  - Ambulatory referral to Cardiology    3. Dementia without behavioral disturbance, unspecified dementia type (H)  I suspect vascular dementia with history of recurrent TIAs.  Unable to tolerate Namenda or Aricept.  Unclear if these would have been helpful anyway.  Continue current management to try to reduce risk of future TIA/CVA with aspirin, Plavix, good blood pressure control and statin.    4. 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)  Blood sugars sounding under good control and now on lower doses of insulin with 65 units of Basaglar and only 15 units of NovoLog with meals to avoid hypoglycemia.    Recommending that he schedule a follow-up  appointment at the office in 2 months.    Phone call duration:  21 minutes    Ld Moy MD

## 2021-06-08 NOTE — TELEPHONE ENCOUNTER
Wellness Screening Tool  Symptom Screening:  Do you have one of the following NEW symptoms:    Fever (subjective or >100.0)?  No    A new cough?  No    Shortness of breath?  No     Chills? No     New loss of taste or smell? No     Generalized body aches? No     New persistent headache? No     New sore throat? No     Nausea, vomiting, or diarrhea?  No    Within the past 3 weeks, have you been exposed to someone with a known positive illness below:    COVID-19 (known or suspected)?  No    Chicken pox?  No    Mealses?  No    Pertussis?  No    Patient notified of visitor restrictions: Yes --- has dementia. Wife will be coming with and she completed the wellness screen for the patient. She asked if their son could come with- informed her that no., we have a strict no visitor policy unless it is needed as a caretaker role. She verbalized understanding that he could be placed on speaker phone.   Pt informed to wear a mask: Yes  Patient's appointment status: Patient will be seen in clinic as scheduled on 6/4/2020 11:10 am

## 2021-06-08 NOTE — PATIENT INSTRUCTIONS - HE
Sonu E Schamberger,  I enjoyed visiting with you again today.  I am sorry to hear about the mini strokes and suspect it is coming from an abnormal heart beat like the one you had 2007 and would need stronger blood then plavix but would not use given the falls.   Based on the heart monitor you do not need a pacer yet.  Per our conversation if you start passing out let me know.  I will plan on seeing you 6 months.  Jass Blank

## 2021-06-08 NOTE — TELEPHONE ENCOUNTER
"Refill Approved    Rx renewed per Medication Renewal Policy. Medication was last renewed on 8/1/18.    Taniya Mcpherson, [facility] Triage/Med Refill 5/29/2020     Requested Prescriptions   Pending Prescriptions Disp Refills     PEN NEEDLE 31 gauge x 5/16\" Ndle [Pharmacy Med Name: EASY GLIDE 31G 8MM PEN NEEDLE MG INJECTABLE] 300 each 3     Sig: USE AS DIRECTED 3 TIMES DAILY       Diabetic Supplies Refill Protocol Passed - 5/26/2020  1:33 PM        Passed - Visit with PCP or prescribing provider visit in last 6 months     Last office visit with prescriber/PCP: 2/21/2020 Ld Moy MD OR same dept: 2/21/2020 Ld Moy MD OR same specialty: 2/21/2020 Ld Moy MD  Last physical: 11/30/2018 Last MTM visit: Visit date not found   Next visit within 3 mo: Visit date not found  Next physical within 3 mo: Visit date not found  Prescriber OR PCP: Ld Moy MD  Last diagnosis associated with med order: 1. 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)  - PEN NEEDLE 31 gauge x 5/16\" Ndle [Pharmacy Med Name: EASY GLIDE 31G 8MM PEN NEEDLE MG INJECTABLE]; USE AS DIRECTED 3 TIMES DAILY  Dispense: 300 each; Refill: 3    If protocol passes may refill for 12 months if within 3 months of last provider visit (or a total of 15 months).             Passed - A1C in last 6 months     Hemoglobin A1c   Date Value Ref Range Status   01/14/2020 7.1 (H) 3.5 - 6.0 % Final                              "

## 2021-06-08 NOTE — TELEPHONE ENCOUNTER
"Triage Call  Call from wife and reports patient fell down in the garage yesterday afternoon  Reports having virtual visit with Dr Moy yesterday prior to fall - wants to update provider  Was sitting on chair, when he got up he fell down  Didn't pass out, was alert and oriented  Not sure why he fell, or if he tripped  Denies feeling dizzy at time of fall but reported feeling dizzy when he got up  Patient did hit his head on the floor  Reports a \"small bump and a couple red spots\" - behind and above left ear  Wife reports calling 911 and EMS arrived - did not feel patient needed to be evaluated at ER  Reports feeling good today, alert and oriented  Tenderness to head when touched  Reports icing head after fall  Hasn't needed OTC medication for pain or discomfort    Disposition  Home cares. Will update Dr Moy and if further assessment needed patient will be contacted by support staff. Educated per Care Advice, wife verbalized understanding.    Reason for Disposition    Taking Coumadin (warfarin) or other strong blood thinner, or known bleeding disorder (e.g., thrombocytopenia)     On PLAVIX    Protocols used: HEAD INJURY-A-[place]    COVID 19 Nurse Triage Plan/Patient Instructions    Please be aware that novel coronavirus (COVID-19) may be circulating in the community. If you develop symptoms such as fever, cough, or SOB or if you have concerns about the presence of another infection including coronavirus (COVID-19), please contact your health care provider or visit [email or web address].     Disposition/Instructions    Patient to stay at home and follow home care protocol based instructions.    Thank you for limiting contact with others, wearing a simple mask to cover your cough, practice good hand hygiene habits and accessing our virtual services where possible to limit the spread of this virus.    For more information about COVID19 and options for caring for yourself at home, please visit the CDC website at " https://www.cdc.gov/coronavirus/2019-ncov/about/steps-when-sick.html  For more options for care at [facility], please visit our website at [email or web address]    For more information, please use the [place] Department of Health COVID-19 Website: [email or web address]  [place] Department of Health ([facility]) COVID-19 Hotlines (Interpreters available):      Health questions: Phone Number: [phone] or [phone] and Hours: 7 a.m. to 7 p.m.    Schools and  questions: Phone Number: [phone] or [phone] and Hours 7 a.m. to 7 p.m.    Sophie Feliciano RN [facility] 5/8/2020 9:40 AM

## 2021-06-09 NOTE — PROGRESS NOTES
Office Visit - Follow Up   Robert E Schamberger   86 y.o. male    Date of Visit: 7/8/2020    Chief Complaint   Patient presents with     Diabetes     Hypertension        Assessment and Plan   1. 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)  Reviewed diabetic log with sugars looking generally well controlled.  Last A1c was 7.1% and get this rechecked today.  Continues on 65 units of Basaglar and 15 units of NovoLog before meals.  Continue annual eye exams.  Diabetic foot exam completed today.  - Glycosylated Hemoglobin A1c    2. Diabetic peripheral neuropathy (H)  Increasing pain secondary to peripheral neuropathy.  Will increase Lyrica to 100 mg at bedtime  - pregabalin (LYRICA) 50 MG capsule; Take 2 capsules (100 mg total) by mouth at bedtime.  Dispense: 180 capsule; Refill: 3    3. Vascular dementia without behavioral disturbance (H)  Recurrent TIAs with recurrent episodes of altered mental status and confusion.  Slums completed earlier this year scoring 17/30.  Wife reports no significant worsening of cognition over the last 6 months.  Continue to address risk factors keeping blood pressure well controlled, continue statin and clopidogrel.    4. Sick sinus syndrome (H)  Evaluated by cardiology with abnormal event monitor showing 2.8 sec sinus pause.  Reviewed records from Dr. Blank.  Given asymptomatic nature of the event and lack of type II second-degree AV block or third-degree AV block or symptomatic bradycardia, permanent pacemaker not recommended at this time.  Follow-up in 6 months.    5. Paroxysmal atrial fibrillation (H)  History of cardioversion 2007.  I would not recommend additional anticoagulation given high risk for falling and head injury.    6. Moderate major depression (H)  Stable with citalopram    7. Chronic kidney disease, stage 3 (moderate) (H)  Monitor renal function  - Basic Metabolic Panel    8. Anemia in stage 3 chronic kidney disease (H)    - HM2(CBC w/o Differential)    9.  QUETA on CPAP  Wearing CPAP faithfully    10. Unsteady gait  Encouraging use of cane or walker at all times.  High risk for falls    11. Vitamin D deficiency  Recheck vitamin D level  - Vitamin D, Total (25-Hydroxy)    12. Mixed hyperlipidemia  Continues on pravastatin.  Lipids checked in January.    13. Hypomagnesemia  Monitor magnesium  - Magnesium    Return in about 4 months (around 11/8/2020) for Recheck.     History of Present Illness   This 86 y.o. old gentleman with type 2 diabetes requiring insulin, hypertension, paroxysmal atrial fibrillation with sick sinus syndrome, osteoarthritis, chronic kidney disease, and vascular dementia.  Here to follow-up all of this and multiple other concerns.  Diabetes has been generally well controlled.  Reviewed diabetic log.  Most mornings well controlled although occasionally over 200.  Last A1c was 7.1%.  Using 65 units of Basaglar daily and 15 units of NovoLog with each meal.  He has had no hypoglycemia.  He is up-to-date with his diabetic eye exams.  He is having more peripheral neuropathy symptoms and is currently using 50 mg of Lyrica at bedtime.  Recent evaluation by cardiology after abnormal event monitor showing 2.8-second pause.  No second-degree AV block or third-degree AV block and no symptomatic sinus bradycardia.  Permanent pacemaker not recommended at this time.  Concern for vascular dementia with history of recurrent TIA suspected.  Recurrent episodes of altered mental status and confusion but none in the last 6 months.  Continues combination of aspirin with Plavix along with pravastatin.  Try to maintain good blood pressure control as well as diabetes control.  Wife reports no significant change in his cognition over the past 6 months.  Remains unsteady with his walking using cane or walker at all times.    Review of Systems:  Otherwise, a comprehensive review of systems was negative except as noted.     Medications, Allergies and Problem List   Patient Active  Problem List   Diagnosis     Lumbar Facet Syndrome     Hyperlipidemia     Low back pain     Glaucoma     QUETA on CPAP     Erectile dysfunction     Morbid obesity (H)     Degenerative disc disease, lumbar     Osteoarthritis of both knees     Osteoarthritis of both hands     Squamous cell skin cancer     Anemia in chronic kidney disease     Bilateral hearing loss     Chronic kidney disease, stage 3 (moderate) (H)     Bursitis of left hip     B12 deficiency     Vitamin D deficiency     Complete tear of left rotator cuff     Entropion of left lower eyelid     Trochanteric bursitis of right hip     Diabetic peripheral neuropathy (H)     Unsteady gait     Moderate major depression (H)     TIA (transient ischemic attack)     Physical deconditioning     Advanced care planning/counseling discussion     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)     Dementia without behavioral disturbance (H)     Sick sinus syndrome (H)     Paroxysmal atrial fibrillation (H)       He has a past surgical history that includes pr removal gallbladder; pr revise median n/carpal tunnel surg; pr excis stomach ulcer,lesn;local; Total knee arthroplasty (Right, 2014); Carpal tunnel release (Bilateral); Eye surgery; Colonoscopy (2003); and Blepharoplasty (2018).    Allergies   Allergen Reactions     Actos [Pioglitazone] Swelling     Edema     Donepezil      Diarrhea     Gabapentin Diarrhea     Diarrhea     Lipitor [Atorvastatin]      Back pain     Niacin      Hyperglycemia     Simvastatin      Back pain       Current Outpatient Medications   Medication Sig Dispense Refill     acetaminophen (TYLENOL) 500 MG tablet Take 1,000 mg by mouth 3 (three) times a day. Special Instructions: max: 4000 mg in 24 hours Dx: pain  Three Times A Day; 08:00 AM, 12:00 PM, 04:00 PM       blood glucose test (GLUCOSE BLOOD) strips Test up to 3 times daily 300 strip 3     blood glucose test strips Use one test strip daily to monitor blood glucose three times a  "day. 300 strip 3     cholecalciferol, vitamin D3, (VITAMIN D3) 2,000 unit Tab Take 1 tablet (2,000 Units total) by mouth daily. 100 tablet 3     citalopram (CELEXA) 20 MG tablet Take 0.5 tablets (10 mg total) by mouth at bedtime. 90 tablet 3     clopidogreL (PLAVIX) 75 mg tablet Take 1 tablet (75 mg total) by mouth daily. 90 tablet 3     cyanocobalamin 1000 MCG tablet Take 1 tablet (1,000 mcg total) by mouth daily. 100 tablet 3     diclofenac sodium (VOLTAREN) 1 % Gel Place 2-4 g on the skin 4 (four) times a day as needed.        hydroCHLOROthiazide (HYDRODIURIL) 25 MG tablet Take 25 mg by mouth daily.       insulin aspart U-100 (NOVOLOG FLEXPEN U-100 INSULIN) 100 unit/mL (3 mL) injection pen Inject 15 Units under the skin 3 (three) times a day before meals. 60 mL 11     insulin glargine (BASAGLAR KWIKPEN) 100 unit/mL (3 mL) pen Inject 65 Units under the skin daily. 5 pen 6     latanoprost (XALATAN) 0.005 % ophthalmic solution Administer 1 drop to both eyes at bedtime.       losartan (COZAAR) 100 MG tablet Take 100 mg by mouth daily.       magnesium oxide (MAGOX) 400 mg (241.3 mg magnesium) tablet Take 1 tablet (400 mg total) by mouth 2 (two) times a day. 100 tablet 1     metFORMIN (GLUCOPHAGE-XR) 500 MG 24 hr tablet TAKE THREE TABLETS (1500MG) BY MOUTH DAILY 270 tablet 3     PEN NEEDLE 31 gauge x 5/16\" Ndle USE AS DIRECTED 3 TIMES DAILY 300 each 3     pravastatin (PRAVACHOL) 20 MG tablet Take 1 tablet (20 mg total) by mouth daily. 90 tablet 3     pregabalin (LYRICA) 50 MG capsule Take 2 capsules (100 mg total) by mouth at bedtime. 180 capsule 3     aspirin 81 MG EC tablet Take 1 tablet (81 mg total) by mouth daily for 21 days. Then discontinue 150 tablet 2     No current facility-administered medications for this visit.         Physical Exam   General Appearance:   Morbidly obese elderly male    /80   Pulse (!) 54   Ht 6' (1.829 m)   Wt (!) 325 lb (147.4 kg)   SpO2 94%   BMI 44.08 kg/m  "       Respiratory: Normal respiratory effort.  Lungs are clear with no rales or wheezes.  Heart: Regular rate and rhythm without murmurs, rubs, or gallops.  No carotid bruits.  Extremities: Trace bilateral edema.  Diabetic foot exam: Good pedal pulses.  Absent vibratory sensation and diminished pinprick sensation.  No open lesions or other abnormalities.  Skin: No cyanosis or pallor           Additional Information   Social History     Tobacco Use     Smoking status: Former Smoker     Smokeless tobacco: Never Used   Substance Use Topics     Alcohol use: Yes     Alcohol/week: 14.0 standard drinks     Types: 7 Cans of beer, 7 Shots of liquor per week     Drug use: No       Over 40 minutes spent in the management for this patient with over 50% of the time spent counseling and coordination of care     Ld Moy MD

## 2021-06-09 NOTE — PROGRESS NOTES
Office Visit - Follow Up   Robert E Schamberger   82 y.o. male    Date of Visit: 3/2/2017    Chief Complaint   Patient presents with     Diabetes     Hip Pain     Back Pain        Assessment and Plan   1. Type 2 diabetes mellitus with peripheral neuropathy  Sugars not as well-controlled as previously.  We'll have him increase his Lantus to 90 units each night and continue current doses of NovoLog.  His chronic pain may be contributing to hyperglycemia.  No other obvious etiology.  He continues to see his eye doctor regularly.  He needed recent surgery.  Foot exam at next appointment.  - Glycosylated Hemoglobin A1c    2. QUETA on CPAP  I would like him to follow-up with pulmonary to see if current settings are correct as he is having difficulty keeping his mask on at night  - Ambulatory referral to Sleep Medicine    3. Hyperlipidemia  Remains on pravastatin.  Continue aspirin 81 mg daily  - Lipid Cascade  - Hepatic Profile    4. Essential hypertension  Blood pressure looks adequately controlled with current medication    5. Depression  Stable with current dose of citalopram    6. Chronic kidney disease, stage 3 (moderate)  Monitor renal function  - Basic Metabolic Panel    7. Anemia in chronic kidney disease    - Hemoglobin    8. Bursitis, hip, left  Will likely benefit from cortisone injection  - Ambulatory referral to Orthopedics    Return in about 3 months (around 6/2/2017) for Recheck.     History of Present Illness   This 82 y.o. old gentleman with type 2 diabetes requiring insulin, hypertension, hyperlipidemia, sleep apnea, and osteoarthritis who is here to follow-up all of these problems.  His sugars have been running higher the last month for unclear reasons.  No change in his diet and he continues on 80 units of Lantus every night along with NovoLog before meals.  He is living with much pain.  This pain includes worsening symptoms in his left hip.  He takes tramadol which doesn't provide much relief.  He is  "also having trouble tolerating his new CPAP mask.  In the past, he has seen Dr. Paresh Howell but this was 7 years ago.  His mood is stable with current dose of citalopram.  Blood pressure remains under control.  He is tolerating pravastatin to manage his lipids.     Review of Systems: No exertional chest pain.  No increasing shortness of breath.  His weight is stable.  No abdominal pain.       Medications, Allergies and Problem List   Patient Active Problem List   Diagnosis     Lumbar Facet Syndrome     Type 2 diabetes mellitus with peripheral neuropathy     Hyperlipidemia     Osteoarthritis     Low back pain     HTN (hypertension)     Glaucoma     QUETA on CPAP     Asthma     Erectile dysfunction     Morbid obesity     Degenerative disc disease, lumbar     Osteoarthritis of both knees     Depression     Osteoarthritis of both hands     Squamous cell skin cancer     Anemia in chronic kidney disease     Right ankle pain     Bilateral hearing loss     Chronic kidney disease, stage 3 (moderate)     Bursitis, hip, left       He has a past surgical history that includes removal gallbladder; revise median n/carpal tunnel surg; excis stomach ulcer,lesn;local; Total knee arthroplasty (Right, 2014); Carpal tunnel release (Bilateral); Eye surgery; and Colonoscopy (2003).    Allergies   Allergen Reactions     Actos [Pioglitazone] Swelling     Edema     Gabapentin Diarrhea     Diarrhea     Lipitor [Atorvastatin]      Back pain     Niacin      Hyperglycemia     Simvastatin      Back pain       Current Outpatient Prescriptions   Medication Sig Dispense Refill     albuterol (PROVENTIL HFA;VENTOLIN HFA) 90 mcg/actuation inhaler Inhale 2 puffs every 6 (six) hours as needed for wheezing. 3 Inhaler 3     aspirin 81 MG EC tablet Take 81 mg by mouth daily.       BD INSULIN PEN NEEDLE UF SHORT 31 gauge x 5/16\" Ndle USE THREE TIMES A  each 11     blood glucose test (ONETOUCH ULTRA TEST) strips One Touch Ultra 2 teststrips. Check " "three times daily. 300 each 3     citalopram (CELEXA) 20 MG tablet Take 1 tablet (20 mg total) by mouth daily. 90 tablet 3     insulin glargine (LANTUS) 100 unit/mL injection Inject 80 Units under the skin bedtime. 80 units at night       insulin syringe-needle U-100 (BD INSULIN SYRINGE ULT-FINE II) 1 mL 31 x 5/16\" Syrg Use 4 daily 500 each 3     latanoprost (XALATAN) 0.005 % ophthalmic solution 1 drop bedtime.       metFORMIN (GLUCOPHAGE-XR) 500 MG 24 hr tablet Take 3 tablets (1,500 mg total) by mouth daily. 270 tablet 3     NOVOLOG FLEXPEN 100 unit/mL injection pen 20 units with breakfast 25 units with lunch, 25 with dinner 60 mL 11     pravastatin (PRAVACHOL) 10 MG tablet TAKE 1 TABLET (10 MG TOTAL) BY MOUTH AT BEDTIME. 90 tablet 3     prednisoLONE acetate (PRED-FORTE) 1 % ophthalmic suspension   1     tobramycin-dexamethasone (TOBRADEX) ophthalmic solution Administer 1 drop into the left eye 2 (two) times a day.  0     traMADol (ULTRAM) 50 mg tablet TAKE 1-2 TABLETS ( MG TOTAL) BY MOUTH EVERY 6 (SIX) HOURS AS NEEDED FOR PAIN. 100 tablet 0     valsartan-hydrochlorothiazide (DIOVAN HCT) 320-25 mg per tablet Take 1 tablet by mouth daily. 90 tablet 0     VIGAMOX 0.5 % ophthalmic solution   1     No current facility-administered medications for this visit.         Physical Exam   General Appearance:   Obese but otherwise well-appearing elderly male    Visit Vitals     /66 (Patient Site: Right Arm, Patient Position: Sitting, Cuff Size: Thigh)     Pulse 69     Ht 5' 10\" (1.778 m)     Wt (!) 339 lb (153.8 kg)     SpO2 94%     BMI 48.64 kg/m2       HEENT: Normal  Respiratory: Normal respiratory effort.  Lungs are clear with no rales or wheezes.  Heart: Regular rate and rhythm without murmurs, rubs, or gallops.  No carotid bruits.  Extremities: No peripheral edema.  Psych: Alert and oriented ×3, mood appropriate.  PHQ-9 score is 7  Musculoskeletal: Tenderness over left greater trochanter         Additional " Information   Social History   Substance Use Topics     Smoking status: Former Smoker     Smokeless tobacco: None     Alcohol use None              Ld Moy MD

## 2021-06-10 NOTE — PROGRESS NOTES
"Robert E Schamberger is a 86 y.o. male who is being evaluated via a billable telephone visit.      The patient has been notified of following:     \"This telephone visit will be conducted via a call between you and your physician/provider. We have found that certain health care needs can be provided without the need for a physical exam.  This service lets us provide the care you need with a short phone conversation.  If a prescription is necessary we can send it directly to your pharmacy.  If lab work is needed we can place an order for that and you can then stop by our lab to have the test done at a later time.    Telephone visits are billed at different rates depending on your insurance coverage. During this emergency period, for some insurers they may be billed the same as an in-person visit.  Please reach out to your insurance provider with any questions.    If during the course of the call the physician/provider feels a telephone visit is not appropriate, you will not be charged for this service.\"    Patient has given verbal consent to a Telephone visit? Yes    What phone number would you like to be contacted at? [phone]    Patient would like to receive their AVS by AVS Preference: Mail a copy.    Additional provider notes: See separate dictation with annual wellness visit completed          Phone call duration:  20 minutes    Ld Moy MD    "

## 2021-06-10 NOTE — TELEPHONE ENCOUNTER
RN cannot approve Refill Request    RN can NOT refill this medication historical medication requested. Last office visit: 7/8/2020 Ld Moy MD Last Physical: 11/30/2018 Last MTM visit: Visit date not found Last visit same specialty: 7/8/2020 Ld Moy MD.  Next visit within 3 mo: Visit date not found  Next physical within 3 mo: Visit date not found      Taniya Mcpherson, [facility] Triage/Med Refill 8/24/2020    Requested Prescriptions   Pending Prescriptions Disp Refills     hydroCHLOROthiazide (HYDRODIURIL) 25 MG tablet [Pharmacy Med Name: HYDROCHLOROTHIAZIDE 25MG TABLET] 90 tablet 0     Sig: TAKE ONE TABLET BY MOUTH ONCE DAILY .  - TAKE WITH LOSARTAN - COMBO NOT AVAILABLE -       Diuretics/Combination Diuretics Refill Protocol  Passed - 8/21/2020  2:10 PM        Passed - Visit with PCP or prescribing provider visit in past 12 months     Last office visit with prescriber/PCP: 7/8/2020 Ld Moy MD OR same dept: 7/8/2020 Ld Moy MD OR same specialty: 7/8/2020 Ld Moy MD  Last physical: 11/30/2018 Last MTM visit: Visit date not found   Next visit within 3 mo: Visit date not found  Next physical within 3 mo: Visit date not found  Prescriber OR PCP: Ld Moy MD  Last diagnosis associated with med order: 1. Type I diabetes mellitus (H)  - hydroCHLOROthiazide (HYDRODIURIL) 25 MG tablet [Pharmacy Med Name: HYDROCHLOROTHIAZIDE 25MG TABLET]; TAKE ONE TABLET BY MOUTH ONCE DAILY .  - TAKE WITH LOSARTAN - COMBO NOT AVAILABLE -  Dispense: 90 tablet; Refill: 0  - losartan (COZAAR) 100 MG tablet [Pharmacy Med Name: LOSARTAN 100MG TAB TRANSDERMAL PATCH]; TAKE ONE TABLET BY MOUTH ONCE DAILY .  TAKE WITH HCTZ - COMBO UNAVAILABLE -  Dispense: 90 tablet; Refill: 0    2. Depression  - citalopram (CELEXA) 20 MG tablet [Pharmacy Med Name: CITALOPRAM 20MG TAB TABLET]; TAKE ONE TABLET BY MOUTH ONCE DAILY  Dispense: 90 tablet; Refill: 3    If protocol passes may  refill for 12 months if within 3 months of last provider visit (or a total of 15 months).             Passed - Serum Potassium in past 12 months      Lab Results   Component Value Date    Potassium 5.4 (H) 07/08/2020             Passed - Serum Sodium in past 12 months      Lab Results   Component Value Date    Sodium 136 07/08/2020             Passed - Blood pressure on file in past 12 months     BP Readings from Last 1 Encounters:   07/08/20 140/80             Passed - Serum Creatinine in past 12 months      Creatinine   Date Value Ref Range Status   07/08/2020 1.58 (H) 0.70 - 1.30 mg/dL Final                losartan (COZAAR) 100 MG tablet [Pharmacy Med Name: LOSARTAN 100MG TAB TRANSDERMAL PATCH] 90 tablet 0     Sig: TAKE ONE TABLET BY MOUTH ONCE DAILY .  TAKE WITH HCTZ - COMBO UNAVAILABLE -       Angiotensin Receptor Blocker Protocol Passed - 8/21/2020  2:10 PM        Passed - PCP or prescribing provider visit in past 12 months       Last office visit with prescriber/PCP: 7/8/2020 Ld Moy MD OR same dept: 7/8/2020 Ld Moy MD OR same specialty: 7/8/2020 Ld Moy MD  Last physical: 11/30/2018 Last MTM visit: Visit date not found   Next visit within 3 mo: Visit date not found  Next physical within 3 mo: Visit date not found  Prescriber OR PCP: Ld Moy MD  Last diagnosis associated with med order: 1. Type I diabetes mellitus (H)  - hydroCHLOROthiazide (HYDRODIURIL) 25 MG tablet [Pharmacy Med Name: HYDROCHLOROTHIAZIDE 25MG TABLET]; TAKE ONE TABLET BY MOUTH ONCE DAILY .  - TAKE WITH LOSARTAN - COMBO NOT AVAILABLE -  Dispense: 90 tablet; Refill: 0  - losartan (COZAAR) 100 MG tablet [Pharmacy Med Name: LOSARTAN 100MG TAB TRANSDERMAL PATCH]; TAKE ONE TABLET BY MOUTH ONCE DAILY .  TAKE WITH HCTZ - COMBO UNAVAILABLE -  Dispense: 90 tablet; Refill: 0    2. Depression  - citalopram (CELEXA) 20 MG tablet [Pharmacy Med Name: CITALOPRAM 20MG TAB TABLET]; TAKE ONE TABLET BY  MOUTH ONCE DAILY  Dispense: 90 tablet; Refill: 3    If protocol passes may refill for 12 months if within 3 months of last provider visit (or a total of 15 months).             Passed - Serum potassium within the past 12 months     Lab Results   Component Value Date    Potassium 5.4 (H) 07/08/2020             Passed - Blood pressure filed in past 12 months     BP Readings from Last 1 Encounters:   07/08/20 140/80             Passed - Serum creatinine within the past 12 months     Creatinine   Date Value Ref Range Status   07/08/2020 1.58 (H) 0.70 - 1.30 mg/dL Final                citalopram (CELEXA) 20 MG tablet [Pharmacy Med Name: CITALOPRAM 20MG TAB TABLET] 90 tablet 3     Sig: TAKE ONE TABLET BY MOUTH ONCE DAILY       SS[facility] Refill Protocol  Passed - 8/21/2020  2:10 PM        Passed - PCP or prescribing provider visit in last year     Last office visit with prescriber/PCP: 7/8/2020 Ld Moy MD OR same dept: 7/8/2020 Ld Moy MD OR same specialty: 7/8/2020 Ld Moy MD  Last physical: 11/30/2018 Last MTM visit: Visit date not found   Next visit within 3 mo: Visit date not found  Next physical within 3 mo: Visit date not found  Prescriber OR PCP: Ld Moy MD  Last diagnosis associated with med order: 1. Type I diabetes mellitus (H)  - hydroCHLOROthiazide (HYDRODIURIL) 25 MG tablet [Pharmacy Med Name: HYDROCHLOROTHIAZIDE 25MG TABLET]; TAKE ONE TABLET BY MOUTH ONCE DAILY .  - TAKE WITH LOSARTAN - COMBO NOT AVAILABLE -  Dispense: 90 tablet; Refill: 0  - losartan (COZAAR) 100 MG tablet [Pharmacy Med Name: LOSARTAN 100MG TAB TRANSDERMAL PATCH]; TAKE ONE TABLET BY MOUTH ONCE DAILY .  TAKE WITH HCTZ - COMBO UNAVAILABLE -  Dispense: 90 tablet; Refill: 0    2. Depression  - citalopram (CELEXA) 20 MG tablet [Pharmacy Med Name: CITALOPRAM 20MG TAB TABLET]; TAKE ONE TABLET BY MOUTH ONCE DAILY  Dispense: 90 tablet; Refill: 3    If protocol passes may refill for 12 months if within  3 months of last provider visit (or a total of 15 months).

## 2021-06-10 NOTE — PROGRESS NOTES
Assessment and Plan:       1. Medicare annual wellness visit, subsequent  Immunizations are reviewed and recommending annual flu shot and Shingrix.  He has a living will.  Non-smoker.  Uses alcohol in moderation.  Regular exercise discussed.  No further colonoscopies indicated at his age and with his comorbidities.  Prostate cancer screening is no longer indicated at his age and with his comorbidities.    Dementia and depression screening completed.  He sees his ophthalmologist every year.  He should follow-up with dermatology every year with history of skin cancer and recommending regular use of sunblock.  No AAA on previous imaging in 2009.    2. Bilateral sensorineural hearing loss  Will refer to audiology for worsening hearing  - Ambulatory referral to Audiology    3. TIA (transient ischemic attack)  Clarified dose of aspirin taking 81 mg daily combined with Plavix 75 mg daily.  - aspirin 81 MG EC tablet; Take 1 tablet (81 mg total) by mouth daily.  Dispense: 150 tablet; Refill: 2    4.  Moderate depression.  Continue citalopram.  PHQ 9 score of 6.  We discussed increasing dose but he had too much sedation on 20 mg dose and will continue at 10 mg.    The patient's current medical problems were reviewed.    I have had an Advance Directives discussion with the patient.  The following health maintenance schedule was reviewed with the patient and provided in printed form in the after visit summary:   Health Maintenance   Topic Date Due     ASTHMA ACTION PLAN  04/14/1934     HEPATITIS B VACCINES (1 of 3 - Risk 3-dose series) 04/14/1953     ZOSTER VACCINES (2 of 3) 01/27/2008     MEDICARE ANNUAL WELLNESS VISIT  11/30/2019     INFLUENZA VACCINE RULE BASED (1) 08/01/2020     Asthma Control Test  09/06/2020     DIABETIC EYE EXAM  09/19/2020     A1C  01/08/2021     LIPID  01/14/2021     MICROALBUMIN  01/14/2021     BMP  07/08/2021     DIABETIC FOOT EXAM  07/08/2021     FALL RISK ASSESSMENT  07/08/2021     ADVANCE CARE  PLANNING  11/30/2023     TD 18+ HE  04/09/2025     DEPRESSION ACTION PLAN  Completed     PNEUMOCOCCAL IMMUNIZATION 65+ HIGH/HIGHEST RISK  Completed        Subjective:   Chief Complaint: Robert E Schamberger is an 86 y.o. male here for an Annual Wellness visit.   HPI: Annual wellness visit completed today with telephone visit    Review of Systems:    Please see above.  The rest of the review of systems are negative for all systems.    Patient Care Team:  Ld Moy MD as PCP - General (Internal Medicine)  Jessee Jama MD as Physician (Family Medicine)  Galo Feliciano MD as Physician (Ophthalmology)  Bennie Black MD as Physician (Ophthalmology)  Ld Moy MD as Assigned PCP     Patient Active Problem List   Diagnosis     Lumbar Facet Syndrome     Hyperlipidemia     Low back pain     Glaucoma     QUETA on CPAP     Erectile dysfunction     Morbid obesity (H)     Degenerative disc disease, lumbar     Osteoarthritis of both knees     Osteoarthritis of both hands     Squamous cell skin cancer     Anemia in chronic kidney disease     Bilateral hearing loss     Chronic kidney disease, stage 3 (moderate) (H)     Bursitis of left hip     B12 deficiency     Vitamin D deficiency     Complete tear of left rotator cuff     Entropion of left lower eyelid     Trochanteric bursitis of right hip     Diabetic peripheral neuropathy (H)     Unsteady gait     Moderate major depression (H)     TIA (transient ischemic attack)     Physical deconditioning     Advanced care planning/counseling discussion     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)     Dementia without behavioral disturbance (H)     Sick sinus syndrome (H)     Paroxysmal atrial fibrillation (H)     Past Medical History:   Diagnosis Date     Anemia in chronic kidney disease      B12 deficiency 11/9/2017     Bilateral hearing loss 5/9/2016     Bursitis, hip, left 3/2/2017     Chest pain     Normal GXT 2006     Chronic kidney disease,  stage 3 (moderate) (H)     Worsening with hyperkalemia on Relafen-discontinued August 2016     Closed displaced fracture of left clavicle 9/21/2017     Cognitive changes 11/30/2018    SLUMS 20/30 Nov 2018     Complete tear of left rotator cuff 2/15/2018    Associated with fall and clavicular fracture that occurred 2017, evaluated by orthopedics, given cortisone injection and physical therapy recommended     Degenerative disc disease, lumbar      Dementia without behavioral disturbance (H) 2/21/2020     Depression      Diabetic peripheral neuropathy (H) 11/30/2018     Erectile dysfunction      Fatigue 7/6/2017     Forearm tendonitis 11/29/2016     Glaucoma      Gout attack     Left foot     History of recurrent TIAs 5/7/2020     HTN (hypertension)      Hyperlipidemia      Ingrown toenail 7/6/2017     Intermittent asthma without complication 11/9/2017     Left leg cellulitis 2014     Low back pain      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     Morbid obesity (H)      QUETA on CPAP      Osteoarthritis      Osteoarthritis of both hands      Osteoarthritis of both knees     Right TKA     Pain of right heel 5/11/2018     Peripheral neuropathy 10/9/2018     Rib fracture 2005     Right-sided chest pain 5/9/2016     Screening     No AAA on CT scan 2009     Sick sinus syndrome (H) 5/7/2020    Abnormal event monitor with 2.8-second sinus pause     Squamous cell skin cancer 11/2/2015     TIA (transient ischemic attack) 09/06/2019    CT head showing chronic lacunar infarcts.  Carotid ultrasound with atherosclerotic plaque but no severe stenosis.     Traumatic subarachnoid hemorrhage with loss of consciousness of 30 minutes or less (H) 9/21/2017    Fall down the stairs following alcohol use with traumatic subarachnoid hemorrhage treated conservatively at [facility] followed by stay at [facility] with no residual neurologic deficits     Trochanteric bursitis of right hip 8/24/2018     Type 2 diabetes mellitus with peripheral  neuropathy (H)      Unsteady gait 3/4/2019     Vitamin D deficiency 11/9/2017      Past Surgical History:   Procedure Laterality Date     BLEPHAROPLASTY  2018     CARPAL TUNNEL RELEASE Bilateral      COLONOSCOPY  2003    Normal     EYE SURGERY       AR EXCIS STOMACH ULCER,LESN;LOCAL      Description: Gastric Excision;  Recorded: 03/23/2012;     AR REMOVAL GALLBLADDER      Description: Cholecystectomy;  Recorded: 03/23/2012;     AR REVISE MEDIAN N/CARPAL TUNNEL SURG      Description: Neuroplasty Decompression Median Nerve At Carpal Tunnel;  Recorded: 03/23/2012;     TOTAL KNEE ARTHROPLASTY Right 2014    Complicated by torn Ranexa 1 and subsequent repair      History reviewed. No pertinent family history.   Social History     Socioeconomic History     Marital status:      Spouse name: Not on file     Number of children: Not on file     Years of education: Not on file     Highest education level: Not on file   Occupational History     Not on file   Social Needs     Financial resource strain: Not on file     Food insecurity     Worry: Not on file     Inability: Not on file     Transportation needs     Medical: Not on file     Non-medical: Not on file   Tobacco Use     Smoking status: Former Smoker     Smokeless tobacco: Never Used   Substance and Sexual Activity     Alcohol use: Yes     Alcohol/week: 14.0 standard drinks     Types: 7 Cans of beer, 7 Shots of liquor per week     Drug use: No     Sexual activity: Not on file   Lifestyle     Physical activity     Days per week: Not on file     Minutes per session: Not on file     Stress: Not on file   Relationships     Social connections     Talks on phone: Not on file     Gets together: Not on file     Attends Jewish service: Not on file     Active member of club or organization: Not on file     Attends meetings of clubs or organizations: Not on file     Relationship status: Not on file     Intimate partner violence     Fear of current or ex partner: Not on file      Emotionally abused: Not on file     Physically abused: Not on file     Forced sexual activity: Not on file   Other Topics Concern     Not on file   Social History Narrative    Semi retired     x 47 years              Current Outpatient Medications   Medication Sig Dispense Refill     acetaminophen (TYLENOL) 500 MG tablet Take 1,000 mg by mouth 3 (three) times a day. Special Instructions: max: 4000 mg in 24 hours Dx: pain  Three Times A Day; 08:00 AM, 12:00 PM, 04:00 PM       blood glucose test (GLUCOSE BLOOD) strips Test up to 3 times daily 300 strip 3     blood glucose test strips Use one test strip daily to monitor blood glucose three times a day. 300 strip 3     cholecalciferol, vitamin D3, (VITAMIN D3) 2,000 unit Tab Take 1 tablet (2,000 Units total) by mouth daily. 100 tablet 3     citalopram (CELEXA) 20 MG tablet Take 0.5 tablets (10 mg total) by mouth at bedtime. 90 tablet 3     clopidogreL (PLAVIX) 75 mg tablet Take 1 tablet (75 mg total) by mouth daily. 90 tablet 3     cyanocobalamin 1000 MCG tablet Take 1 tablet (1,000 mcg total) by mouth daily. 100 tablet 3     diclofenac sodium (VOLTAREN) 1 % Gel Place 2-4 g on the skin 4 (four) times a day as needed.        hydroCHLOROthiazide (HYDRODIURIL) 25 MG tablet Take 25 mg by mouth daily.       insulin aspart U-100 (NOVOLOG FLEXPEN U-100 INSULIN) 100 unit/mL (3 mL) injection pen Inject 15 Units under the skin 3 (three) times a day before meals. 60 mL 11     insulin glargine (BASAGLAR KWIKPEN) 100 unit/mL (3 mL) pen Inject 65 Units under the skin daily. 5 pen 6     latanoprost (XALATAN) 0.005 % ophthalmic solution Administer 1 drop to both eyes at bedtime.       losartan (COZAAR) 100 MG tablet Take 100 mg by mouth daily.       magnesium oxide (MAGOX) 400 mg (241.3 mg magnesium) tablet Take 1 tablet (400 mg total) by mouth 2 (two) times a day. 100 tablet 1     metFORMIN (GLUCOPHAGE-XR) 500 MG 24 hr tablet TAKE THREE TABLETS (1500MG) BY MOUTH DAILY 270  "tablet 3     PEN NEEDLE 31 gauge x 5/16\" Ndle USE AS DIRECTED 3 TIMES DAILY 300 each 3     pravastatin (PRAVACHOL) 20 MG tablet Take 1 tablet (20 mg total) by mouth daily. 90 tablet 3     pregabalin (LYRICA) 50 MG capsule Take 2 capsules (100 mg total) by mouth at bedtime. 180 capsule 3     aspirin 81 MG EC tablet Take 1 tablet (81 mg total) by mouth daily. 150 tablet 2     No current facility-administered medications for this visit.       Objective:   Vital Signs: There were no vitals taken for this visit.     Weight: Unable to obtain due to video visit  Height: Unable to obtain due to video visit  BMI: Unable to obtain due to video visit  Blood Pressure: Unable to obtain due to video visit      VisionScreening:  No exam data present     PHYSICAL EXAM      Assessment Results 7/28/2020   Activities of Daily Living No help needed   Instrumental Activities of Daily Living No help needed   Get Up and Go Score -   Mini Cog Total Score -   Some recent data might be hidden     A Mini-Cog score of 0-2 suggests the possibility of dementia, score of 3-5 suggests no dementia  Fall Risk not completed due to virtual visit; need for additional assessment in future face-to-face visit.   Cognitive Screen not completed due to known dementia.    Identified Health Risks:     He is at risk for lack of exercise and has been provided with information to increase physical activity for the benefit of his well-being.  The patient identified a concern for his hearing.  I referred [facility].  The patient's PHQ-9 score is consistent with mild depression. He was provided with information regarding depression and this was discussed during today's visit  He is at risk for falling and has been provided with information to reduce the risk of falling at home.  Patient's advanced directive was discussed and I am comfortable with the patient's wishes.        "

## 2021-06-11 NOTE — PROGRESS NOTES
Office Visit - Follow Up   Robert E Schamberger   83 y.o. male    Date of Visit: 7/6/2017    Chief Complaint   Patient presents with     Diabetes     Fasting        Assessment and Plan   1. Type 2 diabetes mellitus with peripheral neuropathy  Sugars looking under good control based on his 30 day average with current insulin regimen.  I reviewed his diabetic log.  Continue current treatment including metformin.  He is seeing his eye doctor annually and had an appointment in January 2017.  Does have peripheral neuropathy.  - Glycosylated Hemoglobin A1c  - Ambulatory referral to Podiatry    2. Fatigue  Recheck hemoglobin and other appropriate labs    3. Anemia in chronic kidney disease    - Hemoglobin    4. Essential hypertension  Good control with current medication    5. COPD (chronic obstructive pulmonary disease)  His occasional wheezing is more likely due to mild COPD rather than asthma.  Former smoker.  Stable.    6. Hyperlipidemia  Recheck lipid profile on pravastatin.  Continue aspirin 81 mg daily  - Lipid Cascade    7. Degenerative disc disease, lumbar  Chronic low back pain.  Encouraging regular walking.    8. Depression  Stable with citalopram    9. Ingrown toenail  We will get him an appointment with a new podiatrist for routine diabetic care and to address his toenails  - Ambulatory referral to Podiatry    10. Bursitis, hip, left  We will follow-up with orthopedics if worsening pain.  He did have a cortisone injection in the spring    11. Chronic kidney disease, stage 3 (moderate)  Monitor renal function  - Basic Metabolic Panel    12. QUETA on CPAP  He has a new mask which is working better and he is wearing faithfully    Return in about 3 months (around 10/6/2017) for Annual physical.     History of Present Illness   This 83 y.o. old gentleman is here to follow-up chronic medical problems including type 2 diabetes requiring insulin, chronic low back pain, hypertension, hyperlipidemia.  Sugars sounding  under good control.  His 30 day average is 134.  No recent hypoglycemia.  Seldom over 200.  Still using 90 units of Lantus at night and faithfully taking NovoLog with each meal.  He is not doing much walking these days as he has severe chronic low back pain.  Mowing the yard becomes in all day project requiring frequent breaks.  He will use occasional Tylenol for his pain but fortunately does get relief when he sits down.  He is having ongoing problems with his left hip even after cortisone injection for bursitis.  In addition to pain, it feels like it will give out.  He does report some fatigue.  Denies chest pain.  No increasing shortness of breath.  We talked about his foot care.  He has some ingrown nails.  He would like to establish with a new podiatrist.  We also clarified his diagnosis of asthma versus COPD.  Asthma as a child which he outgrew.  Former smoker quitting 30 years ago.  Will occasionally need his albuterol inhaler for wheezing.    Review of Systems:  Otherwise, a comprehensive review of systems was negative except as noted.     Medications, Allergies and Problem List   Patient Active Problem List   Diagnosis     Lumbar Facet Syndrome     Type 2 diabetes mellitus with peripheral neuropathy     Hyperlipidemia     Osteoarthritis     Low back pain     HTN (hypertension)     Glaucoma     QUETA on CPAP     Erectile dysfunction     Morbid obesity     Degenerative disc disease, lumbar     Osteoarthritis of both knees     Depression     Osteoarthritis of both hands     Squamous cell skin cancer     Anemia in chronic kidney disease     Right ankle pain     Bilateral hearing loss     Chronic kidney disease, stage 3 (moderate)     Bursitis, hip, left     COPD (chronic obstructive pulmonary disease)     Fatigue     Ingrown toenail       He has a past surgical history that includes removal gallbladder; revise median n/carpal tunnel surg; excis stomach ulcer,lesn;local; Total knee arthroplasty (Right, 2014);  "Carpal tunnel release (Bilateral); Eye surgery; and Colonoscopy (2003).    Allergies   Allergen Reactions     Actos [Pioglitazone] Swelling     Edema     Gabapentin Diarrhea     Diarrhea     Lipitor [Atorvastatin]      Back pain     Niacin      Hyperglycemia     Simvastatin      Back pain       Current Outpatient Prescriptions   Medication Sig Dispense Refill     albuterol (PROVENTIL HFA;VENTOLIN HFA) 90 mcg/actuation inhaler Inhale 2 puffs every 6 (six) hours as needed for wheezing. 3 Inhaler 3     aspirin 81 MG EC tablet Take 81 mg by mouth daily.       BD INSULIN PEN NEEDLE UF SHORT 31 gauge x 5/16\" Ndle USE THREE TIMES A  each 10     blood glucose test (ONETOUCH ULTRA TEST) strips One Touch Ultra 2 teststrips. Check three times daily. 300 each 3     citalopram (CELEXA) 20 MG tablet Take 1 tablet (20 mg total) by mouth daily. 90 tablet 3     insulin syringe-needle U-100 (BD INSULIN SYRINGE ULT-FINE II) 1 mL 31 x 5/16\" Syrg Use 4 daily 500 each 3     LANTUS 100 unit/mL injection 90 units at bedtime 50 mL 10     latanoprost (XALATAN) 0.005 % ophthalmic solution 1 drop bedtime.       metFORMIN (GLUCOPHAGE-XR) 500 MG 24 hr tablet Take 3 tablets (1,500 mg total) by mouth daily. 270 tablet 3     NOVOLOG FLEXPEN 100 unit/mL injection pen 20 UNITS WITH BREAKFAST, 25 UNITS WITH LUNCH, 25 WITH DINNER 60 mL 10     pravastatin (PRAVACHOL) 10 MG tablet TAKE 1 TABLET (10 MG TOTAL) BY MOUTH AT BEDTIME. 90 tablet 3     prednisoLONE acetate (PRED-FORTE) 1 % ophthalmic suspension   1     tobramycin-dexamethasone (TOBRADEX) ophthalmic solution Administer 1 drop into the left eye 2 (two) times a day.  0     traMADol (ULTRAM) 50 mg tablet TAKE 1-2 TABLETS ( MG TOTAL) BY MOUTH EVERY 6 (SIX) HOURS AS NEEDED FOR PAIN. 100 tablet 0     valsartan-hydrochlorothiazide (DIOVAN-HCT) 320-25 mg per tablet take 1 tablet by mouth every day (needs appt for further refills) 90 tablet 3     VIGAMOX 0.5 % ophthalmic solution   1     No " "current facility-administered medications for this visit.         Physical Exam   General Appearance:   Overweight but otherwise well-appearing elderly male    /72 (Patient Site: Left Arm, Patient Position: Sitting, Cuff Size: Thigh)  Pulse 81  Ht 5' 10\" (1.778 m)  Wt (!) 337 lb 1.3 oz (152.9 kg)  SpO2 95%  BMI 48.37 kg/m2    HEENT: Normal  Respiratory: Normal respiratory effort.  Lungs are clear with no rales or wheezes.  Heart: Regular rate and rhythm without murmurs, rubs, or gallops.  No carotid bruits.  Extremities: No peripheral edema.  Diabetic foot exam: Good pedal pulses.  Decreased sensation for vibration.  Some ingrown toenails but no other abnormality  Neurologic: Grossly nonfocal  Skin: No cyanosis or pallor  Psych: Alert and oriented ×3, mood appropriate         Additional Information   Social History   Substance Use Topics     Smoking status: Former Smoker     Smokeless tobacco: Never Used     Alcohol use None              Ld Moy MD  "

## 2021-06-11 NOTE — TELEPHONE ENCOUNTER
Refill Approved    Rx renewed per Medication Renewal Policy. Medication was last renewed on 2/21/2020.    Mary Aceves, [facility] Triage/Med Refill 9/10/2020     Requested Prescriptions   Pending Prescriptions Disp Refills     NOVOLOG FLEXPEN U-100 INSULIN 100 unit/mL (3 mL) injection pen [Pharmacy Med Name: NOVOLOG FLEXPEN 3ML MG INJECTABLE] 60 mL 11     Sig: INJECT 20 UNITS SQ WITH BREAKFAST 25 UNITS WITH LUNCH AND 25 UNITS WITH DINNER       Insulin/GLP-1 Refill Protocol Passed - 9/9/2020 10:52 AM        Passed - Visit with PCP or prescribing provider visit in last 6 months     Last office visit with prescriber/PCP: 7/8/2020 OR same dept: 7/8/2020 Ld Moy MD OR same specialty: 7/8/2020 Ld Moy MD Last physical: Visit date not found Last MTM visit: Visit date not found     Next appt within 3 mo: Visit date not found  Next physical within 3 mo: Visit date not found  Prescriber OR PCP: Ld Moy MD  Last diagnosis associated with med order: 1. 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)  - NOVOLOG FLEXPEN U-100 INSULIN 100 unit/mL (3 mL) injection pen [Pharmacy Med Name: NOVOLOG FLEXPEN 3ML MG INJECTABLE]; INJECT 20 UNITS SQ WITH BREAKFAST 25 UNITS WITH LUNCH AND 25 UNITS WITH DINNER  Dispense: 60 mL; Refill: 11    If protocol passes may refill for 6 months if within 3 months of last provider visit (or a total of 9 months).              Passed - A1C in last 6 months     Hemoglobin A1c   Date Value Ref Range Status   07/08/2020 7.3 (H) 3.5 - 6.0 % Final               Passed - Microalbumin in last year     Microalbumin, Random Urine   Date Value Ref Range Status   01/14/2020 18.52 (H) 0.00 - 1.99 mg/dL Final                  Passed - Blood pressure in last year     BP Readings from Last 1 Encounters:   07/08/20 140/80             Passed - Creatinine done in last year     Creatinine   Date Value Ref Range Status   07/08/2020 1.58 (H) 0.70 - 1.30 mg/dL  Final

## 2021-06-12 NOTE — PROGRESS NOTES
[facility]        Visit Type: H & P (SAH)    Code Status:  FULL CODE  Facility:  [facility] [[id number]]          PCP: Ld Moy MD       PHONE: [phone]     FAX:[phone]        ASSESSMENT/PLAN:  1. SAH (subarachnoid hemorrhage)   no symptoms currently, we will repeat head CT scan in a few weeks.  Will hold aspirin for 2 weeks and restart on/12   2. Closed left clavicular fracture   follow-up with orthopedics, needs arm sling to protect clavicular fracture while doing physical therapy, decrease Roxicodone to 5 mg, 1 tablet every 4 hours as needed   3. Essential hypertension   uncontrolled on valsartan/HCT.  Will check orthostatics since patient has been having intermittent dizziness which could also be secondary to SAH.  May need to get CT scan of the head sooner than later   4. Type 2 diabetes mellitus   blood sugars are on the low side of normal, unclear if with hypoglycemic events causing his fall.  Will check blood sugars 4 times daily, decrease Lantus to 80 units nightly, continue metformin and Humalog sliding scale   5. MARTY (acute kidney injury)   recheck BMP on 8/29   6. Depression   start Celexa 20 mg daily   7.      Anemia- multifactorial, could be secondary to SAH but also could be secondary to history of gastric surgery with decreased B12 absorption.  Will check vitamin D and B12 levels      HISTORY OF PRESENT ILLNESS:   Robert E Schamberger is a 83 y.o. male with a history of hypertension, type 2 diabetes, status post partial gastrectomy for ulcer disease, status post cholecystectomy who fell downstairs after alcohol use, had a 3 minute loss of consciousness and suffered a scalp hematoma, left frontal SAH per head CT scan and left clavicular fracture on 8/22/17.  His GFR was at 41, creatinine of 1.53 with AK I.  He was then admitted at the trauma unit for observation.  He was started on Roxicodone for pain management he did  well and was sent to our facility for further monitoring and rehab.    Currently, the patient states that he has intermittent dizzy spells especially when getting up fast from a laying position to a sitting position.  He states that he does not have any headaches, visual changes, nausea or vomiting.  He also denies any cough, chest pains, shortness of breath fevers or chills.  He states that his appetite is improving and that he is sleeping well.  He states that he still has some pain on his left clavicular fracture area especially with physical activity.  His blood sugars are normal at 9-1 1 2 throughout the day on 90 units of Lantus and unclear if hypoglycemia played a role during his fall.  He was also noted to have AK I in the hospital with a GFR of 41 creatinine of 1.53.  At home he states that he is on Celexa for depression which was not restarted from the hospital.  He does state that he does not have any problems with depression at this time.    Other review of systems are negative.      PAST MEDICAL/SURGICAL HISTORY:  Past Medical History:   Diagnosis Date     Anemia in chronic kidney disease      Bilateral hearing loss 5/9/2016     Bursitis, hip, left 3/2/2017     Chest pain     Normal GXT 2006     Chronic kidney disease, stage 3 (moderate)     Worsening with hyperkalemia on Relafen-discontinued August 2016     COPD (chronic obstructive pulmonary disease)      Degenerative disc disease, lumbar      Depression      Erectile dysfunction      Fatigue 7/6/2017     Forearm tendonitis 11/29/2016     Glaucoma      Gout attack     Left foot     HTN (hypertension)      Hyperlipidemia      Ingrown toenail 7/6/2017     Left leg cellulitis 2014     Low back pain      Morbid obesity      QUETA on CPAP      Osteoarthritis      Osteoarthritis of both hands      Osteoarthritis of both knees     Right TKA     Rib fracture 2005     Right-sided chest pain 5/9/2016     Screening     No AAA on CT scan 2009     Squamous cell skin  "cancer 11/2/2015     Type 2 diabetes mellitus with peripheral neuropathy      Past Surgical History:   Procedure Laterality Date     CARPAL TUNNEL RELEASE Bilateral      COLONOSCOPY  2003    Normal     EYE SURGERY       OR EXCIS STOMACH ULCER,LESN;LOCAL      Description: Gastric Excision;  Recorded: 03/23/2012;     OR REMOVAL GALLBLADDER      Description: Cholecystectomy;  Recorded: 03/23/2012;     OR REVISE MEDIAN N/CARPAL TUNNEL SURG      Description: Neuroplasty Decompression Median Nerve At Carpal Tunnel;  Recorded: 03/23/2012;     TOTAL KNEE ARTHROPLASTY Right 2014    Complicated by torn Ranexa 1 and subsequent repair       SOCIAL HISTORY:  Social History     Social History     Marital status:      Spouse name: N/A     Number of children: N/A     Years of education: N/A     Occupational History     Not on file.     Social History Main Topics     Smoking status: Former Smoker     Smokeless tobacco: Never Used     Alcohol use Not on file     Drug use: Not on file     Sexual activity: Not on file     Other Topics Concern     Not on file     Social History Narrative    Semi retired     x 47 years               FAMILY HISTORY:  No family history on file.    MEDICATIONS:  Current Outpatient Prescriptions on File Prior to Visit   Medication Sig     albuterol (PROVENTIL HFA;VENTOLIN HFA) 90 mcg/actuation inhaler Inhale 2 puffs every 6 (six) hours as needed for wheezing.     aspirin 81 MG EC tablet Take 81 mg by mouth daily.     BD INSULIN PEN NEEDLE UF SHORT 31 gauge x 5/16\" Ndle USE THREE TIMES A DAY     blood glucose test (ONETOUCH ULTRA TEST) strips One Touch Ultra 2 teststrips. Check three times daily.     citalopram (CELEXA) 20 MG tablet Take 1 tablet (20 mg total) by mouth daily.     insulin syringe-needle U-100 (BD INSULIN SYRINGE ULT-FINE II) 1 mL 31 x 5/16\" Syrg Use 4 daily     LANTUS 100 unit/mL injection 90 units at bedtime     latanoprost (XALATAN) 0.005 % ophthalmic solution 1 drop bedtime. " "    metFORMIN (GLUCOPHAGE-XR) 500 MG 24 hr tablet Take 3 tablets (1,500 mg total) by mouth daily.     NOVOLOG FLEXPEN 100 unit/mL injection pen 20 UNITS WITH BREAKFAST, 25 UNITS WITH LUNCH, 25 WITH DINNER     pravastatin (PRAVACHOL) 10 MG tablet TAKE 1 TABLET (10 MG TOTAL) BY MOUTH AT BEDTIME.     prednisoLONE acetate (PRED-FORTE) 1 % ophthalmic suspension      tobramycin-dexamethasone (TOBRADEX) ophthalmic solution Administer 1 drop into the left eye 2 (two) times a day.     traMADol (ULTRAM) 50 mg tablet TAKE 1-2 TABLETS ( MG TOTAL) BY MOUTH EVERY 6 (SIX) HOURS AS NEEDED FOR PAIN.     ULTICARE 1 mL 30 gauge x 1/2\" Syrg USE AS DIRECTED 4 TIMES DAILY     valsartan-hydrochlorothiazide (DIOVAN-HCT) 320-25 mg per tablet take 1 tablet by mouth every day (needs appt for further refills)     VIGAMOX 0.5 % ophthalmic solution      No current facility-administered medications on file prior to visit.        ALLERGIES:  Allergies   Allergen Reactions     Actos [Pioglitazone] Swelling     Edema     Gabapentin Diarrhea     Diarrhea     Lipitor [Atorvastatin]      Back pain     Niacin      Hyperglycemia     Simvastatin      Back pain         PHYSICAL EXAMINATION:  Vital signs 152/73, repeat 168/80, 97.1, 18, 81, 95%  General: Awake, Alert, oriented x3, not in any form of acute distress, answers questions appropriately, follows simple commands, conversant, obese  HEENT: Pink conjunctiva, anicteric sclerae, oral mucosa is moist month scalp hematoma posteriorly is fading  NECK: Supple, without any lymphadenopathy, thyromegaly or any masses  LUNG: Clear to auscultation, good chest expansion. There are no crackles, no wheezes, normal AP diameter  BACK: No kyphosis of the thoracic spine  CVS: There is good S1  S2, there are no murmurs, no heaves, rhythm is regular  ABDOMEN: Globular and soft, nontender to palpation, no organomegaly, good bowel sounds  EXTREMITIES: Good range of motion on both upper and lower extremities, trace " pedal edema, no cyanosis or clubbing, no calf tenderness, left clavicular fracture mildly tender to palpation  SKIN: Warm and dry, no rashes or erythema noted    LABS:  Reviewed          45 minutes of total time spent, greater than 55% of the time spent in coordination of care and counseling regarding the above medical issues and plan of care. I have reviewed the patient's medical records, labs and medications.       Electronically signed by:Priscilla Fair MD

## 2021-06-12 NOTE — PROGRESS NOTES
Admission History & Physical  Robert E Schamberger, 4/14/1934, [id number]    [facility]  Ld Moy MD, [phone]    Extended Emergency Contact Information  Primary Emergency Contact: Schamberger,Carmel  Address: [address] [place] [id number] [facility]  Home Phone: [phone]  Relation: Spouse     Assessment and Plan:   Assessment: Onychomycosis, onychauxis, onychocryptosis, diabetes mellitus  Plan: Performed partial nail excision and matrixectomy of the medial and lateral borders of both great toenails today.  Trimmed remaining nails 1 through 5 both feet.  Active Problems:    * No active hospital problems. *      Chief Complaint:  Painful ingrown toenails and long nails both feet     HPI:    Robert E Schamberger is a 83 y.o. old male who presented to the clinic today complaining of painful ingrown toenails on both sides of both great toenails.  He has had this problem for several years.  The pain is moderate to severe.  The pain is aggravated by his shoes.  He denies any redness, swelling, drainage or bleeding.  He indicated that he has been treated by a podiatrist in the past and wanted the ingrown toenails treated but this did not occur in a satisfactory manner.  He is somewhat frustrated that he continues to have pain due to the ingrown toenails.  He also has difficulty trimming his nails.  History is provided by patient    Medical History  Active Ambulatory (Non-Hospital) Problems    Diagnosis     Fatigue     Ingrown toenail     COPD (chronic obstructive pulmonary disease)     Bursitis, hip, left     Chronic kidney disease, stage 3 (moderate)     Bilateral hearing loss     Right ankle pain     Anemia in chronic kidney disease     Squamous cell skin cancer     Type 2 diabetes mellitus with peripheral neuropathy     Hyperlipidemia     Osteoarthritis     Low back pain     HTN (hypertension)     Glaucoma     QUETA on CPAP     Erectile dysfunction      Morbid obesity     Degenerative disc disease, lumbar     Osteoarthritis of both knees     Depression     Osteoarthritis of both hands     Lumbar Facet Syndrome     Past Medical History:   Diagnosis Date     Anemia in chronic kidney disease      Bilateral hearing loss 5/9/2016     Bursitis, hip, left 3/2/2017     Chest pain      Chronic kidney disease, stage 3 (moderate)      COPD (chronic obstructive pulmonary disease)      Degenerative disc disease, lumbar      Depression      Erectile dysfunction      Fatigue 7/6/2017     Forearm tendonitis 11/29/2016     Glaucoma      Gout attack      HTN (hypertension)      Hyperlipidemia      Ingrown toenail 7/6/2017     Left leg cellulitis 2014     Low back pain      Morbid obesity      QUETA on CPAP      Osteoarthritis      Osteoarthritis of both hands      Osteoarthritis of both knees      Rib fracture 2005     Right-sided chest pain 5/9/2016     Screening      Squamous cell skin cancer 11/2/2015     Type 2 diabetes mellitus with peripheral neuropathy      Patient Active Problem List    Diagnosis Date Noted     Fatigue 07/06/2017     Ingrown toenail 07/06/2017     COPD (chronic obstructive pulmonary disease)      Bursitis, hip, left 03/02/2017     Chronic kidney disease, stage 3 (moderate)      Bilateral hearing loss 05/09/2016     Right ankle pain 11/19/2015     Anemia in chronic kidney disease      Squamous cell skin cancer 11/02/2015     Type 2 diabetes mellitus with peripheral neuropathy      Hyperlipidemia      Osteoarthritis      Low back pain      HTN (hypertension)      Glaucoma      QUETA on CPAP      Erectile dysfunction      Morbid obesity      Degenerative disc disease, lumbar      Osteoarthritis of both knees      Depression      Osteoarthritis of both hands      Lumbar Facet Syndrome      Surgical History  He  has a past surgical history that includes removal gallbladder; revise median n/carpal tunnel surg; excis stomach ulcer,lesn;local; Total knee arthroplasty  "(Right, 2014); Carpal tunnel release (Bilateral); Eye surgery; and Colonoscopy (2003).   Past Surgical History:   Procedure Laterality Date     CARPAL TUNNEL RELEASE Bilateral      COLONOSCOPY  2003    Normal     EYE SURGERY       ND EXCIS STOMACH ULCER,LESN;LOCAL      Description: Gastric Excision;  Recorded: 03/23/2012;     ND REMOVAL GALLBLADDER      Description: Cholecystectomy;  Recorded: 03/23/2012;     ND REVISE MEDIAN N/CARPAL TUNNEL SURG      Description: Neuroplasty Decompression Median Nerve At Carpal Tunnel;  Recorded: 03/23/2012;     TOTAL KNEE ARTHROPLASTY Right 2014    Complicated by torn Ranexa 1 and subsequent repair    Social History  Reviewed, and he  reports that he has quit smoking. He has never used smokeless tobacco.  Social History   Substance Use Topics     Smoking status: Former Smoker     Smokeless tobacco: Never Used     Alcohol use Not on file      Allergies  Allergies   Allergen Reactions     Actos [Pioglitazone] Swelling     Edema     Gabapentin Diarrhea     Diarrhea     Lipitor [Atorvastatin]      Back pain     Niacin      Hyperglycemia     Simvastatin      Back pain    Family History  Reviewed, and family history is not on file.   Psychosocial Needs  Social History     Social History Narrative    Semi retired     x 47 years             Additional psychosocial needs reviewed per nursing assessment.       Prior to Admission Medications     (Not in a hospital admission)        Review of Systems - Negative     Pulse 80  Resp 16  Ht 5' 10\" (1.778 m)  Wt (!) 337 lb 1.3 oz (152.9 kg)  SpO2 96%  BMI 48.37 kg/m2    Objective findings: General: The patient is alert and in no acute distress      Integument: Nails bilateral feet are normal elongated and slightly thickened. Medial and lateral borders of both great toenails are severely incurvated.  There is moderate pain on palpation along the medial and lateral margins of both great toenails.  Skin bilateral feet warm and " intact.      Vascular: DP and PT pulses +2/4 bilateral feet.      Neurologic: Negative clonus, negative Babinski bilaterally.       Musculoskeletal: Range of motion within normal limits bilaterally. Muscle power +5/5 bilaterally in all compartments.      Assessment: Onychocryptosis both great toes, onychomycosis, onychauxis, diabetes mellitus      Plan: Performed partial nail excision and matrixectomy of the medial and lateral  borders of both great toenails today under local anesthesia of 2% lidocaine plain via the phenol and alcohol technique.  The patient tolerated the procedures and anesthesia well and was discharged in good condition.  She was given both written and verbal postoperative instructions.  Also trimmed nails 1 through 5 both feet.  I recommended the patient return to the clinic every 3 months for continued diabetic foot care

## 2021-06-12 NOTE — PROGRESS NOTES
[facility]    DATE: 2017    NAME:  Robert E Schamberger             :  1934  MRN: [id number]  CODE STATUS:  FULL CODE    VISIT TYPE: DISCHARGE SUMMARY  [id number]       PCP: Ld Moy MD       PHONE: [phone]     FAX:[phone]    PRIMARY CARE PROVIDER: Ld Moy MD    DISCHARGE DIAGNOSIS:      1. Type 2 diabetes mellitus with peripheral neuropathy    2. Essential hypertension    3. COPD (chronic obstructive pulmonary disease)    4. QUETA on CPAP    5. Low back pain    6. Osteoarthritis    7. Lumbar Facet Syndrome         DISCHARGE MEDICATIONS:         Medication List          These changes are accurate as of: 17 11:59 PM.  If you have any questions, ask your nurse or doctor.               CONTINUE taking these medications          acetaminophen 500 MG tablet   Commonly known as:  TYLENOL       insulin aspart 100 unit/mL injection   Commonly known as:  NovoLOG       insulin glargine 100 unit/mL injection   Commonly known as:  LANTUS       latanoprost 0.005 % ophthalmic solution   Commonly known as:  XALATAN       metFORMIN 500 MG 24 hr tablet   Commonly known as:  GLUCOPHAGE-XR   Take 3 tablets (1,500 mg total) by mouth daily.       oxyCODONE 5 MG immediate release tablet   Commonly known as:  ROXICODONE       polyethylene glycol 17 gram packet   Commonly known as:  MIRALAX       pravastatin 10 MG tablet   Commonly known as:  PRAVACHOL   TAKE 1 TABLET (10 MG TOTAL) BY MOUTH AT BEDTIME.       sennosides-docusate sodium 8.6-50 mg tablet   Generic drug:  senna-docusate       valsartan-hydrochlorothiazide 320-25 mg per tablet   Commonly known as:  DIOVAN-HCT   take 1 tablet by mouth every day (needs appt for further refills)         STOP taking these medications          citalopram 20 MG tablet   Commonly known as:  celeXA   Stopped by:  Yudi Ko CNP             HISTORY OF PRESENT ILLNESS: Robert E Schamberger is a  83 y.o. male who has an extensive medical history. He was in the TCU after a fall with injury. He reports alcohol use prior to fall. He also has a  history of hypertension, type 2 diabetes, status post partial gastrectomy for ulcer disease, status post cholecystectomy. He reports he  fell downstairs after alcohol use, had a 3 minute loss of consciousness and suffered a scalp hematoma, left frontal SAH per head CT scan, as well as a  left clavicular fracture on 8/22.   He was then admitted at the trauma unit for observation.  He was started on Roxicodone for pain management he did well and was sent to our facility for further monitoring and rehab.     Currently he  states that he does not have any headaches, visual changes, nausea or vomiting.  He also denies any cough, chest pains, shortness of breath fevers or chills.  He states that his appetite is improving and that he is sleeping well. His BS was 200 last nght. He has aSS insulin coverage. Per staff they report his wife has been involved with his medical care and he will have HH PT/OT  As well on d/c.    SKILLED NURSING FACILITY COURSE:  During this TCU stay, patient completed all anticipated goals of therapy.      PHYSICAL EXAMINATION:    Vitals:    09/09/17 1043   BP: 136/70   Pulse: 84   Temp: 98.1  F (36.7  C)   Weight: (!) 316 lb 9.6 oz (143.6 kg)         GENERAL: Awake, Alert, oriented x3, not in any form of acute distress, answers questions appropriately, follows simple commands, conversant. Denies pain at this time.  HEENT: Head is normocephalic with normal hair distribution. No evidence of trauma. Ears: No acute purulent discharge. Eyes: Conjunctivae pink with no scleral jaundice. Nose: Normal mucosa and septum. NECK: Supple with no cervical or supraclavicular lymphadenopathy. Trachea is midline.   CHEST: No tenderness or deformity, no crepitus  LUNG: Clear to auscultation with good chest expansion. There are no crackles or wheezes, normal AP  diameter.  BACK: No kyphosis of the thoracic spine. Symmetric, no curvature, ROM normal, no CVA tenderness, no spinal tenderness   CVS: There is good S1  S2, there are no murmurs, rubs, gallops, or heaves, rhythm is regular,  2+ pulses symmetric in all extremities.  ABDOMEN: Globular and soft, nontender to palpation, non distended, no masses, no organomegaly, good bowel sounds, no rebound or guarding, no peritoneal signs.   EXTREMITIES: Atraumatic. Full range of motion on both upper and lower extremities, does use his sling to L arm for stability, he has on ut is lifting arm. there is no tenderness to palpation, bilateral trace pedal edema, no cyanosis or clubbing, no calf tenderness, normal cap refill, no joint swelling.  SKIN: Warm and dry, no erythema noted.  Skin color, texture, no rashes or lesions.  NEUROLOGICAL: The patient is oriented to person, place and time. Strength and sensation are grossly intact. Face is symmetric.      LABS:  All labs reviewed in the nursing home record.        DISCHARGE PLAN: I certify that this patient is under Dr. Fair's care, seen by the NP, and had a face-to-face encounter that meets the physician face-to-face encounter requirements on 9/8/17.  The encounter was in whole, or part related to the primary reason for home health.  The Patient is homebound due to: back pain, a fractured clavicle and deconditioned state, it is taxing and it will take a considerable amount of effort for patient to leave the home.  He is dependent on others for transportation.  The patient is confined to his home and needs intermittent skilled nursing, PT,OT,and HHA.  The patient has been under the care of Dr. Fair/NP and Dr. Fair initiated the establishment of the plan of care.        Patient to be followed by home care for physical therapy to eval and treat for strengthening, balance, endurance, and safety with mobility, and ambulation.  Patient to be followed by home care for occupational  therapy to eval and treat for strengthening, ADL needs, adaptive equipment, and safety.  Patient to be followed by home care for nursing services for medication set up and teaching, symptom and disease processes monitoring and education.    Patient to be followed by home care for home health aid services for bathing and ADL needs.  Planned discharge.  All therapy goals have been met.  Family will assist with discharge and transportation.      Patient will follow up with PCP within 7-10 days after discharge for medication mangagment and appropriate lab studies.            Electronically signed by:  Yudi Ko CNP      For documentation purposes, chart review, medication management, and discharge coordination of care was greater than 35 minutes

## 2021-06-12 NOTE — PROGRESS NOTES
[facility]        Visit Type: Review Of Multiple Medical Conditions (multiple medical issues)    Code Status:  FULL CODE  Facility:  [facility] [[id number]]          PCP: Ld Moy MD       PHONE: [phone]     FAX:[phone]        ASSESSMENT/PLAN:  1. SAH (subarachnoid hemorrhage)     2. Closed left clavicular fracture     3. B12 deficiency     4. Type 2 diabetes mellitus     5. Essential hypertension     6. MARYT (acute kidney injury)     7. Depression     8. COPD (chronic obstructive pulmonary disease)     9. QUETA on CPAP         1. SAH (subarachnoid hemorrhage)   no symptoms except for mild dizziness intermittently, we will repeat head CT scan next week.  Will hold aspirin for 2 weeks and restart on 9/12   2. Closed left clavicular fracture   follow-up with orthopedics, needs arm sling to protect clavicular fracture while doing physical therapy, d/c Roxicodone since unclear if this is also leading to his dizziness   3. Essential hypertension   now controlled on valsartan/HCT.     4. Type 2 diabetes mellitus   blood sugars are stable despite decreasing Lantus, unclear if with hypoglycemic events causing his fall.  Will check blood sugars 4 times daily, now on Lantus to 80 units nightly, continue metformin and Humalog sliding scale   5. MARTY (acute kidney injury)   mild improvement in BMP, with normal electrolytes, BUN 29, creatinine 1.49 from 1.53, GFR slightly improved at 47 from 41   6.   B12 deficiency  s/p partial gastrectomy for stomach ulcer in the past, B12 is at 146.  Will give loading dose B12 1000 mcg IM 2 times a week for 2 weeks, then 1000 mcg IM monthly and recheck B12 in 3 months.  Awaiting vitamin D levels   7.      Anemia- multifactorial, could be secondary to SAH but also could be secondary to history of gastric surgery with decreased B12 absorption.  Hemoglobin at 12.9      HISTORY OF PRESENT ILLNESS:   Robert E Schamberger  is a 83 y.o. male with a history of hypertension, type 2 diabetes, status post partial gastrectomy for ulcer disease, status post cholecystectomy who fell downstairs after alcohol use, had a 3 minute loss of consciousness and suffered a scalp hematoma, left frontal SAH per head CT scan and left clavicular fracture on 8/22/17.  His GFR was at 41, creatinine of 1.53 with AK I.  He was then admitted at the trauma unit for observation.  He was started on Roxicodone for pain management he did well and was sent to our facility for further monitoring and rehab.    Currently, the patient states that his intermittent dizzy spells especially when getting up fast from a laying position to a sitting position has been improving.  He states that he does not have any headaches, visual changes, nausea or vomiting.  He also denies any cough, chest pains, shortness of breath fevers or chills.  He states that his appetite is improving and that he is sleeping well.  He states that he still has some pain on his left clavicular fracture area especially with physical activity.  His blood sugars are normal at  throughout the day on 80 units of Lantus and unclear if hypoglycemia played a role during his fall.  He was also noted to have AK I in the hospital with a GFR of 41 creatinine of 1.53 with mild improvement of current BMP.   He does state that he does not have any problems with depression at this time.    Other review of systems are negative.      PAST MEDICAL/SURGICAL HISTORY:  Past Medical History:   Diagnosis Date     Anemia in chronic kidney disease      Bilateral hearing loss 5/9/2016     Bursitis, hip, left 3/2/2017     Chest pain     Normal GXT 2006     Chronic kidney disease, stage 3 (moderate)     Worsening with hyperkalemia on Relafen-discontinued August 2016     COPD (chronic obstructive pulmonary disease)      Degenerative disc disease, lumbar      Depression      Erectile dysfunction      Fatigue 7/6/2017      Forearm tendonitis 11/29/2016     Glaucoma      Gout attack     Left foot     HTN (hypertension)      Hyperlipidemia      Ingrown toenail 7/6/2017     Left leg cellulitis 2014     Low back pain      Morbid obesity      QUETA on CPAP      Osteoarthritis      Osteoarthritis of both hands      Osteoarthritis of both knees     Right TKA     Rib fracture 2005     Right-sided chest pain 5/9/2016     Screening     No AAA on CT scan 2009     Squamous cell skin cancer 11/2/2015     Type 2 diabetes mellitus with peripheral neuropathy      Past Surgical History:   Procedure Laterality Date     CARPAL TUNNEL RELEASE Bilateral      COLONOSCOPY  2003    Normal     EYE SURGERY       DE EXCIS STOMACH ULCER,LESN;LOCAL      Description: Gastric Excision;  Recorded: 03/23/2012;     DE REMOVAL GALLBLADDER      Description: Cholecystectomy;  Recorded: 03/23/2012;     DE REVISE MEDIAN N/CARPAL TUNNEL SURG      Description: Neuroplasty Decompression Median Nerve At Carpal Tunnel;  Recorded: 03/23/2012;     TOTAL KNEE ARTHROPLASTY Right 2014    Complicated by torn Ranexa 1 and subsequent repair       SOCIAL HISTORY:  Social History     Social History     Marital status:      Spouse name: N/A     Number of children: N/A     Years of education: N/A     Occupational History     Not on file.     Social History Main Topics     Smoking status: Former Smoker     Smokeless tobacco: Never Used     Alcohol use Not on file     Drug use: Not on file     Sexual activity: Not on file     Other Topics Concern     Not on file     Social History Narrative    Semi retired     x 47 years               MEDICATIONS:   reviewed per TCU orders summary    ALLERGIES:  Allergies   Allergen Reactions     Actos [Pioglitazone] Swelling     Edema     Gabapentin Diarrhea     Diarrhea     Lipitor [Atorvastatin]      Back pain     Niacin      Hyperglycemia     Simvastatin      Back pain         PHYSICAL EXAMINATION:  Vital signs 128/66, 96%, 86, 20,  97.1  General: Awake, Alert, oriented x3, not in any form of acute distress, answers questions appropriately, follows simple commands, conversant, obese  HEENT: Pink conjunctiva, anicteric sclerae, oral mucosa is moist month scalp hematoma posteriorly is fading  NECK: Supple, without any lymphadenopathy, thyromegaly or any masses  LUNG: Clear to auscultation, good chest expansion. There are no crackles, no wheezes, normal AP diameter  BACK: No kyphosis of the thoracic spine  CVS: There is good S1  S2, there are no murmurs, no heaves, rhythm is regular  ABDOMEN: Globular and soft, nontender to palpation, no organomegaly, good bowel sounds  EXTREMITIES: Good range of motion on both upper and lower extremities, trace pedal edema, no cyanosis or clubbing, no calf tenderness, left clavicular fracture mildly tender to palpation  SKIN: Warm and dry, no rashes or erythema noted    LABS:  Reviewed          35 minutes of total time spent, greater than 55% of the time spent in coordination of care and counseling regarding the above medical issues and plan of care with nursing staff, patient and his wife, multiple questions answered. I have reviewed the patient's medical records, labs and medications.       Electronically signed by:Priscilla Fair MD

## 2021-06-12 NOTE — PROGRESS NOTES
[facility] Patient Outreach:     Discharge Date::  9/8/17      Reason for TCU stay (discharge diagnosis)::  Fall after using alcohol, SAH, left clavicle fx      Are you feeling better, the same or worse since your discharge?:  Patient is feeling better          As part of your discharge plan, did they discuss home care with you?: Yes        Have your seen them yet, or are they scheduled to visit?: Yes                Do you have any follow up visits scheduled with your PCP or Specialist?:  Yes, with PCP      (RN) Is it scheduled soon enough (3-5 days)?: No        (RN) Is the patient okay with moving appointment up (if RN feels appropriate)?: No (Patient's wife feels that appt on 9/21 will be soon enough.  )

## 2021-06-12 NOTE — PROGRESS NOTES
[facility]        Visit Type: Review Of Multiple Medical Conditions (multiple medical issues)    Code Status:  FULL CODE  Facility:  [facility] [[id number]]          PCP: Ld Moy MD       PHONE: [phone]     FAX:[phone]        ASSESSMENT/PLAN:  1. SAH (subarachnoid hemorrhage)     2. Closed left clavicular fracture     3. B12 deficiency     4. Type 2 diabetes mellitus     5. Essential hypertension     6. COPD (chronic obstructive pulmonary disease)     7. Anemia     8. CRD (chronic renal disease), stage 3 (moderate)         1. SAH (subarachnoid hemorrhage)   no symptoms except for mild dizziness intermittently, repeat head CT scan done on 9/5 showed 12mm Left cerebral hemisphere subacute/chronic subdural hematoma with mild 2mm mass effect left to right midline shift.  We reviewed with patient and his wife the old CTs from hospital which showed some improvement but will ask radiology to compare current head CT from hospital CT..  Will hold aspirin for 2 weeks and restart on 9/12   2. Closed left clavicular fracture   follow-up with orthopedics today, needs arm sling to protect clavicular fracture while doing physical therapy, d/gisela Roxicodone since unclear if this is also leading to his dizziness   3. Essential hypertension   now controlled on valsartan/HCT.     4. Type 2 diabetes mellitus   blood sugars are stable despite decreasing Lantus, unclear if with hypoglycemic events causing his fall.  Will decrease Lantus to 75 units nightly, continue metformin and d/c Humalog sliding scale.  Will start novolog 5 u TID before meals, hold for BS <100   5. MARTY (acute kidney injury) on Chronic renal disease  mild improvement in BMP, with normal electrolytes, BUN 29, creatinine 1.44 from 1.53, GFR slightly improved at 47 from 41.  GFR now at baseling from 2016   6.   B12 deficiency  s/p partial gastrectomy for stomach ulcer in the past, B12 is at  146.  Started loading dose B12 1000 mcg IM 2 times a week for 2 weeks, then 1000 mcg IM monthly and recheck B12 in 3 months.  Awaiting vitamin D levels   7.      Anemia- multifactorial, could be secondary to SAH but also could be secondary to history of gastric surgery with decreased B12 absorption.  Hemoglobin at 12.9      HISTORY OF PRESENT ILLNESS:   Robert E Schamberger is a 83 y.o. male with a history of hypertension, type 2 diabetes, status post partial gastrectomy for ulcer disease, status post cholecystectomy who fell downstairs after alcohol use, had a 3 minute loss of consciousness and suffered a scalp hematoma, left frontal SAH per head CT scan and left clavicular fracture on 8/22/17.  His GFR was at 41, creatinine of 1.53 with AK I.  He was then admitted at the trauma unit for observation.  He was started on Roxicodone for pain management he did well and was sent to our facility for further monitoring and rehab.    Currently, the patient states that his intermittent dizzy spells especially when getting up fast from a laying position to a sitting position has been improving.  He states that he does not have any headaches, visual changes, nausea or vomiting.  He also denies any cough, chest pains, shortness of breath fevers or chills.  He states that his appetite is improving and that he is sleeping well.  He states that he still has some pain on his left clavicular fracture area especially with physical activity.  His blood sugars are normal at 104-205 throughout the day on 80 units of Lantus and unclear if hypoglycemia played a role during his fall.  He states that at home he uses novolog reg insulin before meals and wants us to start on this medication again.  He was also noted to have AK I in the hospital with a GFR of 41 creatinine of 1.53 with mild improvement of current BMP. Upon review of  His previous GFRs in 2016, noted is persistently low GFR around 43-46.  He does state that he does not have  any problems with depression at this time since restarting Celexa..    Other review of systems are negative.      PAST MEDICAL/SURGICAL HISTORY:  Past Medical History:   Diagnosis Date     Anemia in chronic kidney disease      Bilateral hearing loss 5/9/2016     Bursitis, hip, left 3/2/2017     Chest pain     Normal GXT 2006     Chronic kidney disease, stage 3 (moderate)     Worsening with hyperkalemia on Relafen-discontinued August 2016     COPD (chronic obstructive pulmonary disease)      Degenerative disc disease, lumbar      Depression      Erectile dysfunction      Fatigue 7/6/2017     Forearm tendonitis 11/29/2016     Glaucoma      Gout attack     Left foot     HTN (hypertension)      Hyperlipidemia      Ingrown toenail 7/6/2017     Left leg cellulitis 2014     Low back pain      Morbid obesity      QUETA on CPAP      Osteoarthritis      Osteoarthritis of both hands      Osteoarthritis of both knees     Right TKA     Rib fracture 2005     Right-sided chest pain 5/9/2016     Screening     No AAA on CT scan 2009     Squamous cell skin cancer 11/2/2015     Type 2 diabetes mellitus with peripheral neuropathy      Past Surgical History:   Procedure Laterality Date     CARPAL TUNNEL RELEASE Bilateral      COLONOSCOPY  2003    Normal     EYE SURGERY       MS EXCIS STOMACH ULCER,LESN;LOCAL      Description: Gastric Excision;  Recorded: 03/23/2012;     MS REMOVAL GALLBLADDER      Description: Cholecystectomy;  Recorded: 03/23/2012;     MS REVISE MEDIAN N/CARPAL TUNNEL SURG      Description: Neuroplasty Decompression Median Nerve At Carpal Tunnel;  Recorded: 03/23/2012;     TOTAL KNEE ARTHROPLASTY Right 2014    Complicated by torn Ranexa 1 and subsequent repair       SOCIAL HISTORY:  Social History     Social History     Marital status:      Spouse name: N/A     Number of children: N/A     Years of education: N/A     Occupational History     Not on file.     Social History Main Topics     Smoking status: Former  Smoker     Smokeless tobacco: Never Used     Alcohol use Not on file     Drug use: Not on file     Sexual activity: Not on file     Other Topics Concern     Not on file     Social History Narrative    Semi retired     x 47 years               MEDICATIONS:   reviewed per TCU orders summary    ALLERGIES:  Allergies   Allergen Reactions     Actos [Pioglitazone] Swelling     Edema     Gabapentin Diarrhea     Diarrhea     Lipitor [Atorvastatin]      Back pain     Niacin      Hyperglycemia     Simvastatin      Back pain         PHYSICAL EXAMINATION:  Vital signs 115/50, 96%, 85, 18, 97.1  General: Awake, Alert, oriented x3, not in any form of acute distress, answers questions appropriately, follows simple commands, conversant, obese  HEENT: Pink conjunctiva, anicteric sclerae, oral mucosa is moist month scalp hematoma posteriorly is fading  NECK: Supple, without any lymphadenopathy, thyromegaly or any masses  LUNG: Clear to auscultation, good chest expansion. There are no crackles, no wheezes, normal AP diameter  BACK: No kyphosis of the thoracic spine  CVS: There is good S1  S2, there are no murmurs, no heaves, rhythm is regular  ABDOMEN: Globular and soft, nontender to palpation, no organomegaly, good bowel sounds  EXTREMITIES: Good range of motion on both upper and lower extremities, trace pedal edema, no cyanosis or clubbing, no calf tenderness, left clavicular fracture mildly tender to palpation  SKIN: Warm and dry, no rashes or erythema noted    LABS:  Reviewed          65 minutes of total time spent, greater than 55% of the time spent in coordination of care and counseling regarding the above medical issues and plan of care with nursing staff, patient and his wife.  30 min face to face time (2:30pm-3pm) spent in discussion of his CT scan, implications of SAH, DM and treatment options, CRD and ongoing low GFR, multiple questions answered. I have reviewed the patient's medical records, labs and medications.        Electronically signed by:Priscilla Fair MD

## 2021-06-12 NOTE — PROGRESS NOTES
Office Visit - Follow Up   Robert E Schamberger   86 y.o. male    Date of Visit: 11/9/2020    Chief Complaint   Patient presents with     Follow-up     pt fasting     Diabetes        Assessment and Plan   1. 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)  Reviewed diabetic log.  Monitoring sugars at least 3 times daily.  Looking well controlled.  Continue 65 units of Basaglar and 15 units of NovoLog before meals.  Recheck A1c.  Continue annual diabetic eye exams.  We discussed using more Eucerin cream for dry feet.  - insulin aspart U-100 (NOVOLOG FLEXPEN U-100 INSULIN) 100 unit/mL (3 mL) injection pen; Inject 15 Units under the skin 3 (three) times a day before meals.; Refill: 0  - Glycosylated Hemoglobin A1c    2. Paroxysmal atrial fibrillation (H)  History of cardioversion 2007.  Anticoagulation contraindicated given balance problems and history of recurrent falls.    3. Sick sinus syndrome (H)  Bradycardic.  Evaluated by cardiology.  Pacemaker not recommended at this time.    4. Moderate major depression (H)  Continue citalopram.  Stable    5. Dementia without behavioral disturbance, unspecified dementia type (H)  Wife reports that there are good days and bad.  Vascular dementia is suspected with history of recurrent TIAs.  Continue to keep blood pressure well controlled.  Continue statin and Plavix.    6. Diabetic peripheral neuropathy (H)  Stable with current dose of Lyrica taking 50 mg at bedtime    7. Stage 3 chronic kidney disease, unspecified whether stage 3a or 3b CKD  Avoid nephrotoxins.  Monitor renal function  - Basic Metabolic Panel    8. Anemia in stage 3 chronic kidney disease, unspecified whether stage 3a or 3b CKD  Monitor hemoglobin  - Hemoglobin    9. Mixed hyperlipidemia  Lipids well controlled with pravastatin earlier this year    10. QUETA on CPAP  Wearing CPAP faithfully    11. Need for immunization against influenza    - Influenza,Quad,High Dose,PF 65 YR+    Return in about 3  months (around 2/9/2021) for Recheck.     History of Present Illness   This 86 y.o. old gentleman with multiple medical problems here to follow-up.  Type 2 diabetes on insulin.  Reviewed diabetic log.  Sugars looking well controlled.  Continues to 5 units of Basaglar at bedtime and 15 units of NovoLog with meals.  No significant hypoglycemia.  Continues on pravastatin.  No exertional chest pain.  No increasing shortness of breath.  Peripheral neuropathy symptoms are managed with Lyrica.  He is continued on 50 mg and this remains effective.  Dementia, likely vascular.  History of recurrent TIA.  Continues Plavix along with statin.  Blood pressure well controlled.  Wife reports that he has been days in bed regarding his cognition.    Review of Systems:  Otherwise, a comprehensive review of systems was negative except as noted.     Medications, Allergies and Problem List   Patient Active Problem List   Diagnosis     Lumbar Facet Syndrome     Hyperlipidemia     Low back pain     Glaucoma     QUETA on CPAP     Erectile dysfunction     Morbid obesity (H)     Degenerative disc disease, lumbar     Osteoarthritis of both knees     Osteoarthritis of both hands     Squamous cell skin cancer     Anemia in chronic kidney disease     Bilateral hearing loss     Chronic kidney disease, stage 3 (moderate) (H)     Bursitis of left hip     B12 deficiency     Vitamin D deficiency     Complete tear of left rotator cuff     Entropion of left lower eyelid     Trochanteric bursitis of right hip     Diabetic peripheral neuropathy (H)     Unsteady gait     Moderate major depression (H)     TIA (transient ischemic attack)     Physical deconditioning     Advanced care planning/counseling discussion     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)     Dementia without behavioral disturbance (H)     Sick sinus syndrome (H)     Paroxysmal atrial fibrillation (H)       He has a past surgical history that includes pr removal gallbladder;  pr revise median n/carpal tunnel surg; pr excis stomach ulcer,lesn;local; Total knee arthroplasty (Right, 2014); Carpal tunnel release (Bilateral); Eye surgery; Colonoscopy (2003); and Blepharoplasty (2018).    Allergies   Allergen Reactions     Actos [Pioglitazone] Swelling     Edema     Donepezil      Diarrhea     Gabapentin Diarrhea     Diarrhea     Lipitor [Atorvastatin]      Back pain     Niacin      Hyperglycemia     Simvastatin      Back pain       Current Outpatient Medications   Medication Sig Dispense Refill     acetaminophen (TYLENOL) 500 MG tablet Take 1,000 mg by mouth 3 (three) times a day. Special Instructions: max: 4000 mg in 24 hours Dx: pain  Three Times A Day; 08:00 AM, 12:00 PM, 04:00 PM       aspirin 81 MG EC tablet Take 1 tablet (81 mg total) by mouth daily. 150 tablet 2     blood glucose test (GLUCOSE BLOOD) strips Test up to 3 times daily 300 strip 3     blood glucose test strips Use one test strip daily to monitor blood glucose three times a day. 300 strip 3     cholecalciferol, vitamin D3, (VITAMIN D3) 2,000 unit Tab Take 1 tablet (2,000 Units total) by mouth daily. 100 tablet 3     citalopram (CELEXA) 20 MG tablet TAKE ONE TABLET BY MOUTH ONCE DAILY 90 tablet 3     clopidogreL (PLAVIX) 75 mg tablet Take 1 tablet (75 mg total) by mouth daily. 90 tablet 3     cyanocobalamin 1000 MCG tablet Take 1 tablet (1,000 mcg total) by mouth daily. 100 tablet 3     diclofenac sodium (VOLTAREN) 1 % Gel Place 2-4 g on the skin 4 (four) times a day as needed.        hydroCHLOROthiazide (HYDRODIURIL) 25 MG tablet TAKE ONE TABLET BY MOUTH ONCE DAILY .  - TAKE WITH LOSARTAN - COMBO NOT AVAILABLE - 90 tablet 3     insulin glargine (BASAGLAR KWIKPEN) 100 unit/mL (3 mL) pen Inject 65 Units under the skin daily. 5 pen 6     latanoprost (XALATAN) 0.005 % ophthalmic solution Administer 1 drop to both eyes at bedtime.       losartan (COZAAR) 100 MG tablet TAKE ONE TABLET BY MOUTH ONCE DAILY .  TAKE WITH HCTZ - COMBO  "UNAVAILABLE - 90 tablet 3     magnesium oxide (MAGOX) 400 mg (241.3 mg magnesium) tablet Take 1 tablet (400 mg total) by mouth 2 (two) times a day. 100 tablet 1     metFORMIN (GLUCOPHAGE-XR) 500 MG 24 hr tablet TAKE THREE TABLETS (1500MG) BY MOUTH DAILY 270 tablet 3     PEN NEEDLE 31 gauge x 5/16\" Ndle USE AS DIRECTED 3 TIMES DAILY 300 each 3     pravastatin (PRAVACHOL) 20 MG tablet TAKE ONE TABLET BY MOUTH AT BEDTIME 90 tablet 3     pregabalin (LYRICA) 50 MG capsule Take 2 capsules (100 mg total) by mouth at bedtime. 180 capsule 3     insulin aspart U-100 (NOVOLOG FLEXPEN U-100 INSULIN) 100 unit/mL (3 mL) injection pen Inject 15 Units under the skin 3 (three) times a day before meals.  0     No current facility-administered medications for this visit.         Physical Exam   General Appearance:   Obese elderly male who otherwise appears well    /60 (Patient Site: Left Arm, Patient Position: Sitting, Cuff Size: Adult Large)   Pulse (!) 51   Ht 6' (1.829 m)   Wt (!) 320 lb (145.2 kg)   SpO2 99%   BMI 43.40 kg/m        Respiratory: Normal respiratory effort.  Lungs are clear with no rales or wheezes.  Heart: Regular rate and rhythm without murmurs, rubs, or gallops.    Extremities: No peripheral edema.  Neurologic: Grossly nonfocal  Skin: No cyanosis or pallor           Additional Information   Social History     Tobacco Use     Smoking status: Former Smoker     Smokeless tobacco: Never Used   Substance Use Topics     Alcohol use: Yes     Alcohol/week: 14.0 standard drinks     Types: 7 Cans of beer, 7 Shots of liquor per week     Drug use: No            Ld Moy MD  "

## 2021-06-12 NOTE — PROGRESS NOTES
[facility]    DATE: 2017    NAME:  Robert E Schamberger             :  1934  MRN: [id number]  CODE STATUS:  FULL CODE    FACILITY:  [facility] [[id number]]       ROOM:   H204    CHIEF COMPLAINT/REASON FOR VISIT:  Chief Complaint   Patient presents with     Problem Visit     SAH, Closed left clavicular fracture, and Diabetes Mellitus     HISTORY OF PRESENT ILLNESS: Robert E Schamberger is a 83 y.o. male with Hypertension, Type 2 diabetes, status post partial gastrectomy for ulcer disease, status post cholecystectomy who fell downstairs after alcohol use, had a 3 minute loss of consciousness and suffered a scalp hematoma, left frontal SAH per head CT scan, and left clavicular fracture on 17.  His GFR was at 41, creatinine of 1.53 with MARTY.  He was then admitted at the trauma unit for observation.  He was started on Roxicodone for pain management. He was stabilized and transferred to TCU for continued rehabilitation.     Today, the patient reports improvement with his dizziness. He states that he does not have any headaches, visual changes, nausea or vomiting.  He also denies any cough, chest pains, shortness of breath fevers or chills.  He states that his appetite is good and that he is sleeping well.  He states that he still has some pain on his left clavicular fracture area especially with physical activity. He has bilateral shoulder pain related to DJD.  He refuses to take anything stronger than Tylenol. He rates his pain as localized at a 7-8/10.  His blood sugars are within target range throughout the day on 80 units of Lantus and unclear if hypoglycemia played a role during his fall.  He was also noted to have AK I in the hospital with a GFR of 41 Creatinine of 1.53 with mild improvement on current BMP.      Past Medical History:   Diagnosis Date     Anemia in chronic kidney disease      Bilateral hearing loss 2016     Bursitis, hip, left 3/2/2017      Chest pain     Normal GXT 2006     Chronic kidney disease, stage 3 (moderate)     Worsening with hyperkalemia on Relafen-discontinued August 2016     COPD (chronic obstructive pulmonary disease)      Degenerative disc disease, lumbar      Depression      Erectile dysfunction      Fatigue 7/6/2017     Forearm tendonitis 11/29/2016     Glaucoma      Gout attack     Left foot     HTN (hypertension)      Hyperlipidemia      Ingrown toenail 7/6/2017     Left leg cellulitis 2014     Low back pain      Morbid obesity      QUETA on CPAP      Osteoarthritis      Osteoarthritis of both hands      Osteoarthritis of both knees     Right TKA     Rib fracture 2005     Right-sided chest pain 5/9/2016     Screening     No AAA on CT scan 2009     Squamous cell skin cancer 11/2/2015     Type 2 diabetes mellitus with peripheral neuropathy      Past Surgical History:   Procedure Laterality Date     CARPAL TUNNEL RELEASE Bilateral      COLONOSCOPY  2003    Normal     EYE SURGERY       RI EXCIS STOMACH ULCER,LESN;LOCAL      Description: Gastric Excision;  Recorded: 03/23/2012;     RI REMOVAL GALLBLADDER      Description: Cholecystectomy;  Recorded: 03/23/2012;     RI REVISE MEDIAN N/CARPAL TUNNEL SURG      Description: Neuroplasty Decompression Median Nerve At Carpal Tunnel;  Recorded: 03/23/2012;     TOTAL KNEE ARTHROPLASTY Right 2014    Complicated by torn Ranexa 1 and subsequent repair     No family history on file.  Social History     Social History     Marital status:      Spouse name: N/A     Number of children: N/A     Years of education: N/A     Occupational History     Not on file.     Social History Main Topics     Smoking status: Former Smoker     Smokeless tobacco: Never Used     Alcohol use Not on file     Drug use: Not on file     Sexual activity: Not on file     Other Topics Concern     Not on file     Social History Narrative    Semi retired     x 47 years             Allergies   Allergen Reactions     Actos  [Pioglitazone] Swelling     Edema     Gabapentin Diarrhea     Diarrhea     Lipitor [Atorvastatin]      Back pain     Niacin      Hyperglycemia     Simvastatin      Back pain     Current Outpatient Prescriptions   Medication Sig Dispense Refill     acetaminophen (TYLENOL) 500 MG tablet Take 1,000 mg by mouth 3 (three) times a day. Special Instructions: max: 4000 mg in 24 hours Dx: pain  Three Times A Day; 08:00 AM, 12:00 PM, 04:00 PM       citalopram (CELEXA) 20 MG tablet Take 1 tablet (20 mg total) by mouth daily. 90 tablet 3     insulin aspart (NOVOLOG) 100 unit/mL injection Inject under the skin 3 (three) times a day. Per Sliding Scale;  If Blood Sugar is less than 70, call MD.  If Blood Sugar is 151 to 200, give 2 Units.  If Blood Sugar is 201 to 250, give 4 Units.  If Blood Sugar is 251 to 300, give 6 Units.  If Blood Sugar is greater than 300, give 8 Units.  If Blood Sugar is greater than 300, call MD.       insulin glargine (LANTUS) 100 unit/mL injection Inject under the skin at bedtime. amt: 80 units; subcutaneous  [DX: Type 2 diabetes mellitus without complications]At Bedtime; 08:00 PM       latanoprost (XALATAN) 0.005 % ophthalmic solution Administer 1 drop to both eyes at bedtime.       metFORMIN (GLUCOPHAGE-XR) 500 MG 24 hr tablet Take 3 tablets (1,500 mg total) by mouth daily. 270 tablet 3     oxyCODONE (ROXICODONE) 5 MG immediate release tablet Take 5 mg by mouth every 4 (four) hours as needed for pain. amt: 1-2 tabs; oral  Special Instructions: for moderate to severe pain  Every 4 Hours - PRN; PRN 1, PRN 2, PRN 3, PRN 4, PRN 5, PRN 6       polyethylene glycol (MIRALAX) 17 gram packet Take 17 g by mouth daily.       pravastatin (PRAVACHOL) 10 MG tablet TAKE 1 TABLET (10 MG TOTAL) BY MOUTH AT BEDTIME. 90 tablet 3     senna-docusate (SENNOSIDES-DOCUSATE SODIUM) 8.6-50 mg tablet Take 1 tablet by mouth 2 (two) times a day. Special Instructions: Dx: constipation  Twice A Day; 08:00 AM, 04:00 PM        valsartan-hydrochlorothiazide (DIOVAN-HCT) 320-25 mg per tablet take 1 tablet by mouth every day (needs appt for further refills) 90 tablet 3     No current facility-administered medications for this visit.        REVIEW OF SYSTEMS:    Currently, no fever, chills, or rigors. Does not have any visual or hearing problems. Denies any chest pain, headaches, palpitations, lightheadedness, dizziness, shortness of breath, or cough. Appetite is good. Denies any GERD symptoms. Denies any difficulty with swallowing, nausea, or vomiting.  Denies any abdominal pain, diarrhea or constipation. Denies any urinary symptoms. No insomnia. No active bleeding. No rash.     PHYSICAL EXAMINATION:  Vitals:    09/07/17 2016   BP: 118/81   Pulse: 68   Resp: 18   Temp: (!) 96.4  F (35.8  C)   SpO2: 97%   Weight: (!) 317 lb 9.6 oz (144.1 kg)       GENERAL: Awake, Alert, oriented x3, not in any form of acute distress, answers questions appropriately, follows simple commands, conversant  HEENT: Head is normocephalic with normal hair distribution. No evidence of trauma. Ears: No acute purulent discharge. Eyes: Conjunctivae pink with no scleral jaundice. Nose: Normal mucosa and septum. NECK: Supple with no cervical or supraclavicular lymphadenopathy. Trachea is midline.   CHEST: No tenderness or deformity, no crepitus  LUNG: Clear to auscultation with good chest expansion. There are no crackles or wheezes, normal AP diameter.  BACK: No kyphosis of the thoracic spine. Symmetric, no curvature, ROM normal, no CVA tenderness, no spinal tenderness   CVS: There is good S1  S2, there are no murmurs, rubs, gallops, or heaves, rhythm is regular,  2+ pulses symmetric in all extremities.  ABDOMEN: Globular and soft, nontender to palpation, non distended, no masses, no organomegaly, good bowel sounds, no rebound or guarding, no peritoneal signs.   EXTREMITIES: Atraumatic. Full range of motion on both upper and lower extremities, there is no tenderness to  palpation, no pedal edema, no cyanosis or clubbing, no calf tenderness.  Pulses equal in all extremities, normal cap refill, no joint swelling.  SKIN: Warm and dry, no erythema noted.  Skin color, texture, no rashes or lesions.  NEUROLOGICAL: The patient is oriented to person, place and time. Strength and sensation are grossly intact. Face is symmetric.    LABS:    Lab Results   Component Value Date    WBC 9.2 08/29/2017    HGB 12.9 (L) 08/29/2017    HCT 38.1 (L) 08/29/2017    MCV 93 08/29/2017     08/29/2017     Results for orders placed or performed in visit on 08/29/17   Basic Metabolic Panel   Result Value Ref Range    Sodium 137 136 - 145 mmol/L    Potassium 4.5 3.5 - 5.0 mmol/L    Chloride 99 98 - 107 mmol/L    CO2 28 22 - 31 mmol/L    Anion Gap, Calculation 10 5 - 18 mmol/L    Glucose 101 70 - 125 mg/dL    Calcium 9.0 8.5 - 10.5 mg/dL    BUN 29 (H) 8 - 28 mg/dL    Creatinine 1.44 (H) 0.70 - 1.30 mg/dL    GFR MDRD Af Amer 57 (L) >60 mL/min/1.73m2    GFR MDRD Non Af Amer 47 (L) >60 mL/min/1.73m2     Lab Results   Component Value Date    HGBA1C 6.8 (H) 07/06/2017     Vitamin D, Total (25-Hydroxy)   Date Value Ref Range Status   08/29/2017 20.9 (L) 30.0 - 80.0 ng/mL Final     Lab Results   Component Value Date    [id number] <146 (L) 08/29/2017         ASSESSMENT/PLAN:    1.  SAH (subarachnoid hemorrhage) - No symptoms except for mild dizziness intermittently, we will repeat head CT scan next week.  Will hold aspirin for 2 weeks and restart on 9/12   2. Closed left clavicular fracture - Follow-up with orthopedics, needs arm sling to protect clavicular fracture while doing physical therapy, d/c Roxicodone since unclear if this is also leading to his dizziness   3. B12 deficiency - s/p partial gastrectomy for stomach ulcer in the past, B12 is at 146.  Will give loading dose B12 1000 mcg IM 2 times a week for 2 weeks, then 1000 mcg IM monthly and recheck B12 in 3 months.  Last Vitamin D level 20.9   4. Type 2  diabetes mellitus - Blood sugars are stable despite decreasing Lantus, unclear if with hypoglycemic events caused his fall.  Will check blood sugars 4 times daily, now on Lantus to 80 units nightly, will continue Metformin and Humalog sliding scale   5. Essential hypertension - Blood pressures are within target range, will continue Valsartan/HCT.    6. MARTY (acute kidney injury)  -  Mild improvement in BMP, with normal electrolytes, BUN 29, Creatinine 1.49 from 1.53, GFR slightly improved at 47 from 41   7. QUETA on CPAP - Stable               Electronically signed by:  James Benz CNP    35 minutes TT of which 50% was spent in counseling and coordination of care of the above plan.    Time spent in interview and examination of patient, review of available records, and discussion with nursing staff. Continue care plan, efforts at therapy, and monitor nutritional status.

## 2021-06-12 NOTE — TELEPHONE ENCOUNTER
RN cannot approve Refill Request    RN can NOT refill this medication allergy/contraindication. Last office visit: 7/8/2020 Ld Moy MD Last Physical: 11/30/2018 Last MTM visit: Visit date not found Last visit same specialty: 7/8/2020 Ld Moy MD.  Next visit within 3 mo: Visit date not found  Next physical within 3 mo: Visit date not found      Mayuri Garrett, [facility] Triage/Med Refill 10/18/2020    Requested Prescriptions   Pending Prescriptions Disp Refills     pravastatin (PRAVACHOL) 20 MG tablet [Pharmacy Med Name: PRAVASTATIN 20MG TAB TABLET] 90 tablet 3     Sig: TAKE ONE TABLET BY MOUTH AT BEDTIME       Statins Refill Protocol (Hmg CoA Reductase Inhibitors) Passed - 10/16/2020  9:56 AM        Passed - PCP or prescribing provider visit in past 12 months      Last office visit with prescriber/PCP: 7/8/2020 Ld Moy MD OR same dept: 7/8/2020 Ld Moy MD OR same specialty: 7/8/2020 Ld Moy MD  Last physical: 11/30/2018 Last MTM visit: Visit date not found   Next visit within 3 mo: Visit date not found  Next physical within 3 mo: Visit date not found  Prescriber OR PCP: Ld Moy MD  Last diagnosis associated with med order: 1. Hyperlipidemia, unspecified hyperlipidemia type  - pravastatin (PRAVACHOL) 20 MG tablet [Pharmacy Med Name: PRAVASTATIN 20MG TAB TABLET]; TAKE ONE TABLET BY MOUTH AT BEDTIME  Dispense: 90 tablet; Refill: 3    If protocol passes may refill for 12 months if within 3 months of last provider visit (or a total of 15 months).

## 2021-06-13 NOTE — PROGRESS NOTES
Subjective findings: The patient presented to the clinic today complaining of long painful nails both feet.    Objective findings:Nails bilateral feet are elongated and slightly thickened.   Skin bilateral feet warm and intact.    DP and PT pulses +2/4 bilateral feet.  Negative clonus, negative Babinski bilaterally.   Range of motion within normal limits bilaterally. Muscle power +5/5 bilaterally in all compartments.      Assessment: Onychomycosis, onychauxis, diabetes mellitus      Plan:Trimmed nails 1 through 5 both feet.  I recommended the patient return to the clinic every 3 months for continued diabetic foot care

## 2021-06-13 NOTE — PROGRESS NOTES
Office Visit - Follow Up   Robert E Schamberger   83 y.o. male    Date of Visit: 9/21/2017    Chief Complaint   Patient presents with     [facility] home follow up        Assessment and Plan   1. Traumatic subarachnoid hemorrhage with loss of consciousness of 30 minutes or less  Treated conservatively.  Follow-up CT scan in early September showing improvement although some residual chronic appearing subdural hematoma.  He appears to have no residual neurologic deficits.  Cognition is intact.  I have recommended that he avoid driving for another 4 weeks but when he resumes, he should have a family member such as his son in the car with him to make sure things go fine.  He will continue physical and occupational therapy as part of home health care.  We discussed avoiding using alcohol other than in moderation.  He has had a beer and a shot of haleigh at night for many years and this seems reasonable to continue but I stressed that he should not drink anything beyond this amount.  Reminded him that this did contribute to his fall.  Consider follow-up CT scan in 2-3 months to document resolution    2. Type 2 diabetes mellitus with peripheral neuropathy  Last hemoglobin A1c 6.8%.  I agree that he does not need tight control with the risk of hypoglycemia but sugars running between 140 and 170 seem reasonable.  Currently he is frequently over 200.  He can continue current dose of Lantus at 75 units but this may need to be increased back to 80 or 90 units but he should increase NovoLog to 10 units before each meal.  I have suggested he check his sugar 2 hours postprandial and if he is running over 200, he should add 5 units in the future to his scheduled dose before that meal.    3. Essential hypertension  Blood pressure slightly over goal today but will continue current dose of valsartan/HCTZ for now and continue to monitor    4. Closed displaced fracture of left clavicle with routine healing, unspecified part of clavicle,  subsequent encounter  Follow-up x-rays are showing healing of minimally displaced clavicular fracture.  No longer wearing sling    Return in about 3 months (around 12/21/2017) for Annual physical.     History of Present Illness   This 83 y.o. old male with type 2 diabetes requiring insulin along with history of hypertension, morbid obesity, osteoarthritis and chronic low back pain, and sleep apnea.  He unfortunately fell down the stairs on August 22, 2017.  He was starting to climb up the stairs when the fall occurred.  It happened quickly and he thinks he may have blacked out.  He denies any warning symptoms.  There were no palpitations or chest pain.  He did not feel any hypoglycemia.  Brought to the ER and imaging revealed left sided subarachnoid hemorrhage.  Also found to have a left clavicle fracture.  Neurosurgery and orthopedics consulted.  Treated conservatively with sling.  [facility] to [facility] where he participated in physical and occupational therapy.  Follow-up CT scan in early September showed improvement with some residual subdural hematoma.  He denies any headache.  He has no residual neurologic deficits.  Speech and cognition remain normal.  He is feeling some dizziness and continues to participate in home physical therapy.  He is using a cane or a walker.  We discussed alcohol use.  He is currently not driving and is asking when he can resume.  His clavicle is healing and he saw orthopedics yesterday and had a follow-up x-ray showing healing.  There is still some mild displacement.  We discussed his diabetes.  Last hemoglobin A1c was 6.8%.  He is on a reduced dose of Lantus now at only 75 units and is following a sliding scale with his NovoLog.  I reviewed his diabetic log and his sugars are running often over 200.  He has had no hypoglycemia.  He was not experiencing any hypoglycemia in the months leading up to his fall and sugars were reasonably well controlled.    Review of Systems:  Otherwise, a  comprehensive review of systems was negative except as noted.     Medications, Allergies and Problem List   Patient Active Problem List   Diagnosis     Lumbar Facet Syndrome     Type 2 diabetes mellitus with peripheral neuropathy     Hyperlipidemia     Osteoarthritis     Low back pain     HTN (hypertension)     Glaucoma     QUETA on CPAP     Erectile dysfunction     Morbid obesity     Degenerative disc disease, lumbar     Osteoarthritis of both knees     Depression     Osteoarthritis of both hands     Squamous cell skin cancer     Anemia in chronic kidney disease     Right ankle pain     Bilateral hearing loss     Chronic kidney disease, stage 3 (moderate)     Bursitis, hip, left     COPD (chronic obstructive pulmonary disease)     Fatigue     Ingrown toenail     Traumatic subarachnoid hemorrhage with loss of consciousness of 30 minutes or less     Closed displaced fracture of left clavicle       He has a past surgical history that includes removal gallbladder; revise median n/carpal tunnel surg; excis stomach ulcer,lesn;local; Total knee arthroplasty (Right, 2014); Carpal tunnel release (Bilateral); Eye surgery; and Colonoscopy (2003).    Allergies   Allergen Reactions     Actos [Pioglitazone] Swelling     Edema     Gabapentin Diarrhea     Diarrhea     Lipitor [Atorvastatin]      Back pain     Niacin      Hyperglycemia     Simvastatin      Back pain       Current Outpatient Prescriptions   Medication Sig Dispense Refill     acetaminophen (TYLENOL) 500 MG tablet Take 1,000 mg by mouth 3 (three) times a day. Special Instructions: max: 4000 mg in 24 hours Dx: pain  Three Times A Day; 08:00 AM, 12:00 PM, 04:00 PM       citalopram (CELEXA) 20 MG tablet Take 20 mg by mouth daily.       insulin aspart (NOVOLOG) 100 unit/mL injection Inject 10 Units under the skin 3 (three) times a day.         insulin glargine (LANTUS) 100 unit/mL injection Inject 75 Units under the skin at bedtime. amt: 75 units; subcutaneous  [DX: Type 2  "diabetes mellitus without complications]At Bedtime; 08:00 PM   Indications: type 2 diabetes mellitus       latanoprost (XALATAN) 0.005 % ophthalmic solution Administer 1 drop to both eyes at bedtime.       metFORMIN (GLUCOPHAGE-XR) 500 MG 24 hr tablet Take 3 tablets (1,500 mg total) by mouth daily. 270 tablet 3     pravastatin (PRAVACHOL) 10 MG tablet TAKE 1 TABLET (10 MG TOTAL) BY MOUTH AT BEDTIME. 90 tablet 3     senna-docusate (SENNOSIDES-DOCUSATE SODIUM) 8.6-50 mg tablet Take 1 tablet by mouth 2 (two) times a day. Special Instructions: Dx: constipation  Twice A Day; 08:00 AM, 04:00 PM       valsartan-hydrochlorothiazide (DIOVAN-HCT) 320-25 mg per tablet take 1 tablet by mouth every day (needs appt for further refills) 90 tablet 3     No current facility-administered medications for this visit.         Physical Exam   General Appearance:   Obese but otherwise well-appearing elderly male    /58 (Patient Site: Right Arm, Patient Position: Sitting, Cuff Size: Thigh)  Pulse 74  Ht 5' 10\" (1.778 m)  Wt (!) 323 lb (146.5 kg)  SpO2 96%  BMI 46.35 kg/m2    HEENT: Normal  Respiratory: Normal respiratory effort.  Lungs are clear with no rales or wheezes.  Heart: Regular rate and rhythm without murmurs, rubs, or gallops.    Extremities: No peripheral edema.  Neurologic: Grossly nonfocal  Skin: No cyanosis or pallor  Psych: Alert and oriented ×3, mood appropriate         Additional Information   Social History   Substance Use Topics     Smoking status: Former Smoker     Smokeless tobacco: Never Used     Alcohol use None         Review and/or order of clinical lab tests: Reviewed last hemoglobin A1c of 6.8% in July 2017   Review and/or order of radiology tests: Reviewed most recent x-ray of left clavicle on September 20 showing healing and slight displacement    Review and summarization of old records and/or obtaining history from someone other than the patient and.or discussion of case with another health care " provider: Reviewed outside records including hospitalization at [facility] on August 22, 2017 after a fall down the stairs sustaining traumatic left subarachnoid hemorrhage and left clavicle fracture treated conservatively.  Also reviewed records from stay at [facility] with discharge on September 8       Ld Moy MD

## 2021-06-13 NOTE — TELEPHONE ENCOUNTER
Refill Approved    Rx renewed per Medication Renewal Policy. Medication was last renewed on 10/171/19.    Taniya Mcpherson, [facility] Triage/Med Refill 11/30/2020     Requested Prescriptions   Pending Prescriptions Disp Refills     metFORMIN (GLUCOPHAGE-XR) 500 MG 24 hr tablet [Pharmacy Med Name: METFORMIN    TAB 500MG ER TABLET] 270 tablet 3     Sig: TAKE THREE TABLETS (1500MG) BY MOUTH DAILY       Metformin Refill Protocol Passed - 11/30/2020  9:44 AM        Passed - Blood pressure in last 12 months     BP Readings from Last 1 Encounters:   11/09/20 128/60             Passed - LFT or AST or ALT in last 12 months     Albumin   Date Value Ref Range Status   02/03/2020 3.5 3.5 - 5.0 g/dL Final     Bilirubin, Total   Date Value Ref Range Status   02/03/2020 0.6 0.0 - 1.0 mg/dL Final     Bilirubin, Direct   Date Value Ref Range Status   01/14/2020 0.2 <=0.5 mg/dL Final     Alkaline Phosphatase   Date Value Ref Range Status   02/03/2020 109 45 - 120 U/L Final     AST   Date Value Ref Range Status   02/03/2020 22 0 - 40 U/L Final     ALT   Date Value Ref Range Status   02/03/2020 35 0 - 45 U/L Final     Protein, Total   Date Value Ref Range Status   02/03/2020 6.6 6.0 - 8.0 g/dL Final                Passed - GFR or Serum Creatinine in last 6 months     GFR MDRD Non Af Amer   Date Value Ref Range Status   11/09/2020 48 (L) >60 mL/min/1.73m2 Final     GFR MDRD Af Amer   Date Value Ref Range Status   11/09/2020 59 (L) >60 mL/min/1.73m2 Final             Passed - Visit with PCP or prescribing provider visit in last 6 months or next 3 months     Last office visit with prescriber/PCP: 11/9/2020 OR same dept: Visit date not found OR same specialty: 11/9/2020 Ld Moy MD Last physical: Visit date not found Last MTM visit: Visit date not found         Next appt within 3 mo: Visit date not found  Next physical within 3 mo: Visit date not found  Prescriber OR PCP: Ld Moy MD  Last diagnosis associated  with med order: 1. Diabetes (H)  - metFORMIN (GLUCOPHAGE-XR) 500 MG 24 hr tablet [Pharmacy Med Name: METFORMIN    TAB 500MG ER TABLET]; TAKE THREE TABLETS (1500MG) BY MOUTH DAILY  Dispense: 270 tablet; Refill: 3     If protocol passes may refill for 12 months if within 3 months of last provider visit (or a total of 15 months).           Passed - A1C in last 6 months     Hemoglobin A1c   Date Value Ref Range Status   11/09/2020 6.6 (H) <=5.6 % Final     Comment:     Normal <5.7% Prediabete 5.7-6.4% Diabletes 6.5% or higher - adopted from ADA consensus guidelines               Passed - Microalbumin in last year      Microalbumin, Random Urine   Date Value Ref Range Status   01/14/2020 18.52 (H) 0.00 - 1.99 mg/dL Final

## 2021-06-14 NOTE — PROGRESS NOTES
Assessment and Plan:       1. Medicare annual wellness visit, initial  Immunizations are reviewed and will provide flu shot.  Living will discussed.  Non-smoker.  Uses alcohol in moderation.  Regular exercise discussed.  Up to date with colonoscopies and I would not recommend repeating after age 75 with no history of polyps.  Unable to perform prostate exam because of body habitus but I will check a PSA for prostate cancer screening.  Dementia and depression screening completed.  He sees his ophthalmologist regularly and does have glaucoma.  Skin exam performed and recommending regular use of sunblock.  He sees a dermatologist annually.  No AAA seen on imaging in 2009.    - PSA, Annual Screen (Prostatic-Specific Antigen)    2. Need for immunization against influenza    - Influenza High Dose, Seasonal 65+ yrs    3. Type 2 diabetes mellitus with peripheral neuropathy  Blood sugars have been higher.  He has increase NovoLog to 15 units before meals and continues 75 units of Lantus.  His last hemoglobin A1c was good at 6.8% this summer.  He does have peripheral neuropathy.  Sees his ophthalmologist every year.  Will check appropriate studies  - Glycosylated Hemoglobin A1c  - Microalbumin, Random Urine    4. Essential hypertension  Blood pressure looks under good control with current medication    5. Hyperlipidemia  Remains on pravastatin.  Recheck lipid profile and LFTs and continue aspirin 81 mg daily  - Lipid Cascade  - Hepatic Profile    6. Traumatic subarachnoid hemorrhage with loss of consciousness of 30 minutes or less, subsequent encounter  Fall with subarachnoid hemorrhage this summer.  No residual  neurologic deficits.    7. Primary osteoarthritis involving multiple joints  Pain involving multiple joints including chronic low back pain.  Using Voltaren gel.  Discussed using Tylenol more regularly    8. QUETA on CPAP  Wearing CPAP faithfully each night    9. Glaucoma  He will continue to see his  ophthalmologist    10. Chronic fatigue  May be related to mild anemia and B12 deficiency which will be addressed    11. Closed displaced fracture of left clavicle  Recent x-ray shows healing.  Will follow up with orthopedics.  Still having pain in left shoulder and applying Voltaren gel    12. B12 deficiency  B12 noted to be under [facility] in [facility]  Begin replacement with vitamin B12 1000 mcg daily.  Recheck in 3 months    13. Vitamin D deficiency  Vitamin D only 20.  Recommending 2000 units daily    14. Chronic kidney disease, stage 3 (moderate)  Monitor renal function  - Basic Metabolic Panel    15. Anemia in stage 3 chronic kidney disease  Recheck hemoglobin  - Hemoglobin    16. Bilateral hearing loss  Discussed at today's visit    17. Intermittent asthma without complication  Stable    18. Depression  Mood is stable with current dose of citalopram    19. Squamous cell skin cancer  He will continue to follow-up with his dermatologist every year      The patient's current medical problems were reviewed.    Over 25 minutes was spent addressing these chronic and new medical problems beyond time spent performing annual wellness visit with over 50% of the time spent counseling and coordination of care    I have had an Advance Directives discussion with the patient.  The following health maintenance schedule was reviewed with the patient and provided in printed form in the after visit summary:   Health Maintenance   Topic Date Due     DEPRESSION FOLLOW UP  01/06/2018     DIABETES OPHTHALMOLOGY EXAM  01/18/2018     ASTHMA CONTROL TEST  03/02/2018     DIABETES HEMOGLOBIN A1C  05/09/2018     DIABETES FOLLOW-UP  05/09/2018     DIABETES FOOT EXAM  11/09/2018     DIABETES URINE MICROALBUMIN  11/09/2018     ASTHMA FOLLOW-UP  11/09/2018     FALL RISK ASSESSMENT  11/09/2018     ADVANCE DIRECTIVES DISCUSSED WITH PATIENT  11/09/2022     TD 18+ HE  04/09/2025     PNEUMOCOCCAL POLYSACCHARIDE VACCINE AGE 65 AND OVER   Completed     INFLUENZA VACCINE RULE BASED  Completed     PNEUMOCOCCAL CONJUGATE VACCINE FOR ADULTS (PCV13 OR PREVNAR)  Completed     ZOSTER VACCINE  Completed        Subjective:   Chief Complaint: Robert E Schamberger is an 83 y.o. male here for an Annual Wellness visit.   HPI: In addition to wellness visit, multiple chronic medical problems discussed.  He has type 2 diabetes and is on insulin.  Last hemoglobin A1c was excellent but his sugars have been running higher lately and he did need to increase his NovoLog to 15 units before each meal.  He is seeing his eye doctor regularly.  Still recovering from fall this summer with subarachnoid hemorrhage and left clavicular fracture.  Still having pain in his left shoulder and is seeing orthopedics.  Recent x-ray of his clavicle shows healing.  Denies any headache or any residual neurologic or cognitive deficits.  Ongoing chronic pain involving low back and multiple joints.  He will use Tylenol as needed.  He does have history of intermittent asthma but denies any recent dyspnea or cough.  Found to have vitamin B12 deficiency at [facility].  He does have chronic fatigue.  Also noted to have vitamin D deficiency and has not started replacement.    Review of Systems:    Please see above.  The rest of the review of systems are negative for all systems.    Patient Care Team:  Ld Moy MD as PCP - General (Internal Medicine)  Jessee Jama MD as Physician (Family Medicine)  Galo Feliciano MD as Physician (Ophthalmology)     Patient Active Problem List   Diagnosis     Lumbar Facet Syndrome     Type 2 diabetes mellitus with peripheral neuropathy     Hyperlipidemia     Osteoarthritis     Low back pain     HTN (hypertension)     Glaucoma     QUETA on CPAP     Erectile dysfunction     Morbid obesity     Degenerative disc disease, lumbar     Osteoarthritis of both knees     Depression     Osteoarthritis of both hands     Squamous cell skin cancer     Anemia in chronic kidney  disease     Bilateral hearing loss     Chronic kidney disease, stage 3 (moderate)     Bursitis, hip, left     COPD (chronic obstructive pulmonary disease)     Fatigue     Traumatic subarachnoid hemorrhage with loss of consciousness of 30 minutes or less     Closed displaced fracture of left clavicle     B12 deficiency     Vitamin D deficiency     Intermittent asthma without complication     Past Medical History:   Diagnosis Date     Anemia in chronic kidney disease      B12 deficiency 11/9/2017     Bilateral hearing loss 5/9/2016     Bursitis, hip, left 3/2/2017     Chest pain     Normal GXT 2006     Chronic kidney disease, stage 3 (moderate)     Worsening with hyperkalemia on Relafen-discontinued August 2016     Closed displaced fracture of left clavicle 9/21/2017     COPD (chronic obstructive pulmonary disease)      Degenerative disc disease, lumbar      Depression      Erectile dysfunction      Fatigue 7/6/2017     Forearm tendonitis 11/29/2016     Glaucoma      Gout attack     Left foot     HTN (hypertension)      Hyperlipidemia      Ingrown toenail 7/6/2017     Intermittent asthma without complication 11/9/2017     Left leg cellulitis 2014     Low back pain      Morbid obesity      QUETA on CPAP      Osteoarthritis      Osteoarthritis of both hands      Osteoarthritis of both knees     Right TKA     Rib fracture 2005     Right-sided chest pain 5/9/2016     Screening     No AAA on CT scan 2009     Squamous cell skin cancer 11/2/2015     Traumatic subarachnoid hemorrhage with loss of consciousness of 30 minutes or less 9/21/2017    Fall down the stairs following alcohol use with traumatic subarachnoid hemorrhage [facility] followed by stay at [facility] with no residual neurologic deficits     Type 2 diabetes mellitus with peripheral neuropathy      Vitamin D deficiency 11/9/2017      Past Surgical History:   Procedure Laterality Date     CARPAL TUNNEL RELEASE Bilateral      COLONOSCOPY   2003    Normal     EYE SURGERY       MA EXCIS STOMACH ULCER,LESN;LOCAL      Description: Gastric Excision;  Recorded: 03/23/2012;     MA REMOVAL GALLBLADDER      Description: Cholecystectomy;  Recorded: 03/23/2012;     MA REVISE MEDIAN N/CARPAL TUNNEL SURG      Description: Neuroplasty Decompression Median Nerve At Carpal Tunnel;  Recorded: 03/23/2012;     TOTAL KNEE ARTHROPLASTY Right 2014    Complicated by torn Ranexa 1 and subsequent repair      History reviewed. No pertinent family history.   Social History     Social History     Marital status:      Spouse name: N/A     Number of children: N/A     Years of education: N/A     Occupational History     Not on file.     Social History Main Topics     Smoking status: Former Smoker     Smokeless tobacco: Never Used     Alcohol use Not on file     Drug use: Not on file     Sexual activity: Not on file     Other Topics Concern     Not on file     Social History Narrative    Semi retired     x 47 years              Current Outpatient Prescriptions   Medication Sig Dispense Refill     acetaminophen (TYLENOL) 500 MG tablet Take 1,000 mg by mouth 3 (three) times a day. Special Instructions: max: 4000 mg in 24 hours Dx: pain  Three Times A Day; 08:00 AM, 12:00 PM, 04:00 PM       aspirin 81 mg chewable tablet Chew 81 mg daily.       citalopram (CELEXA) 20 MG tablet TAKE ONE TABLET BY MOUTH ONCE DAILY 90 tablet 0     diclofenac sodium (VOLTAREN) 1 % Gel Place 2-4 g on the skin.       insulin aspart (NOVOLOG) 100 unit/mL injection Inject 15 Units under the skin 3 (three) times a day before meals.       insulin glargine (LANTUS) 100 unit/mL injection Inject 75 Units under the skin at bedtime. amt: 75 units; subcutaneous  [DX: Type 2 diabetes mellitus without complications]At Bedtime; 08:00 PM   Indications: type 2 diabetes mellitus       latanoprost (XALATAN) 0.005 % ophthalmic solution Administer 1 drop to both eyes at bedtime.       metFORMIN (GLUCOPHAGE-XR) 500  "MG 24 hr tablet Take 3 tablets (1,500 mg total) by mouth daily. 270 tablet 3     pravastatin (PRAVACHOL) 10 MG tablet TAKE 1 TABLET (10 MG TOTAL) BY MOUTH AT BEDTIME. 90 tablet 3     valsartan-hydrochlorothiazide (DIOVAN-HCT) 320-25 mg per tablet take 1 tablet by mouth every day (needs appt for further refills) 90 tablet 3     No current facility-administered medications for this visit.       Objective:   Vital Signs:   Visit Vitals     /68 (Patient Site: Left Arm, Patient Position: Sitting, Cuff Size: Thigh)     Pulse 74     Ht 5' 10\" (1.778 m)     Wt (!) 327 lb (148.3 kg)     SpO2 98%     BMI 46.92 kg/m2          PHYSICAL EXAM  EYES: Eyelids, conjunctiva, and sclera were normal. Pupils were normal. Cornea, iris, and lens were normal bilaterally.  HEAD, EARS, NOSE, MOUTH, AND THROAT: Head and face were normal. Nose appearance was normal and there was no discharge. Oropharynx was normal.  NECK: Neck appearance was normal. There were no neck masses and the thyroid was not enlarged and no nodules are felt.  No lymphadenopathy.  RESPIRATORY: Breathing pattern was normal and the chest moved symmetrically.  Percussion/auscultatory percussion was normal.  Lung sounds were normal and there were no rales or wheezes.  CARDIOVASCULAR: Heart rate and rhythm were normal.  S1 and S2 were normal and there were no extra sounds or murmurs. Peripheral pulses in arms and legs were normal.  Jugular venous pressure was normal.  There was no peripheral edema.  No carotid bruits.  GASTROINTESTINAL: The abdomen was normal in contour.  Bowel sounds were present.   Palpation detected no tenderness, mass, or enlarged organs.   RECTAL/PROSTATE: Deferred, unable to complete prostate exam last year because of body habitus.  MUSCULOSKELETAL: Skeletal configuration was normal and muscle mass was normal for age. Joint appearance was overall normal.  LYMPHATIC: There were no enlarged nodes.  SKIN/HAIR/NAILS: Skin color was normal.  Hair and " nails were normal.There were no skin lesions.  NEUROLOGIC: The patient was alert and oriented to person, place, time, and circumstance. Speech was normal. Cranial nerves were normal. Motor strength was normal for age. The patient was normally coordinated.  Decreased vibratory sensation  Diabetic foot exam: Good pedal pulses.  Decreased vibratory sensation but normal monofilament exam.  No other abnormalities  PSYCHIATRIC:  Mood and affect were normal .  The patient's judgment and insight were normal.  Only able to recall 2 of 3 words and had some trouble with clock drawing.  However did very well with further testing easily naming over 15 animals in less than 1 minute and normal calculation.    Assessment Results 11/9/2017   Activities of Daily Living No help needed   Instrumental Activities of Daily Living No help needed   Get Up and Go Score Less than 12 seconds   Mini Cog Total Score 3   Some recent data might be hidden     A Mini-Cog score of 0-2 suggests the possibility of dementia, score of 3-5 suggests no dementia    Identified Health Risks:     The patient was provided with written information regarding signs of hearing loss.  The patient's PHQ-9 score is consistent with mild depression. He was provided with information regarding depression and this was addressed at today's visit  He is at risk for falling and has been provided with information to reduce the risk of falling at home.  Information regarding advance directives (living swenson), including where he can download the appropriate form, was provided to the patient via the AVS.

## 2021-06-15 PROBLEM — M70.72 BURSITIS OF LEFT HIP: Status: ACTIVE | Noted: 2017-03-02

## 2021-06-15 NOTE — TELEPHONE ENCOUNTER
[facility] - Vanessa calling  Phone number: [phone]    Updates per nurse:    Patient has a follow up appointment on 3/5/2021.    Patient was started on amlodipine 5 mg daily when he was [facility]. They discontinued his losartan on 2/9/2021 due to hyperkalemia. His hydrochlorothiazide was also discontinued on 1/19/21 for worsening kidney function.     Wife helps him with managing medications and there are a couple days that patient was not taking amlodipine. Patient will need a refill of this if he is to continue it. He will be out of medication before appointment and will have no blood pressure medication that he is taking. Order pended.    Patient was taking insulin dosing incorrectly, they were continuing 15 units with meals. Vanessa corrected this and they are now taking the 10 units with meals instead. Fasting glucose was 134 during her visit with patient.    Patient also has one lesion on right great toe and one on left 3rd toe. Lesions are closed and covered with dark red/black eschar. According to nurse they look okay. Patient is unsure of where they came from but wife thinks this may possibly be from fall.     Patient reported that he is taking magnesium 400 mg two times a day. I do see that we have this on our med list.     Potential med interaction: Patient is currently taking cefdinir 300 mg two times a day for 30 days to treat UTI. This was started on 2/3/2021 and patient should be finishing this up in about 1 week. This interacts with Magnesium making the abx less effective which Vanessa advised patient to hold until antibiotic is completed. She wants to confirm that you agree with this?    Patient is also having a productive cough with greenish phlegm. Patient has been using lozenges which were ordered in the TCU. Patient had dx of acute pharyngitis which she is unsure if the cough is related. No shortness of breath and lungs were clear in TCU. May need to  recheck this at follow up.    These are just FYI's unless something should be done at this time.     She is requesting a copy of the progress note after appointment. Please fax to [phone]    Briana Kraus CMA ............... 10:12 AM, 02/22/21

## 2021-06-15 NOTE — TELEPHONE ENCOUNTER
I can follow with home care orders.  However, initial referral and orders should have been sent from provider at [facility]

## 2021-06-15 NOTE — TELEPHONE ENCOUNTER
Prescription for amlodipine sent to his pharmacy.  Okay to hold off on taking magnesium until his appointment on March 5 and we will recheck level at that time.

## 2021-06-15 NOTE — TELEPHONE ENCOUNTER
Reason for Call:  Other prescription     Detailed comments:  Patient was discharged from [facility] on antibiotics  Cefdinir was instructed to take 2x daiily until March 3.  Only has one tablet left.     Should he continue taking?  If so can you call in a new refill to [facility] [phone]       Phone Number Patient can be reached at: Home number on file [phone] (home)    Best Time: any    Can we leave a detailed message on this number?: No    Call taken on 2/16/2021 at 1:34 PM by Laura L Goldberg

## 2021-06-15 NOTE — TELEPHONE ENCOUNTER
Reason for Call: Request for an order or referral:    Order or referral being requested: Verbal order for OT for 1x a wk for 1 wk, 2x a wk for 1 wk, 1x a wk for 3 wks for falls prevention, upper body strength, assist med device training, transfer training, ADL training.     Date needed: as soon as possible    Has the patient been seen by the PCP for this problem? YES and Not Applicable    Additional comments: NO    Phone number to be reached at:  [phone]     Best Time:  anytime    Can we leave a detailed message on this number?  Yes    Call taken on 2/22/2021 at 8:18 AM by Minoo Jiménez

## 2021-06-15 NOTE — PROGRESS NOTES
Preoperative Consultation   Robert E Schamberger   83 y.o.  male    Date of visit: 1/9/2018  Physician: Ld Moy MD    This is a preoperative consultation requested by Dr. Black in preparation for left ptosis repair of left upper eyelid on February 7, 2018 at [facility].       Assessment and Plan   Robert E Schamberger was seen in preoperative consultation in preparation for repair of ptosis involving left upper eyelid.  This is a low risk surgery and the patient has increased risk for major cardiac complications based on a history of diabetes mellitus requiring use of insulin .  However, no history of coronary artery disease or congestive heart failure.  Denies exertional chest pain.  No problems with anesthesia or previous surgery.  Diabetes is well-controlled with current management.  Hypertension well controlled with current medications.  Overall risk is low.    He will avoid aspirin and NSAIDs for 1 week prior to surgery.  He will take only half of his usual dose of Lantus the night before surgery and will take no insulin or other medications on the morning of surgery.    1. Pre-op exam    2. Ptosis of eyelid, left    3. Essential hypertension    4. Type 2 diabetes mellitus with peripheral neuropathy    5. QUETA on CPAP    6. Chronic kidney disease, stage 3 (moderate)         Patient Profile   Social History     Social History Narrative    Semi retired     x 47 years                Past Medical History   Patient Active Problem List   Diagnosis     Lumbar Facet Syndrome     Type 2 diabetes mellitus with peripheral neuropathy     Hyperlipidemia     Osteoarthritis     Low back pain     HTN (hypertension)     Glaucoma     QUETA on CPAP     Erectile dysfunction     Morbid obesity     Degenerative disc disease, lumbar     Osteoarthritis of both knees     Depression     Osteoarthritis of both hands     Squamous cell skin cancer     Anemia in chronic kidney disease     Bilateral hearing  loss     Chronic kidney disease, stage 3 (moderate)     Bursitis, hip, left     COPD (chronic obstructive pulmonary disease)     Fatigue     Traumatic subarachnoid hemorrhage with loss of consciousness of 30 minutes or less     Closed displaced fracture of left clavicle     B12 deficiency     Vitamin D deficiency     Intermittent asthma without complication     Ptosis of eyelid, left       Past Surgical History  He has a past surgical history that includes pr removal gallbladder; pr revise median n/carpal tunnel surg; pr excis stomach ulcer,lesn;local; Total knee arthroplasty (Right, 2014); Carpal tunnel release (Bilateral); Eye surgery; and Colonoscopy (2003).     History of Present Illness   This 83 y.o. old male with history of type 2 diabetes requiring insulin, hypertension, and severe osteoarthritis who is to have repair of ptosis involving the left upper eyelid.  Surgery is planned for early February.  He has had no problems with anesthesia or previous surgeries.  Diabetes is well controlled with current insulin schedule.  Hypertension looks well-controlled with current medications.  No history of coronary artery disease or congestive heart failure.  Denies any exertional chest pain or increasing dyspnea.  Denies any peripheral edema.    Recent antiplatelet use: yes Will hold aspirin  Personal or family history of bleeding or clotting disorders: no  Steroid use in the past year: no  Personal or family history of difficulty with anesthesia: no  Current cardiopulmonary symptoms: no      Review of Systems: A comprehensive review of systems was negative except as noted.     Medications and Allergies   Current Outpatient Prescriptions   Medication Sig Dispense Refill     acetaminophen (TYLENOL) 500 MG tablet Take 1,000 mg by mouth 3 (three) times a day. Special Instructions: max: 4000 mg in 24 hours Dx: pain  Three Times A Day; 08:00 AM, 12:00 PM, 04:00 PM       aspirin 81 mg chewable tablet Chew 81 mg daily.        "cholecalciferol, vitamin D3, (VITAMIN D3) 2,000 unit Tab Take 1 tablet (2,000 Units total) by mouth daily. 100 tablet 3     citalopram (CELEXA) 20 MG tablet TAKE ONE TABLET BY MOUTH ONCE DAILY 90 tablet 3     cyanocobalamin 1000 MCG tablet Take 1 tablet (1,000 mcg total) by mouth daily. 100 tablet 3     diclofenac sodium (VOLTAREN) 1 % Gel Place 2-4 g on the skin.       insulin aspart (NOVOLOG) 100 unit/mL injection Inject 15 Units under the skin 3 (three) times a day before meals.       insulin glargine (LANTUS) 100 unit/mL injection Inject 75 Units under the skin at bedtime. amt: 75 units; subcutaneous  [DX: Type 2 diabetes mellitus without complications]At Bedtime; 08:00 PM   Indications: type 2 diabetes mellitus       latanoprost (XALATAN) 0.005 % ophthalmic solution Administer 1 drop to both eyes at bedtime.       metFORMIN (GLUCOPHAGE-XR) 500 MG 24 hr tablet Take 3 tablets (1,500 mg total) by mouth daily. 270 tablet 3     pravastatin (PRAVACHOL) 10 MG tablet TAKE ONE TABLET BY MOUTH AT BEDTIME 90 tablet 3     valsartan-hydrochlorothiazide (DIOVAN-HCT) 320-25 mg per tablet take 1 tablet by mouth every day (needs appt for further refills) 90 tablet 3     No current facility-administered medications for this visit.      Allergies   Allergen Reactions     Actos [Pioglitazone] Swelling     Edema     Gabapentin Diarrhea     Diarrhea     Lipitor [Atorvastatin]      Back pain     Niacin      Hyperglycemia     Simvastatin      Back pain        Family and Social History   History reviewed. No pertinent family history.     Social History   Substance Use Topics     Smoking status: Former Smoker     Smokeless tobacco: Never Used     Alcohol use None        Physical Exam   General Appearance:   Obese but otherwise well-appearing elderly male    /70 (Patient Site: Left Arm, Patient Position: Sitting, Cuff Size: Adult Large)  Pulse 80  Ht 5' 10\" (1.778 m)  Wt (!) 329 lb (149.2 kg)  SpO2 96%  BMI 47.21 " kg/m2    HEENT: Normal  RESPIRATORY: Breathing pattern was normal and the chest moved symmetrically.  Percussion/auscultatory percussion was normal.  Lung sounds were normal and there were no abnormal sounds.  CARDIOVASCULAR: Heart rate and rhythm were normal.  S1 and S2 were normal and there were no extra sounds or murmurs. Peripheral pulses in arms and legs were normal.  Jugular venous pressure was normal.  There was no peripheral edema.  No carotid bruits.  SKIN/HAIR/NAILS: No cyanosis or pallor  NEUROLOGIC: Grossly nonfocal  PSYCHIATRIC:  Mood and affect were normal and the patient had normal recent and remote memory. The patient's judgment and insight were normal.         Additional Tests   Laboratory: Potassium 4.8 creatinine 1.45        Total time spent with the patient today was 25 minutes of which > 50% was spent in counseling and coordination of care     Ld Moy MD  Internal Medicine  Contact me at [phone]

## 2021-06-15 NOTE — TELEPHONE ENCOUNTER
Reason for Call:  Other      Detailed comments: Will provider follow up for homecare orders?    Phone Number to be reached at: [phone]    Best Time: anytime    Can we leave a detailed message on this number?: Yes    Call taken on 2/16/2021 at 1:33 PM by Minoo Jiménez

## 2021-06-15 NOTE — TELEPHONE ENCOUNTER
Reason for Call:  Home Health Care    Muna Rea with [facility] called regarding (reason for call): verbal order for PT    Orders are needed for this patient. PT    PT: 1x wk for 1wk, 2x for 1 wk, 1x for 3wk to address lower extremity, balance and home safety    OT: na    Skilled Nursing: na    Pt Provider: dr wade    Phone Number Homecare Nurse can be reached at: [phone]     Can we leave a detailed message on this number? Yes     Phone number patient can be reached at: Home number on file [phone] (home)    Best Time: anytime    Call taken on 2/22/2021 at 11:42 AM by Minoo Jiménez

## 2021-06-15 NOTE — TELEPHONE ENCOUNTER
Left generic voicemail with okay for requested orders. There was no greeting on the voicemail so I did not feel comfortable leaving specific. I did say to call back if more information is needed. If she calls back, please give the okay for verbal orders.  Briana Kraus CMA ............... 11:58 AM, 02/22/21

## 2021-06-15 NOTE — TELEPHONE ENCOUNTER
As I have not been involved with any of his care until this very moment, I think it would be best for them to contact Juani Sandhu to have the discharging provider clarify why only 2 tablets were sent and what the exact stop date should be.

## 2021-06-16 PROBLEM — I48.0 PAROXYSMAL ATRIAL FIBRILLATION (H): Status: ACTIVE | Noted: 2020-06-04

## 2021-06-16 PROBLEM — R29.6 RECURRENT FALLS: Status: ACTIVE | Noted: 2021-03-05

## 2021-06-16 PROBLEM — R53.81 PHYSICAL DECONDITIONING: Status: ACTIVE | Noted: 2020-02-14

## 2021-06-16 PROBLEM — I49.5 SICK SINUS SYNDROME (H): Status: ACTIVE | Noted: 2020-05-07

## 2021-06-16 PROBLEM — E53.8 B12 DEFICIENCY: Status: ACTIVE | Noted: 2017-11-09

## 2021-06-16 PROBLEM — E11.42 DIABETIC PERIPHERAL NEUROPATHY (H): Status: ACTIVE | Noted: 2018-11-30

## 2021-06-16 PROBLEM — G45.9 TIA (TRANSIENT ISCHEMIC ATTACK): Status: ACTIVE | Noted: 2019-09-06

## 2021-06-16 PROBLEM — F32.1 MODERATE MAJOR DEPRESSION (H): Status: ACTIVE | Noted: 2019-06-03

## 2021-06-16 PROBLEM — F03.90 DEMENTIA WITHOUT BEHAVIORAL DISTURBANCE (H): Status: ACTIVE | Noted: 2020-02-21

## 2021-06-16 PROBLEM — N18.31 CHRONIC KIDNEY DISEASE, STAGE 3A (H): Status: ACTIVE | Noted: 2021-05-07

## 2021-06-16 PROBLEM — R26.81 UNSTEADY GAIT: Status: ACTIVE | Noted: 2019-03-04

## 2021-06-16 PROBLEM — E55.9 VITAMIN D DEFICIENCY: Status: ACTIVE | Noted: 2017-11-09

## 2021-06-16 PROBLEM — Z71.89 ADVANCED CARE PLANNING/COUNSELING DISCUSSION: Status: ACTIVE | Noted: 2020-02-14

## 2021-06-16 PROBLEM — H02.005 ENTROPION OF LEFT LOWER EYELID: Status: ACTIVE | Noted: 2018-07-31

## 2021-06-16 PROBLEM — R60.0 PERIPHERAL EDEMA: Status: ACTIVE | Noted: 2021-05-07

## 2021-06-16 PROBLEM — M75.122 COMPLETE TEAR OF LEFT ROTATOR CUFF: Status: ACTIVE | Noted: 2018-02-15

## 2021-06-16 PROBLEM — M70.61 TROCHANTERIC BURSITIS OF RIGHT HIP: Status: ACTIVE | Noted: 2018-08-24

## 2021-06-16 NOTE — TELEPHONE ENCOUNTER
Should have refill left on Lyrica    Controlled Substance Refill Request  Medication Name:   Requested Prescriptions     Pending Prescriptions Disp Refills     pregabalin (LYRICA) 50 MG capsule [Pharmacy Med Name: PREGABALIN   CAP 50MG CAPSULE] 180 capsule 0     Sig: TAKE 2 CAPSULES (100 MG TOTAL) BY MOUTH ONCE DAILY AT BEDTIME.     Signed Prescriptions Disp Refills     clopidogreL (PLAVIX) 75 mg tablet 90 tablet 1     Sig: TAKE ONE TABLET (75 MG) BY MOUTH ONCE DAILY     Authorizing Provider: NICOLE SOLIS     Ordering User: ERASTO SUERO     Date Last Fill: 7/8/20  Requested Pharmacy: [facility]  Submit electronically to pharmacy  Controlled Substance Agreement on file:   Encounter-Level CSA Scan Date:    There are no encounter-level csa scan date.        Last office visit:  3/5/21

## 2021-06-16 NOTE — PATIENT INSTRUCTIONS - HE
Mr Robert E Schamberger,  I enjoyed visiting with you again today.  I am glad to hear you are doing well.  Per our conversation we will check the heart monitor.  I will plan on seeing you 1 year or sooner if needed.  Jass Blank

## 2021-06-16 NOTE — TELEPHONE ENCOUNTER
Refill Approved    Rx renewed per Medication Renewal Policy. Medication was last renewed on 2/7/20.    Edy Antony, [facility] Triage/Med Refill 4/5/2021     Requested Prescriptions   Pending Prescriptions Disp Refills     clopidogreL (PLAVIX) 75 mg tablet [Pharmacy Med Name: CLOPIDOGREL 75MG TAB TABLET] 90 tablet 3     Sig: TAKE ONE TABLET (75 MG) BY MOUTH ONCE DAILY       Clopidogrel/Prasugrel/Ticagrelor Refill Protocol Passed - 4/5/2021 10:01 AM        Passed - PCP or prescribing provider visit in past 6 months       Last office visit with prescriber/PCP: 11/9/2020 OR same dept: Visit date not found OR same specialty: 11/9/2020 Ld Moy MD Last physical: Visit date not found Last MTM visit: Visit date not found     Next appt within 3 mo: Visit date not found  Next physical within 3 mo: Visit date not found  Prescriber OR PCP: Ld Moy MD  Last diagnosis associated with med order: 1. TIA (transient ischemic attack)  - clopidogreL (PLAVIX) 75 mg tablet [Pharmacy Med Name: CLOPIDOGREL 75MG TAB TABLET]; TAKE ONE TABLET (75 MG) BY MOUTH ONCE DAILY  Dispense: 90 tablet; Refill: 3    2. Diabetic peripheral neuropathy (H)  - pregabalin (LYRICA) 50 MG capsule [Pharmacy Med Name: PREGABALIN   CAP 50MG CAPSULE]; TAKE 2 CAPSULES (100 MG TOTAL) BY MOUTH ONCE DAILY AT BEDTIME.  Dispense: 180 capsule; Refill: 0    If protocol passes may refill for 6 months if within 3 months of last provider visit (or a total of 9 months).              Passed - Hemoglobin in past 12 months     Hemoglobin   Date Value Ref Range Status   02/15/2021 12.3 (L) 14.0 - 18.0 g/dL Final                pregabalin (LYRICA) 50 MG capsule [Pharmacy Med Name: PREGABALIN   CAP 50MG CAPSULE] 180 capsule 0     Sig: TAKE 2 CAPSULES (100 MG TOTAL) BY MOUTH ONCE DAILY AT BEDTIME.       Controlled Substances Refill Protocol Failed - 4/5/2021 10:01 AM        Failed - Route all Controlled Substance Requests to Provider        Failed -  Patient has controlled substance agreement in past 12 months     Encounter-Level CSA Scan Date:    There are no encounter-level csa scan date.               Passed - Visit with PCP or prescribing provider visit in past 12 months      Last office visit with prescriber/PCP: 11/9/2020 Ld Moy MD OR same dept: Visit date not found OR same specialty: 11/9/2020 Ld Moy MD Last physical: 11/30/2018 Last MTM visit: Visit date not found    Next visit within 3 mo: Visit date not found  Next physical within 3 mo: Visit date not found  Prescriber OR PCP: Ld Moy MD  Last diagnosis associated with med order: 1. TIA (transient ischemic attack)  - clopidogreL (PLAVIX) 75 mg tablet [Pharmacy Med Name: CLOPIDOGREL 75MG TAB TABLET]; TAKE ONE TABLET (75 MG) BY MOUTH ONCE DAILY  Dispense: 90 tablet; Refill: 3    2. Diabetic peripheral neuropathy (H)  - pregabalin (LYRICA) 50 MG capsule [Pharmacy Med Name: PREGABALIN   CAP 50MG CAPSULE]; TAKE 2 CAPSULES (100 MG TOTAL) BY MOUTH ONCE DAILY AT BEDTIME.  Dispense: 180 capsule; Refill: 0

## 2021-06-16 NOTE — TELEPHONE ENCOUNTER
Refill Request  Medication name:   Requested Prescriptions     Pending Prescriptions Disp Refills     pregabalin (LYRICA) 50 MG capsule [Pharmacy Med Name: PREGABALIN   CAP 50MG CAPSULE] 180 capsule 0     Sig: TAKE 2 CAPSULES (100 MG TOTAL) BY MOUTH ONCE DAILY AT BEDTIME.     Signed Prescriptions Disp Refills     clopidogreL (PLAVIX) 75 mg tablet 90 tablet 1     Sig: TAKE ONE TABLET (75 MG) BY MOUTH ONCE DAILY     Authorizing Provider: NICOLE SOLIS     Ordering User: ERASTO SUERO     Who prescribed the medication: mauro  Last refill on medication: 7/8/2020  Requested Pharmacy: [facility]  Last appointment with PCP: 3/5/2021 virtual  Next appointment: 5/7/2021    Shital Lynn, [facility]

## 2021-06-17 NOTE — TELEPHONE ENCOUNTER
RN cannot approve Refill Request    RN can NOT refill this medication PCP messaged that patient is overdue for Labs. Last office visit: 11/9/2020 Ld Moy MD Last Physical: 11/30/2018 Last MTM visit: Visit date not found Last visit same specialty: 11/9/2020 Ld Moy MD.  Next visit within 3 mo: Visit date not found  Next physical within 3 mo: Visit date not found      Nu Harkins, [facility] Triage/Med Refill 4/28/2021    Requested Prescriptions   Pending Prescriptions Disp Refills     metFORMIN (GLUCOPHAGE-XR) 500 MG 24 hr tablet [Pharmacy Med Name: METFORMIN    TAB 500MG ER TABLET] 270 tablet 0     Sig: TAKE THREE TABLETS (1500MG) BY MOUTH DAILY       Metformin Refill Protocol Failed - 4/28/2021 12:25 PM        Failed - Microalbumin in last year      Microalbumin, Random Urine   Date Value Ref Range Status   01/14/2020 18.52 (H) 0.00 - 1.99 mg/dL Final                  Passed - Blood pressure in last 12 months     BP Readings from Last 1 Encounters:   03/31/21 126/50             Passed - LFT or AST or ALT in last 12 months     Albumin   Date Value Ref Range Status   01/25/2021 3.4 (L) 3.5 - 5.0 g/dL Final     Bilirubin, Total   Date Value Ref Range Status   01/25/2021 0.7 0.0 - 1.0 mg/dL Final     Bilirubin, Direct   Date Value Ref Range Status   01/14/2020 0.2 <=0.5 mg/dL Final     Alkaline Phosphatase   Date Value Ref Range Status   01/25/2021 89 45 - 120 U/L Final     AST   Date Value Ref Range Status   01/25/2021 37 0 - 40 U/L Final     ALT   Date Value Ref Range Status   01/25/2021 41 0 - 45 U/L Final     Protein, Total   Date Value Ref Range Status   01/25/2021 6.8 6.0 - 8.0 g/dL Final                Passed - GFR or Serum Creatinine in last 6 months     GFR MDRD Non Af Amer   Date Value Ref Range Status   02/10/2021 51 (L) >60 mL/min/1.73m2 Final     GFR MDRD Af Amer   Date Value Ref Range Status   02/10/2021 >60 >60 mL/min/1.73m2 Final             Passed - Visit with  PCP or prescribing provider visit in last 6 months or next 3 months     Last office visit with prescriber/PCP: 11/9/2020 OR same dept: Visit date not found OR same specialty: 11/9/2020 Ld Moy MD Last physical: Visit date not found Last MTM visit: Visit date not found         Next appt within 3 mo: Visit date not found  Next physical within 3 mo: Visit date not found  Prescriber OR PCP: Ld Moy MD  Last diagnosis associated with med order: 1. Diabetes (H)  - metFORMIN (GLUCOPHAGE-XR) 500 MG 24 hr tablet [Pharmacy Med Name: METFORMIN    TAB 500MG ER TABLET]; TAKE THREE TABLETS (1500MG) BY MOUTH DAILY  Dispense: 270 tablet; Refill: 0     If protocol passes may refill for 12 months if within 3 months of last provider visit (or a total of 15 months).           Passed - A1C in last 6 months     Hemoglobin A1c   Date Value Ref Range Status   01/13/2021 7.1 (H) <=5.6 % Final     Comment:     Prediabetes:   HBA1c       5.7 to 6.4%        Diabetes:        HBA1c        >=6.5%   Patients with Hgb F >5%, total bilirubin >10.0 mg/dL, abnormal red cell turnover, severe renal or hepatic disease or malignancy should not have this A1C method used to diagnose or monitor diabetes.

## 2021-06-17 NOTE — PROGRESS NOTES
Office Visit - Follow Up   Robert E Schamberger   87 y.o. male    Date of Visit: 5/5/2021    Chief Complaint   Patient presents with     Urinary Tract Infection     Sxs's: Dysuria & UA frequency x 3- 4 days  Denies blood in urine, denies fever, flank pain.        Assessment and Plan   1. Urinary tract infection  Urinalysis is not impressive today.  However given his recurrent symptoms and the fact that he had a prostatitis and was quite ill I am going to put him back on the antibiotics.  He already has an appointment to see Dr. Hernandez in 2 days.  I did discuss with patient and wife that he probably would benefit from a urologic evaluation given his ongoing symptoms and issues.  I will let Dr. Hernandez make that referral.  - Urinalysis-UC if Indicated    2. Prostatitis, unspecified prostatitis type  As above.  I am going to err on the safe side and treat him with the Omnicef again given his symptoms.  Low threshold to refer to urology.  - cefdinir (OMNICEF) 300 MG capsule; Take 1 capsule (300 mg total) by mouth 2 (two) times a day for 7 days.  Dispense: 14 capsule; Refill: 0        Return in about 2 days (around 5/7/2021) for Next scheduled follow up.     History of Present Illness   This 87 y.o. old diabetic gentleman who sees Dr. Hernandez comes in today with recurrent urinary frequency symptoms.  He has had prostatitis earlier this year with urosepsis.  He was in the hospital 2 times.  He was treated with a month of Omnicef.  Did well until the last week or so he is noted more urinary frequency and urgency and burning.  He has chronic back pain which is unchanged.  No fevers or chills.  He did take a fall recently.  No delirium like he had with his last infection according to wife.    Review of Systems: A comprehensive review of systems was negative except as noted.     Medications, Allergies and Problem List   Reviewed, reconciled and updated  Post Discharge Medication Reconciliation Status:      Physical Exam    General Appearance:   A pleasant gentleman in no distress.  Obese.  Sitting in a wheelchair.    /78 (Patient Site: Left Arm, Patient Position: Sitting, Cuff Size: Adult Regular)   Pulse 62   Temp 96.8  F (36  C)   Ht 6' (1.829 m)   Wt (!) 319 lb (144.7 kg)   SpO2 96%   BMI 43.26 kg/m           Additional Information   Current Outpatient Medications   Medication Sig Dispense Refill     acetaminophen (TYLENOL) 500 MG tablet Take 1,000 mg by mouth 3 (three) times a day. Special Instructions: max: 4000 mg in 24 hours Dx: pain  Three Times A Day; 08:00 AM, 12:00 PM, 04:00 PM       amLODIPine (NORVASC) 5 MG tablet Take 1 tablet (5 mg total) by mouth daily. 90 tablet 3     aspirin 81 MG EC tablet Take 1 tablet (81 mg total) by mouth daily. 150 tablet 2     blood glucose test (GLUCOSE BLOOD) strips Test up to 3 times daily 300 strip 3     blood glucose test strips Use one test strip daily to monitor blood glucose three times a day. 300 strip 3     cholecalciferol, vitamin D3, (VITAMIN D3) 2,000 unit Tab Take 1 tablet (2,000 Units total) by mouth daily. 100 tablet 3     citalopram (CELEXA) 20 MG tablet TAKE ONE TABLET BY MOUTH ONCE DAILY 90 tablet 3     clopidogreL (PLAVIX) 75 mg tablet TAKE ONE TABLET (75 MG) BY MOUTH ONCE DAILY 90 tablet 1     cyanocobalamin 1000 MCG tablet Take 1 tablet (1,000 mcg total) by mouth daily. 100 tablet 3     diclofenac sodium (VOLTAREN) 1 % Gel Place 2-4 g on the skin 4 (four) times a day as needed.        hydroCHLOROthiazide (HYDRODIURIL) 25 MG tablet TAKE ONE TABLET BY MOUTH ONCE DAILY .  - TAKE WITH LOSARTAN - COMBO NOT AVAILABLE - 90 tablet 3     insulin aspart U-100 (NOVOLOG FLEXPEN U-100 INSULIN) 100 unit/mL (3 mL) injection pen Inject 15 Units under the skin 3 (three) times a day before meals.  0     insulin glargine (BASAGLAR KWIKPEN) 100 unit/mL (3 mL) pen Inject 60 Units under the skin daily.  0     latanoprost (XALATAN) 0.005 % ophthalmic solution Administer 1 drop to  "both eyes at bedtime.       losartan (COZAAR) 100 MG tablet TAKE ONE TABLET BY MOUTH ONCE DAILY .  TAKE WITH HCTZ - COMBO UNAVAILABLE - 90 tablet 3     magnesium oxide (MAGOX) 400 mg (241.3 mg magnesium) tablet Take 1 tablet (400 mg total) by mouth 2 (two) times a day. 100 tablet 1     metFORMIN (GLUCOPHAGE-XR) 500 MG 24 hr tablet TAKE THREE TABLETS (1500MG) BY MOUTH DAILY 270 tablet 3     PEN NEEDLE 31 gauge x 5/16\" Ndle USE AS DIRECTED 3 TIMES DAILY 300 each 3     pravastatin (PRAVACHOL) 20 MG tablet TAKE ONE TABLET BY MOUTH AT BEDTIME 90 tablet 3     pregabalin (LYRICA) 50 MG capsule TAKE 2 CAPSULES (100 MG TOTAL) BY MOUTH ONCE DAILY AT BEDTIME. 180 capsule 3     cefdinir (OMNICEF) 300 MG capsule Take 1 capsule (300 mg total) by mouth 2 (two) times a day for 7 days. 14 capsule 0     No current facility-administered medications for this visit.      Allergies   Allergen Reactions     Actos [Pioglitazone] Swelling     Edema     Donepezil      Diarrhea     Gabapentin Diarrhea     Diarrhea     Lipitor [Atorvastatin]      Back pain     Niacin      Hyperglycemia     Simvastatin      Back pain     Social History     Tobacco Use     Smoking status: Former Smoker     Smokeless tobacco: Never Used   Substance Use Topics     Alcohol use: Yes     Alcohol/week: 14.0 standard drinks     Types: 7 Cans of beer, 7 Shots of liquor per week     Drug use: No       Review and/or order of clinical lab tests:  Review and/or order of radiology tests:  Review and/or order of medicine tests:  Discussion of test results with performing physician:  Decision to obtain old records and/or obtain history from someone other than the patient:  Review and summarization of old records and/or obtaining history from someone other than the patient and.or discussion of case with another health care provider:  Independent visualization of image, tracing or specimen itself:    Time:      Bruce Joyner MD  "

## 2021-06-17 NOTE — TELEPHONE ENCOUNTER
Refill Approved    Rx renewed per Medication Renewal Policy. Medication was last renewed on 2/18/20.    Edy Antony, [facility] Triage/Med Refill 5/14/2021     Requested Prescriptions   Pending Prescriptions Disp Refills     blood glucose test (ONETOUCH VERIO TEST STRIPS) strips [Pharmacy Med Name: OneTouch Verio In Vitro Strip]  0     Sig: Use to test blood glucose 3 times a day       Diabetic Supplies Refill Protocol Passed - 5/14/2021 12:14 PM        Passed - Visit with PCP or prescribing provider visit in last 6 months     Last office visit with prescriber/PCP: 5/7/2021 Ld Moy MD OR same dept: 11/9/2020 Ld Moy MD OR same specialty: 5/7/2021 Ld Moy MD  Last physical: 11/30/2018 Last MTM visit: Visit date not found   Next visit within 3 mo: Visit date not found  Next physical within 3 mo: Visit date not found  Prescriber OR PCP: Ld Moy MD  Last diagnosis associated with med order: 1. 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             Passed - A1C in last 6 months     Hemoglobin A1c   Date Value Ref Range Status   05/07/2021 6.8 (H) <=5.6 % Final

## 2021-06-17 NOTE — PROGRESS NOTES
Assessment & Plan     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)  Diabetes is managed with Basaglar nightly and NovoLog before meals in addition to Metformin.  Last A1c was 7.1%.  He has had a few low sugars in the morning between 50-70 despite decreasing dose of Basaglar to 60 units.  I am recommending that he decrease his dose further to 55 units at night to avoid hypoglycemia in the morning.  He will continue his current dose of NovoLog 15 units before meals.  We will recheck A1c.  He is getting annual diabetic eye exam.  He has known peripheral neuropathy.  Repeat foot exam at return.  He takes a statin daily.  - Glycosylated Hemoglobin A1c  - insulin glargine (BASAGLAR KWIKPEN) 100 unit/mL (3 mL) pen  Dispense: 15 mL; Refill: 0    Sick sinus syndrome (H)  Underwent cardiac evaluation including 2-week event monitor.  Demonstrating sinus bradycardia along with first-degree and Mobitz type I second-degree AV block but no high-grade AV block demonstrated.  Met with Dr. Blank who is not recommending pacemaker placement at this time.  Domenico does not take any rate lowering medications.  He will need to be monitored carefully probably with a monitor every year.  If any progression, he would be a candidate for pacemaker placement.    Diabetic peripheral neuropathy (H)  No change in peripheral neuropathy symptoms.  He continues on Lyrica.    Dementia without behavioral disturbance, unspecified dementia type (H)  Vascular dementia is suspected.  He is much more interactive and conversant at today's visit compared to our last meeting.  Continues on aspirin, statin and maintaining good blood pressure control.    Moderate major depression (H)  PHQ-9 score was 0 on March 5, 2021.  Doing well with 20 mg of citalopram daily.    Paroxysmal atrial fibrillation (H)  History of cardioversion 2007.  Anticoagulation is avoided given history of recurrent falls  with balance problem that he has.    Prostatitis,  unspecified prostatitis type  Was experiencing increased urinary frequency and wife noted some increasing confusion this past week.  Was evaluated on May 5 at [facility].  He was hospitalized with urosepsis this winter.  Concern for prostatitis and started on Omnicef 300 mg twice daily.  Urinalysis unremarkable.  No culture was sent.  Nevertheless, prostatitis remains possible.  I am recommending that he continue for a total of 10 days.  - cefdinir (OMNICEF) 300 MG capsule  Dispense: 6 capsule; Refill: 0    Chronic kidney disease, stage 3a  Monitor renal function.  Avoid nephrotoxins.  Maintain good blood pressure control  - Comprehensive Metabolic Panel  - HM2(CBC w/o Differential)    Essential hypertension  Blood pressure is much better controlled with the addition of amlodipine but experiencing increasing edema in both feet.  Will decrease the dose to 2.5 mg daily.  Continue current doses of losartan 100 mg daily and HCTZ 25 mg daily.  - amLODIPine (NORVASC) 2.5 MG tablet  Dispense: 90 tablet; Refill: 3  - Comprehensive Metabolic Panel  - HM2(CBC w/o Differential)    Peripheral edema  Edema in both feet has gotten significantly worse over the past several months and likely attributed to his new blood pressure medication, amlodipine.  As above, decreasing dose to 2.5 mg daily.  Continue salt restriction and leg elevation.  Compression stockings would probably be helpful but getting them on and off might be challenging for him and his wife.    Mixed hyperlipidemia  We will recheck a lipid profile on pravastatin.  (Nonfasting).  - Lipid Mc[place]od    QUETA on CPAP  Wearing CPAP faithfully at night.    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    Weight as of  5/5/21: 319 lb (144.7 kg).       Return in about 4 months (around 9/7/2021) for Annual physical.    Ld Moy MD  [facility]    Subjective       HPI 87-year-old male here to follow-up multiple medical problems including type 2 diabetes on insulin, sick sinus syndrome,, stage IIIa CKD, sleep apnea, peripheral neuropathy, vascular dementia, and depression.  Also with concerns for recurrent prostatitis with increasing confusion and history of urosepsis started on antibiotics earlier this week.  See assessment and plan for details of visit    Current Outpatient Medications on File Prior to Visit   Medication Sig Dispense Refill     acetaminophen (TYLENOL) 500 MG tablet Take 1,000 mg by mouth 3 (three) times a day. Special Instructions: max: 4000 mg in 24 hours Dx: pain  Three Times A Day; 08:00 AM, 12:00 PM, 04:00 PM       aspirin 81 MG EC tablet Take 1 tablet (81 mg total) by mouth daily. 150 tablet 2     blood glucose test (GLUCOSE BLOOD) strips Test up to 3 times daily 300 strip 3     blood glucose test strips Use one test strip daily to monitor blood glucose three times a day. 300 strip 3     cholecalciferol, vitamin D3, (VITAMIN D3) 2,000 unit Tab Take 1 tablet (2,000 Units total) by mouth daily. 100 tablet 3     citalopram (CELEXA) 20 MG tablet TAKE ONE TABLET BY MOUTH ONCE DAILY 90 tablet 3     clopidogreL (PLAVIX) 75 mg tablet TAKE ONE TABLET (75 MG) BY MOUTH ONCE DAILY 90 tablet 1     cyanocobalamin 1000 MCG tablet Take 1 tablet (1,000 mcg total) by mouth daily. 100 tablet 3     diclofenac sodium (VOLTAREN) 1 % Gel Place 2-4 g on the skin 4 (four) times a day as needed.        hydroCHLOROthiazide (HYDRODIURIL) 25 MG tablet TAKE ONE TABLET BY MOUTH ONCE DAILY .  - TAKE WITH LOSARTAN - COMBO NOT AVAILABLE - 90 tablet 3     insulin aspart U-100 (NOVOLOG FLEXPEN U-100 INSULIN) 100 unit/mL (3 mL) injection pen Inject 15 Units under the skin 3 (three) times a day before  "meals.  0     latanoprost (XALATAN) 0.005 % ophthalmic solution Administer 1 drop to both eyes at bedtime.       losartan (COZAAR) 100 MG tablet TAKE ONE TABLET BY MOUTH ONCE DAILY .  TAKE WITH HCTZ - COMBO UNAVAILABLE - 90 tablet 3     magnesium oxide (MAGOX) 400 mg (241.3 mg magnesium) tablet Take 1 tablet (400 mg total) by mouth 2 (two) times a day. 100 tablet 1     metFORMIN (GLUCOPHAGE-XR) 500 MG 24 hr tablet TAKE THREE TABLETS (1500MG) BY MOUTH DAILY 270 tablet 3     PEN NEEDLE 31 gauge x 5/16\" Ndle USE AS DIRECTED 3 TIMES DAILY 300 each 3     pravastatin (PRAVACHOL) 20 MG tablet TAKE ONE TABLET BY MOUTH AT BEDTIME 90 tablet 3     pregabalin (LYRICA) 50 MG capsule TAKE 2 CAPSULES (100 MG TOTAL) BY MOUTH ONCE DAILY AT BEDTIME. 180 capsule 3     [DISCONTINUED] amLODIPine (NORVASC) 5 MG tablet Take 1 tablet (5 mg total) by mouth daily. 90 tablet 3     [DISCONTINUED] cefdinir (OMNICEF) 300 MG capsule Take 1 capsule (300 mg total) by mouth 2 (two) times a day for 7 days. 14 capsule 0     [DISCONTINUED] insulin glargine (BASAGLAR KWIKPEN) 100 unit/mL (3 mL) pen Inject 60 Units under the skin daily.  0     No current facility-administered medications on file prior to visit.         Review of Systems  Denies any chest pain or increasing shortness of breath.  Increasing edema in both feet.  12 point ROS is negative other than what is described in assessment and plan and above      Objective    Vitals:    05/07/21 0956   BP: 124/60   Patient Site: Right Arm   Patient Position: Sitting   Cuff Size: Adult Large   Pulse: (!) 55   Temp: 96.6  F (35.9  C)   TempSrc: Tympanic   SpO2: 97%        Physical Exam  Pleasant elderly male who is more interactive and conversant than our last visit  Lungs clear bilaterally without rales or wheezes  Heart regular rate and rhythm  2+ bilateral edema involving both feet and ankles          "

## 2021-06-17 NOTE — PROGRESS NOTES
Office Visit - Follow Up   Robert E Schamberger   84 y.o. male    Date of Visit: 5/11/2018    Chief Complaint   Patient presents with     Foot Pain     R heel/foot pain x2 weeks        Assessment and Plan   1. Pain of right heel  Inflammatory process, either gout, pseudogout, or bursitis.  Cellulitis or septic bursitis much less likely.  He is afebrile.  Checking CBC and there is no leukocytosis.  Severity of pain seems more consistent with inflammatory process.  Begin Medrol Dosepak.  Apply ice.  Rest.  - HM2(CBC w/o Differential)  - Uric Acid    2. Type 2 diabetes mellitus with peripheral neuropathy  Discussed that sugars are likely to increase with Medrol Dosepak.  He should watch his sugars closely and increase his dose of Lantus if necessary.  Currently giving himself 75 units at night and 15-18 units in the morning.  The morning dose may need to increase to 20 or 25 depending on results.    Return in about 4 weeks (around 6/8/2018) for Recheck.     History of Present Illness   This 84 y.o. old gentleman with type 2 diabetes requiring insulin who is here with acute pain involving his right heel.  Symptoms came on over the past week.  He does not recall any injury.  The pain is severe and hurts especially when he tries to walk.  Even in bed, the pain can be felt as he rests his heel on the bed.  Cannot think of any change in activity.  No overuse.  No new shoes.  Denies any fevers or chills.  No redness streaking up his leg.  He does have a history of gout.  The quality of pain feels similar.    Review of Systems:  Otherwise, a comprehensive review of systems was negative except as noted.     Medications, Allergies and Problem List   Patient Active Problem List   Diagnosis     Lumbar Facet Syndrome     Type 2 diabetes mellitus with peripheral neuropathy     Hyperlipidemia     Osteoarthritis     Low back pain     HTN (hypertension)     Glaucoma     QUETA on CPAP     Erectile dysfunction     Morbid obesity      Degenerative disc disease, lumbar     Osteoarthritis of both knees     Depression     Osteoarthritis of both hands     Squamous cell skin cancer     Anemia in chronic kidney disease     Bilateral hearing loss     Chronic kidney disease, stage 3 (moderate)     Bursitis, hip, left     COPD (chronic obstructive pulmonary disease)     Fatigue     Traumatic subarachnoid hemorrhage with loss of consciousness of 30 minutes or less     B12 deficiency     Vitamin D deficiency     Intermittent asthma without complication     Complete tear of left rotator cuff     Pain of right heel       He has a past surgical history that includes pr removal gallbladder; pr revise median n/carpal tunnel surg; pr excis stomach ulcer,lesn;local; Total knee arthroplasty (Right, 2014); Carpal tunnel release (Bilateral); Eye surgery; Colonoscopy (2003); and Blepharoplasty (2018).    Allergies   Allergen Reactions     Actos [Pioglitazone] Swelling     Edema     Gabapentin Diarrhea     Diarrhea     Lipitor [Atorvastatin]      Back pain     Niacin      Hyperglycemia     Simvastatin      Back pain       Current Outpatient Prescriptions   Medication Sig Dispense Refill     acetaminophen (TYLENOL) 500 MG tablet Take 1,000 mg by mouth 3 (three) times a day. Special Instructions: max: 4000 mg in 24 hours Dx: pain  Three Times A Day; 08:00 AM, 12:00 PM, 04:00 PM       aspirin 81 mg chewable tablet Chew 81 mg daily.       cholecalciferol, vitamin D3, (VITAMIN D3) 2,000 unit Tab Take 1 tablet (2,000 Units total) by mouth daily. 100 tablet 3     citalopram (CELEXA) 20 MG tablet TAKE ONE TABLET BY MOUTH ONCE DAILY 90 tablet 3     cyanocobalamin 1000 MCG tablet Take 1 tablet (1,000 mcg total) by mouth daily. 100 tablet 3     diclofenac sodium (VOLTAREN) 1 % Gel Place 2-4 g on the skin.       insulin aspart (NOVOLOG) 100 unit/mL injection Inject 15 Units under the skin 3 (three) times a day before meals.       insulin glargine (LANTUS; BASAGLAR) 100 unit/mL (3  "mL) pen Inject 75 Units under the skin at bedtime. 25 adj dose pen 3     latanoprost (XALATAN) 0.005 % ophthalmic solution Administer 1 drop to both eyes at bedtime.       metFORMIN (GLUCOPHAGE-XR) 500 MG 24 hr tablet Take 3 tablets (1,500 mg total) by mouth daily. 270 tablet 3     pravastatin (PRAVACHOL) 10 MG tablet TAKE ONE TABLET BY MOUTH AT BEDTIME 90 tablet 3     valsartan-hydrochlorothiazide (DIOVAN-HCT) 320-25 mg per tablet take 1 tablet by mouth every day (needs appt for further refills) 90 tablet 3     methylPREDNISolone (MEDROL DOSEPACK) 4 mg tablet follow package directions 21 tablet 0     No current facility-administered medications for this visit.         Physical Exam   General Appearance:   Obese elderly male who otherwise appears well    /80  Pulse 75  Ht 5' 10\" (1.778 m)  Wt (!) 326 lb (147.9 kg)  SpO2 96%  BMI 46.78 kg/m2        Right heel with erythema and warmth with exquisite tenderness to palpation.  No tenderness over Achilles or insertion.  No redness streaking up the leg.     Additional Information   Social History   Substance Use Topics     Smoking status: Former Smoker     Smokeless tobacco: Never Used     Alcohol use None              Ld Moy MD  "

## 2021-06-17 NOTE — TELEPHONE ENCOUNTER
RN cannot approve Refill Request    RN can NOT refill this medication PCP messaged that patient is overdue for Labs. Last office visit: 5/7/2021 Ld Moy MD Last Physical: 11/30/2018 Last MTM visit: Visit date not found Last visit same specialty: 5/7/2021 Ld Moy MD.  Next visit within 3 mo: Visit date not found  Next physical within 3 mo: Visit date not found      Mary Aceves, [facility] Triage/Med Refill 5/14/2021    Requested Prescriptions   Pending Prescriptions Disp Refills     insulin glargine (BASAGLAR KWIKPEN) 100 unit/mL (3 mL) pen [Pharmacy Med Name: BASAGLAR KWIKPEN 100 U/ML MG INJECTABLE] 15 mL 0     Sig: INJECT 75 UNITS UNDER THE SKIN AT BEDTIME -REPLACES LANTUS       Insulin/GLP-1 Refill Protocol Failed - 5/14/2021 12:18 PM        Failed - Microalbumin in last year     Microalbumin, Random Urine   Date Value Ref Range Status   01/14/2020 18.52 (H) 0.00 - 1.99 mg/dL Final                  Passed - Visit with PCP or prescribing provider visit in last 6 months     Last office visit with prescriber/PCP: 5/7/2021 OR same dept: 5/7/2021 Ld Moy MD OR same specialty: 5/7/2021 Ld Moy MD Last physical: Visit date not found Last MTM visit: Visit date not found     Next appt within 3 mo: Visit date not found  Next physical within 3 mo: Visit date not found  Prescriber OR PCP: Ld Moy MD  Last diagnosis associated with med order: 1. 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)  - insulin glargine (BASAGLAR KWIKPEN) 100 unit/mL (3 mL) pen [Pharmacy Med Name: BASAGLAR KWIKPEN 100 U/ML MG INJECTABLE]; INJECT 75 UNITS UNDER THE SKIN AT BEDTIME -REPLACES LANTUS  Dispense: 15 mL; Refill: 0    If protocol passes may refill for 6 months if within 3 months of last provider visit (or a total of 9 months).              Passed - A1C in last 6 months     Hemoglobin A1c   Date Value Ref Range Status   05/07/2021 6.8 (H) <=5.6 % Final                Passed - Blood pressure in last year     BP Readings from Last 1 Encounters:   05/07/21 124/60             Passed - Creatinine done in last year     Creatinine   Date Value Ref Range Status   05/11/2021 1.52 (H) 0.70 - 1.30 mg/dL Final

## 2021-06-17 NOTE — TELEPHONE ENCOUNTER
Patient thinks he is getting his bladder infection back.  He had one around 3-5-21, now has burning with urination, not severe.  Denies blood in urine, denies fever, flank pain.  Patient should be seen today per triage stephanie.        Patient and wife are asking if he could get another antibiotic over the phone and are still using [facility] on file.    Please return to call to [phone].      Reason for Disposition    All other males with painful urination, or patient wants to be seen    Additional Information    Negative: Shock suspected (e.g., cold/pale/clammy skin, too weak to stand, low BP, rapid pulse)    Negative: Sounds like a life-threatening emergency to the triager    Negative: Severe pain with urination    Negative: Side (flank) or lower back pain present    Negative: Taking antibiotic > 24 hours for UTI and fever persists    Negative: Taking antibiotic > 3 days for UTI and painful urination not improved    Negative: Taking treatment > 3 days for STD (e.g., penile discharge from gonorrhea, chlamydia) and painful urination not improved    Protocols used: URINATION PAIN - MALE-A-OH       Patient's daughter called about pain medication for the patient. When she was discharged, they told her it would go to her pharmacy but they never received it. Please send to [facility] on [address]

## 2021-06-17 NOTE — TELEPHONE ENCOUNTER
Patient's wife notified that Dr. Moy is out for the rest of today and also tomorrow. I did offer them [place] services for today but they declined. Patient scheduled at [facility] tomorrow 5/5/2021 with Dr. Joyner.  Briana Kraus [facility] ............... 4:33 PM, 05/04/21

## 2021-06-18 NOTE — LETTER
Letter by Ld Moy MD at      Author: Ld Moy MD Service: -- Author Type: --    Filed:  Encounter Date: 3/5/2019 Status: (Other)       Robert E Schamberger  [address] [place] [address]             March 5, 2019         Dear Domenico,    Below are the results from your recent visit:    Resulted Orders   Hemoglobin   Result Value Ref Range    Hemoglobin 13.6 (L) 14.0 - 18.0 g/dL   Glycosylated Hemoglobin A1c   Result Value Ref Range    Hemoglobin A1c 7.6 (H) 3.5 - 6.0 %   Basic Metabolic Panel   Result Value Ref Range    Sodium 138 136 - 145 mmol/L    Potassium 4.7 3.5 - 5.0 mmol/L    Chloride 101 98 - 107 mmol/L    CO2 27 22 - 31 mmol/L    Anion Gap, Calculation 10 5 - 18 mmol/L    Glucose 111 70 - 125 mg/dL    Calcium 9.9 8.5 - 10.5 mg/dL    BUN 29 (H) 8 - 28 mg/dL    Creatinine 1.44 (H) 0.70 - 1.30 mg/dL    GFR MDRD Af Amer 57 (L) >60 mL/min/1.73m2    GFR MDRD Non Af Amer 47 (L) >60 mL/min/1.73m2    Narrative    Fasting Glucose reference range is 70-99 mg/dL per  American Diabetes Association (ADA) guidelines.       Diabetes control is about the same and is reasonably good with the A1c staying under 8%.  Kidney function stable.  Mild anemia is unchanged.    Please call with questions or contact us using [employer].    Sincerely,        Electronically signed by Ld Moy MD

## 2021-06-18 NOTE — PATIENT INSTRUCTIONS - HE
Patient Instructions by Ld Moy MD at 7/28/2020  3:00 PM     Author: Ld Moy MD Service: -- Author Type: Physician    Filed: 7/28/2020  4:04 PM Encounter Date: 7/28/2020 Status: Addendum    : Ld Moy MD (Physician)    Related Notes: Original Note by Ld Moy MD (Physician) filed at 7/28/2020  3:57 PM         Patient Education     Exercise for a Healthier Heart  You may wonder how you can improve the health of your heart. If youre thinking about exercise, youre on the right track. You dont need to become an athlete, but you do need a certain amount of brisk exercise to help strengthen your heart. If you have been diagnosed with a heart condition, your doctor may recommend exercise to help stabilize your condition. To help make exercise a habit, choose safe, fun activities.       Be sure to check with your health care provider before starting an exercise program.    Why exercise?  Exercising regularly offers many healthy rewards. It can help you do all of the following:    Improve your blood cholesterol levels to help prevent further heart trouble    Lower your blood pressure to help prevent a stroke or heart attack    Control diabetes, or reduce your risk of getting this disease    Improve your heart and lung function    Reach and maintain a healthy weight    Make your muscles stronger and more limber so you can stay active    Prevent falls and fractures by slowing the loss of bone mass (osteoporosis)    Manage stress better  Exercise tips  Ease into your routine. Set small goals. Then build on them.  Exercise on most days. Aim for a total of 150 or more minutes of moderate to  vigorous intensity activity each week. Consider 40 minutes, 3 to 4 times a week. For best results, activity should last for 40 minutes on average. It is OK to work up to the 40 minute period over time. Examples of moderate-intensity activity is walking one mile in 15 minutes or 30 to  45 minutes of yard work.  Step up your daily activity level. Along with your exercise program, try being more active throughout the day. Walk instead of drive. Do more household tasks or yard work.  Choose one or more activities you enjoy. Walking is one of the easiest things you can do. You can also try swimming, riding a bike, or taking an exercise class.  Stop exercising and call your doctor if you:    Have chest pain or feel dizzy or lightheaded    Feel burning, tightness, pressure, or heaviness in your chest, neck, shoulders, back, or arms    Have unusual shortness of breath    Have increased joint or muscle pain    Have palpitations or an irregular heartbeat      7915-0916 [employer]. [address] [place] [address], [place] [address]. All rights reserved. This information is not intended as a substitute for professional medical care. Always follow your healthcare professional's instructions.           Hearing Loss  You identified a concern for your hearing.  Please follow up with [facility] for further assistance.  Patient Education   Depression and Suicide in Older Adults  Nearly 2 million older Americans have some type of depression. Sadly, some of them even take their own lives. Yet depression among older adults is often ignored. Learn the warning signs. You may help spare a loved one needless pain. You may also save a life.       What Is Depression?  Depression is a mood disorder that affects the way you think and feel. The most common symptom is a feeling of deep sadness. People who are depressed also may seem tired and listless. And nothing seems to give them pleasure. Its normal to grieve or be sad sometimes. But sadness lessens or passes with time. Depression rarely goes away or improves on its own. Other symptoms of depression are:    Sleeping more or less than normal    Eating more or less than normal    Having headaches, stomachaches, or other pains that dont go away    Feeling  nervous, empty, or worthless    Crying a great deal    Thinking or talking about suicide or death    Feeling confused or forgetful  What Causes It?  The causes of depression arent fully known. Certain chemicals in the brain play a role. Depression does run in families. And life stresses can also trigger depression in some people. That may be the case with older adults. They often face great burdens, such as the death of friends or a spouse. They may have failing health. And they are more likely to be alone, lonely, or poor.  How You Can Help  Often, depressed people may not want to ask for help. When they do, they may be ignored. Or, they may receive the wrong treatment. You can help by showing parents and older friends love and support. If they seem depressed, help them find the right treatment. Talk to your doctor. Or contact a local mental health center, social service agency, or hospital. With modern treatment, no one has to suffer from depression.  Resources:    National [employer] of Mental Health  710.172.5870  www.nimh.nih.gov    National [place] on Mental Illness  606.170.3700  www.gustavo.org    [facility]  811.225.7382  www.[facility].org    National Suicide Hotline  862.933.9887 (800-SUICIDE)      9725-5718 [employer]. [address] [place] [address], [place] [address]. All rights reserved. This information is not intended as a substitute for professional medical care. Always follow your healthcare professional's instructions.         Patient Education   Preventing Falls in the Home  As you get older, falls are more likely. Thats because your reaction time slows. Your muscles and joints may also get stiffer, making them less flexible. Illness, medications, and vision changes can also affect your balance. A fall could leave you unable to live on your own. To make your home safer, follow these tips:    Floors    Put nonskid pads under area rugs.    Remove throw rugs.    Replace worn floor  coverings.    Tack carpets firmly to each step on carpeted stairs. Put nonskid strips on the edges of uncarpeted stairs.    Keep floors and stairs free of clutter and cords.    Arrange furniture so there are clear pathways.    Clean up any spills right away.    Bathrooms    Install grab bars in the tub or shower.    Apply nonskid strips or put a nonskid rubber mat in the tub or shower.    Sit on a bath chair to bathe.    Use bathmats with nonskid backing.    Lighting    Keep a flashlight in each room.    Put a nightlight along the pathway between the bedroom and the bathroom.    4074-5173 [employer]. [address] [place] [address], [place] [address]. All rights reserved. This information is not intended as a substitute for professional medical care. Always follow your healthcare professional's instructions.           Advance Directive  Patients advance directive was discussed and I am comfortable with the patients wishes.  Patient Education   Personalized Prevention Plan  You are due for the preventive services outlined below.  Your care team is available to assist you in scheduling these services.  If you have already completed any of these items, please share that information with your care team to update in your medical record.  Health Maintenance   Topic Date Due   ? ASTHMA ACTION PLAN  04/14/1934   ? ZOSTER VACCINES (2 of 3) 01/27/2008   ? INFLUENZA VACCINE RULE BASED (1) 08/01/2020   ? Asthma Control Test  09/06/2020   ? DIABETIC EYE EXAM  09/19/2020   ? A1C  01/08/2021   ? LIPID  01/14/2021   ? MICROALBUMIN  01/14/2021   ? BMP  07/08/2021   ? DIABETIC FOOT EXAM  07/08/2021   ? FALL RISK ASSESSMENT  07/08/2021   ? MEDICARE ANNUAL WELLNESS VISIT  07/28/2021   ? ADVANCE CARE PLANNING  11/30/2023   ? TD 18+ HE  04/09/2025   ? DEPRESSION ACTION PLAN  Completed   ? PNEUMOCOCCAL IMMUNIZATION 65+ HIGH/HIGHEST RISK  Completed   ? HEPATITIS B VACCINES  Discontinued         Annual flu shot every  fall    Recommending shingles vaccine, Shingrix.  This can be obtained at your pharmacy.    Continue to see your eye doctor every year    Follow-up with dermatology annually

## 2021-06-18 NOTE — PROGRESS NOTES
Office Visit - Follow Up   Robert E Schamberger   84 y.o. male    Date of Visit: 5/22/2018    Chief Complaint   Patient presents with     Diabetes     Hypertension     Heel Pain     right        Assessment and Plan   1. Type 2 diabetes mellitus with peripheral neuropathy  Diabetes appears to be reasonably well-controlled with current insulin regimen and taking metformin.  Recheck A1c.  Continue annual eye exams.  - Glycosylated Hemoglobin A1c    2. Essential hypertension  Excellent blood pressure control with current medication    3. Hyperlipidemia  Recheck lipid profile on pravastatin and monitor LFTs.  Continue aspirin 81 mg daily  - Lipid Cascade  - Hepatic Profile    4. Pain of right heel  Pain in heel has resolved with Medrol Dosepak.  I suspect this was severe bursitis.  Is unable to tolerate NSAIDs    5. QUETA on CPAP  Continue CPAP nightly    6. Depression  Mood is stable with PHQ 9 score of 3 using citalopram    7. Chronic kidney disease, stage 3 (moderate)  Monitor renal function  - Basic Metabolic Panel    8. Anemia in stage 3 chronic kidney disease  Monitor hemoglobin  - Hemoglobin    He is inquiring about screening for AAA.  He had a CT scan in 2009 at the age of 75 negative for any AAA    Return in about 3 months (around 8/22/2018) for Recheck.     History of Present Illness   This 84 y.o. old gentleman with multiple medical problems here to.  Recent severe pain involving heel which resolved with Medrol dose pack.  Bursitis was suspected.  Sugars did increase while on Medrol but overall diabetes appears to be well controlled with current insulin regimen.  He takes 75 units of Basaglar each night and is using approximately 18 units of NovoLog with each meal.  A couple times sugar was in the 70s once in the 60s in the morning.  He recognizes when his sugar is low.  He denies any chest pain.  No increasing shortness of breath.  He has chronic pain in his low back and involving multiple joints secondary to  arthritis.  He continues on pravastatin to manage his cholesterol.  Mood is stable with current dose of citalopram.  Wearing CPAP faithfully at night for sleep apnea    Review of Systems:  Otherwise, a comprehensive review of systems was negative except as noted.     Medications, Allergies and Problem List   Patient Active Problem List   Diagnosis     Lumbar Facet Syndrome     Type 2 diabetes mellitus with peripheral neuropathy     Hyperlipidemia     Osteoarthritis     Low back pain     HTN (hypertension)     Glaucoma     QUETA on CPAP     Erectile dysfunction     Morbid obesity     Degenerative disc disease, lumbar     Osteoarthritis of both knees     Depression     Osteoarthritis of both hands     Squamous cell skin cancer     Anemia in chronic kidney disease     Bilateral hearing loss     Chronic kidney disease, stage 3 (moderate)     Bursitis, hip, left     COPD (chronic obstructive pulmonary disease)     Fatigue     Traumatic subarachnoid hemorrhage with loss of consciousness of 30 minutes or less     B12 deficiency     Vitamin D deficiency     Intermittent asthma without complication     Complete tear of left rotator cuff       He has a past surgical history that includes pr removal gallbladder; pr revise median n/carpal tunnel surg; pr excis stomach ulcer,lesn;local; Total knee arthroplasty (Right, 2014); Carpal tunnel release (Bilateral); Eye surgery; Colonoscopy (2003); and Blepharoplasty (2018).    Allergies   Allergen Reactions     Actos [Pioglitazone] Swelling     Edema     Gabapentin Diarrhea     Diarrhea     Lipitor [Atorvastatin]      Back pain     Niacin      Hyperglycemia     Simvastatin      Back pain       Current Outpatient Prescriptions   Medication Sig Dispense Refill     acetaminophen (TYLENOL) 500 MG tablet Take 1,000 mg by mouth 3 (three) times a day. Special Instructions: max: 4000 mg in 24 hours Dx: pain  Three Times A Day; 08:00 AM, 12:00 PM, 04:00 PM       aspirin 81 mg chewable tablet  "Chew 81 mg daily.       cholecalciferol, vitamin D3, (VITAMIN D3) 2,000 unit Tab Take 1 tablet (2,000 Units total) by mouth daily. 100 tablet 3     citalopram (CELEXA) 20 MG tablet TAKE ONE TABLET BY MOUTH ONCE DAILY 90 tablet 3     cyanocobalamin 1000 MCG tablet Take 1 tablet (1,000 mcg total) by mouth daily. 100 tablet 3     diclofenac sodium (VOLTAREN) 1 % Gel Place 2-4 g on the skin.       insulin aspart (NOVOLOG) 100 unit/mL injection Inject 18 Units under the skin 3 (three) times a day before meals.       insulin glargine (LANTUS; BASAGLAR) 100 unit/mL (3 mL) pen Inject 75 Units under the skin at bedtime. 15 adj dose pen 4     latanoprost (XALATAN) 0.005 % ophthalmic solution Administer 1 drop to both eyes at bedtime.       metFORMIN (GLUCOPHAGE-XR) 500 MG 24 hr tablet Take 3 tablets (1,500 mg total) by mouth daily. 270 tablet 3     pravastatin (PRAVACHOL) 10 MG tablet TAKE ONE TABLET BY MOUTH AT BEDTIME 90 tablet 3     valsartan-hydrochlorothiazide (DIOVAN-HCT) 320-25 mg per tablet take 1 tablet by mouth every day (needs appt for further refills) 90 tablet 3     No current facility-administered medications for this visit.         Physical Exam   General Appearance:   Morbidly obese elderly male    /60 (Patient Site: Left Arm, Patient Position: Sitting, Cuff Size: Thigh)  Pulse 69  Ht 5' 10\" (1.778 m)  Wt (!) 326 lb (147.9 kg)  SpO2 97%  BMI 46.78 kg/m2    HEENT: Normal  Respiratory: Normal respiratory effort.  Lungs are clear with no rales or wheezes.  Heart: Regular rate and rhythm   No carotid bruits.  Extremities: No peripheral edema.  Neurologic: Grossly nonfocal  Skin: No cyanosis or pallor  Psych: Alert and oriented ×3, mood appropriate.  PHQ 9 score is 3         Additional Information   Social History   Substance Use Topics     Smoking status: Former Smoker     Smokeless tobacco: Never Used     Alcohol use None              Ld Moy MD  "

## 2021-06-19 NOTE — PROGRESS NOTES
Preoperative Exam    Scheduled Procedure: Entropion Repair Left Lower Lid  Surgery Date:  08/01/2018  Surgery Location: [facility]    Surgeon:  Dr Black    Assessment/Plan:     1. Encounter for preoperative examination for general surgical procedure  No contraindications to proceeding with general anesthesia and proposed surgery    2. Entropion of left lower eyelid  Plans for repair of left lower lid entropion    3. Chronic kidney disease, stage 3 (moderate)  Stable chronic renal disease  - Basic Metabolic Panel    4. 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)  He will take only half of his usual dose of Basaglar tonight and will skip his morning dose of NovoLog tomorrow morning.    5. QUETA on CPAP  Wearing CPAP nightly    6. Essential hypertension  Blood pressure reasonably well-controlled.  Now using losartan/HCTZ which replaced valsartan/HCTZ.  He will avoid taking medication with diuretic on morning of surgery.        Surgical Procedure Risk: Low (reported cardiac risk generally < 1%)  Have you had prior anesthesia?: Yes  Have you or any family members had a previous anesthesia reaction:  No  Do you or any family members have a history of a clotting or bleeding disorder?: No  Cardiac Risk Assessment: no increased risk for major cardiac complications    Patient approved for surgery with general or local anesthesia.    Patient uses a CPAP machine.    Functional Status: Independent  Patient plans to recover at home with family.     Subjective:      Robert E Schamberger is a 84 y.o. male who presents for a preoperative consultation.  Persistent entropion left lower eyelid causing irritation with plans for repair tomorrow August 1.  He has had no previous trouble with general anesthesia.  No coronary artery disease.  Denies exertional chest pain.  No dyspnea.  He has type 2 diabetes with sugars generally well controlled.  Recently started new blood pressure medication, losartan/HCTZ after  valsartan removed from market.    All other systems reviewed and are negative, other than those listed in the HPI.    Pertinent History  Do you have difficulty breathing or chest pain after walking up a flight of stairs: No  History of obstructive sleep apnea: Yes: Has Cpap  Steroid use in the last 6 months: No  Frequent Aspirin/NSAID use: Yes: Aspirin 81mg daily, holding for 7 days  Prior Blood Transfusion: No  Prior Blood Transfusion Reaction: No      Current Outpatient Prescriptions   Medication Sig Dispense Refill     acetaminophen (TYLENOL) 500 MG tablet Take 1,000 mg by mouth 3 (three) times a day. Special Instructions: max: 4000 mg in 24 hours Dx: pain  Three Times A Day; 08:00 AM, 12:00 PM, 04:00 PM       aspirin 81 mg chewable tablet Chew 81 mg daily.       cholecalciferol, vitamin D3, (VITAMIN D3) 2,000 unit Tab Take 1 tablet (2,000 Units total) by mouth daily. 100 tablet 3     citalopram (CELEXA) 20 MG tablet TAKE ONE TABLET BY MOUTH ONCE DAILY 90 tablet 3     cyanocobalamin 1000 MCG tablet Take 1 tablet (1,000 mcg total) by mouth daily. 100 tablet 3     diclofenac sodium (VOLTAREN) 1 % Gel Place 2-4 g on the skin.       insulin aspart (NOVOLOG) 100 unit/mL injection Inject 18 Units under the skin 3 (three) times a day before meals.       insulin glargine (LANTUS; BASAGLAR) 100 unit/mL (3 mL) pen Inject 75 Units under the skin at bedtime. 15 adj dose pen 4     latanoprost (XALATAN) 0.005 % ophthalmic solution Administer 1 drop to both eyes at bedtime.       losartan-hydrochlorothiazide (HYZAAR) 100-25 mg per tablet Take 1 tablet by mouth daily. 90 tablet 3     metFORMIN (GLUCOPHAGE-XR) 500 MG 24 hr tablet TAKE THREE TABLETS (1500MG) BY MOUTH DAILY 270 tablet 3     NOVOLOG FLEXPEN U-100 INSULIN 100 unit/mL injection pen INJECT 20 UNITS SQ WITH BREAKFAST 25 UNITS WITH LUNCH AND 25 UNITS WITH DINNER 60 mL 11     pravastatin (PRAVACHOL) 10 MG tablet TAKE ONE TABLET BY MOUTH AT BEDTIME 90 tablet 3     No  current facility-administered medications for this visit.         Allergies   Allergen Reactions     Actos [Pioglitazone] Swelling     Edema     Gabapentin Diarrhea     Diarrhea     Lipitor [Atorvastatin]      Back pain     Niacin      Hyperglycemia     Simvastatin      Back pain       Patient Active Problem List   Diagnosis     Lumbar Facet Syndrome     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)     Hyperlipidemia     Osteoarthritis     Low back pain     HTN (hypertension)     Glaucoma     QUETA on CPAP     Erectile dysfunction     Morbid obesity (H)     Degenerative disc disease, lumbar     Osteoarthritis of both knees     Depression     Osteoarthritis of both hands     Squamous cell skin cancer     Anemia in chronic kidney disease     Bilateral hearing loss     Chronic kidney disease, stage 3 (moderate)     Bursitis, hip, left     COPD (chronic obstructive pulmonary disease) (H)     Fatigue     Traumatic subarachnoid hemorrhage with loss of consciousness of 30 minutes or less (H)     B12 deficiency     Vitamin D deficiency     Intermittent asthma without complication     Complete tear of left rotator cuff     Entropion of left lower eyelid       Past Medical History:   Diagnosis Date     Anemia in chronic kidney disease      B12 deficiency 11/9/2017     Bilateral hearing loss 5/9/2016     Bursitis, hip, left 3/2/2017     Chest pain     Normal GXT 2006     Chronic kidney disease, stage 3 (moderate)     Worsening with hyperkalemia on Relafen-discontinued August 2016     Closed displaced fracture of left clavicle 9/21/2017     Complete tear of left rotator cuff 2/15/2018    Associated with fall and clavicular fracture that occurred 2017, evaluated by orthopedics, given cortisone injection and physical therapy recommended     COPD (chronic obstructive pulmonary disease) (H)      Degenerative disc disease, lumbar      Depression      Erectile dysfunction      Fatigue 7/6/2017     Forearm tendonitis  11/29/2016     Glaucoma      Gout attack     Left foot     HTN (hypertension)      Hyperlipidemia      Ingrown toenail 7/6/2017     Intermittent asthma without complication 11/9/2017     Left leg cellulitis 2014     Low back pain      Morbid obesity (H)      QUETA on CPAP      Osteoarthritis      Osteoarthritis of both hands      Osteoarthritis of both knees     Right TKA     Pain of right heel 5/11/2018     Rib fracture 2005     Right-sided chest pain 5/9/2016     Screening     No AAA on CT scan 2009     Squamous cell skin cancer 11/2/2015     Traumatic subarachnoid hemorrhage with loss of consciousness of 30 minutes or less (H) 9/21/2017    Fall down the stairs following alcohol use with traumatic subarachnoid hemorrhage [facility] followed by stay at [facility] with no residual neurologic deficits     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)      Vitamin D deficiency 11/9/2017       Past Surgical History:   Procedure Laterality Date     BLEPHAROPLASTY  2018     CARPAL TUNNEL RELEASE Bilateral      COLONOSCOPY  2003    Normal     EYE SURGERY       WY EXCIS STOMACH ULCER,LESN;LOCAL      Description: Gastric Excision;  Recorded: 03/23/2012;     WY REMOVAL GALLBLADDER      Description: Cholecystectomy;  Recorded: 03/23/2012;     WY REVISE MEDIAN N/CARPAL TUNNEL SURG      Description: Neuroplasty Decompression Median Nerve At Carpal Tunnel;  Recorded: 03/23/2012;     TOTAL KNEE ARTHROPLASTY Right 2014    Complicated by torn Ranexa 1 and subsequent repair       Social History     Social History     Marital status:      Spouse name: N/A     Number of children: N/A     Years of education: N/A     Occupational History     Not on file.     Social History Main Topics     Smoking status: Former Smoker     Smokeless tobacco: Never Used     Alcohol use Not on file     Drug use: Not on file     Sexual activity: Not on file     Other Topics Concern     Not on file     Social History  "Narrative    Semi retired     x 47 years                     Objective:     Vitals:    07/31/18 1330   BP: 144/70   Pulse: 83   SpO2: 95%   Weight: (!) 332 lb (150.6 kg)   Height: 5' 10\" (1.778 m)         Physical Exam:  HEENT normal  RESPIRATORY: Breathing pattern was normal and the chest moved symmetrically.   Lung sounds were normal and there were no rales or wheezes.  CARDIOVASCULAR: Heart rate and rhythm were normal.  S1 and S2 were normal and there were no extra sounds or murmurs. Peripheral pulses in arms and legs were normal.  Jugular venous pressure was normal.  There was no peripheral edema.  No carotid bruits.  GASTROINTESTINAL: The abdomen was normal in contour.  Bowel sounds were present.   Palpation detected no tenderness, mass, or enlarged organs.   SKIN/HAIR/NAILS: No cyanosis or pallor  NEUROLOGIC: Grossly nonfocal  PSYCHIATRIC:  Mood and affect were normal     Patient Instructions     Hold all supplements, aspirin and NSAIDs for 7 days prior to surgery.  Follow your surgeon's direction on when to stop eating and drinking prior to surgery.  Take only 40 units of Basaglar tonight and no NovoLog in the morning          Labs:  Potassium 5.0 creatinine 1.59    Immunization History   Administered Date(s) Administered     Influenza high dose, seasonal 11/02/2015, 10/20/2016, 11/09/2017     Pneumo Conj 13-V (2010&after) 04/09/2015     Pneumo Polysac 23-V 09/16/2002     Tdap 04/09/2015     ZOSTER, LIVE 12/02/2007           Electronically signed by Ld Moy MD 07/31/18 1:32 PM    "

## 2021-06-19 NOTE — LETTER
Letter by Ld Moy MD at      Author: Ld Moy MD Service: -- Author Type: --    Filed:  Encounter Date: 9/8/2019 Status: (Other)         Robert E Schamberger  [address] [place] [address]             September 8, 2019         Dear Domenico,    Below are the results from your recent visit:    Resulted Orders   Glycosylated Hemoglobin A1c   Result Value Ref Range    Hemoglobin A1c 7.8 (H) 3.5 - 6.0 %   Basic Metabolic Panel   Result Value Ref Range    Sodium 138 136 - 145 mmol/L    Potassium 5.2 (H) 3.5 - 5.0 mmol/L    Chloride 103 98 - 107 mmol/L    CO2 20 (L) 22 - 31 mmol/L    Anion Gap, Calculation 15 5 - 18 mmol/L    Glucose 158 (H) 70 - 125 mg/dL    Calcium 9.3 8.5 - 10.5 mg/dL    BUN 23 8 - 28 mg/dL    Creatinine 1.47 (H) 0.70 - 1.30 mg/dL    GFR MDRD Af Amer 55 (L) >60 mL/min/1.73m2    GFR MDRD Non Af Amer 46 (L) >60 mL/min/1.73m2    Narrative    Fasting Glucose reference range is 70-99 mg/dL per  American Diabetes Association (ADA) guidelines.   Hemoglobin   Result Value Ref Range    Hemoglobin 12.7 (L) 14.0 - 18.0 g/dL   Magnesium   Result Value Ref Range    Magnesium 1.6 (L) 1.8 - 2.6 mg/dL       Hemoglobin A1c is slightly higher than last time.  We want to keep this under 8%.  Mild impairment of kidney function but stable.  Mild anemia is stable.  Potassium slightly above normal range but this is okay.  Your magnesium level is low.  I would suggest starting an OTC magnesium supplement, magnesium oxide 400 mg once daily.    Please call with questions or contact us using [employer].    Sincerely,        Electronically signed by Ld Moy MD

## 2021-06-19 NOTE — LETTER
Letter by Ld Moy MD at      Author: Ld Moy MD Service: -- Author Type: --    Filed:  Encounter Date: 6/4/2019 Status: (Other)         Robert E Schamberger  [address] [place] [address]             June 4, 2019         Dear Domenico,    Below are the results from your recent visit:    Resulted Orders   Glycosylated Hemoglobin A1c   Result Value Ref Range    Hemoglobin A1c 7.5 (H) 3.5 - 6.0 %   Basic Metabolic Panel   Result Value Ref Range    Sodium 137 136 - 145 mmol/L    Potassium 4.7 3.5 - 5.0 mmol/L    Chloride 102 98 - 107 mmol/L    CO2 23 22 - 31 mmol/L    Anion Gap, Calculation 12 5 - 18 mmol/L    Glucose 118 70 - 125 mg/dL    Calcium 9.6 8.5 - 10.5 mg/dL    BUN 30 (H) 8 - 28 mg/dL    Creatinine 1.40 (H) 0.70 - 1.30 mg/dL    GFR MDRD Af Amer 58 (L) >60 mL/min/1.73m2    GFR MDRD Non Af Amer 48 (L) >60 mL/min/1.73m2    Narrative    Fasting Glucose reference range is 70-99 mg/dL per  American Diabetes Association (ADA) guidelines.   Hemoglobin   Result Value Ref Range    Hemoglobin 13.6 (L) 14.0 - 18.0 g/dL       Diabetes remains reasonably well controlled.  Hemoglobin A1c slightly lower than last time.  No change in mildly decreased kidney function.  Mild anemia is stable.    Please call with questions or contact us using [employer].    Sincerely,        Electronically signed by Ld Moy MD

## 2021-06-20 NOTE — LETTER
Letter by oTsha Albright CNP at      Author: Tosha Albright CNP Service: -- Author Type: --    Filed:  Encounter Date: 2/14/2020 Status: (Other)         Patient: Robert E Schamberger   MR Number: [id number]   YOB: 1934   Date of Visit: 2/14/2020     [facility]    Facility:   [facility] [[id number]]   Code Status: POLST AVAILABLE  PCP: Ld Moy MD   Phone: [phone]   Fax: [phone]      CHIEF COMPLAINT/REASON FOR VISIT:  Chief Complaint   Patient presents with   ? Discharge Summary       HISTORY COURSE:  Sonu is a 85 y.o. male who  has a past medical history of Anemia in chronic kidney disease, B12 deficiency (11/9/2017), Bilateral hearing loss (5/9/2016), Bursitis, hip, left (3/2/2017), Chest pain, Chronic kidney disease, stage 3 (moderate) (H), Closed displaced fracture of left clavicle (9/21/2017), Cognitive changes (11/30/2018), Complete tear of left rotator cuff (2/15/2018), Degenerative disc disease, lumbar, Depression, Diabetic peripheral neuropathy (H) (11/30/2018), Erectile dysfunction, Fatigue (7/6/2017), Forearm tendonitis (11/29/2016), Glaucoma, Gout attack, HTN (hypertension), Hyperlipidemia, Ingrown toenail (7/6/2017), Intermittent asthma without complication (11/9/2017), Left leg cellulitis (2014), Low back pain, 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), Morbid obesity (H), QUETA on CPAP, Osteoarthritis, Osteoarthritis of both hands, Osteoarthritis of both knees, Pain of right heel (5/11/2018), Peripheral neuropathy (10/9/2018), Rib fracture (2005), Right-sided chest pain (5/9/2016), Screening, Squamous cell skin cancer (11/2/2015), TIA (transient ischemic attack) (09/06/2019), Traumatic subarachnoid hemorrhage with loss of consciousness of 30 minutes or less (H) (9/21/2017), Trochanteric bursitis of right hip (8/24/2018), 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), Unsteady gait (3/4/2019), and Vitamin D deficiency  (11/9/2017).  Sonu was recently admitted to the hospital for bilateral leg weakness and was found to have a possible TIA.  He was admitted at [facility] from 2/3/2020 to 2/6/2020.  The discharging provider summarized the hospitalization in previous notes.    Today Sonu is being evaluated for a discharge examination. Sonu states he has been doing quite well. He is very happy to be going home. He feels as if he will be successful. He does not have any new issues or problems to report. No significant medications changes while in the TCU. He will be on  mg by mouth daily for 21 days. Sonu denies any other concerns including fevers/chills, cough or cold symptoms, headaches, vision changes, chest pain/pressure, difficulty breathing, SOB, abdominal pain, nausea, vomiting, diarrhea, dysuria, increasing weakness, increasing pain.     PHYSICAL EXAM:   /55   Pulse 60   Temp 97.7  F (36.5  C)   Resp 15   Wt (!) 320 lb 6.4 oz (145.3 kg)   SpO2 94%   BMI 43.45 kg/m     Morbid obesity significantly limits examination.  General appearance: alert, appears stated age and cooperative  HEENT: Head is normocephalic with normal hair distribution. No evidence of trauma. Ears: Without lesions or deformity. No acute purulent discharge. Eyes: Conjunctivae pink with no scleral icterus or erythema. Nose: Normal mucosa and septum. Oropharnyx: mmm, no lesions present.  Lungs: clear to auscultation bilaterally, respirations without effort  Heart: regular rate and rhythm, S1, S2 normal, no murmur, click, rub or gallop  Abdomen: soft, non-tender; bowel sounds normal; no masses,  no organomegaly  Extremities: extremities normal, atraumatic, no cyanosis or edema  Pulses: 2+ and symmetric  Skin: Skin color, texture, turgor normal. No rashes or lesions  Neurologic: Grossly normal   Psych: interacts well with caregivers, exhibits logical thought processes and connections, pleasant    MEDICATION LIST:  Current  "Outpatient Medications   Medication Sig   ? acetaminophen (TYLENOL) 500 MG tablet Take 1,000 mg by mouth 3 (three) times a day. Special Instructions: max: 4000 mg in 24 hours Dx: pain  Three Times A Day; 08:00 AM, 12:00 PM, 04:00 PM   ? aspirin 81 MG EC tablet Take 1 tablet (81 mg total) by mouth daily for 21 days. Then discontinue   ? blood glucose test (GLUCOSE BLOOD) strips Test up to 3 times daily   ? cholecalciferol, vitamin D3, (VITAMIN D3) 2,000 unit Tab Take 1 tablet (2,000 Units total) by mouth daily.   ? citalopram (CELEXA) 20 MG tablet Take 0.5 tablets (10 mg total) by mouth at bedtime.   ? clopidogreL (PLAVIX) 75 mg tablet Take 1 tablet (75 mg total) by mouth daily.   ? cyanocobalamin 1000 MCG tablet Take 1 tablet (1,000 mcg total) by mouth daily.   ? diclofenac sodium (VOLTAREN) 1 % Gel Place 2-4 g on the skin 4 (four) times a day as needed.    ? hydroCHLOROthiazide (HYDRODIURIL) 25 MG tablet Take 25 mg by mouth daily.   ? insulin aspart U-100 (NOVOLOG FLEXPEN U-100 INSULIN) 100 unit/mL (3 mL) injection pen Inject 10 Units under the skin 3 (three) times a day before meals.   ? insulin glargine (BASAGLAR KWIKPEN) 100 unit/mL (3 mL) pen INJECT 75 UNITS UNDER THE SKIN AT BEDTIME -REPLACES LANTUS (Patient taking differently: Inject 75 Units under the skin at bedtime. )   ? latanoprost (XALATAN) 0.005 % ophthalmic solution Administer 1 drop to both eyes at bedtime.   ? losartan (COZAAR) 100 MG tablet Take 100 mg by mouth daily.   ? magnesium oxide (MAGOX) 400 mg (241.3 mg magnesium) tablet Take 1 tablet (400 mg total) by mouth 2 (two) times a day.   ? metFORMIN (GLUCOPHAGE-XR) 500 MG 24 hr tablet TAKE THREE TABLETS (1500MG) BY MOUTH DAILY   ? pravastatin (PRAVACHOL) 20 MG tablet Take 1 tablet (20 mg total) by mouth daily.   ? pregabalin (LYRICA) 50 MG capsule Take 1 capsule (50 mg total) by mouth at bedtime.   ? UNIFINE PENTIPS 31 gauge x 5/16\" Ndle USE AS DIRECTED 3 TIMES DAILY       DISCHARGE DIAGNOSIS:    " ICD-10-CM    1. Physical deconditioning R53.81    2. TIA (transient ischemic attack) G45.9    3. Unsteady gait R26.81    4. Fatigue, unspecified type R53.83      Physical Deconditioning  -Continue PT/OT and other therapies as per care plan.  -Encouraged good nutrition and movement habits.   -Discussed care plan and expected course of stay.   -Continue to follow-up per routine schedule or sooner if needed.     TIA/cognitive changes/unsteady gait/Fatigue  -SLP to eval and treat.  - mg by mouth daily for 21 days for TIA.  -Plavix 75 mg by mouth once daily.  -Pravastatin 20 mg by mouth daily.    MEDICAL EQUIPMENT NEEDS:  None today.     DISCHARGE PLAN/FACE TO FACE:  I certify that services are/were furnished while this patient was under the care of a physician and that a physician or an allowed non-physician practitioner (NPP), had a face-to-face encounter that meets the physician face-to-face encounter requirements. The encounter was in whole, or in part, related to the primary reason for home health. The patient is confined to his/her home and needs intermittent skilled nursing, physical therapy, speech-language pathology, or the continued need for occupational therapy. A plan of care has been established by a physician and is periodically reviewed by a physician.  Date of Face-to-Face Encounter: 2/14/2020    I certify that, based on my findings, the following services are medically necessary home health services: home health aid for bathing and ADLs, skilled nursing (RN) for medication set-up and teaching, symptoms and disease process monitoring and education, PT for strengthening, balance, endurance and safety within the home, OT for strengthening, ADL needs, adaptive equipment and safety.    My clinical findings support the need for the above skilled services because: home health aid for bathing and ADLs, skilled nursing (RN) for medication set-up and teaching, symptoms and disease process monitoring and  education, PT for strengthening, balance, endurance and safety within the home, OT for strengthening, ADL needs, adaptive equipment and safety.    This patient is homebound because: n/a    The patient is, or has been, under my care and I have initiated the establishment of the plan of care. This patient will be followed by a physician who will periodically review the plan of care. Schedule follow up visit with primary care provider within 7 days to reestablish care.    Total unit/floor time of 40 minutes time spent on discharge planning, discharge follow-up discussion and discharge medication review.    Electronically signed by: Tosha Albright CNP

## 2021-06-20 NOTE — PROGRESS NOTES
Office Visit - Follow Up   Robert E Schamberger   84 y.o. male    Date of Visit: 10/9/2018    Chief Complaint   Patient presents with     Foot Pain     right     Toe Pain        Assessment and Plan   1. Peripheral neuropathy  I believe his right foot pain represents peripheral neuropathy.  Minimal erythema and no tenderness with palpation.  Normal movement.  Begin Lyrica 50 mg at bedtime and increase to 100 mg after 2 weeks.  Discussed potential side effects.    2. 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)  Emphasized the importance of good diabetes control.  Last A1c was improving down to 7.5%.  Follow-up in 6 weeks.  Continue current insulin regimen.  Continue efforts at diet modification and getting some more regular exercise.    3. Need for immunization against influenza    - Influenza High Dose, Seasonal 65+ yrs    Return in about 6 weeks (around 11/20/2018) for Recheck.     History of Present Illness   This 84 y.o. old male with multiple medical problems including type 2 diabetes who is here with right foot pain.  Present for the past several weeks.  Does not recall injury.  It bothers him at night.  Even when his feet coming contact with the bed sheets, there is pain.  Interestingly, the pain is less when he is ambulating.  No history of gout.  No fevers or chills.  Denies any chronic numbness or tingling although has had decreased vibratory sensation consistent with peripheral neuropathy.    Review of Systems: Sugars are under reasonably good control      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)     Hyperlipidemia     Osteoarthritis     Low back pain     HTN (hypertension)     Glaucoma     QUETA on CPAP     Erectile dysfunction     Morbid obesity (H)     Degenerative disc disease, lumbar     Osteoarthritis of both knees     Depression     Osteoarthritis of both hands      Squamous cell skin cancer     Anemia in chronic kidney disease     Bilateral hearing loss     Chronic kidney disease, stage 3 (moderate) (H)     Bursitis, hip, left     COPD (chronic obstructive pulmonary disease) (H)     Fatigue     Traumatic subarachnoid hemorrhage with loss of consciousness of 30 minutes or less (H)     B12 deficiency     Vitamin D deficiency     Intermittent asthma without complication     Complete tear of left rotator cuff     Entropion of left lower eyelid     Trochanteric bursitis of right hip     Peripheral neuropathy       He has a past surgical history that includes pr removal gallbladder; pr revise median n/carpal tunnel surg; pr excis stomach ulcer,lesn;local; Total knee arthroplasty (Right, 2014); Carpal tunnel release (Bilateral); Eye surgery; Colonoscopy (2003); and Blepharoplasty (2018).    Allergies   Allergen Reactions     Actos [Pioglitazone] Swelling     Edema     Gabapentin Diarrhea     Diarrhea     Lipitor [Atorvastatin]      Back pain     Niacin      Hyperglycemia     Simvastatin      Back pain       Current Outpatient Prescriptions   Medication Sig Dispense Refill     acetaminophen (TYLENOL) 500 MG tablet Take 1,000 mg by mouth 3 (three) times a day. Special Instructions: max: 4000 mg in 24 hours Dx: pain  Three Times A Day; 08:00 AM, 12:00 PM, 04:00 PM       aspirin 81 mg chewable tablet Chew 81 mg daily.       BASAGLAR KWIKPEN U-100 INSULIN 100 unit/mL (3 mL) pen INJECT 75 UNITS UNDER THE SKIN AT BEDTIME -REPLACES LANTUS 60 mL 11     cholecalciferol, vitamin D3, (VITAMIN D3) 2,000 unit Tab Take 1 tablet (2,000 Units total) by mouth daily. 100 tablet 3     citalopram (CELEXA) 20 MG tablet TAKE ONE TABLET BY MOUTH ONCE DAILY 90 tablet 3     cyanocobalamin 1000 MCG tablet Take 1 tablet (1,000 mcg total) by mouth daily. 100 tablet 3     diclofenac sodium (VOLTAREN) 1 % Gel Place 2-4 g on the skin.       latanoprost (XALATAN) 0.005 % ophthalmic solution Administer 1 drop to  "both eyes at bedtime.       losartan-hydrochlorothiazide (HYZAAR) 100-25 mg per tablet Take 1 tablet by mouth daily. 90 tablet 3     metFORMIN (GLUCOPHAGE-XR) 500 MG 24 hr tablet TAKE THREE TABLETS (1500MG) BY MOUTH DAILY 270 tablet 3     NOVOLOG FLEXPEN U-100 INSULIN 100 unit/mL injection pen INJECT 20 UNITS SQ WITH BREAKFAST 25 UNITS WITH LUNCH AND 25 UNITS WITH DINNER (Patient taking differently: INJECT 20 UNITS SQ WITH BREAKFAST 20 UNITS WITH LUNCH AND 20 UNITS WITH DINNER) 60 mL 11     pravastatin (PRAVACHOL) 10 MG tablet TAKE ONE TABLET BY MOUTH AT BEDTIME 90 tablet 3     UNIFINE PENTIPS 31 gauge x 5/16\" Ndle USE AS DIRECTED 3 TIMES DAILY 300 each 3     pregabalin (LYRICA) 50 MG capsule Take 1-2 capsules ( mg total) by mouth at bedtime. 60 capsule 11     No current facility-administered medications for this visit.         Physical Exam   General Appearance:   Obese elderly male who otherwise appears well    /60 (Patient Site: Left Arm, Patient Position: Sitting, Cuff Size: Thigh)  Pulse 77  Ht 5' 10\" (1.778 m)  Wt (!) 334 lb (151.5 kg)  SpO2 97%  BMI 47.92 kg/m2        Right foot with good pedal pulse.  No tenderness with palpation involving first MTP or any of the toes.  Slight erythema present but no warmth or tenderness to touch.  No edema.  No pain with flexion/extension of toe or with any movement of foot.     Additional Information   Social History   Substance Use Topics     Smoking status: Former Smoker     Smokeless tobacco: Never Used     Alcohol use 8.4 oz/week     7 Cans of beer, 7 Shots of liquor per week              Ld Moy MD  "

## 2021-06-20 NOTE — PROGRESS NOTES
Office Visit - Follow Up   Robert E Schamberger   84 y.o. male    Date of Visit: 8/24/2018    Chief Complaint   Patient presents with     Diabetes     Hypertension        Assessment and Plan   1. 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  He continues on 75 units of Basaglar but is now taking 20 units of NovoLog before each meal.  Morning sugars are variable.  He continues with annual eye exams.  He does have peripheral neuropathy and chronic kidney disease.  - Glycosylated Hemoglobin A1c    2. Essential hypertension  Blood pressure looks reasonably well-controlled with current medications including losartan/HCTZ    3. Hyperlipidemia  Recheck lipid profile on pravastatin and monitor LFTs.  Continues aspirin 81 mg daily  - Lipid Cascade  - Hepatic Profile    4. Trochanteric bursitis of right hip  New pain involving right hip consistent with bursitis.  He would benefit from a cortisone injection.  NSAIDs are contraindicated with his other medical problems.  - Ambulatory referral to Orthopedics    5. Chronic kidney disease, stage 3 (moderate)  Monitor renal function.  Avoiding NSAIDs  - Basic Metabolic Panel    6. Anemia in stage 3 chronic kidney disease  Recheck hemoglobin  - Hemoglobin    7. B12 deficiency  Continues on B12 replacement    8. Complete tear of left rotator cuff  He had cortisone injection earlier this year.  Stable    9. Mild intermittent asthma without complication  Stable    10. QUETA on CPAP  Wearing CPAP faithfully    11. Morbid obesity (H)  Encouraging modification of diet and attempts to get more regular exercise.  He is limited because of his severe osteoarthritis and chronic back problems    Return in about 3 months (around 11/24/2018) for Annual physical.     History of Present Illness   This 84 y.o. old gentleman with multiple medical problems including type 2 diabetes requiring insulin complicated by peripheral neuropathy and  chronic kidney disease, hypertension, hyperlipidemia, severe osteoarthritis, and morbid obesity.  I reviewed his diabetic log.  Morning sugars are quite variable.  As low as 94 but several times over 200.  He cannot relate it to any particular change in his diet.  His last A1c was 8.3%.  He did increase his NovoLog to 20 units before meals and continue 75 units of Basaglar once daily.  He is complaining of increasing pain in his right hip.  Present for the past several weeks.  He cannot think of any injury.  No fall.  Does have history of a left hip bursitis.  Also with severe osteoarthritis involving multiple joints.  Pain is described as lateral aspect of the hip and radiating down the side of his leg but not going beyond the knee.  He continues on pravastatin to manage his lipids.  Tolerating without any side effects.  Blood pressure has been well controlled.  He was switched to losartan/HCTZ.  Continues on B12 replacement.  Denies any exertional chest pain.  No new shortness of breath.  No abdominal pain.    Review of Systems:  Otherwise, a comprehensive review of systems was negative except as noted.     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)     Hyperlipidemia     Osteoarthritis     Low back pain     HTN (hypertension)     Glaucoma     QUETA on CPAP     Erectile dysfunction     Morbid obesity (H)     Degenerative disc disease, lumbar     Osteoarthritis of both knees     Depression     Osteoarthritis of both hands     Squamous cell skin cancer     Anemia in chronic kidney disease     Bilateral hearing loss     Chronic kidney disease, stage 3 (moderate)     Bursitis, hip, left     COPD (chronic obstructive pulmonary disease) (H)     Fatigue     Traumatic subarachnoid hemorrhage with loss of consciousness of 30 minutes or less (H)     B12 deficiency     Vitamin D deficiency      Intermittent asthma without complication     Complete tear of left rotator cuff     Entropion of left lower eyelid     Trochanteric bursitis of right hip       He has a past surgical history that includes pr removal gallbladder; pr revise median n/carpal tunnel surg; pr excis stomach ulcer,lesn;local; Total knee arthroplasty (Right, 2014); Carpal tunnel release (Bilateral); Eye surgery; Colonoscopy (2003); and Blepharoplasty (2018).    Allergies   Allergen Reactions     Actos [Pioglitazone] Swelling     Edema     Gabapentin Diarrhea     Diarrhea     Lipitor [Atorvastatin]      Back pain     Niacin      Hyperglycemia     Simvastatin      Back pain       Current Outpatient Prescriptions   Medication Sig Dispense Refill     acetaminophen (TYLENOL) 500 MG tablet Take 1,000 mg by mouth 3 (three) times a day. Special Instructions: max: 4000 mg in 24 hours Dx: pain  Three Times A Day; 08:00 AM, 12:00 PM, 04:00 PM       aspirin 81 mg chewable tablet Chew 81 mg daily.       cholecalciferol, vitamin D3, (VITAMIN D3) 2,000 unit Tab Take 1 tablet (2,000 Units total) by mouth daily. 100 tablet 3     citalopram (CELEXA) 20 MG tablet TAKE ONE TABLET BY MOUTH ONCE DAILY 90 tablet 3     cyanocobalamin 1000 MCG tablet Take 1 tablet (1,000 mcg total) by mouth daily. 100 tablet 3     diclofenac sodium (VOLTAREN) 1 % Gel Place 2-4 g on the skin.       insulin glargine (LANTUS; BASAGLAR) 100 unit/mL (3 mL) pen Inject 75 Units under the skin at bedtime. 15 adj dose pen 4     latanoprost (XALATAN) 0.005 % ophthalmic solution Administer 1 drop to both eyes at bedtime.       losartan-hydrochlorothiazide (HYZAAR) 100-25 mg per tablet Take 1 tablet by mouth daily. 90 tablet 3     metFORMIN (GLUCOPHAGE-XR) 500 MG 24 hr tablet TAKE THREE TABLETS (1500MG) BY MOUTH DAILY 270 tablet 3     NOVOLOG FLEXPEN U-100 INSULIN 100 unit/mL injection pen INJECT 20 UNITS SQ WITH BREAKFAST 25 UNITS WITH LUNCH AND 25 UNITS WITH DINNER (Patient taking  "differently: INJECT 20 UNITS SQ WITH BREAKFAST 20 UNITS WITH LUNCH AND 20 UNITS WITH DINNER) 60 mL 11     pravastatin (PRAVACHOL) 10 MG tablet TAKE ONE TABLET BY MOUTH AT BEDTIME 90 tablet 3     UNIFINE PENTIPS 31 gauge x 5/16\" Ndle USE AS DIRECTED 3 TIMES DAILY 300 each 3     No current facility-administered medications for this visit.         Physical Exam   General Appearance:   Obese elderly male who otherwise appears well    /70 (Patient Site: Left Arm, Patient Position: Sitting)  Pulse 70  Ht 5' 10\" (1.778 m)  Wt (!) 329 lb (149.2 kg)  SpO2 97%  BMI 47.21 kg/m2    HEENT: Normal  Respiratory: Normal respiratory effort.  Lungs are clear with no rales or wheezes.  Heart: Regular rate and rhythm without murmurs, rubs, or gallops.  No carotid bruits.  Extremities: No peripheral edema.  Tenderness to palpation over right greater trochanter.  Normal range of motion of hip.           Additional Information   Social History   Substance Use Topics     Smoking status: Former Smoker     Smokeless tobacco: Never Used     Alcohol use 8.4 oz/week     7 Cans of beer, 7 Shots of liquor per week            Ld Moy MD  "

## 2021-06-20 NOTE — LETTER
"Letter by Wilmar Horowitz MD at      Author: Wilmar Horowitz MD Service: -- Author Type: --    Filed:  Encounter Date: 2/13/2020 Status: (Other)         Patient: Robert E Schamberger   MR Number: [id number]   YOB: 1934   Date of Visit: 2/13/2020     [facility]    Facility:   [facility] [[id number]]   Code Status: FULL CODE      CHIEF COMPLAINT/REASON FOR VISIT:  Chief Complaint   Patient presents with   ? H & P       HISTORY:      HPI: Mr. 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    Hospital course: Patient was admitted at [facility] between February 3 and February 6 presenting with bilateral \"numbness tingling\" as well as change in speech he says slurring hospital notes as gibberish.  Evaluation included negative head CT followed by a nonacute brain MRI.  He also had negative VFSS.  Echocardiogram showed severe MAC with EF 65% normal LV function.  He had negative carotid ultrasounds.  He had EEG showing encephalopathy but no evidence of seizures.  He was seen by neurology who felt that this represented TIA/CVA.  Dual antiplatelet therapy was recommended for 3 weeks then Plavix alone to follow.    Subjective/review of systems:  -Compromised by patient's memory deficit  -Augmented by discussion with staff, record review     Patient reports he feels good today.  He thinks he is back to his baseline.  He is noticing no problems with his speech.  He denies headaches.  He denies change in vision swallowing hearing.  He denies chest pain orthopnea PND peripheral " edema cough wheezing abdominal pain nausea vomiting diarrhea melena bright red blood per rectum dysuria.  He does have some bladder urgency which is chronic and no dysuria.  Denies falls or injuries.  Denies fever sweats or chills.  Denies exposure to known or named illness.  He says that his blood sugars at home running really quite good 80-1 15 on his fasting check in the morning with no known hypoglycemia.  He says he does not always check it again but if he does it around suppertime and he hopes to be a 200 or less.  He says he does not feel like he is depressed.  Remainder 13 system ROS negative    Social history  Patient and his wife have lived on [address] in their home for 58 years.  He is going to be moving in September to the new facility at [place] [address] of [address] [facility] which is been held right now, they have a unit reserved.  He says they are doing this more for his wife who has difficulty with steps at home than for him.  He says he has 1 son who lives in [place] and is helpful when needed.    Past Medical History:   Diagnosis Date   ? Anemia in chronic kidney disease    ? B12 deficiency 11/9/2017   ? Bilateral hearing loss 5/9/2016   ? Bursitis, hip, left 3/2/2017   ? Chest pain     Normal GXT 2006   ? Chronic kidney disease, stage 3 (moderate) (H)     Worsening with hyperkalemia on Relafen-discontinued August 2016   ? Closed displaced fracture of left clavicle 9/21/2017   ? Cognitive changes 11/30/2018    SLUMS 20/30 Nov 2018   ? Complete tear of left rotator cuff 2/15/2018    Associated with fall and clavicular fracture that occurred 2017, evaluated by orthopedics, given cortisone injection and physical therapy recommended   ? Degenerative disc disease, lumbar    ? Depression    ? Diabetic peripheral neuropathy (H) 11/30/2018   ? Erectile dysfunction    ? Fatigue 7/6/2017   ? Forearm tendonitis 11/29/2016   ? Glaucoma    ? Gout attack     Left foot   ? HTN (hypertension)    ?  Hyperlipidemia    ? Ingrown toenail 7/6/2017   ? Intermittent asthma without complication 11/9/2017   ? Left leg cellulitis 2014   ? Low back pain    ? 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 Morbid obesity (H)    ? QUETA on CPAP    ? Osteoarthritis    ? Osteoarthritis of both hands    ? Osteoarthritis of both knees     Right TKA   ? Pain of right heel 5/11/2018   ? Peripheral neuropathy 10/9/2018   ? Rib fracture 2005   ? Right-sided chest pain 5/9/2016   ? Screening     No AAA on CT scan 2009   ? Squamous cell skin cancer 11/2/2015   ? TIA (transient ischemic attack) 09/06/2019    CT head showing chronic lacunar infarcts.  Carotid ultrasound with atherosclerotic plaque but no severe stenosis.   ? Traumatic subarachnoid hemorrhage with loss of consciousness of 30 minutes or less (H) 9/21/2017    Fall down the stairs following alcohol use with traumatic subarachnoid hemorrhage treated conservatively at [facility] followed by stay at TCU with no residual neurologic deficits   ? Trochanteric bursitis of right hip 8/24/2018   ? 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 Unsteady gait 3/4/2019   ? Vitamin D deficiency 11/9/2017             No family history on file.  Social History     Socioeconomic History   ? Marital status:      Spouse name: None   ? Number of children: None   ? Years of education: None   ? Highest education level: None   Occupational History   ? None   Social Needs   ? Financial resource strain: None   ? Food insecurity:     Worry: None     Inability: None   ? Transportation needs:     Medical: None     Non-medical: None   Tobacco Use   ? Smoking status: Former Smoker   ? Smokeless tobacco: Never Used   Substance and Sexual Activity   ? Alcohol use: Yes     Alcohol/week: 14.0 standard drinks     Types: 7 Cans of beer, 7 Shots of liquor per week   ? Drug use: No   ? Sexual activity: None   Lifestyle   ? Physical activity:     Days per week: None     Minutes per  session: None   ? Stress: None   Relationships   ? Social connections:     Talks on phone: None     Gets together: None     Attends [employer] service: None     Active member of club or organization: None     Attends meetings of clubs or organizations: None     Relationship status: None   ? Intimate partner violence:     Fear of current or ex partner: None     Emotionally abused: None     Physically abused: None     Forced sexual activity: None   Other Topics Concern   ? None   Social History Narrative    Semi retired     x 47 years                 Review of Systems as above    Vitals:    02/13/20 1957   BP: 158/63   Pulse: 63   Resp: 18   Temp: 97.1  F (36.2  C)   SpO2: 96%   Weight: (!) 321 lb (145.6 kg)       Physical Exam  Patient is alert oriented x3 extremely pleasant and normally conversant.  His cognitive impairment does not seem to be as severe as his slums score would indicate an casual conversation, I did not do formal MMSE today.  Normocephalic atraumatic sclera clear nonicteric EOMI no facial droop.  Tongue movements are normal.  Oropharynx is crowded but without erythema or exudate.  Neck is supple no adenopathy or mass I cannot see neck veins.  Heart is distant S1-S2 but regular without audible murmur gallop or rub.  Lungs are clear and he moves air easily.  Difficult abdominal exam with very large taut abdomen but no guarding with normal bowel sounds no organomegaly or mass.  He has impressive T shaped abdominal surgical scar which she says was from his gastrectomy now 40 or 50 years ago.  Extremities show large thick legs but no pitting edema.  Skin is warm and pink with good cap refill in hands and and legs.  Antigravity strength in his legs.  LABS:   Results for SCHAMBERGER, ROBERT E (MRN [id number]) as of 2/13/2020 20:08   Ref. Range 2/5/2020 08:38   Sodium Latest Ref Range: 136 - 145 mmol/L 137   Potassium Latest Ref Range: 3.5 - 5.0 mmol/L 4.4   Chloride Latest Ref Range: 98 - 107 mmol/L  104   CO2 Latest Ref Range: 22 - 31 mmol/L 25   Anion Gap, Calculation Latest Ref Range: 5 - 18 mmol/L 8   BUN Latest Ref Range: 8 - 28 mg/dL 24   Creatinine Latest Ref Range: 0.70 - 1.30 mg/dL 1.41 (H)   GFR MDRD Af Amer Latest Ref Range: >60 mL/min/1.73m2 58 (L)   GFR MDRD Non Af Amer Latest Ref Range: >60 mL/min/1.73m2 48 (L)   Calcium Latest Ref Range: 8.5 - 10.5 mg/dL 8.7   Magnesium Latest Ref Range: 1.8 - 2.6 mg/dL 1.7 (L)   Glucose Latest Ref Range: 70 - 125 mg/dL 98   Platelets Latest Ref Range: 140 - 440 thou/uL 195       ASSESSMENT:      ICD-10-CM    1. Diabetic peripheral neuropathy (H) E11.42    2. QUETA on CPAP G47.33     Z99.89    3. Hyperlipidemia, unspecified hyperlipidemia type E78.5    4. TIA (transient ischemic attack) G45.9    5. Primary osteoarthritis involving multiple joints M15.0    6. Chronic kidney disease, stage 3 (moderate) (H) N18.3    7. Cognitive changes R41.89    8. Moderate major depression (H) F32.1        Assessment/plan    Cerebrovascular disease  TIA/CVA     Patient appears back to baseline by history.  He has on aspirin 325+ Plavix 75 for 3 weeks and will be on Plavix alone at 75 mg daily thereafter.  -PT, OT,   -Statin therapy  -Blood pressure therapy  -Blood sugar control    Hypertension     Blood pressures look adequately controlled, continue hydrochlorothiazide and losartan.    Hyperlipidemia     Continue pravastatin 20 daily, outpatient follow-up    Insulin-dependent type 2 diabetes    Reports excellent control, supported by A1c below.   Lab Results   Component Value Date    HGBA1C 7.1 (H) 01/14/2020      Continue Lantus 75 at bedtime barring hypoglycemia.  Continue metformin 1500 daily.    Peripheral neuropathy     Continue Lyrica 50 mg    Cognitive impairment     Slums score was only 10 which is a bit surprising to me based on my conversation with him today.  OT consultation is ordered.  Social service consult ordered.    Asthma     By history patient is  asymptomatic    Depression     Continue citalopram 10 mg daily    Glaucoma/gout/osteoarthritis, obesity, other chronic medical problems are reviewed, no changes were made today.        Electronically signed by: Wilmar Horowitz MD

## 2021-06-20 NOTE — LETTER
Letter by Brittany Davalos NP at      Author: Brittany Davalos NP Service: -- Author Type: --    Filed:  Encounter Date: 2020 Status: (Other)         Patient: Robert E Schamberger   MR Number: [id number]   YOB: 1934   Date of Visit: 2020                 [facility]    DATE: 2020    NAME:  Robert E Schamberger             :  1934  MRN: [id number]  CODE STATUS:  FULL CODE    VISIT TYPE: Problem Visit (hospital f/u)     FACILITY:  [facility] [[id number]]       CHIEF COMPLAIN/REASON FOR VISIT:    Chief Complaint   Patient presents with   ? Problem Visit     hospital f/u               HISTORY OF PRESENT ILLNESS: Robert E Schamberger is a 85 y.o. male who was admitted 2/3- for numbness and tingling in bue and ble, garbled speech, unsteady gait. CT negative and later MRI negative as well. His vitals were stable and blood glucose within normal limits. He was suspected of having TIA, as he has previously had. Neurology followed and EEG showed mild encephalopathy. Echo and carotid US stable. He passed swallow eval. He did have some bradycardia with concerns for sick sinus syndrome. He was recommended 30 day cardiac monitor. He was started on aspirin x 3 weeks with plavix and then will continue only plavix. He scored 10/30 on slums. His novolog dose was reduced with meals due to hypoglycemic during inpatient stay. [facility]. He has PMH of obesity, type 2 DM, OA, HTN.     Today Mr. Schamberger is seen for hospital follow up. He says his speech has been fine since he has been here and no further numbness or tingling in his arms or legs. He is not having any headaches or difficulty swallowing. He denies recent cough or cold symptoms. He thinks therapy is going well. He thinks his bowels are moving fine. He does not feel constipated. His appetite is great and no issues there. No breathing concerns or pain today. He says he is  wearing his cpap at night and no issues with his machine. He did not sleep last night but says he does not sleep well in places like this. He denies any blood in stool or urine and no urinary issues. He offers no acute complaints today.     REVIEW OF SYSTEMS:  PROBLEMS AND REVIEW OF SYSTEMS:   Today on ROS:   Currently, no fever, chills, or rigors. Decreased vision and hearing. Denies any chest pain, headaches, palpitations, lightheadedness, dizziness, shortness of breath, or cough. Appetite is good. Denies any GERD symptoms. Denies any difficulty with swallowing, nausea, or vomiting.  Denies any abdominal pain, diarrhea or constipation. Denies any urinary symptoms. No active bleeding. No rash. Positive for insomnia, cpap use      Allergies   Allergen Reactions   ? Actos [Pioglitazone] Swelling     Edema   ? Gabapentin Diarrhea     Diarrhea   ? Lipitor [Atorvastatin]      Back pain   ? Niacin      Hyperglycemia   ? Simvastatin      Back pain     Current Outpatient Medications   Medication Sig   ? acetaminophen (TYLENOL) 500 MG tablet Take 1,000 mg by mouth 3 (three) times a day. Special Instructions: max: 4000 mg in 24 hours Dx: pain  Three Times A Day; 08:00 AM, 12:00 PM, 04:00 PM   ? aspirin 81 MG EC tablet Take 1 tablet (81 mg total) by mouth daily for 21 days. Then discontinue   ? blood glucose test (GLUCOSE BLOOD) strips Test up to 3 times daily   ? cholecalciferol, vitamin D3, (VITAMIN D3) 2,000 unit Tab Take 1 tablet (2,000 Units total) by mouth daily.   ? citalopram (CELEXA) 20 MG tablet Take 0.5 tablets (10 mg total) by mouth at bedtime.   ? clopidogreL (PLAVIX) 75 mg tablet Take 1 tablet (75 mg total) by mouth daily.   ? cyanocobalamin 1000 MCG tablet Take 1 tablet (1,000 mcg total) by mouth daily.   ? diclofenac sodium (VOLTAREN) 1 % Gel Place 2-4 g on the skin 4 (four) times a day as needed.    ? hydroCHLOROthiazide (HYDRODIURIL) 25 MG tablet Take 25 mg by mouth daily.   ? insulin aspart U-100 (NOVOLOG  "FLEXPEN U-100 INSULIN) 100 unit/mL (3 mL) injection pen Inject 10 Units under the skin 3 (three) times a day before meals.   ? insulin glargine (BASAGLAR KWIKPEN) 100 unit/mL (3 mL) pen INJECT 75 UNITS UNDER THE SKIN AT BEDTIME -REPLACES LANTUS (Patient taking differently: Inject 75 Units under the skin at bedtime. )   ? latanoprost (XALATAN) 0.005 % ophthalmic solution Administer 1 drop to both eyes at bedtime.   ? losartan (COZAAR) 100 MG tablet Take 100 mg by mouth daily.   ? magnesium oxide (MAGOX) 400 mg (241.3 mg magnesium) tablet Take 1 tablet (400 mg total) by mouth 2 (two) times a day.   ? metFORMIN (GLUCOPHAGE-XR) 500 MG 24 hr tablet TAKE THREE TABLETS (1500MG) BY MOUTH DAILY   ? pravastatin (PRAVACHOL) 20 MG tablet Take 1 tablet (20 mg total) by mouth daily.   ? pregabalin (LYRICA) 50 MG capsule Take 1 capsule (50 mg total) by mouth at bedtime.   ? UNIFINE PENTIPS 31 gauge x 5/16\" Ndle USE AS DIRECTED 3 TIMES DAILY     Past Medical History:    Past Medical History:   Diagnosis Date   ? Anemia in chronic kidney disease    ? B12 deficiency 11/9/2017   ? Bilateral hearing loss 5/9/2016   ? Bursitis, hip, left 3/2/2017   ? Chest pain     Normal GXT 2006   ? Chronic kidney disease, stage 3 (moderate) (H)     Worsening with hyperkalemia on Relafen-discontinued August 2016   ? Closed displaced fracture of left clavicle 9/21/2017   ? Cognitive changes 11/30/2018    SLUMS 20/30 Nov 2018   ? Complete tear of left rotator cuff 2/15/2018    Associated with fall and clavicular fracture that occurred 2017, evaluated by orthopedics, given cortisone injection and physical therapy recommended   ? Degenerative disc disease, lumbar    ? Depression    ? Diabetic peripheral neuropathy (H) 11/30/2018   ? Erectile dysfunction    ? Fatigue 7/6/2017   ? Forearm tendonitis 11/29/2016   ? Glaucoma    ? Gout attack     Left foot   ? HTN (hypertension)    ? Hyperlipidemia    ? Ingrown toenail 7/6/2017   ? Intermittent asthma without " complication 11/9/2017   ? Left leg cellulitis 2014   ? Low back pain    ? 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 Morbid obesity (H)    ? QUETA on CPAP    ? Osteoarthritis    ? Osteoarthritis of both hands    ? Osteoarthritis of both knees     Right TKA   ? Pain of right heel 5/11/2018   ? Peripheral neuropathy 10/9/2018   ? Rib fracture 2005   ? Right-sided chest pain 5/9/2016   ? Screening     No AAA on CT scan 2009   ? Squamous cell skin cancer 11/2/2015   ? TIA (transient ischemic attack) 09/06/2019    CT head showing chronic lacunar infarcts.  Carotid ultrasound with atherosclerotic plaque but no severe stenosis.   ? Traumatic subarachnoid hemorrhage with loss of consciousness of 30 minutes or less (H) 9/21/2017    Fall down the stairs following alcohol use with traumatic subarachnoid hemorrhage treated conserv[facility] at [facility] followed by stay at [facility] with no residual neurologic deficits   ? Trochanteric bursitis of right hip 8/24/2018   ? 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 Unsteady gait 3/4/2019   ? Vitamin D deficiency 11/9/2017           PHYSICAL EXAMINATION  Vitals:    02/07/20 0700   BP: 135/62   Pulse: (!) 57   Resp: 18   Temp: 97.1  F (36.2  C)   SpO2: 94%       Today on physical exam:     GENERAL: Awake, Alert, not in any form of acute distress, answers questions appropriately, follows simple commands, conversant, obese  HEENT: Head is normocephalic with normal hair distribution. No evidence of trauma. Ears: No acute purulent discharge. Eyes: Conjunctivae pink with no scleral jaundice. Nose: Normal mucosa and septum. NECK: Supple with no cervical or supraclavicular lymphadenopathy. Trachea is midline. [place], decreased vision  CHEST: No tenderness or deformity, no crepitus  LUNG: dim to auscultation with good chest expansion. There are no crackles or wheezes, normal AP diameter.   BACK: No kyphosis of the thoracic spine. Symmetric, no curvature, ROM normal, no CVA  tenderness, no spinal tenderness   CVS: There is good S1  S2, regular rhythm, there are no murmurs, rubs, gallops, or heaves,  2+ pulses symmetric in all extremities.  ABDOMEN: obese and soft, nontender to palpation, non distended, no masses, no organomegaly, good bowel sounds, no rebound or guarding, no peritoneal signs.   EXTREMITIES: trace ble pedal edema, no numb/tingling   SKIN: Warm and dry, dry and cracked skin on ble.  NEUROLOGICAL: The patient is oriented to person, place, forgetful. Most recent slums 10/30.            LABS:   Recent Results (from the past 168 hour(s))   ECG 12 lead nursing unit performed   Result Value Ref Range    SYSTOLIC BLOOD PRESSURE 149 mmHg    DIASTOLIC BLOOD PRESSURE 71 mmHg    VENTRICULAR RATE 79 BPM    ATRIAL RATE 79 BPM    P-R INTERVAL 222 ms    QRS DURATION 96 ms    Q-T INTERVAL 378 ms    QTC CALCULATION (BEZET) 433 ms    P Axis 63 degrees    R AXIS 60 degrees    T AXIS 62 degrees    MUSE DIAGNOSIS       Sinus rhythm with sinus arrhythmia with 1st degree A-V block  Otherwise normal ECG  No previous ECGs available  Confirmed by SEE ED PROVIDER NOTE FOR, ECG INTERPRETATION ([id number]),  Elmo Borrego ([id number]) on 2/3/2020 2:04:06 PM     HM2(CBC w/o Differential)   Result Value Ref Range    WBC 8.3 4.0 - 11.0 thou/uL    RBC 4.23 (L) 4.40 - 6.20 mill/uL    Hemoglobin 12.9 (L) 14.0 - 18.0 g/dL    Hematocrit 39.3 (L) 40.0 - 54.0 %    MCV 93 80 - 100 fL    MCH 30.5 27.0 - 34.0 pg    MCHC 32.8 32.0 - 36.0 g/dL    RDW 13.0 11.0 - 14.5 %    Platelets 220 140 - 440 thou/uL    MPV 11.6 8.5 - 12.5 fL   Comprehensive Metabolic Panel   Result Value Ref Range    Sodium 136 136 - 145 mmol/L    Potassium 4.6 3.5 - 5.0 mmol/L    Chloride 100 98 - 107 mmol/L    CO2 25 22 - 31 mmol/L    Anion Gap, Calculation 11 5 - 18 mmol/L    Glucose 179 (H) 70 - 125 mg/dL    BUN 28 8 - 28 mg/dL    Creatinine 1.48 (H) 0.70 - 1.30 mg/dL    GFR MDRD Af Amer 55 (L) >60 mL/min/1.73m2    GFR MDRD Non Af Amer 45  (L) >60 mL/min/1.73m2    Bilirubin, Total 0.6 0.0 - 1.0 mg/dL    Calcium 8.9 8.5 - 10.5 mg/dL    Protein, Total 6.6 6.0 - 8.0 g/dL    Albumin 3.5 3.5 - 5.0 g/dL    Alkaline Phosphatase 109 45 - 120 U/L    AST 22 0 - 40 U/L    ALT 35 0 - 45 U/L   POCT Glucose   Result Value Ref Range    Glucose 163 (H) 70 - 139 mg/dL   POCT Glucose   Result Value Ref Range    Glucose 212 (H) 70 - 139 mg/dL   POCT Glucose   Result Value Ref Range    Glucose 194 (H) 70 - 139 mg/dL   Echo Complete   Result Value Ref Range    LV volume diastolic 160 (!) 62 - 150 cm3    LV volume systolic 59.3 21 - 61 cm3    LVOT diam 2.3 cm    LVOT mean gradient 3 mmHg    LVOT peak VTI 29.1 cm    LVOT mean judson 82.1 cm/s    LVOT peak judson 122 cm/s    LVOT peak gradient 6 mmHg    MV E' lat judson 6.2 cm/s    MV E' med judson 5.66 cm/s    AV mean judson 78.5 cm/s    AV mean gradient 3 mmHg    AV VTI 30.3 cm    AV peak judson 130 cm/s    AO root 4.1 cm    AO ascending 3.8 cm    MV decel slope 3,910 mm/s2    MV decel time 384 ms    MV P 1/2 time 112 ms    MV peak A judson 153 cm/s    MV peak E judson 150 cm/s    BSA 2.75 m2    Hieght 72 in    Weight 5,196.8 lbs    /73 mmHg    HR 51 bpm    Echo LVEF calculated 63 55 - 75 %    LA volume 117.6 mL    AV area 4.0 cm2    AV DIM IND judson 0.9     MV area p 1/2 time 2.0 cm2    MV E/A Ratio 1.0     LVOT area 4.15 cm2    LVOT .8 cm3    AV peak gradient 6.8 mmHg    LV systolic volume index 21.6 11 - 31 cm3/m2    LV diastolic volume index 58.2 34 - 74 cm3/m2    LA volume index 42.7 mL/m2    LV SVi 43.9 ml/m2    TAPSE 2.3 cm    MV med E/e' ratio 26.5     MV lat E/e' ratio 24.2     LV CO 6.2 l/min    LV Ci 2.2 l/min/m2    LA area 2 37.2 cm2    LA area 1 29.0 cm2    Height 72.0 in    Weight 325 lbs    MV Avg E/e' Ratio 25.3 cm/s    LA length 7.8 cm    AV DIM IND VTI 1.0    POCT Glucose   Result Value Ref Range    Glucose 185 (H) 70 - 139 mg/dL   POCT Glucose   Result Value Ref Range    Glucose 163 (H) 70 - 139 mg/dL   POCT Glucose    Result Value Ref Range    Glucose 247 (H) 70 - 139 mg/dL   POCT Glucose   Result Value Ref Range    Glucose 94 70 - 139 mg/dL   Magnesium   Result Value Ref Range    Magnesium 1.7 (L) 1.8 - 2.6 mg/dL   Basic Metabolic Panel   Result Value Ref Range    Sodium 137 136 - 145 mmol/L    Potassium 4.4 3.5 - 5.0 mmol/L    Chloride 104 98 - 107 mmol/L    CO2 25 22 - 31 mmol/L    Anion Gap, Calculation 8 5 - 18 mmol/L    Glucose 98 70 - 125 mg/dL    Calcium 8.7 8.5 - 10.5 mg/dL    BUN 24 8 - 28 mg/dL    Creatinine 1.41 (H) 0.70 - 1.30 mg/dL    GFR MDRD Af Amer 58 (L) >60 mL/min/1.73m2    GFR MDRD Non Af Amer 48 (L) >60 mL/min/1.73m2   Platelet Count - every other day x 3   Result Value Ref Range    Platelets 195 140 - 440 thou/uL   POCT Glucose   Result Value Ref Range    Glucose 78 70 - 139 mg/dL   POCT Glucose   Result Value Ref Range    Glucose 156 (H) 70 - 139 mg/dL   POCT Glucose   Result Value Ref Range    Glucose 184 (H) 70 - 139 mg/dL   POCT Glucose   Result Value Ref Range    Glucose 108 70 - 139 mg/dL   POCT Glucose   Result Value Ref Range    Glucose 183 (H) 70 - 139 mg/dL     Results for orders placed or performed during the hospital encounter of 02/03/20   Basic Metabolic Panel   Result Value Ref Range    Sodium 137 136 - 145 mmol/L    Potassium 4.4 3.5 - 5.0 mmol/L    Chloride 104 98 - 107 mmol/L    CO2 25 22 - 31 mmol/L    Anion Gap, Calculation 8 5 - 18 mmol/L    Glucose 98 70 - 125 mg/dL    Calcium 8.7 8.5 - 10.5 mg/dL    BUN 24 8 - 28 mg/dL    Creatinine 1.41 (H) 0.70 - 1.30 mg/dL    GFR MDRD Af Amer 58 (L) >60 mL/min/1.73m2    GFR MDRD Non Af Amer 48 (L) >60 mL/min/1.73m2         Lab Results   Component Value Date    WBC 8.3 02/03/2020    HGB 12.9 (L) 02/03/2020    HCT 39.3 (L) 02/03/2020    MCV 93 02/03/2020     02/05/2020       Lab Results   Component Value Date    [id number] 471 02/15/2018     Lab Results   Component Value Date    HGBA1C 7.1 (H) 01/14/2020     Lab Results   Component Value Date     INR 1.13 (H) 01/09/2014    INR 1.13 (H) 01/08/2014    INR 1.07 01/07/2014     Vitamin D, Total (25-Hydroxy)   Date Value Ref Range Status   08/29/2017 20.9 (L) 30.0 - 80.0 ng/mL Final     Lab Results   Component Value Date    TSH 3.74 11/30/2018           ASSESSMENT/PLAN:    1. TIA, recurrent: Garbled speech, numb/tingling bue and ble resolved. No dysarthria today. On aspirin x 3 weeks, plavix, pravastatin. F/u with neuro prn.   2. Type 2 DM: Accuchecks 128-231 recently, novolog recently reduced. On metformin, lantus.    3. QUETA: ON CPAP. No concerns.   4. HTN: SBP 130s. On losartan, HCTZ.   5. Cognitive impairment: a/o x2 today, forgetful and confused at times. Slums 10/30. ST and Ot following. Provide safe environment.   6. CKD stage 3: Cr 1.41 on 2/5. Stable.   7. Dry skin: cracked and dry on legs. Will order eucerin cream at bedtime and daily prn.   8. Depression: controlled on citalopram.   9. Chronic pain, diabetic neuropathy: on tylenol, lyrica. Denies pain today.   10. Obesity: Weight 325lbs. Counseling provided on weight loss, improving fruit and vegetable intake, healthier dietary habits and increasing physical activity.      Electronically signed by: Brittany Davalos NP

## 2021-06-20 NOTE — LETTER
Letter by Tosha Albright CNP at      Author: Tosha Albright CNP Service: -- Author Type: --    Filed:  Encounter Date: 2/12/2020 Status: (Other)         Patient: Robert E Schamberger   MR Number: [id number]   YOB: 1934   Date of Visit: 2/12/2020     [facility]    Facility:   [facility] [[id number]]   Code Status: FULL CODE and POLST AVAILABLE      CHIEF COMPLAINT/REASON FOR VISIT:  Chief Complaint   Patient presents with   ? Review Of Multiple Medical Conditions     TCU intake       HISTORY:      HPI: Sonu is a 85 y.o. male who  has a past medical history of Anemia in chronic kidney disease, B12 deficiency (11/9/2017), Bilateral hearing loss (5/9/2016), Bursitis, hip, left (3/2/2017), Chest pain, Chronic kidney disease, stage 3 (moderate) (H), Closed displaced fracture of left clavicle (9/21/2017), Cognitive changes (11/30/2018), Complete tear of left rotator cuff (2/15/2018), Degenerative disc disease, lumbar, Depression, Diabetic peripheral neuropathy (H) (11/30/2018), Erectile dysfunction, Fatigue (7/6/2017), Forearm tendonitis (11/29/2016), Glaucoma, Gout attack, HTN (hypertension), Hyperlipidemia, Ingrown toenail (7/6/2017), Intermittent asthma without complication (11/9/2017), Left leg cellulitis (2014), Low back pain, 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), Morbid obesity (H), QUETA on CPAP, Osteoarthritis, Osteoarthritis of both hands, Osteoarthritis of both knees, Pain of right heel (5/11/2018), Peripheral neuropathy (10/9/2018), Rib fracture (2005), Right-sided chest pain (5/9/2016), Screening, Squamous cell skin cancer (11/2/2015), TIA (transient ischemic attack) (09/06/2019), Traumatic subarachnoid hemorrhage with loss of consciousness of 30 minutes or less (H) (9/21/2017), Trochanteric bursitis of right hip (8/24/2018), 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  Sonu  "was recently admitted to the hospital for bilateral leg weakness and was found to have a possible TIA.  He was admitted at [facility] from 2/3/2020 to 2/6/2020.  The discharging provider summarized the hospitalization as follows:    \"85-year-old male with hypertension, morbid obesity, type 2 diabetes, and severe osteoarthritis who presented with concerns for possible TIA.  Awoke feeling numbness and tingling in both arms and legs.  Unsteady on his feet when trying to ambulate.  Speech was gibberish.  He has had several similar episodes over the last 2 years usually lasting under 30 minutes although usually difficulties with word finding.  No associated headache, chest pain or palpitations.  He reported that blood sugar at home while symptomatic was normal.  Blood pressure was okay when paramedics arrived.  No reported bradycardia.  Work-up in ER included CT scan of head showing no acute findings.  Subsequent MRI of brain without infarct or other abnormality.  Neurology consulted. EEG showed mild encephalopathy.  Echocardiogram with normal left ventricular systolic function.  Carotid ultrasound without significant stenosis.  Normal swallowing study.  Monitored on cardiac telemetry.  A couple occasions with heart rate in the high 40s raising question of sick sinus syndrome.  Neurology recommending adding Plavix 75 mg daily and taking in combination with aspirin for the next 3 weeks.  Thereafter, he should discontinue aspirin but continue Plavix indefinitely.  He will continue his current dose of pravastatin.  Blood pressure adequately controlled.  No recurrent neurologic symptoms but did poorly with cognitive assessment performed by Occupational Therapy scoring 10/30 on SLUMS exam.  Also unsteady on his feet during physical therapy assessment and recommendation to continue PT/OT in a TCU setting before returning home.  Blood sugars running lower during hospitalization and cut back on his dose of NovoLog " "before meals.  He will continue 75 units of Lantus at bedtime.  He should wear CPAP at bedtime for sleep apnea.  Because of above concern for possible sick sinus syndrome as an alternative explanation for his recurrent symptoms, my office will arrange for a 30-day event monitor as an outpatient.  He is discharging to the TCU in stable condition.  I would like him to follow-up with me in 1 week after discharge.\"    Today Sonu is being evaluated for an intake into the transitional care unit.  He states that he has been doing well and has no new concerns or issues to report.  He denies any ongoing issues with TIA symptoms, weakness, or memory loss.  He states that he has been doing relatively well.  He has been working with physical and occupational therapies and feels that he is getting stronger.  He continues to eat well.  He has no problems with urinating or having bowel movements.  He will follow-up with cardiology and did have a Holter monitor placed.   Sonu denies any chest symptoms. Sonu denies any other concerns including fevers/chills, cough or cold symptoms, headaches, vision changes, chest pain/pressure, difficulty breathing, SOB, abdominal pain, nausea, vomiting, diarrhea, dysuria, increasing weakness, increasing pain.     Advanced Care Planning  Spoke with Sonu regarding code status and advanced care planning.  Sonu consented to discussion and is aware of possible copay. They are also aware of the necessity of this discussion due to TCU admission. Discussed that he would like to have full resuscitation efforts. he would also like to have treatment if he  were to fall ill. he would like full medical treatment for all medical issues.  His wife Nely would decide for her/him if he  was unable to make medical decisions. he agrees to IV/IM antibiotics.  He does not want a feeding tube but is agreeable if necessary and is appropriate.  He does request to have it noted that he is Nonden[employer]ational.    Past " Medical History:   Diagnosis Date   ? Anemia in chronic kidney disease    ? B12 deficiency 11/9/2017   ? Bilateral hearing loss 5/9/2016   ? Bursitis, hip, left 3/2/2017   ? Chest pain     Normal GXT 2006   ? Chronic kidney disease, stage 3 (moderate) (H)     Worsening with hyperkalemia on Relafen-discontinued August 2016   ? Closed displaced fracture of left clavicle 9/21/2017   ? Cognitive changes 11/30/2018    SLUMS 20/30 Nov 2018   ? Complete tear of left rotator cuff 2/15/2018    Associated with fall and clavicular fracture that occurred 2017, evaluated by orthopedics, given cortisone injection and physical therapy recommended   ? Degenerative disc disease, lumbar    ? Depression    ? Diabetic peripheral neuropathy (H) 11/30/2018   ? Erectile dysfunction    ? Fatigue 7/6/2017   ? Forearm tendonitis 11/29/2016   ? Glaucoma    ? Gout attack     Left foot   ? HTN (hypertension)    ? Hyperlipidemia    ? Ingrown toenail 7/6/2017   ? Intermittent asthma without complication 11/9/2017   ? Left leg cellulitis 2014   ? Low back pain    ? 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 Morbid obesity (H)    ? QUETA on CPAP    ? Osteoarthritis    ? Osteoarthritis of both hands    ? Osteoarthritis of both knees     Right TKA   ? Pain of right heel 5/11/2018   ? Peripheral neuropathy 10/9/2018   ? Rib fracture 2005   ? Right-sided chest pain 5/9/2016   ? Screening     No AAA on CT scan 2009   ? Squamous cell skin cancer 11/2/2015   ? TIA (transient ischemic attack) 09/06/2019    CT head showing chronic lacunar infarcts.  Carotid ultrasound with atherosclerotic plaque but no severe stenosis.   ? Traumatic subarachnoid hemorrhage with loss of consciousness of 30 minutes or less (H) 9/21/2017    Fall down the stairs following alcohol use with traumatic subarachnoid hemorrhage treated conservatively at [facility] followed by stay at TCU with no residual neurologic deficits   ? Trochanteric bursitis of right hip 8/24/2018   ?  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 Unsteady gait 3/4/2019   ? Vitamin D deficiency 11/9/2017             No family history on file.  Social History     Socioeconomic History   ? Marital status:      Spouse name: Not on file   ? Number of children: Not on file   ? Years of education: Not on file   ? Highest education level: Not on file   Occupational History   ? Not on file   Social Needs   ? Financial resource strain: Not on file   ? Food insecurity:     Worry: Not on file     Inability: Not on file   ? Transportation needs:     Medical: Not on file     Non-medical: Not on file   Tobacco Use   ? Smoking status: Former Smoker   ? Smokeless tobacco: Never Used   Substance and Sexual Activity   ? Alcohol use: Yes     Alcohol/week: 14.0 standard drinks     Types: 7 Cans of beer, 7 Shots of liquor per week   ? Drug use: No   ? Sexual activity: Not on file   Lifestyle   ? Physical activity:     Days per week: Not on file     Minutes per session: Not on file   ? Stress: Not on file   Relationships   ? Social connections:     Talks on phone: Not on file     Gets together: Not on file     Attends Taoism service: Not on file     Active member of club or organization: Not on file     Attends meetings of clubs or organizations: Not on file     Relationship status: Not on file   ? Intimate partner violence:     Fear of current or ex partner: Not on file     Emotionally abused: Not on file     Physically abused: Not on file     Forced sexual activity: Not on file   Other Topics Concern   ? Not on file   Social History Narrative    Semi retired     x 47 years               REVIEW OF SYSTEM:  Per HPI    PHYSICAL EXAM:   /63   Pulse 63   Temp 97.1  F (36.2  C)   Resp 18   Wt (!) 319 lb 12.8 oz (145.1 kg)   SpO2 96%   BMI 43.37 kg/m      Morbid obesity significantly limits examination.'  General appearance: alert, appears stated age and cooperative  HEENT: Head is normocephalic  with normal hair distribution. No evidence of trauma. Ears: Without lesions or deformity. No acute purulent discharge. Eyes: Conjunctivae pink with no scleral icterus or erythema. Nose: Normal mucosa and septum. Oropharnyx: mmm, no lesions present.  Lungs: clear to auscultation bilaterally however diminished at bases, respirations without effort  Heart: regular rate and rhythm, S1, S2 normal, no murmur, click, rub or gallop  Abdomen: Obese, soft, non-tender; bowel sounds normal; no masses,  no organomegaly  Extremities: extremities normal, atraumatic, no cyanosis or edema  Pulses: 2+ and symmetric  Skin: Skin color, texture, turgor normal. No rashes or lesions  Neurologic: Grossly normal   Psych: interacts well with caregivers, exhibits logical thought processes and connections, pleasant    LABS:   None today.    ASSESSMENT:      ICD-10-CM    1. TIA (transient ischemic attack) G45.9    2. Physical deconditioning R53.81    3. Morbid obesity (H) E66.01    4. Cognitive changes R41.89    5. Unsteady gait R26.81    6. Advanced care planning/counseling discussion Z71.89        PLAN:    Physical Deconditioning  -Continue PT/OT and other therapies as per care plan.  -Encouraged good nutrition and movement habits.   -Discussed care plan and expected course of stay.   -Continue to follow-up per routine schedule or sooner if needed.     TIA/cognitive changes/unsteady gait  -SLP to eval and treat.  - mg by mouth daily for 21 days for TIA.  -Plavix 75 mg by mouth once daily.  -Pravastatin 20 mg by mouth daily.    Morbid obesity  -Dietitian to eval and treat.    Advanced Care Planning  -POLST reviewed and signed.   -Full Code.     Admission history and physical per MD in the next 30 days. At this time continue current care plan for all chronic medical conditions, as they are stable. Encouraged patient to engage in PT/OT for strengthening and conditioning. Encouraged patient to work closely with nursing staff to ensure any  medical complaints are quickly addressed. Follow up this week or sooner if needed. Will continue to monitor patient and work with nursing staff collaboratively to work toward positive patient outcomes.    Total unit/floor time of 35 minutes time consisted of the following: time spent with patient, examination of patient, reviewing the record including pertinent labs and completing documentation. More than 50% of this time was spent in coordination of care time with nursing staff and other healthcare providers, this time was spent on discussion/counseling on current care plan including medical management of chronic health problems and acute health problems, education pertaining to plan, and discussion of the goals of care pertaining to the current outlined plan with nursing staff and patient. An additional 16 minutes of time was spent discussing code status, wishes for end of life care and reviewing POLST from 11 35-11 51. POLST signed and left with nursing staff.     Electronically signed by: Tosha Albright CNP

## 2021-06-20 NOTE — LETTER
Letter by Ld Moy MD at      Author: Ld Moy MD Service: -- Author Type: --    Filed:  Encounter Date: 2/22/2020 Status: (Other)         Robert E Schamberger  [address] [place] [address]             February 22, 2020         Dear Domenico,    Below are the results from your recent visit:    Resulted Orders   Basic Metabolic Panel   Result Value Ref Range    Sodium 138 136 - 145 mmol/L    Potassium 5.0 3.5 - 5.0 mmol/L    Chloride 104 98 - 107 mmol/L    CO2 24 22 - 31 mmol/L    Anion Gap, Calculation 10 5 - 18 mmol/L    Glucose 156 (H) 70 - 125 mg/dL    Calcium 9.1 8.5 - 10.5 mg/dL    BUN 25 8 - 28 mg/dL    Creatinine 1.38 (H) 0.70 - 1.30 mg/dL    GFR MDRD Af Amer 59 (L) >60 mL/min/1.73m2    GFR MDRD Non Af Amer 49 (L) >60 mL/min/1.73m2    Narrative    Fasting Glucose reference range is 70-99 mg/dL per  American Diabetes Association (ADA) guidelines.   Magnesium   Result Value Ref Range    Magnesium 1.6 (L) 1.8 - 2.6 mg/dL   Hemoglobin   Result Value Ref Range    Hemoglobin 12.4 (L) 14.0 - 18.0 g/dL       Kidney function is stable.  Mild anemia is stable.    Your magnesium level remains low.  I would increase the magnesium oxide to 400 mg twice daily.    Please call with questions or contact us using [employer].    Sincerely,        Electronically signed by Ld Moy MD

## 2021-06-20 NOTE — LETTER
Letter by Ld Moy MD at      Author: Ld Moy MD Service: -- Author Type: --    Filed:  Encounter Date: 1/15/2020 Status: Signed         Robert E Schamberger  [address] [place] [address]             January 15, 2020         Dear Domenico,     Below are the results from your recent visit:    Resulted Orders   Glycosylated Hemoglobin A1c   Result Value Ref Range    Hemoglobin A1c 7.1 (H) 3.5 - 6.0 %   Basic Metabolic Panel   Result Value Ref Range    Sodium 138 136 - 145 mmol/L    Potassium 4.6 3.5 - 5.0 mmol/L    Chloride 105 98 - 107 mmol/L    CO2 23 22 - 31 mmol/L    Anion Gap, Calculation 10 5 - 18 mmol/L    Glucose 67 (L) 70 - 125 mg/dL    Calcium 9.2 8.5 - 10.5 mg/dL    BUN 28 8 - 28 mg/dL    Creatinine 1.38 (H) 0.70 - 1.30 mg/dL    GFR MDRD Af Amer 59 (L) >60 mL/min/1.73m2    GFR MDRD Non Af Amer 49 (L) >60 mL/min/1.73m2    Narrative    Fasting Glucose reference range is 70-99 mg/dL per  American Diabetes Association (ADA) guidelines.   Lipid Cascade   Result Value Ref Range    Cholesterol 118 <=199 mg/dL    Triglycerides 110 <=149 mg/dL    HDL Cholesterol 28 (L) >=40 mg/dL    LDL Calculated 68 <=129 mg/dL    Patient Fasting > 8hrs? Unknown    Hepatic Profile   Result Value Ref Range    Bilirubin, Total 0.5 0.0 - 1.0 mg/dL    Bilirubin, Direct 0.2 <=0.5 mg/dL    Protein, Total 6.7 6.0 - 8.0 g/dL    Albumin 3.7 3.5 - 5.0 g/dL    Alkaline Phosphatase 97 45 - 120 U/L    AST 29 0 - 40 U/L    ALT 44 0 - 45 U/L   HM2(CBC w/o Differential)   Result Value Ref Range    WBC 7.4 4.0 - 11.0 thou/uL    RBC 4.27 (L) 4.40 - 6.20 mill/uL    Hemoglobin 13.0 (L) 14.0 - 18.0 g/dL    Hematocrit 39.5 (L) 40.0 - 54.0 %    MCV 93 80 - 100 fL    MCH 30.4 27.0 - 34.0 pg    MCHC 32.9 32.0 - 36.0 g/dL    RDW 13.2 11.0 - 14.5 %    Platelets 199 140 - 440 thou/uL    MPV 11.8 8.5 - 12.5 fL   Microalbumin, Random Urine   Result Value Ref Range    Microalbumin, Random Urine 18.52 (H) 0.00 - 1.99 mg/dL     Creatinine, Urine 167.2 mg/dL    Microalbumin/Creatinine Ratio Random Urine 110.8 (H) <=19.9 mg/g    Narrative    Microalbumin, Random Urine  <2.0 mg/dL . . . . . . . . Normal  3.0-30.0 mg/dL . . . . . . Microalbuminuria  >30.0 mg/dL . . . . . .  . Clinical Proteinuria    Microalbumin/Creatinine Ratio, Random Urine  <20 mg/g . . . . .. . . . Normal   mg/g . . . . . . . Microalbuminuria  >300 mg/g . . . . . . . . Clinical Proteinuria           Diabetes control is much better with the A1c down to 7.1%.  Everything else looks fine.  Kidney function mildly impaired but stable.  Mild anemia is stable.  Cholesterol is very well controlled.  Normal liver studies.    Please call with questions or contact us using [employer].    Sincerely,        Electronically signed by Ld Moy MD

## 2021-06-20 NOTE — LETTER
Letter by Ld Moy MD at      Author: Ld Moy MD Service: -- Author Type: --    Filed:  Encounter Date: 7/9/2020 Status: (Other)         Robert E Schamberger  [address] [place] [address]             July 9, 2020         Dear Domenico,    Below are the results from your recent visit:    Resulted Orders   HM2(CBC w/o Differential)   Result Value Ref Range    WBC 8.7 4.0 - 11.0 thou/uL    RBC 4.13 (L) 4.40 - 6.20 mill/uL    Hemoglobin 12.6 (L) 14.0 - 18.0 g/dL    Hematocrit 36.8 (L) 40.0 - 54.0 %    MCV 89 80 - 100 fL    MCH 30.6 27.0 - 34.0 pg    MCHC 34.3 32.0 - 36.0 g/dL    RDW 12.4 11.0 - 14.5 %    Platelets 216 140 - 440 thou/uL    MPV 8.5 7.0 - 10.0 fL   Basic Metabolic Panel   Result Value Ref Range    Sodium 136 136 - 145 mmol/L    Potassium 5.4 (H) 3.5 - 5.0 mmol/L    Chloride 103 98 - 107 mmol/L    CO2 22 22 - 31 mmol/L    Anion Gap, Calculation 11 5 - 18 mmol/L    Glucose 123 70 - 125 mg/dL    Calcium 9.1 8.5 - 10.5 mg/dL    BUN 27 8 - 28 mg/dL    Creatinine 1.58 (H) 0.70 - 1.30 mg/dL    GFR MDRD Af Amer 51 (L) >60 mL/min/1.73m2    GFR MDRD Non Af Amer 42 (L) >60 mL/min/1.73m2    Narrative    Fasting Glucose reference range is 70-99 mg/dL per  American Diabetes Association (ADA) guidelines.   Glycosylated Hemoglobin A1c   Result Value Ref Range    Hemoglobin A1c 7.3 (H) 3.5 - 6.0 %   Vitamin D, Total (25-Hydroxy)   Result Value Ref Range    Vitamin D, Total (25-Hydroxy) 37.7 30.0 - 80.0 ng/mL    Narrative    Deficiency <10.0 ng/mL  Insufficiency 10.0-29.9 ng/mL  Sufficiency 30.0-80.0 ng/mL  Toxicity (possible) >100.0 ng/mL   Magnesium   Result Value Ref Range    Magnesium 2.0 1.8 - 2.6 mg/dL       Diabetes control remains reasonably good with A1c of 7.3%.  No change in kidney function.  Your potassium is mildly elevated.  This can be related to kidney function or to your blood pressure medication losartan.  Make sure you are not getting into too much potassium in your diet.   Foods that are high in potassium include potatoes, bananas, oranges, and melons.  They should be restricted.    Vitamin D level looks good.  Mild anemia with hemoglobin 12.6 is stable.    Please call with questions or contact us using MyChart.    Sincerely,        Electronically signed by Ld Moy MD

## 2021-06-21 NOTE — LETTER
Letter by Ld Myo MD at      Author: Ld Moy MD Service: -- Author Type: --    Filed:  Encounter Date: 11/11/2020 Status: (Other)         Robert E Schamberger  [address] [place] [address]             November 11, 2020         Dear Domenico,    Below are the results from your recent visit:    Resulted Orders   Glycosylated Hemoglobin A1c   Result Value Ref Range    Hemoglobin A1c 6.6 (H) <=5.6 %      Comment:      Normal <5.7% Prediabete 5.7-6.4% Diabletes 6.5% or higher - adopted from ADA consensus guidelines   Hemoglobin   Result Value Ref Range    Hemoglobin 13.4 (L) 14.0 - 18.0 g/dL   Basic Metabolic Panel   Result Value Ref Range    Sodium 139 136 - 145 mmol/L    Potassium 4.9 3.5 - 5.0 mmol/L    Chloride 104 98 - 107 mmol/L    CO2 24 22 - 31 mmol/L    Anion Gap, Calculation 11 5 - 18 mmol/L    Glucose 90 70 - 125 mg/dL    Calcium 9.0 8.5 - 10.5 mg/dL    BUN 27 8 - 28 mg/dL    Creatinine 1.39 (H) 0.70 - 1.30 mg/dL    GFR MDRD Af Amer 59 (L) >60 mL/min/1.73m2    GFR MDRD Non Af Amer 48 (L) >60 mL/min/1.73m2    Narrative    Fasting Glucose reference range is 70-99 mg/dL per  American Diabetes Association (ADA) guidelines.       Diabetes is under excellent control with the A1c of 6.6%. The rest of your labs look good. Kidney function is stable. Mild anemia is stable.    Please call with questions or contact us using [employer].    Sincerely,        Electronically signed by Ld Moy MD

## 2021-06-21 NOTE — LETTER
Letter by Ld Moy MD at      Author: Ld Moy MD Service: -- Author Type: --    Filed:  Encounter Date: 5/7/2021 Status: (Other)         Robert E Schamberger  [address] [place] [address]             May 7, 2021         Dear Domenico,    Below are the results from your recent visit:    Resulted Orders   Glycosylated Hemoglobin A1c   Result Value Ref Range    Hemoglobin A1c 6.8 (H) <=5.6 %       Your diabetes control is excellent.  Cutting back on the Basaglar to 55 units as we discussed makes sense.  I am sorry that they could not get all the blood needed in the lab.  You can schedule lab appointments at the [place] location if that is more convenient.    Please call with questions or contact us using [employer].    Sincerely,        Electronically signed by Ld Moy MD

## 2021-06-21 NOTE — LETTER
Letter by Ld Moy MD at      Author: Ld Moy MD Service: -- Author Type: --    Filed:  Encounter Date: 5/13/2021 Status: (Other)         Robert E Schamberger  [address] [place] [address]             May 13, 2021         Dear Domenico,    Below are the results from your recent visit:    Resulted Orders   Lipid Cascade   Result Value Ref Range    Cholesterol 141 <=199 mg/dL    Triglycerides 130 <=149 mg/dL    HDL Cholesterol 33 (L) >=40 mg/dL    LDL Calculated 82 <=129 mg/dL    Patient Fasting > 8hrs? Yes    Comprehensive Metabolic Panel   Result Value Ref Range    Sodium 134 (L) 136 - 145 mmol/L    Potassium 5.2 (H) 3.5 - 5.0 mmol/L    Chloride 101 98 - 107 mmol/L    CO2 22 22 - 31 mmol/L    Anion Gap, Calculation 11 5 - 18 mmol/L    Glucose 130 (H) 70 - 125 mg/dL    BUN 31 (H) 8 - 28 mg/dL    Creatinine 1.52 (H) 0.70 - 1.30 mg/dL    GFR MDRD Af Amer 53 (L) >60 mL/min/1.73m2    GFR MDRD Non Af Amer 44 (L) >60 mL/min/1.73m2    Bilirubin, Total 0.5 0.0 - 1.0 mg/dL    Calcium 9.1 8.5 - 10.5 mg/dL    Protein, Total 6.8 6.0 - 8.0 g/dL    Albumin 3.8 3.5 - 5.0 g/dL    Alkaline Phosphatase 106 45 - 120 U/L    AST 19 0 - 40 U/L    ALT 25 0 - 45 U/L    Narrative    Fasting Glucose reference range is 70-99 mg/dL per  American Diabetes Association (ADA) guidelines.   HM2(CBC w/o Differential)   Result Value Ref Range    WBC 9.3 4.0 - 11.0 thou/uL    RBC 4.13 (L) 4.40 - 6.20 mill/uL    Hemoglobin 12.3 (L) 14.0 - 18.0 g/dL    Hematocrit 37.8 (L) 40.0 - 54.0 %    MCV 92 80 - 100 fL    MCH 29.8 27.0 - 34.0 pg    MCHC 32.5 32.0 - 36.0 g/dL    RDW 13.0 11.0 - 14.5 %    Platelets 235 140 - 440 thou/uL    MPV 10.1 (H) 7.0 - 10.0 fL       Cholesterol is well controlled.  Kidney function is stable.  Mild anemia is stable.    Please call with questions or contact us using [employer].    Sincerely,        Electronically signed by Ld Moy MD

## 2021-06-22 NOTE — TELEPHONE ENCOUNTER
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  When did you use medication last?:  unknown  Okay to leave a detailed message: no

## 2021-06-22 NOTE — TELEPHONE ENCOUNTER
Name of form/paperwork: Other:  Med B compliance form for the test strips  Have you been seen for this request: N/A  Do we have the form: Yes- faxed to clinic and refill line  When is form needed by: ASAP  How would you like the form returned: fax  Fax Number: see form  Patient Notified form requests are processed in 3-5 business days: No  (If patient needs form sooner, please note that in this message.)  Okay to leave a detailed message? Yes

## 2021-06-22 NOTE — PROGRESS NOTES
Assessment and Plan:       1. Medicare annual wellness visit, subsequent  Immunizations are reviewed and recommending Shingrix.  Living will discussed.  Non-smoker.  Uses alcohol in moderation.  Regular exercise discussed.  Up to date with colonoscopies and I would not recommend repeating given age and other medical problems.  Prostate exam is deferred.  Checking PSA for prostate cancer screening.  Dementia and depression screening completed.  He sees his ophthalmologist regularly and gets glaucoma screening.  Skin exam performed and recommending regular use of sunblock.    No AAA on previous imaging in 2009.      2. Cognitive changes  Wife and son are noticing changes.  Sonu does as well.  He did not do well on screening evaluation and slums evaluation only 20 out of 30.  Could only remember 2 of 5 objects.  Had difficulty with clock drawing.  Wife does not indicate any changes in behavior or activities at home.  Will need to monitor closely and reassess annually with SLUMS.  May be candidate for Namenda or donepezil if further changes are seen.  B12 level normal earlier this year.  Will screen for thyroid disease with TSH.  May be result of traumatic brain injury with subdural hematoma/subarachnoid hemorrhage that occurred last year after fall down the stairs.    3. 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  Will recheck again today.  Continue annual eye exams.  Reviewed diabetic log.  Sugars are variable but often under 100 in the morning when fasting.  I would not change current dose of Basaglar.  He is using NovoLog before each meal and adjustments may be necessary.  - Glycosylated Hemoglobin A1c  - Microalbumin, Random Urine    4. Squamous cell skin cancer  He continues to see his dermatologist annually    5. Diabetic peripheral neuropathy (H)  Neuropathy is affecting his gait.  Feeling more unsteady when ambulating.   Encouraging use of cane or walker.  Continues on Lyrica    6. Primary osteoarthritis involving multiple joints  Chronic pain involving multiple joints.  Continue Tylenol as needed.  Uses Voltaren gel    7. Morbid obesity (H)  Encouraging modification of diet and increased exercise    8. QUETA on CPAP  Wearing CPAP faithfully    9. Mild intermittent asthma without complication  Stable.    10. Essential hypertension  Blood pressure reasonably well controlled with current medication    11. Mixed hyperlipidemia  Recheck lipid profile on pravastatin.  He continues aspirin daily  - Lipid Cascade    12. Fatigue, unspecified type  Multifactorial including related to uncontrolled diabetes.  Screen for thyroid disease  - Thyroid Stimulating Hormone (TSH)    13. Depression, unspecified depression type  PHQ 9 score is 5.  Continue citalopram    14. Chronic kidney disease, stage 3 (moderate) (H)  Monitor renal function.  He should avoid NSAIDs.  - Basic Metabolic Panel    15. B12 deficiency  Continues on B12 replacement    16. Anemia in stage 3 chronic kidney disease (H)  Monitor hemoglobin  - Hemoglobin    17. Medication monitoring encounter  Monitor liver function while on statin  - Hepatic Profile    18. Screening for prostate cancer    - PSA, Annual Screen (Prostatic-Specific Antigen)        The patient's current medical problems were reviewed.    I have had an Advance Directives discussion with the patient.  The following health maintenance schedule was reviewed with the patient and provided in printed form in the after visit summary:   Health Maintenance   Topic Date Due     ZOSTER VACCINES (2 of 3) 01/27/2008     DIABETES URINE MICROALBUMIN  11/09/2018     DIABETES FOLLOW-UP  02/24/2019     DEPRESSION FOLLOW UP  05/30/2019     DIABETES HEMOGLOBIN A1C  05/30/2019     DIABETES OPHTHALMOLOGY EXAM  08/16/2019     ASTHMA CONTROL TEST  08/24/2019     ASTHMA FOLLOW-UP  08/24/2019     FALL RISK ASSESSMENT  10/09/2019     DIABETES  FOOT EXAM  11/30/2019     ADVANCE DIRECTIVES DISCUSSED WITH PATIENT  11/30/2023     TD 18+ HE  04/09/2025     PNEUMOCOCCAL POLYSACCHARIDE VACCINE AGE 65 AND OVER  Completed     INFLUENZA VACCINE RULE BASED  Completed     PNEUMOCOCCAL CONJUGATE VACCINE FOR ADULTS (PCV13 OR PREVNAR)  Completed        Subjective:   Chief Complaint: Robert E Schamberger is an 84 y.o. male here for an Annual Wellness visit.   HPI: In addition to annual wellness visit, multiple chronic medical problems and new concerns discussed.    He and his wife have noticed some changes in his memory.  This was discussed after he did poorly on dementia screening having trouble drawing a clock and unable to remember any of the 3 words.  He did have head trauma last year with fall downstairs with subdural hematoma/subarachnoid hemorrhage.  Denies any chronic headaches.  As of yet, not interfering with normal activities around the house.  He does still read as previously.  Does not have a lot of other hobbies.    Diabetes control is fair.  Continues on same insulin regimen.  Monitoring sugars regularly and they are variable.  Often glucose is under 100 in the morning.  Other days it can be over 200 for unclear reasons.    He has peripheral neuropathy and problems with balance.  Using Lyrica.    Chronic pain related to osteoarthritis involving multiple joints including arthritis in his spine.  He uses Tylenol and Voltaren gel.    Mood is stable with citalopram.    Review of Systems:    Please see above.  The rest of the review of systems are negative for all systems.    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)     Hyperlipidemia      Osteoarthritis     Low back pain     HTN (hypertension)     Glaucoma     QUETA on CPAP     Erectile dysfunction     Morbid obesity (H)     Degenerative disc disease, lumbar     Osteoarthritis of both knees     Depression     Osteoarthritis of both hands     Squamous cell skin cancer     Anemia in chronic kidney disease     Bilateral hearing loss     Chronic kidney disease, stage 3 (moderate) (H)     Bursitis, hip, left     COPD (chronic obstructive pulmonary disease) (H)     Fatigue     B12 deficiency     Vitamin D deficiency     Intermittent asthma without complication     Complete tear of left rotator cuff     Entropion of left lower eyelid     Trochanteric bursitis of right hip     Cognitive changes     Diabetic peripheral neuropathy (H)     Past Medical History:   Diagnosis Date     Anemia in chronic kidney disease      B12 deficiency 11/9/2017     Bilateral hearing loss 5/9/2016     Bursitis, hip, left 3/2/2017     Chest pain     Normal GXT 2006     Chronic kidney disease, stage 3 (moderate) (H)     Worsening with hyperkalemia on Relafen-discontinued August 2016     Closed displaced fracture of left clavicle 9/21/2017     Cognitive changes 11/30/2018    SLUMS 20/30 Nov 2018     Complete tear of left rotator cuff 2/15/2018    Associated with fall and clavicular fracture that occurred 2017, evaluated by orthopedics, given cortisone injection and physical therapy recommended     COPD (chronic obstructive pulmonary disease) (H)      Degenerative disc disease, lumbar      Depression      Diabetic peripheral neuropathy (H) 11/30/2018     Erectile dysfunction      Fatigue 7/6/2017     Forearm tendonitis 11/29/2016     Glaucoma      Gout attack     Left foot     HTN (hypertension)      Hyperlipidemia      Ingrown toenail 7/6/2017     Intermittent asthma without complication 11/9/2017     Left leg cellulitis 2014     Low back pain      Morbid obesity (H)      QUETA on CPAP      Osteoarthritis      Osteoarthritis of both  hands      Osteoarthritis of both knees     Right TKA     Pain of right heel 5/11/2018     Peripheral neuropathy 10/9/2018     Rib fracture 2005     Right-sided chest pain 5/9/2016     Screening     No AAA on CT scan 2009     Squamous cell skin cancer 11/2/2015     Traumatic subarachnoid hemorrhage with loss of consciousness of 30 minutes or less (H) 9/21/2017    Fall down the stairs following alcohol use with traumatic subarachnoid hemorrhage treated conservatively at [facility] followed by stay at [facility] with no residual neurologic deficits     Trochanteric bursitis of right hip 8/24/2018     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)      Vitamin D deficiency 11/9/2017      Past Surgical History:   Procedure Laterality Date     BLEPHAROPLASTY  2018     CARPAL TUNNEL RELEASE Bilateral      COLONOSCOPY  2003    Normal     EYE SURGERY       VT EXCIS STOMACH ULCER,LESN;LOCAL      Description: Gastric Excision;  Recorded: 03/23/2012;     VT REMOVAL GALLBLADDER      Description: Cholecystectomy;  Recorded: 03/23/2012;     VT REVISE MEDIAN N/CARPAL TUNNEL SURG      Description: Neuroplasty Decompression Median Nerve At Carpal Tunnel;  Recorded: 03/23/2012;     TOTAL KNEE ARTHROPLASTY Right 2014    Complicated by torn Ranexa 1 and subsequent repair      No family history on file.   Social History     Socioeconomic History     Marital status:      Spouse name: Not on file     Number of children: Not on file     Years of education: Not on file     Highest education level: Not on file   Social Needs     Financial resource strain: Not on file     Food insecurity - worry: Not on file     Food insecurity - inability: Not on file     Transportation needs - medical: Not on file     Transportation needs - non-medical: Not on file   Occupational History     Not on file   Tobacco Use     Smoking status: Former Smoker     Smokeless tobacco: Never Used   Substance and Sexual Activity     Alcohol use: Yes      "Alcohol/week: 8.4 oz     Types: 7 Cans of beer, 7 Shots of liquor per week     Drug use: No     Sexual activity: Not on file   Other Topics Concern     Not on file   Social History Narrative    Semi retired     x 47 years              Current Outpatient Medications   Medication Sig Dispense Refill     acetaminophen (TYLENOL) 500 MG tablet Take 1,000 mg by mouth 3 (three) times a day. Special Instructions: max: 4000 mg in 24 hours Dx: pain  Three Times A Day; 08:00 AM, 12:00 PM, 04:00 PM       aspirin 81 mg chewable tablet Chew 81 mg daily.       BASAGLAR KWIKPEN U-100 INSULIN 100 unit/mL (3 mL) pen INJECT 75 UNITS UNDER THE SKIN AT BEDTIME -REPLACES LANTUS 60 mL 11     cholecalciferol, vitamin D3, (VITAMIN D3) 2,000 unit Tab Take 1 tablet (2,000 Units total) by mouth daily. 100 tablet 3     citalopram (CELEXA) 20 MG tablet TAKE ONE TABLET BY MOUTH ONCE DAILY 90 tablet 3     cyanocobalamin 1000 MCG tablet Take 1 tablet (1,000 mcg total) by mouth daily. 100 tablet 3     diclofenac sodium (VOLTAREN) 1 % Gel Place 2-4 g on the skin.       latanoprost (XALATAN) 0.005 % ophthalmic solution Administer 1 drop to both eyes at bedtime.       losartan-hydrochlorothiazide (HYZAAR) 100-25 mg per tablet Take 1 tablet by mouth daily. 90 tablet 3     metFORMIN (GLUCOPHAGE-XR) 500 MG 24 hr tablet TAKE THREE TABLETS (1500MG) BY MOUTH DAILY 270 tablet 3     NOVOLOG FLEXPEN U-100 INSULIN 100 unit/mL injection pen INJECT 20 UNITS SQ WITH BREAKFAST 25 UNITS WITH LUNCH AND 25 UNITS WITH DINNER (Patient taking differently: INJECT 20 UNITS SQ WITH BREAKFAST 20 UNITS WITH LUNCH AND 20 UNITS WITH DINNER) 60 mL 11     pravastatin (PRAVACHOL) 10 MG tablet TAKE ONE TABLET BY MOUTH AT BEDTIME 90 tablet 3     pregabalin (LYRICA) 50 MG capsule Take 1-2 capsules ( mg total) by mouth at bedtime. 60 capsule 11     UNIFINE PENTIPS 31 gauge x 5/16\" Ndle USE AS DIRECTED 3 TIMES DAILY 300 each 3     No current facility-administered " "medications for this visit.       Objective:   Vital Signs:   Visit Vitals  /76 (Patient Site: Left Arm, Patient Position: Sitting, Cuff Size: Thigh)   Pulse 77   Ht 5' 10\" (1.778 m)   Wt (!) 334 lb (151.5 kg)   SpO2 98%   BMI 47.92 kg/m             PHYSICAL EXAM  EYES: Eyelids, conjunctiva, and sclera were normal. Pupils were normal. Cornea, iris, and lens were normal bilaterally.  HEAD, EARS, NOSE, MOUTH, AND THROAT: Head and face were normal. Nose appearance was normal and there was no discharge. Oropharynx was normal.  NECK: Neck appearance was normal. There were no neck masses and the thyroid was not enlarged and no nodules are felt.  No lymphadenopathy.  RESPIRATORY: Breathing pattern was normal and the chest moved symmetrically.  Percussion/auscultatory percussion was normal.  Lung sounds were normal and there were no rales or wheezes.  CARDIOVASCULAR: Heart rate and rhythm were normal.  S1 and S2 were normal and there were no extra sounds or murmurs. Peripheral pulses in arms and legs were normal.  Jugular venous pressure was normal.  There was no peripheral edema.  No carotid bruits.  GASTROINTESTINAL: The abdomen was normal in contour.  Bowel sounds were present.   Palpation detected no tenderness, mass, or enlarged organs.   RECTAL/PROSTATE: Deferred.  Because of body habitus, unable to examine prostate  MUSCULOSKELETAL: Skeletal configuration was normal and muscle mass was normal for age. Joint appearance was overall normal.  LYMPHATIC: There were no enlarged nodes.  SKIN/HAIR/NAILS: Skin color was normal.  Hair and nails were normal.There were no skin lesions.  NEUROLOGIC: The patient was alert and oriented to person, place, time, and circumstance. Speech was normal. Cranial nerves were normal. Motor strength was normal for age. The patient was normally coordinated.    Diabetic foot exam: Decreased vibratory sensation.  Pinprick sensation remains intact.  Good pedal pulses and no other " abnormalities other than onychomycosis  PSYCHIATRIC:  Mood and affect were normal PHQ 9 score is 5    Assessment Results 11/30/2018   Activities of Daily Living 1 - Full function   Instrumental Activities of Daily Living 2-4 - Moderate impairment   Get Up and Go Score -   Mini Cog Total Score 1   Some recent data might be hidden     A Mini-Cog score of 0-2 suggests the possibility of dementia, score of 3-5 suggests no dementia    Identified Health Risks:     He is at risk for lack of exercise and has been provided with information to increase physical activity for the benefit of his well-being.  The patient was counseled and encouraged to consider modifying their diet and eating habits. He was provided with information on recommended healthy diet options.  The patient reports that he has difficulty with activities of daily living.  This issue was addressed at today's appointment.  Difficulties are related to physical limitations.  Wife provides assistance  The patient reports that he has difficulty with instrumental activities of daily living.    This issue was addressed at today's appointment.  As above  Information on urinary incontinence and treatment options given to patient.  The patient's PHQ-9 score is consistent with mild depression. He was provided with information regarding depression and continues on citalopram at current dose  He is at risk for falling and has been provided with information to reduce the risk of falling at home.  Information regarding advance directives (living [place]), including where he can download the appropriate form, was provided to the patient via the AVS.       Memory problem addressed at today's visit

## 2021-06-22 NOTE — TELEPHONE ENCOUNTER
He still needs to check his sugar more often than once daily as he is on insulin.  He should check up to 3 times daily

## 2021-06-23 NOTE — TELEPHONE ENCOUNTER
Refill Approved    Rx renewed per Medication Renewal Policy. Medication was last renewed on 1/3/18.    Ov: 11/30/18    Yokasta Chamorro, [facility] Triage/Med Refill 1/25/2019     Requested Prescriptions   Pending Prescriptions Disp Refills     pravastatin (PRAVACHOL) 10 MG tablet [Pharmacy Med Name: PRAVASTATIN  TAB 10MG TABLET] 90 tablet 3     Sig: TAKE ONE TABLET BY MOUTH AT BEDTIME    Statins Refill Protocol (Hmg CoA Reductase Inhibitors) Passed - 1/23/2019  9:44 AM       Passed - PCP or prescribing provider visit in past 12 months     Last office visit with prescriber/PCP: 10/9/2018 Ld Moy MD OR same dept: 10/9/2018 Ld Moy MD OR same specialty: 10/9/2018 Ld Moy MD  Last physical: 11/30/2018 Last MTM visit: Visit date not found   Next visit within 3 mo: Visit date not found  Next physical within 3 mo: Visit date not found  Prescriber OR PCP: Ld Moy MD  Last diagnosis associated with med order: There are no diagnoses linked to this encounter.  If protocol passes may refill for 12 months if within 3 months of last provider visit (or a total of 15 months).

## 2021-06-24 NOTE — PROGRESS NOTES
Office Visit - Follow Up   Robert E Schamberger   84 y.o. male    Date of Visit: 3/4/2019    Chief Complaint   Patient presents with     Diabetes     Hypertension        Assessment and Plan   1. 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  Recheck A1c.  No change in peripheral neuropathy symptoms.  Continues to get annual eye exams.  Having difficulty with current pen needles.  Discussed using lower gauge but he will discuss with pharmacist.  Also concerns regarding cost of Basaglar KwikPen and whether Lantus vial would be cheaper although more challenging for him to use.  - Glycosylated Hemoglobin A1c    2. Chronic kidney disease, stage 3 (moderate) (H)  Continue to monitor renal function.  Avoiding NSAIDs  - Basic Metabolic Panel    3. Essential hypertension  Fair control of blood pressure with result slightly above goal today at 142/62.  Already with polypharmacy.    4. Hyperlipidemia, unspecified hyperlipidemia type  Continues on pravastatin.  Lipids looked good at last visit    5. Morbid obesity (H)  Again discussed the importance of staying active, with diet modification    6. QUETA on CPAP  Wearing CPAP faithfully    7. Squamous cell skin cancer  Keeping up with regular appointments to dermatology    8. Cognitive changes  We will continue to monitor for any worsening cognitive changes and consider starting Aricept or Namenda if there is concerns for early dementia    9. Anemia in stage 3 chronic kidney disease (H)    - Hemoglobin    10. Unsteady gait  We discussed the importance of using his cane and even better his walker at all times to reduce the risk of falling.  Also offered referral to PT but he is hesitant at this time.  NDBC would be recommended.    Return in about 3 months (around 6/4/2019) for Recheck.     History of Present Illness   This 84 y.o. old gentleman with multiple chronic medical problems here to  follow-up.  He has type 2 diabetes and remains on insulin using 75 units of Basaglar every night and NovoLog before meals.  Reviewed his diabetic log book and sugars look generally well controlled.  Only on one occasion was his a.m. sugar over 200 and he thinks he may have forgotten to take his Basaglar.  No hypoglycemic events.  Still having difficulty with the current pen needles.  He describes them bending when using and he did discuss with our diabetes educator but has not discussed with pharmacy.  Ongoing issues with balance.  History of falling.  Combination of severe osteoarthritis involving knees and chronic low back pain along with peripheral neuropathy and obesity.  He does use a cane and has a walker.  We also discussed cognitive changes noticed over the past 1-2 years.  Traumatic brain injury 1 year ago when he fell down the stairs.  This may be a contributing factor although he scored 20/30 on his slums evaluation at his last wellness visit.  Over the past several months, his wife cannot clearly see any significant worse but ongoing concerns remain.  Metabolic evaluation was unremarkable.    Review of Systems: Denies any chest pain.  No increasing shortness of breath.  No headaches.  Otherwise, a comprehensive review of systems was negative except as noted.     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)     Hyperlipidemia     Osteoarthritis     Low back pain     HTN (hypertension)     Glaucoma     QUETA on CPAP     Erectile dysfunction     Morbid obesity (H)     Degenerative disc disease, lumbar     Osteoarthritis of both knees     Depression     Osteoarthritis of both hands     Squamous cell skin cancer     Anemia in chronic kidney disease     Bilateral hearing loss     Chronic kidney disease, stage 3 (moderate) (H)     Bursitis, hip, left     Fatigue     B12 deficiency     Vitamin  D deficiency     Intermittent asthma without complication     Complete tear of left rotator cuff     Entropion of left lower eyelid     Trochanteric bursitis of right hip     Cognitive changes     Diabetic peripheral neuropathy (H)     Unsteady gait       He has a past surgical history that includes pr removal gallbladder; pr revise median n/carpal tunnel surg; pr excis stomach ulcer,lesn;local; Total knee arthroplasty (Right, 2014); Carpal tunnel release (Bilateral); Eye surgery; Colonoscopy (2003); and Blepharoplasty (2018).    Allergies   Allergen Reactions     Actos [Pioglitazone] Swelling     Edema     Gabapentin Diarrhea     Diarrhea     Lipitor [Atorvastatin]      Back pain     Niacin      Hyperglycemia     Simvastatin      Back pain       Current Outpatient Medications   Medication Sig Dispense Refill     acetaminophen (TYLENOL) 500 MG tablet Take 1,000 mg by mouth 3 (three) times a day. Special Instructions: max: 4000 mg in 24 hours Dx: pain  Three Times A Day; 08:00 AM, 12:00 PM, 04:00 PM       aspirin 81 mg chewable tablet Chew 81 mg daily.       BASAGLAR KWIKPEN U-100 INSULIN 100 unit/mL (3 mL) pen INJECT 75 UNITS UNDER THE SKIN AT BEDTIME -REPLACES LANTUS 60 mL 11     blood glucose test (GLUCOSE BLOOD) strips Test up to 3 times daily 300 strip 3     cholecalciferol, vitamin D3, (VITAMIN D3) 2,000 unit Tab Take 1 tablet (2,000 Units total) by mouth daily. 100 tablet 3     citalopram (CELEXA) 20 MG tablet TAKE ONE TABLET BY MOUTH ONCE DAILY 90 tablet 3     cyanocobalamin 1000 MCG tablet Take 1 tablet (1,000 mcg total) by mouth daily. 100 tablet 3     diclofenac sodium (VOLTAREN) 1 % Gel Place 2-4 g on the skin.       latanoprost (XALATAN) 0.005 % ophthalmic solution Administer 1 drop to both eyes at bedtime.       losartan-hydrochlorothiazide (HYZAAR) 100-25 mg per tablet Take 1 tablet by mouth daily. 90 tablet 3     metFORMIN (GLUCOPHAGE-XR) 500 MG 24 hr tablet TAKE THREE TABLETS (1500MG) BY MOUTH DAILY  "270 tablet 3     NOVOLOG FLEXPEN U-100 INSULIN 100 unit/mL injection pen INJECT 20 UNITS SQ WITH BREAKFAST 25 UNITS WITH LUNCH AND 25 UNITS WITH DINNER (Patient taking differently: INJECT 20 UNITS SQ WITH BREAKFAST 20 UNITS WITH LUNCH AND 20 UNITS WITH DINNER) 60 mL 11     pravastatin (PRAVACHOL) 10 MG tablet TAKE ONE TABLET BY MOUTH AT BEDTIME 90 tablet 2     pregabalin (LYRICA) 50 MG capsule Take 1-2 capsules ( mg total) by mouth at bedtime. 60 capsule 11     UNIFINE PENTIPS 31 gauge x 5/16\" Ndle USE AS DIRECTED 3 TIMES DAILY 300 each 3     No current facility-administered medications for this visit.         Physical Exam   General Appearance:   Pleasant although obese elderly male    /62 (Patient Site: Left Arm, Cuff Size: Thigh)   Pulse 70   Ht 5' 10\" (1.778 m)   Wt (!) 331 lb (150.1 kg)   SpO2 97%   BMI 47.49 kg/m        Respiratory: Normal respiratory effort.  Lungs are clear with no rales or wheezes.  Heart: Regular rate and rhythm without murmurs, rubs, or gallops.  No carotid bruits.  Neurologic: Grossly nonfocal  Skin: No cyanosis or pallor           Additional Information   Social History     Tobacco Use     Smoking status: Former Smoker     Smokeless tobacco: Never Used   Substance Use Topics     Alcohol use: Yes     Alcohol/week: 8.4 oz     Types: 7 Cans of beer, 7 Shots of liquor per week     Drug use: No       Over 40 minutes was spent in the management of this patient with over 50% of that time spent counseling and coordination of care     Ld Moy MD  "

## 2021-06-29 NOTE — PROGRESS NOTES
Progress Notes by Marley Blank MD at 6/4/2020 11:10 AM     Author: Marley Blank MD Service: -- Author Type: Physician    Filed: 6/4/2020 11:51 AM Encounter Date: 6/4/2020 Status: Signed    : Marley Blank MD (Physician)         [facility] Office Consult     Assessment:     1. Sick sinus syndrome (H) -30-day event monitor February 11 through March 14, 2020 showed probable sick sinus syndrome with sinus arrhythmia with 2.8-second pause that was asymptomatic.  He was noted to have first-degree AV block as well as type I second-degree AV block.  Heart rates went only down to 50, and the majority of the monitor was asymptomatic.  Given lack of type II second-degree AV block or third-degree heart block or symptomatic bradycardia would not place pacemaker yet.  He is on no rate lowering drugs but should he has symptoms would need to reevaluate.   2. Unsteady gait -he has fallen and hit in his head.  He does have a remote history of atrial fibrillation and would qualify for anticoagulant therapy but given his unsteady gait and falls very hesitant given the recent head hit.   3. 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) -hemoglobin A1c slightly up at 7.1 and defer to primary.   4. TIA (transient ischemic attack) -head CT and head MRI showed no major abnormalities but there is a suspicion that he has had TIAs.  On Plavix currently.  Given remote history of atrial fibrillation would consider anticoagulation but once again given falls very hesitant.   5. QUETA on CPAP -so noted and most likely contributing to abnormal heartbeats.   6. Morbid obesity (H) -contributing to overall above issues and would recommend weight loss.-   7. Hyperlipidemia, unspecified hyperlipidemia type -excellent cholesterol at 118 with an LDL of 68 and continue current meds.   8. Chronic kidney disease, stage 3 (moderate) (H)-creatinine mildly increased at 1.38.   9. Paroxysmal atrial fibrillation (H)  -as above had cardioversion of this in 2007 and is no longer on anticoagulation.  I would suspect he is going in and out of atrial fibrillation, asymptomatic and not valvular.  This is based on recent echo.  Normally would recommend anticoagulation with Eliquis, Xarelto or possibly Coumadin.  However given his frequent falls and unsteady gait very hesitant to do this.      Plan:   1.  No pacemaker just yet.  2.  No anticoagulation just yet.  3.  Follow-up with me in 6 months or sooner if needed.  4.  Weight loss.    History of Present Illness:   Thank you for asking the [facility] team to see Robert E Schamberger a 86 y.o. male  in consultation  to evaluate abnormal heart monitor.   Patient was in his usual state of health till the beginning of this year when he had 2 episodes where he had what sounds like total global amnesia.  He tells me both times he was driving with his wife, he suddenly could not remember anything but did not lose consciousness nor strength or sensation in his extremities.  He had an evaluation including an event monitor which suggested first-degree AV block, type I second-degree AV block and sinus arrhythmia and is referred here for further discussion.  He lives independently with his wife, feels extremely off balance needing to walk with a cane, tells me it is due to weak legs.  There is no syncope, dizziness, chest discomfort, palpitations, shortness of breath, PND, orthopnea or significant peripheral edema.    Past Medical History:     Past Medical History:   Diagnosis Date   ? Anemia in chronic kidney disease    ? B12 deficiency 11/9/2017   ? Bilateral hearing loss 5/9/2016   ? Bursitis, hip, left 3/2/2017   ? Chronic kidney disease, stage 3 (moderate) (H)     Worsening with hyperkalemia on Relafen-discontinued August 2016   ? Closed displaced fracture of left clavicle 9/21/2017   ? Cognitive changes 11/30/2018    SLUMS 20/30 Nov 2018   ? Complete tear of left rotator cuff  2/15/2018    Associated with fall and clavicular fracture that occurred 2017, evaluated by orthopedics, given cortisone injection and physical therapy recommended   ? Degenerative disc disease, lumbar    ? Dementia without behavioral disturbance (H) 2/21/2020   ? Depression    ? Diabetic peripheral neuropathy (H) 11/30/2018   ? Erectile dysfunction    ? Forearm tendonitis 11/29/2016   ? Glaucoma    ? Gout attack     Left foot   ? HTN (hypertension)    ? Hyperlipidemia    ? Ingrown toenail 7/6/2017   ? Intermittent asthma without complication 11/9/2017   ? Left leg cellulitis 2014   ? 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 Morbid obesity (H)    ? QUETA on CPAP    ? Osteoarthritis of both hands    ? Osteoarthritis of both knees     Right TKA   ? Pain of right heel 5/11/2018   ? Peripheral neuropathy 10/9/2018   ? Rib fracture 2005   ? Right-sided chest pain 5/9/2016   ? Sick sinus syndrome (H) 5/7/2020    Abnormal event monitor with 2.8-second sinus pause   ? Squamous cell skin cancer 11/2/2015   ? TIA (transient ischemic attack) 09/06/2019    CT head showing chronic lacunar infarcts.  Carotid ultrasound with atherosclerotic plaque but no severe stenosis.   ? Traumatic subarachnoid hemorrhage with loss of consciousness of 30 minutes or less (H) 9/21/2017    Fall down the stairs following alcohol use with traumatic subarachnoid hemorrhage treated conservatively at [facility] followed by stay at TCU with no residual neurologic deficits   ? Trochanteric bursitis of right hip 8/24/2018   ? 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 Unsteady gait 3/4/2019   ? Vitamin D deficiency 11/9/2017     Past history is negative for cancer, tuberculosis, myocardial infarction,  rheumatic fever,  cerebrovascular accident, peptic ulcer disease, chronic obstructive pulmonary disease, or thyroid disorder.    Past Surgical History:     Past Surgical History:   Procedure Laterality Date   ? BLEPHAROPLASTY  2018    ? CARPAL TUNNEL RELEASE Bilateral    ? COLONOSCOPY  2003    Normal   ? EYE SURGERY     ? IL EXCIS STOMACH ULCER,LESN;LOCAL      Description: Gastric Excision;  Recorded: 03/23/2012;   ? IL REMOVAL GALLBLADDER      Description: Cholecystectomy;  Recorded: 03/23/2012;   ? IL REVISE MEDIAN N/CARPAL TUNNEL SURG      Description: Neuroplasty Decompression Median Nerve At Carpal Tunnel;  Recorded: 03/23/2012;   ? TOTAL KNEE ARTHROPLASTY Right 2014    Complicated by torn Ranexa 1 and subsequent repair     Family History:   Family history negative for cardiovascular disease.    Social History:   He lives at home independently with his wife, is retired  for [employer], reports that he has quit smoking, up to 3 packs a day for 35 years quitting about 35 years ago. He has never used smokeless tobacco. He reports current alcohol use of about 14.0 standard drinks of alcohol per week, a haleigh and beer every evening. He reports that he does not use drugs. The primary care physician is Ld Moy MD    Meds:   Scheduled Meds:  Current Outpatient Medications   Medication Sig Dispense Refill   ? acetaminophen (TYLENOL) 500 MG tablet Take 1,000 mg by mouth 3 (three) times a day. Special Instructions: max: 4000 mg in 24 hours Dx: pain  Three Times A Day; 08:00 AM, 12:00 PM, 04:00 PM     ? blood glucose test (GLUCOSE BLOOD) strips Test up to 3 times daily 300 strip 3   ? blood glucose test strips Use one test strip daily to monitor blood glucose three times a day. 300 strip 3   ? cholecalciferol, vitamin D3, (VITAMIN D3) 2,000 unit Tab Take 1 tablet (2,000 Units total) by mouth daily. 100 tablet 3   ? citalopram (CELEXA) 20 MG tablet Take 0.5 tablets (10 mg total) by mouth at bedtime. 90 tablet 3   ? clopidogreL (PLAVIX) 75 mg tablet Take 1 tablet (75 mg total) by mouth daily. 90 tablet 3   ? cyanocobalamin 1000 MCG tablet Take 1 tablet (1,000 mcg total) by mouth daily. 100 tablet 3   ?  "diclofenac sodium (VOLTAREN) 1 % Gel Place 2-4 g on the skin 4 (four) times a day as needed.      ? hydroCHLOROthiazide (HYDRODIURIL) 25 MG tablet Take 25 mg by mouth daily.     ? insulin aspart U-100 (NOVOLOG FLEXPEN U-100 INSULIN) 100 unit/mL (3 mL) injection pen Inject 15 Units under the skin 3 (three) times a day before meals. 60 mL 11   ? insulin glargine (BASAGLAR KWIKPEN) 100 unit/mL (3 mL) pen Inject 65 Units under the skin daily. 5 pen 6   ? latanoprost (XALATAN) 0.005 % ophthalmic solution Administer 1 drop to both eyes at bedtime.     ? losartan (COZAAR) 100 MG tablet Take 100 mg by mouth daily.     ? magnesium oxide (MAGOX) 400 mg (241.3 mg magnesium) tablet Take 1 tablet (400 mg total) by mouth 2 (two) times a day. 100 tablet 1   ? metFORMIN (GLUCOPHAGE-XR) 500 MG 24 hr tablet TAKE THREE TABLETS (1500MG) BY MOUTH DAILY 270 tablet 3   ? PEN NEEDLE 31 gauge x 5/16\" Ndle USE AS DIRECTED 3 TIMES DAILY 300 each 3   ? pravastatin (PRAVACHOL) 20 MG tablet Take 1 tablet (20 mg total) by mouth daily. 90 tablet 3   ? pregabalin (LYRICA) 50 MG capsule Take 1 capsule (50 mg total) by mouth at bedtime. 30 capsule 5   ? aspirin 81 MG EC tablet Take 1 tablet (81 mg total) by mouth daily for 21 days. Then discontinue 150 tablet 2     No current facility-administered medications for this visit.      PRN Meds:.    Allergies:   Actos [pioglitazone]; Donepezil; Gabapentin; Lipitor [atorvastatin]; Niacin; and Simvastatin    Objective:      Physical Exam  (!) 320 lb (145.2 kg)     Body mass index is 43.4 kg/m .  /62 (Patient Site: Right Arm, Patient Position: Sitting, Cuff Size: Adult Large)   Pulse 61   Resp 14   Wt (!) 320 lb (145.2 kg)   SpO2 97%   BMI 43.40 kg/m      General Appearance:   Alert, cooperative and in no acute distress.  Morbidly obese.   HEENT:  No scleral icterus; the mucous membranes were pink and moist.   Neck: JVP 2 to 3 cm at 30 degrees. No thyromegaly. No HJR   Chest: The spine was " straight. The chest was symmetric.   Lungs:   Respirations unlabored; the lungs are clear to auscultation.   Cardiovascular:   S1 and S2 without murmur, clicks or rubs. Brachial, radial, carotid and posterior tibial pulses are intact and symetrical.  No carotid bruits noted   Abdomen:  No organomegaly, masses, bruits, or tenderness. Bowels sounds are present   Extremities: No cyanosis, clubbing, or edema.   Skin: No xanthelasma.   Neurologic: Mood and affect are appropriate.         Lab Reviewed Personally by myself  Lab Results   Component Value Date     02/21/2020    K 5.0 02/21/2020     02/21/2020    CO2 24 02/21/2020    BUN 25 02/21/2020    CREATININE 1.38 (H) 02/21/2020    CALCIUM 9.1 02/21/2020     Lab Results   Component Value Date    WBC 8.3 02/03/2020    HGB 12.4 (L) 02/21/2020    HCT 39.3 (L) 02/03/2020    MCV 93 02/03/2020     02/05/2020     Lab Results   Component Value Date    CHOL 118 01/14/2020    TRIG 110 01/14/2020    HDL 28 (L) 01/14/2020     No results found for: BNP    ECG personally reviewed by myself shows sinus rhythm, first-degree AV block, sinus arrhythmia, no acute changes.     Review of Systems:     Review of Systems:   General: WNL  Eyes: WNL  Ears/Nose/Throat: Hearing Loss  Lungs: WNL  Heart: WNL  Stomach: WNL  Bladder: WNL  Muscle/Joints: (Falls)  Skin: WNL  Nervous System: Daytime Sleepiness, Loss of Balance, Dizziness  Mental Health: WNL     Blood: Easy Bruising

## 2021-06-30 NOTE — PROGRESS NOTES
Progress Notes by Marley Blank MD at 3/31/2021  8:50 AM     Author: Marley Blank MD Service: -- Author Type: Physician    Filed: 3/31/2021  9:09 AM Encounter Date: 3/31/2021 Status: Signed    : Marley Blank MD (Physician)           [facility]  [facility] Clinic Follow-up Note    Assessment & Plan        1. Sick sinus syndrome (H) -30-day event monitor February 11 through March 14, 2020 showed probable sick sinus syndrome with sinus arrhythmia with 2.8-second pause that was asymptomatic.  He was noted to have first-degree AV block as well as type I second-degree AV block.  Heart rates went only down to 50, and the majority of the monitor was asymptomatic.  Given lack of type II second-degree AV block or third-degree heart block or symptomatic bradycardia would not place pacemaker yet.  He is on no rate lowering drugs and has no symptoms and apparently was on telemetry during his recent 2 hospitalizations.  We will however repeat an event monitor for 2 weeks.    2. Paroxysmal atrial fibrillation (H)-  had cardioversion of this in 2007 and is no longer on anticoagulation.  I would suspect he is going in and out of atrial fibrillation, asymptomatic and not valvular.   Normally would recommend anticoagulation with Eliquis, Xarelto or possibly Coumadin, however given his frequent falls and unsteady gait very hesitant to do this.  We will recheck event monitor looking for this as above.   3. Unsteady gait-due to this no anticoagulation just yet.   4. 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)-so noted and on Metformin and insulin with hemoglobin A1c of 7.1.   5. QUETA on CPAP-so noted.   6. Morbid obesity (H)-work on weight loss.   7. Essential hypertension-under good control currently on losartan, HCTZ, Norvasc.   8. Dementia without behavioral disturbance, unspecified dementia type (H)-so noted, defer to primary, on aspirin and Plavix.   9. Stage 3a chronic kidney disease-so  noted with creatinine of 1.33.     Plan  1.  2-week event monitor looking for major sick sinus syndrome.  2.  Unless monitor extremely abnormal follow-up with me in 1 year or sooner if needed.    Subjective  CC: 86-year-old white gentleman here for yearly follow-up today.  Since I saw him he was hospitalized twice, due to E. coli bacteremia as well as a fall.  Living at home independently with a wife, family comes in and helps and he is getting home health.  He is mainly in a wheelchair throughout the day.  Does some exercises at home but does not help with household chores.  Has fallen several times due to his feet being weak.  Otherwise no syncope, dizziness, chest discomfort, palpitations, shortness of breath, PND, orthopnea or significant peripheral edema.    Medications  Current Outpatient Medications   Medication Sig   ? acetaminophen (TYLENOL) 500 MG tablet Take 1,000 mg by mouth 3 (three) times a day. Special Instructions: max: 4000 mg in 24 hours Dx: pain  Three Times A Day; 08:00 AM, 12:00 PM, 04:00 PM   ? amLODIPine (NORVASC) 5 MG tablet Take 1 tablet (5 mg total) by mouth daily.   ? aspirin 81 MG EC tablet Take 1 tablet (81 mg total) by mouth daily.   ? blood glucose test (GLUCOSE BLOOD) strips Test up to 3 times daily   ? blood glucose test strips Use one test strip daily to monitor blood glucose three times a day.   ? cholecalciferol, vitamin D3, (VITAMIN D3) 2,000 unit Tab Take 1 tablet (2,000 Units total) by mouth daily.   ? citalopram (CELEXA) 20 MG tablet TAKE ONE TABLET BY MOUTH ONCE DAILY   ? clopidogreL (PLAVIX) 75 mg tablet Take 1 tablet (75 mg total) by mouth daily.   ? cyanocobalamin 1000 MCG tablet Take 1 tablet (1,000 mcg total) by mouth daily.   ? diclofenac sodium (VOLTAREN) 1 % Gel Place 2-4 g on the skin 4 (four) times a day as needed.    ? hydroCHLOROthiazide (HYDRODIURIL) 25 MG tablet TAKE ONE TABLET BY MOUTH ONCE DAILY .  - TAKE WITH LOSARTAN - COMBO NOT AVAILABLE -   ? insulin  "aspart U-100 (NOVOLOG FLEXPEN U-100 INSULIN) 100 unit/mL (3 mL) injection pen Inject 15 Units under the skin 3 (three) times a day before meals.   ? insulin glargine (BASAGLAR KWIKPEN) 100 unit/mL (3 mL) pen Inject 60 Units under the skin daily.   ? latanoprost (XALATAN) 0.005 % ophthalmic solution Administer 1 drop to both eyes at bedtime.   ? losartan (COZAAR) 100 MG tablet TAKE ONE TABLET BY MOUTH ONCE DAILY .  TAKE WITH HCTZ - COMBO UNAVAILABLE -   ? magnesium oxide (MAGOX) 400 mg (241.3 mg magnesium) tablet Take 1 tablet (400 mg total) by mouth 2 (two) times a day.   ? metFORMIN (GLUCOPHAGE-XR) 500 MG 24 hr tablet TAKE THREE TABLETS (1500MG) BY MOUTH DAILY   ? PEN NEEDLE 31 gauge x 5/16\" Ndle USE AS DIRECTED 3 TIMES DAILY   ? pravastatin (PRAVACHOL) 20 MG tablet TAKE ONE TABLET BY MOUTH AT BEDTIME   ? pregabalin (LYRICA) 50 MG capsule Take 2 capsules (100 mg total) by mouth at bedtime.       Objective  /50 (Patient Site: Left Arm, Patient Position: Sitting, Cuff Size: Adult Large)   Pulse (!) 56   Resp 16   Ht 6' (1.829 m)   Wt (!) 315 lb (142.9 kg)   BMI 42.72 kg/m      General Appearance:    Alert, cooperative, no distress, appears stated age, obese in wheelchair   Head:    Normocephalic, without obvious abnormality, atraumatic   Throat:   Lips, mucosa, and tongue normal; teeth and gums normal   Neck:   Supple, symmetrical, trachea midline, no adenopathy;        thyroid:  No enlargement/tenderness/nodules; no carotid    bruit or JVD   Back:     Symmetric, no curvature, ROM normal, no CVA tenderness   Lungs:     Clear to auscultation bilaterally, respirations unlabored   Chest wall:    No tenderness or deformity   Heart:    Regular rate and rhythm, S1 and S2 normal, no murmur, rub   or gallop   Abdomen:     Soft, non-tender, bowel sounds active all four quadrants,     no masses, no organomegaly   Extremities:   Normal, atraumatic, no cyanosis or edema   Pulses:   2+ and symmetric all 4 extremities " absent lower extremities   Skin:   Skin color, texture, turgor normal, no rashes or lesions     Results    Lab Results personally reviewed   Lab Results   Component Value Date    CHOL 118 01/14/2020    CHOL 140 11/30/2018     Lab Results   Component Value Date    HDL 28 (L) 01/14/2020    HDL 29 (L) 11/30/2018     Lab Results   Component Value Date    LDLCALC 68 01/14/2020    LDLCALC 76 11/30/2018     Lab Results   Component Value Date    TRIG 110 01/14/2020    TRIG 177 (H) 11/30/2018     Lab Results   Component Value Date    WBC 9.1 02/11/2021    HGB 12.3 (L) 02/15/2021    HCT 40.6 02/11/2021     02/11/2021     Lab Results   Component Value Date    CREATININE 1.33 (H) 02/10/2021    BUN 37 (H) 02/10/2021     02/10/2021    K 5.5 (H) 02/10/2021    CO2 23 02/10/2021     Review of Systems:   General: WNL  Eyes: WNL  Ears/Nose/Throat: WNL  Lungs: WNL  Heart: WNL  Stomach: WNL  Bladder: Frequent Urination at Night  Muscle/Joints: WNL  Skin: WNL  Nervous System: Falls, Daytime Sleepiness  Mental Health: Depression, Anxiety     Blood: Easy Bruising

## 2021-07-04 NOTE — ADDENDUM NOTE
Addendum Note by Jenniffer Go RT (R) at 5/7/2021 10:00 AM     Author: Jenniffer Go RT (R) Service: -- Author Type: Radiologic Technologist    Filed: 5/7/2021 11:49 AM Encounter Date: 5/7/2021 Status: Signed    : Jenniffer Go RT (R) (Radiologic Technologist)    Addended by: JENNIFFER GO on: 5/7/2021 11:49 AM        Modules accepted: Orders

## 2021-07-14 ENCOUNTER — TRANSITIONAL CARE UNIT VISIT (OUTPATIENT)
Dept: GERIATRICS | Facility: CLINIC | Age: 86
End: 2021-07-14
Payer: MEDICARE

## 2021-07-14 VITALS
HEIGHT: 72 IN | TEMPERATURE: 97.5 F | OXYGEN SATURATION: 94 % | SYSTOLIC BLOOD PRESSURE: 141 MMHG | HEART RATE: 55 BPM | DIASTOLIC BLOOD PRESSURE: 68 MMHG | RESPIRATION RATE: 17 BRPM | WEIGHT: 304.4 LBS | BODY MASS INDEX: 41.23 KG/M2

## 2021-07-14 DIAGNOSIS — F32.0 MILD MAJOR DEPRESSION (H): ICD-10-CM

## 2021-07-14 DIAGNOSIS — I12.9 BENIGN HYPERTENSIVE KIDNEY DISEASE WITH CHRONIC KIDNEY DISEASE STAGE I THROUGH STAGE IV, OR UNSPECIFIED: ICD-10-CM

## 2021-07-14 DIAGNOSIS — R53.81 PHYSICAL DECONDITIONING: ICD-10-CM

## 2021-07-14 DIAGNOSIS — K59.01 SLOW TRANSIT CONSTIPATION: ICD-10-CM

## 2021-07-14 DIAGNOSIS — Z79.4 TYPE 2 DIABETES MELLITUS WITH DIABETIC POLYNEUROPATHY, WITH LONG-TERM CURRENT USE OF INSULIN (H): ICD-10-CM

## 2021-07-14 DIAGNOSIS — E11.42 TYPE 2 DIABETES MELLITUS WITH DIABETIC POLYNEUROPATHY, WITH LONG-TERM CURRENT USE OF INSULIN (H): ICD-10-CM

## 2021-07-14 DIAGNOSIS — I65.22 CAROTID ARTERY STENOSIS, SYMPTOMATIC, LEFT: Primary | ICD-10-CM

## 2021-07-14 DIAGNOSIS — I48.0 PAROXYSMAL ATRIAL FIBRILLATION (H): ICD-10-CM

## 2021-07-14 DIAGNOSIS — G63 POLYNEUROPATHY ASSOCIATED WITH UNDERLYING DISEASE (H): ICD-10-CM

## 2021-07-14 DIAGNOSIS — R00.1 SINUS BRADYCARDIA: ICD-10-CM

## 2021-07-14 DIAGNOSIS — R41.89 COGNITIVE IMPAIRMENT: ICD-10-CM

## 2021-07-14 PROBLEM — G45.9 TIA (TRANSIENT ISCHEMIC ATTACK): Status: RESOLVED | Noted: 2019-09-06 | Resolved: 2019-09-13

## 2021-07-14 PROBLEM — J45.20 INTERMITTENT ASTHMA WITHOUT COMPLICATION: Status: RESOLVED | Noted: 2017-11-09 | Resolved: 2020-02-13

## 2021-07-14 PROBLEM — S06.6X1A TRAUMATIC SUBARACHNOID HEMORRHAGE WITH LOSS OF CONSCIOUSNESS OF 30 MINUTES OR LESS (H): Status: RESOLVED | Noted: 2017-09-21 | Resolved: 2018-11-30

## 2021-07-14 PROCEDURE — 99305 1ST NF CARE MODERATE MDM 35: CPT | Mod: AI | Performed by: INTERNAL MEDICINE

## 2021-07-14 RX ORDER — PRAVASTATIN SODIUM 20 MG
20 TABLET ORAL DAILY
COMMUNITY
End: 2021-01-01

## 2021-07-14 RX ORDER — CITALOPRAM HYDROBROMIDE 10 MG/1
5 TABLET ORAL DAILY
COMMUNITY
End: 2022-01-01 | Stop reason: DRUGHIGH

## 2021-07-14 RX ORDER — SENNOSIDES A AND B 8.6 MG/1
1 TABLET, FILM COATED ORAL 2 TIMES DAILY PRN
COMMUNITY
End: 2021-01-01

## 2021-07-14 RX ORDER — POLYETHYLENE GLYCOL 3350 17 G/17G
1 POWDER, FOR SOLUTION ORAL DAILY PRN
COMMUNITY

## 2021-07-14 RX ORDER — LOSARTAN POTASSIUM 100 MG/1
100 TABLET ORAL DAILY
COMMUNITY
End: 2022-01-01

## 2021-07-14 ASSESSMENT — MIFFLIN-ST. JEOR: SCORE: 2093.75

## 2021-07-14 NOTE — LETTER
7/14/2021        RE: Robert E Schamberger  [address] [place] [address]        [facility] GERIATRICS  INITIAL VISIT NOTE  July 14, 2021    PRIMARY CARE PROVIDER AND CLINIC:  [facility] [facility] [facility] / [facility] [id number]    Chief Complaint   Patient presents with     Hospital F/U       HPI:    Robert E Schamberger is a 87 year old  (4/14/1934) male who [facility] for an initial visit. Medical history is notable for HTN, atrial fibrillation, DM and TIA (2019). He was hospitalized at [place] from 7/8/21 to 7/13/21 where he presented with increasing confusion and in the ER was aphasic with R sided hemiparesis. There is no discharge summary. Stroke code called and he received tPA. Imaging with critical stenosis of left ICA. Vascular surgery consulted and his 2019 presentation and his symptoms on this admission are likely due to symptomatic carotid artery stenosis. MRI without CVA. He is a high risk surgical candidate given anatomy of the vessel, location of the stenosis, body habitus, etc. PTA glargine and losartan were decreased and pravastatin was increased. He was admitted to this facility for medical management and rehab.     Today, Mr. Schamberger is seen in his room sitting in a chair.  Says he will be here for two weeks and wonders if Leonid, who was the PT who helped after his knee replacement, will be seeing him. No chest pain or dyspnea. No abdominal pain. Nurse the morning noted that NPH was ordered and should be aspart. No other concerns. He will start working with therapies today.     CODE STATUS:   CPR/Full code     ALLERGIES:  Allergies   Allergen Reactions     Actos [Pioglitazone] Swelling     Edema     Atorvastatin Unknown     Back pain     Donepezil Unknown     Diarrhea     Gabapentin Diarrhea     Diarrhea     Niacin Unknown     Hyperglycemia     Simvastatin Unknown     Back pain       PAST MEDICAL HISTORY:   Past Medical History:    Diagnosis Date     Anemia in chronic kidney disease      B12 deficiency 11/9/2017     Bilateral hearing loss 5/9/2016     Bursitis, hip, left 3/2/2017     Chest pain     Normal GXT 2006     Chronic kidney disease, stage 3 (moderate)     Worsening with hyperkalemia on Relafen-discontinued August 2016     Chronic kidney disease, stage 3a 5/7/2021     Closed displaced fracture of left clavicle 9/21/2017     Cognitive changes 11/30/2018    SLUMS 20/30 Nov 2018     Complete tear of left rotator cuff 2/15/2018    Associated with fall and clavicular fracture that occurred 2017, evaluated by orthopedics, given cortisone injection and physical therapy recommended     Degenerative disc disease, lumbar      Dementia without behavioral disturbance (H) 2/21/2020     Depression      Diabetic peripheral neuropathy (H) 11/30/2018     Erectile dysfunction      Fatigue 7/6/2017     Forearm tendonitis 11/29/2016     Glaucoma      Gout attack     Left foot     History of recurrent TIAs 5/7/2020     HTN (hypertension)      Hyperlipidemia      Ingrown toenail 7/6/2017     Intermittent asthma without complication 11/9/2017     Left leg cellulitis 2014     Low back pain      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     Morbid obesity (H)      QUETA on CPAP      Osteoarthritis      Osteoarthritis of both hands      Osteoarthritis of both knees     Right TKA     Pain of right heel 5/11/2018     Peripheral edema 5/7/2021     Peripheral neuropathy 10/9/2018     Recurrent falls 3/5/2021     Rib fracture 2005     Right-sided chest pain 5/9/2016     Screening     No AAA on CT scan 2009     Sepsis secondary to UTI (H) 3/5/2021    Hospitalized with urosepsis January 2021 and rehospitalized after fall found to have recurrent UTI treated with 30 days of Omnicef for acute prostatitis     Sick sinus syndrome (H) 5/7/2020    Abnormal event monitor with 2.8-second sinus pause     Squamous cell skin cancer 11/2/2015     TIA (transient ischemic attack)  09/06/2019    CT head showing chronic lacunar infarcts.  Carotid ultrasound with atherosclerotic plaque but no severe stenosis.     Traumatic subarachnoid hemorrhage with loss of consciousness of 30 minutes or less (H) 9/21/2017    Fall down the stairs following alcohol use with traumatic subarachnoid hemorrhage treated conservatively at [facility] followed by stay at TCU with no residual neurologic deficits     Trochanteric bursitis of right hip 8/24/2018     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)      Unsteady gait 3/4/2019     Vitamin D deficiency 11/9/2017       PAST SURGICAL HISTORY:   Past Surgical History:   Procedure Laterality Date     BLEPHAROPLASTY  2018     C EXCIS STOMACH ULCER,LESN;LOCAL      Description: Gastric Excision;  Recorded: 03/23/2012;     COLONOSCOPY  2003    Normal     EYE SURGERY       HC REMOVAL GALLBLADDER      Description: Cholecystectomy;  Recorded: 03/23/2012;     HC REVISE MEDIAN N/CARPAL TUNNEL SURG      Description: Neuroplasty Decompression Median Nerve At Carpal Tunnel;  Recorded: 03/23/2012;     IR BILIARY TUBE CHANGE  7/25/2000     IR LUMBAR EPIDURAL STEROID INJECTION  3/23/2012     IR LUMBAR EPIDURAL STEROID INJECTION  4/13/2012     RELEASE CARPAL TUNNEL Bilateral      TOTAL KNEE ARTHROPLASTY Right 2014    Complicated by torn Ranexa 1 and subsequent repair       FAMILY HISTORY:   Unable to review due to cognitive impairment    SOCIAL HISTORY:   Lives with spouse. Ambulates with a walker at baseline.    MEDICATIONS:  Post Discharge Medication Reconciliation Status: discharge medications reconciled and changed, per note/orders.   Current Outpatient Medications   Medication Sig Dispense Refill     acetaminophen (TYLENOL) 500 MG tablet Take 1,000 mg by mouth every 8 hours as needed        amLODIPine (NORVASC) 2.5 MG tablet [AMLODIPINE (NORVASC) 2.5 MG TABLET] Take 1 tablet (2.5 mg total) by mouth daily. 90 tablet 3     aspirin 81 MG EC tablet [ASPIRIN 81 MG  EC TABLET] Take 1 tablet (81 mg total) by mouth daily. 150 tablet 2     cholecalciferol, vitamin D3, (VITAMIN D3) 2,000 unit Tab [CHOLECALCIFEROL, VITAMIN D3, (VITAMIN D3) 2,000 UNIT TAB] Take 1 tablet (2,000 Units total) by mouth daily. 100 tablet 3     citalopram (CELEXA) 10 MG tablet Take 10 mg by mouth daily       clopidogreL (PLAVIX) 75 mg tablet [CLOPIDOGREL (PLAVIX) 75 MG TABLET] TAKE ONE TABLET (75 MG) BY MOUTH ONCE DAILY 90 tablet 1     cyanocobalamin 1000 MCG tablet [CYANOCOBALAMIN 1000 MCG TABLET] Take 1 tablet (1,000 mcg total) by mouth daily. 100 tablet 3     diclofenac (VOLTAREN) 1 % topical gel Apply 2 g topically 4 times daily as needed for moderate pain        hydroCHLOROthiazide (HYDRODIURIL) 25 MG tablet [HYDROCHLOROTHIAZIDE (HYDRODIURIL) 25 MG TABLET] TAKE ONE TABLET BY MOUTH ONCE DAILY .  - TAKE WITH LOSARTAN - COMBO NOT AVAILABLE - 90 tablet 3     insulin aspart (NOVOLOG PEN) 100 UNIT/ML pen Inject Subcutaneous 3 times daily (with meals) Per Sliding Scale  If Blood Sugar is less than 70, call MD.  If Blood Sugar is 150 to 199, give 3 Units.  If Blood Sugar is 200 to 249, give 6 Units.  If Blood Sugar is 250 to 299, give 9 Units.  If Blood Sugar is 300 to 349, give 12 Units.  If Blood Sugar is 350 to 399, give 15 Units.  If Blood Sugar is greater than 399, give 18 Units.  If Blood Sugar is greater than 400, call MD.       insulin aspart U-100 (NOVOLOG FLEXPEN U-100 INSULIN) 100 unit/mL (3 mL) injection pen [INSULIN ASPART U-100 (NOVOLOG FLEXPEN U-100 INSULIN) 100 UNIT/ML (3 ML) INJECTION PEN] Inject 15 Units under the skin 3 (three) times a day before meals.  0     insulin glargine (LANTUS PEN) 100 UNIT/ML pen Inject 50 Units Subcutaneous At Bedtime       latanoprost (XALATAN) 0.005 % ophthalmic solution [LATANOPROST (XALATAN) 0.005 % OPHTHALMIC SOLUTION] Administer 1 drop to both eyes at bedtime.       losartan (COZAAR) 25 MG tablet Take 25 mg by mouth daily       magnesium oxide (MAGOX) 400 mg  (241.3 mg magnesium) tablet [MAGNESIUM OXIDE (MAGOX) 400 MG (241.3 MG MAGNESIUM) TABLET] Take 1 tablet (400 mg total) by mouth 2 (two) times a day. 100 tablet 1     metFORMIN (GLUCOPHAGE) 500 MG tablet Take 500 mg by mouth 3 times daily (with meals)       polyethylene glycol (MIRALAX) 17 g packet Take 1 packet by mouth daily as needed for constipation       pravastatin (PRAVACHOL) 40 MG tablet Take 40 mg by mouth daily       pregabalin (LYRICA) 50 MG capsule [PREGABALIN (LYRICA) 50 MG CAPSULE] TAKE 2 CAPSULES (100 MG TOTAL) BY MOUTH ONCE DAILY AT BEDTIME. 180 capsule 3     senna (SENOKOT) 8.6 MG tablet Take 1 tablet by mouth 2 times daily as needed for constipation           ROS:  10 point ROS neg other than the symptoms noted above in the HPI. [facility] 21/30    PHYSICAL EXAM:  BP (!) 141/68   Pulse 55   Temp 97.5  F (36.4  C)   Resp 17   Ht 1.829 m (6')   Wt 138.1 kg (304 lb 6.4 oz)   SpO2 94%   BMI 41.28 kg/m     Gen: sitting in chair, alert, cooperative and in no acute distress  HEENT: normocephalic; oropharynx clear  Card: RRR, S1, S2, no murmurs  Resp: lungs clear to auscultation bilaterally, no crackles or wheezes  MSK: normal muscle tone, no LE edema  Neuro: CX II-XII grossly in tact; ROM in all four extremities grossly in tact  Psych: alert and oriented to self and general situation; normal affect  LE: some venous stasis changes, no ulcers/open areas     LABORATORY/IMAGING DATA:  Reviewed as per Epic    ASSESSMENT/PLAN:    Critical Left ICA Stenosis (90%)  Presented with confusion, aphasia and R hemiparesis. Received tPA in the ER. MRI negative for CVA. Imaging with critical ICA stenosis and per vascular medicine note, his TIA in 2019 and this presentation are likely due to symptoms from this. He is a very high risk surgical/revascularization candidate.   -- ASA 81 mg daily, clopidogrel 75 mg daily, pravastatin 40 mg daily  -- follow up with vascular as scheduled    Paroxysmal Atrial Fibrillation  Sinus  "Bradycardia  HR 50s. Not on systemic anticoagulation due to risk>benefit (falls; ambulates with walker). Not on rate control meds given bradycardia. Is followed by cardiology and no recommendation for PPM at this time.   -- ASA 81 mg daily     HTN  SBPs 120s-130s  -- amlodipine 2.5 mg daily, hydrochlorothiazide 25 mg daily and losartan 25 mg daily  -- follow BPs and adjust medications as needed    HLD  Pravastatin increased during hospitalization. Lipids (7/9) - TChol 120, LDL 72 HDL 29 and TG 96.  -- pravastatin 40 mg daily     DM, Type II  Hgb A1c 6.8 in May. Sugar - no trends as new to TCU. Was 197 this morning.  PTA on glagine 55 units.   -- glargine 50 units at bedtime and aspart 15 units TID AC plus sliding scale insulin  -- metformin 500 mg TID   -- follow sugars and adjust insulin as needed    Peripheral Neuropathy  Underlying DM  -- pregabalin 50 mg BID     Chronic Bilateral LE Edema  By chart review. None today  -- follow clinically     Cognitive Impairment  \"Mild dementia\" and \"suspected vascular dementia\" in the chart. Lives with spouse. SLUMS 21/30 during hospitalization   -- OT following for cog assessment     Mild Major Depression  Mood and spirits seemed OK today  -- citalopram 10 mg daily     CKD, Stage III  Baseline Cr 1.2-1.6. Cr 1.26 on 7/8.   -- avoid nephrotoxic meds  -- BMP 7/19    Slow Transit Constipation  -- Miralax 17g daily PRN and Senna 1 tab BID PRN  -- adjust bowel regimen as needed    Physical Deconditioning  In setting of hospitalization and underlying medical conditions  -- ongoing PT/OT    Electronically signed by:  Milly Rubio MD                      Sincerely,        Milly Rubio MD    "

## 2021-07-14 NOTE — LETTER
Robert E Schamberger  4/14/1934    1. Discontinue the NPH-regular as the insulin used for sliding scale insulin. Change to aspart (same as pre-meal fixed insulin) and continue the sliding scale insulin as ordered  2. Increase frequency of diclofenac from daily PRN to QID PRN  Dx joint pain  3. BMP 7/19/21 Dx CKD    Milly Rubio MD

## 2021-07-14 NOTE — PROGRESS NOTES
[facility] GERIATRICS  INITIAL VISIT NOTE  July 14, 2021    PRIMARY CARE PROVIDER AND CLINIC:  L[address] [facility] [place] [address]    Chief Complaint   Patient presents with     Hospital F/U       HPI:    Robert E Schamberger is a 87 year old  (4/14/1934) male who was [facility] at [facility] on July 14, 2021 for an initial visit. Medical history is notable for HTN, atrial fibrillation, DM and TIA (2019). He was hospitalized at [place] from 7/8/21 to 7/13/21 where he presented with increasing confusion and in the ER was aphasic with R sided hemiparesis. There is no discharge summary. Stroke code called and he received tPA. Imaging with critical stenosis of left ICA. Vascular surgery consulted and his 2019 presentation and his symptoms on this admission are likely due to symptomatic carotid artery stenosis. MRI without CVA. He is a high risk surgical candidate given anatomy of the vessel, location of the stenosis, body habitus, etc. PTA glargine and losartan were decreased and pravastatin was increased. He was admitted to this facility for medical management and rehab.     Today, Mr. Schamberger is seen in his room sitting in a chair.  Says he will be here for two weeks and wonders if Leonid, who was the PT who helped after his knee replacement, will be seeing him. No chest pain or dyspnea. No abdominal pain. Nurse the morning noted that NPH was ordered and should be aspart. No other concerns. He will start working with therapies today.     CODE STATUS:   CPR/Full code     ALLERGIES:  Allergies   Allergen Reactions     Actos [Pioglitazone] Swelling     Edema     Atorvastatin Unknown     Back pain     Donepezil Unknown     Diarrhea     Gabapentin Diarrhea     Diarrhea     Niacin Unknown     Hyperglycemia     Simvastatin Unknown     Back pain       PAST MEDICAL HISTORY:   Past Medical History:   Diagnosis Date     Anemia in chronic kidney disease      B12 deficiency 11/9/2017      Bilateral hearing loss 5/9/2016     Bursitis, hip, left 3/2/2017     Chest pain     Normal GXT 2006     Chronic kidney disease, stage 3 (moderate)     Worsening with hyperkalemia on Relafen-discontinued August 2016     Chronic kidney disease, stage 3a 5/7/2021     Closed displaced fracture of left clavicle 9/21/2017     Cognitive changes 11/30/2018    SLUMS 20/30 Nov 2018     Complete tear of left rotator cuff 2/15/2018    Associated with fall and clavicular fracture that occurred 2017, evaluated by orthopedics, given cortisone injection and physical therapy recommended     Degenerative disc disease, lumbar      Dementia without behavioral disturbance (H) 2/21/2020     Depression      Diabetic peripheral neuropathy (H) 11/30/2018     Erectile dysfunction      Fatigue 7/6/2017     Forearm tendonitis 11/29/2016     Glaucoma      Gout attack     Left foot     History of recurrent TIAs 5/7/2020     HTN (hypertension)      Hyperlipidemia      Ingrown toenail 7/6/2017     Intermittent asthma without complication 11/9/2017     Left leg cellulitis 2014     Low back pain      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     Morbid obesity (H)      QUETA on CPAP      Osteoarthritis      Osteoarthritis of both hands      Osteoarthritis of both knees     Right TKA     Pain of right heel 5/11/2018     Peripheral edema 5/7/2021     Peripheral neuropathy 10/9/2018     Recurrent falls 3/5/2021     Rib fracture 2005     Right-sided chest pain 5/9/2016     Screening     No AAA on CT scan 2009     Sepsis secondary to UTI (H) 3/5/2021    Hospitalized with urosepsis January 2021 and rehospitalized after fall found to have recurrent UTI treated with 30 days of Omnicef for acute prostatitis     Sick sinus syndrome (H) 5/7/2020    Abnormal event monitor with 2.8-second sinus pause     Squamous cell skin cancer 11/2/2015     TIA (transient ischemic attack) 09/06/2019    CT head showing chronic lacunar infarcts.  Carotid ultrasound with  atherosclerotic plaque but no severe stenosis.     Traumatic subarachnoid hemorrhage with loss of consciousness of 30 minutes or less (H) 9/21/2017    Fall down the stairs following alcohol use with traumatic subarachnoid hemorrhage treated conservatively at [facility] followed by stay at TCU with no residual neurologic deficits     Trochanteric bursitis of right hip 8/24/2018     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)      Unsteady gait 3/4/2019     Vitamin D deficiency 11/9/2017       PAST SURGICAL HISTORY:   Past Surgical History:   Procedure Laterality Date     BLEPHAROPLASTY  2018     C EXCIS STOMACH ULCER,LESN;LOCAL      Description: Gastric Excision;  Recorded: 03/23/2012;     COLONOSCOPY  2003    Normal     EYE SURGERY       HC REMOVAL GALLBLADDER      Description: Cholecystectomy;  Recorded: 03/23/2012;     HC REVISE MEDIAN N/CARPAL TUNNEL SURG      Description: Neuroplasty Decompression Median Nerve At Carpal Tunnel;  Recorded: 03/23/2012;     IR BILIARY TUBE CHANGE  7/25/2000     IR LUMBAR EPIDURAL STEROID INJECTION  3/23/2012     IR LUMBAR EPIDURAL STEROID INJECTION  4/13/2012     RELEASE CARPAL TUNNEL Bilateral      TOTAL KNEE ARTHROPLASTY Right 2014    Complicated by torn Ranexa 1 and subsequent repair       FAMILY HISTORY:   Unable to review due to cognitive impairment    SOCIAL HISTORY:   Lives with spouse. Ambulates with a walker at baseline.    MEDICATIONS:  Post Discharge Medication Reconciliation Status: discharge medications reconciled and changed, per note/orders.   Current Outpatient Medications   Medication Sig Dispense Refill     acetaminophen (TYLENOL) 500 MG tablet Take 1,000 mg by mouth every 8 hours as needed        amLODIPine (NORVASC) 2.5 MG tablet [AMLODIPINE (NORVASC) 2.5 MG TABLET] Take 1 tablet (2.5 mg total) by mouth daily. 90 tablet 3     aspirin 81 MG EC tablet [ASPIRIN 81 MG EC TABLET] Take 1 tablet (81 mg total) by mouth daily. 150 tablet 2      cholecalciferol, vitamin D3, (VITAMIN D3) 2,000 unit Tab [CHOLECALCIFEROL, VITAMIN D3, (VITAMIN D3) 2,000 UNIT TAB] Take 1 tablet (2,000 Units total) by mouth daily. 100 tablet 3     citalopram (CELEXA) 10 MG tablet Take 10 mg by mouth daily       clopidogreL (PLAVIX) 75 mg tablet [CLOPIDOGREL (PLAVIX) 75 MG TABLET] TAKE ONE TABLET (75 MG) BY MOUTH ONCE DAILY 90 tablet 1     cyanocobalamin 1000 MCG tablet [CYANOCOBALAMIN 1000 MCG TABLET] Take 1 tablet (1,000 mcg total) by mouth daily. 100 tablet 3     diclofenac (VOLTAREN) 1 % topical gel Apply 2 g topically 4 times daily as needed for moderate pain        hydroCHLOROthiazide (HYDRODIURIL) 25 MG tablet [HYDROCHLOROTHIAZIDE (HYDRODIURIL) 25 MG TABLET] TAKE ONE TABLET BY MOUTH ONCE DAILY .  - TAKE WITH LOSARTAN - COMBO NOT AVAILABLE - 90 tablet 3     insulin aspart (NOVOLOG PEN) 100 UNIT/ML pen Inject Subcutaneous 3 times daily (with meals) Per Sliding Scale  If Blood Sugar is less than 70, call MD.  If Blood Sugar is 150 to 199, give 3 Units.  If Blood Sugar is 200 to 249, give 6 Units.  If Blood Sugar is 250 to 299, give 9 Units.  If Blood Sugar is 300 to 349, give 12 Units.  If Blood Sugar is 350 to 399, give 15 Units.  If Blood Sugar is greater than 399, give 18 Units.  If Blood Sugar is greater than 400, call MD.       insulin aspart U-100 (NOVOLOG FLEXPEN U-100 INSULIN) 100 unit/mL (3 mL) injection pen [INSULIN ASPART U-100 (NOVOLOG FLEXPEN U-100 INSULIN) 100 UNIT/ML (3 ML) INJECTION PEN] Inject 15 Units under the skin 3 (three) times a day before meals.  0     insulin glargine (LANTUS PEN) 100 UNIT/ML pen Inject 50 Units Subcutaneous At Bedtime       latanoprost (XALATAN) 0.005 % ophthalmic solution [LATANOPROST (XALATAN) 0.005 % OPHTHALMIC SOLUTION] Administer 1 drop to both eyes at bedtime.       losartan (COZAAR) 25 MG tablet Take 25 mg by mouth daily       magnesium oxide (MAGOX) 400 mg (241.3 mg magnesium) tablet [MAGNESIUM OXIDE (MAGOX) 400 MG (241.3 MG  MAGNESIUM) TABLET] Take 1 tablet (400 mg total) by mouth 2 (two) times a day. 100 tablet 1     metFORMIN (GLUCOPHAGE) 500 MG tablet Take 500 mg by mouth 3 times daily (with meals)       polyethylene glycol (MIRALAX) 17 g packet Take 1 packet by mouth daily as needed for constipation       pravastatin (PRAVACHOL) 40 MG tablet Take 40 mg by mouth daily       pregabalin (LYRICA) 50 MG capsule [PREGABALIN (LYRICA) 50 MG CAPSULE] TAKE 2 CAPSULES (100 MG TOTAL) BY MOUTH ONCE DAILY AT BEDTIME. 180 capsule 3     senna (SENOKOT) 8.6 MG tablet Take 1 tablet by mouth 2 times daily as needed for constipation           ROS:  10 point ROS neg other than the symptoms noted above in the HPI. [facility] 21/30    PHYSICAL EXAM:  BP (!) 141/68   Pulse 55   Temp 97.5  F (36.4  C)   Resp 17   Ht 1.829 m (6')   Wt 138.1 kg (304 lb 6.4 oz)   SpO2 94%   BMI 41.28 kg/m     Gen: sitting in chair, alert, cooperative and in no acute distress  HEENT: normocephalic; oropharynx clear  Card: RRR, S1, S2, no murmurs  Resp: lungs clear to auscultation bilaterally, no crackles or wheezes  MSK: normal muscle tone, no LE edema  Neuro: CX II-XII grossly in tact; ROM in all four extremities grossly in tact  Psych: alert and oriented to self and general situation; normal affect  LE: some venous stasis changes, no ulcers/open areas     LABORATORY/IMAGING DATA:  Reviewed as per Epic    ASSESSMENT/PLAN:    Critical Left ICA Stenosis (90%)  Presented with confusion, aphasia and R hemiparesis. Received tPA in the ER. MRI negative for CVA. Imaging with critical ICA stenosis and per vascular medicine note, his TIA in 2019 and this presentation are likely due to symptoms from this. He is a very high risk surgical/revascularization candidate.   -- ASA 81 mg daily, clopidogrel 75 mg daily, pravastatin 40 mg daily  -- follow up with vascular as scheduled    Paroxysmal Atrial Fibrillation  Sinus Bradycardia  HR 50s. Not on systemic anticoagulation due to  "risk>benefit (falls; ambulates with walker). Not on rate control meds given bradycardia. Is followed by cardiology and no recommendation for PPM at this time.   -- ASA 81 mg daily     HTN  SBPs 120s-130s  -- amlodipine 2.5 mg daily, hydrochlorothiazide 25 mg daily and losartan 25 mg daily  -- follow BPs and adjust medications as needed    HLD  Pravastatin increased during hospitalization. Lipids (7/9) - TChol 120, LDL 72 HDL 29 and TG 96.  -- pravastatin 40 mg daily     DM, Type II  Hgb A1c 6.8 in May. Sugar - no trends as new to TCU. Was 197 this morning.  PTA on glagine 55 units.   -- glargine 50 units at bedtime and aspart 15 units TID AC plus sliding scale insulin  -- metformin 500 mg TID   -- follow sugars and adjust insulin as needed    Peripheral Neuropathy  Underlying DM  -- pregabalin 50 mg BID     Chronic Bilateral LE Edema  By chart review. None today  -- follow clinically     Cognitive Impairment  \"Mild dementia\" and \"suspected vascular dementia\" in the chart. Lives with spouse. SLUMS 21/30 during hospitalization   -- OT following for cog assessment     Mild Major Depression  Mood and spirits seemed OK today  -- citalopram 10 mg daily     CKD, Stage III  Baseline Cr 1.2-1.6. Cr 1.26 on 7/8.   -- avoid nephrotoxic meds  -- BMP 7/19    Slow Transit Constipation  -- Miralax 17g daily PRN and Senna 1 tab BID PRN  -- adjust bowel regimen as needed    Physical Deconditioning  In setting of hospitalization and underlying medical conditions  -- ongoing PT/OT    Electronically signed by:  Milly Rubio MD                "

## 2021-07-17 ENCOUNTER — LAB REQUISITION (OUTPATIENT)
Dept: LAB | Facility: CLINIC | Age: 86
End: 2021-07-17
Payer: MEDICARE

## 2021-07-17 DIAGNOSIS — N18.30 CHRONIC KIDNEY DISEASE, STAGE 3 UNSPECIFIED (H): ICD-10-CM

## 2021-07-19 ENCOUNTER — TELEPHONE (OUTPATIENT)
Dept: GERIATRICS | Facility: CLINIC | Age: 86
End: 2021-07-19

## 2021-07-19 LAB
ANION GAP SERPL CALCULATED.3IONS-SCNC: 12 MMOL/L (ref 5–18)
BUN SERPL-MCNC: 26 MG/DL (ref 8–28)
CALCIUM SERPL-MCNC: 8.8 MG/DL (ref 8.5–10.5)
CHLORIDE BLD-SCNC: 100 MMOL/L (ref 98–107)
CO2 SERPL-SCNC: 24 MMOL/L (ref 22–31)
CREAT SERPL-MCNC: 1.44 MG/DL (ref 0.7–1.3)
GFR SERPL CREATININE-BSD FRML MDRD: 43 ML/MIN/1.73M2
GLUCOSE BLD-MCNC: 104 MG/DL (ref 70–125)
POTASSIUM BLD-SCNC: 4.4 MMOL/L (ref 3.5–5)
SODIUM SERPL-SCNC: 136 MMOL/L (ref 136–145)

## 2021-07-19 PROCEDURE — 80048 BASIC METABOLIC PNL TOTAL CA: CPT | Performed by: INTERNAL MEDICINE

## 2021-07-19 PROCEDURE — 36415 COLL VENOUS BLD VENIPUNCTURE: CPT | Performed by: INTERNAL MEDICINE

## 2021-07-19 PROCEDURE — P9604 ONE-WAY ALLOW PRORATED TRIP: HCPCS | Performed by: INTERNAL MEDICINE

## 2021-07-20 ENCOUNTER — TRANSITIONAL CARE UNIT VISIT (OUTPATIENT)
Dept: GERIATRICS | Facility: CLINIC | Age: 86
End: 2021-07-20
Payer: MEDICARE

## 2021-07-20 VITALS
TEMPERATURE: 97.3 F | DIASTOLIC BLOOD PRESSURE: 54 MMHG | OXYGEN SATURATION: 98 % | BODY MASS INDEX: 34.19 KG/M2 | HEART RATE: 52 BPM | WEIGHT: 252.4 LBS | HEIGHT: 72 IN | SYSTOLIC BLOOD PRESSURE: 134 MMHG | RESPIRATION RATE: 17 BRPM

## 2021-07-20 DIAGNOSIS — I12.9 BENIGN HYPERTENSIVE KIDNEY DISEASE WITH CHRONIC KIDNEY DISEASE STAGE I THROUGH STAGE IV, OR UNSPECIFIED: ICD-10-CM

## 2021-07-20 DIAGNOSIS — E11.42 TYPE 2 DIABETES MELLITUS WITH DIABETIC POLYNEUROPATHY, WITH LONG-TERM CURRENT USE OF INSULIN (H): ICD-10-CM

## 2021-07-20 DIAGNOSIS — R41.89 COGNITIVE IMPAIRMENT: ICD-10-CM

## 2021-07-20 DIAGNOSIS — I48.0 PAROXYSMAL ATRIAL FIBRILLATION (H): ICD-10-CM

## 2021-07-20 DIAGNOSIS — Z79.4 TYPE 2 DIABETES MELLITUS WITH DIABETIC POLYNEUROPATHY, WITH LONG-TERM CURRENT USE OF INSULIN (H): ICD-10-CM

## 2021-07-20 DIAGNOSIS — R00.1 SINUS BRADYCARDIA: ICD-10-CM

## 2021-07-20 DIAGNOSIS — F32.0 MILD MAJOR DEPRESSION (H): ICD-10-CM

## 2021-07-20 DIAGNOSIS — I65.22 CAROTID ARTERY STENOSIS, SYMPTOMATIC, LEFT: Primary | ICD-10-CM

## 2021-07-20 DIAGNOSIS — K59.01 SLOW TRANSIT CONSTIPATION: ICD-10-CM

## 2021-07-20 DIAGNOSIS — R13.12 OROPHARYNGEAL DYSPHAGIA: ICD-10-CM

## 2021-07-20 PROCEDURE — 99309 SBSQ NF CARE MODERATE MDM 30: CPT | Performed by: NURSE PRACTITIONER

## 2021-07-20 ASSESSMENT — MIFFLIN-ST. JEOR: SCORE: 1857.88

## 2021-07-20 NOTE — TELEPHONE ENCOUNTER
[facility] Nurse Triage Telephone Note    Provider: LISA Calloway  Facility: [facility] Facility Type:  TCU    Caller: Aylin  Call Back Number: [phone]    Allergies:    Allergies   Allergen Reactions     Actos [Pioglitazone] Swelling     Edema     Atorvastatin Unknown     Back pain     Donepezil Unknown     Diarrhea     Gabapentin Diarrhea     Diarrhea     Niacin Unknown     Hyperglycemia     Simvastatin Unknown     Back pain        Reason for call: Pt sent to [place] ER last pm after fall & increased confusion. Slightly dehydrated, CT no change. Cr 1.44 (1.84 yest,1.26), GFR 43 (35 yest,54)    Verbal Order/Direction given by Provider: NNO    Provider giving Order:  LISA Calloway    Verbal Order given to: Aylin Asher RN

## 2021-07-20 NOTE — LETTER
2021        RE: Robert E Schamberger  [address] [place] [address]        [facility]    Name:   Robert E Schamberger (Bob)  :   1934  MRN:    [id number]     Facility:   [facility] ([facility]) [[id number]]   Room: [place] [address]  Code Status: FULL CODE and POLST AVAILABLE -     DOS:  2021  Previous visit:  2021 with Dr. Milly Rubio    PCP:  Ld Moy    CHIEF COMPLAINT / REASON FOR VISIT:  Chief Complaint   Patient presents with     Clinic Care Coordination - Follow-up     TCU Follow up      From 2021 until 2021 (confusion, aphasia, right-sided hemiparesis/critical stenosis of left ICA)      HPI: Sonu is an 87 year old male with a history of diabetes mellitus type 2 (with chronic kidney disease and polyneuropathy), hypertension, atrial fibrillation, and history of TIA (2019). He initially presented to the ED with increasing confusion, aphasia, and right-sided hemiparesis. A stroke code was called, and he received tPA. Imaging revealed critical stenosis of the left ICA. Vascular surgery was consulted, and his 2019 presentation and his symptoms with this admission were felt likely due to symptomatic carotid artery stenosis and other factors. He is a high risk surgical candidate given the anatomy of the vessel, location of the stenosis, and body habitus among other comorbidities. PTA glargine and losartan were decreased, and pravastatin was increased. He was subsequently admitted here for rehab.      CURRENT/RECENT TCU ISSUES    Disposition: He is generally quite pleasant and gives the impression of being alert and oriented; however, there are some cognitive issues (see below). He went to the ER over the weekend following a fall with increased confusion. They suggested it was due to dehydration, and he was subsequently returned to the TCU.    Internal carotid artery stenosis: They discussed the possibility of surgery in a few weeks,  "and they wanted him in the TCU to \"strengthen up\" before surgery, according to the patient.    Diabetes mellitus type 2: Blood glucose levels have been running low despite earlier dose reduction of glargine (currently 50 units, but it has been increased to 60 units prior to his hospitalization). He also receives prandial NovoLog 15 units with each meal. Friday morning, his blood glucose level was 61, and at 5 PM on Saturday, it was 39. He tells me he has had no change in appetite. We are going to decrease his prandial NovoLog to 10 units with breakfast, 10 units with lunch, and 5 units with supper. His last A1c was \"about 7.\" He is also receiving 500 mg of metformin 3 times daily. His A1c in May 2021 was 6.8, so his diabetes has generally been well controlled. There did seem to be a bit of confusion when discussing at white blood glucose level he becomes symptomatic for hypoglycemia, and we will have to go with anything less than 80.    He also has diabetic polyneuropathy, receiving pregabalin kidney disease (stage IIIa).    Dysphagia: In the hospital, he underwent a video swallow on 07/09/2021, showing mild to moderate oropharyngeal dysphagia, and nectar thick liquids were ordered. Orders from the hospital were to repeat a video swallow in 1 to 2 weeks. Due to oropharyngeal dysphagia (R13.12) following cerebral infarct (I69.391), speech therapy is requesting a repeat video swallow study.    Cognition: Cognitive impairment has been noted, and a request was made for speech therapy in this regard (as well as his dysphagia). He scored 14/30 on the SLUMS and 3.9/5.6 on the CPT in February 2020. At that time, there was impairment of short-term memory and problem-solving.      ROS: No headaches or chest pains, coughing or congestion, nausea or vomiting, dizziness or dyspnea, dysuria, diplopia, constipation or diarrhea, difficulty chewing or swallowing, integumentary issues, or problems with sleep.    Past Medical History: "   Diagnosis Date     Anemia in chronic kidney disease      B12 deficiency 11/9/2017     Bilateral hearing loss 5/9/2016     Bursitis, hip, left 3/2/2017     Chest pain     Normal GXT 2006     Chronic kidney disease, stage 3 (moderate)     Worsening with hyperkalemia on Relafen-discontinued August 2016     Chronic kidney disease, stage 3a 5/7/2021     Closed displaced fracture of left clavicle 9/21/2017     Cognitive changes 11/30/2018    SLUMS 20/30 Nov 2018     Complete tear of left rotator cuff 2/15/2018    Associated with fall and clavicular fracture that occurred 2017, evaluated by orthopedics, given cortisone injection and physical therapy recommended     Degenerative disc disease, lumbar      Dementia without behavioral disturbance (H) 2/21/2020     Depression      Diabetic peripheral neuropathy (H) 11/30/2018     Erectile dysfunction      Fatigue 7/6/2017     Forearm tendonitis 11/29/2016     Glaucoma      Gout attack     Left foot     History of recurrent TIAs 5/7/2020     HTN (hypertension)      Hyperlipidemia      Ingrown toenail 7/6/2017     Intermittent asthma without complication 11/9/2017     Left leg cellulitis 2014     Low back pain      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     Morbid obesity (H)      QUETA on CPAP      Osteoarthritis      Osteoarthritis of both hands      Osteoarthritis of both knees     Right TKA     Pain of right heel 5/11/2018     Peripheral edema 5/7/2021     Peripheral neuropathy 10/9/2018     Recurrent falls 3/5/2021     Rib fracture 2005     Right-sided chest pain 5/9/2016     Screening     No AAA on CT scan 2009     Sepsis secondary to UTI (H) 3/5/2021    Hospitalized with urosepsis January 2021 and rehospitalized after fall found to have recurrent UTI treated with 30 days of Omnicef for acute prostatitis     Sick sinus syndrome (H) 5/7/2020    Abnormal event monitor with 2.8-second sinus pause     Squamous cell skin cancer 11/2/2015     TIA (transient ischemic attack)  09/06/2019    CT head showing chronic lacunar infarcts.  Carotid ultrasound with atherosclerotic plaque but no severe stenosis.     Traumatic subarachnoid hemorrhage with loss of consciousness of 30 minutes or less (H) 9/21/2017    Fall down the stairs following alcohol use with traumatic subarachnoid hemorrhage treated conservatively at [facility] followed by stay at TCU with no residual neurologic deficits     Trochanteric bursitis of right hip 8/24/2018     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)      Unsteady gait 3/4/2019     Vitamin D deficiency 11/9/2017              History reviewed. No pertinent family history.  Social History     Socioeconomic History     Marital status:      Spouse name: None     Number of children: None     Years of education: None     Highest education level: None   Occupational History     None   Tobacco Use     Smoking status: Former Smoker     Smokeless tobacco: Never Used   Substance and Sexual Activity     Alcohol use: Yes     Alcohol/week: 14.0 standard drinks     Drug use: No     Sexual activity: None   Other Topics Concern     None   Social History Narrative    Semi retired  x 47 years     Social Determinants of Health     Financial Resource Strain:      Difficulty of Paying Living Expenses:    Food Insecurity:      Worried About Running Out of Food in the Last Year:      Ran Out of Food in the Last Year:    Transportation Needs:      Lack of Transportation (Medical):      Lack of Transportation (Non-Medical):    Physical Activity:      Days of Exercise per Week:      Minutes of Exercise per Session:    Stress:      Feeling of Stress :    Social Connections:      Frequency of Communication with Friends and Family:      Frequency of Social Gatherings with Friends and Family:      Attends [employer] Services:      Active Member of Clubs or Organizations:      Attends Club or Organization Meetings:      Marital Status:    Intimate Partner  Violence:      Fear of Current or Ex-Partner:      Emotionally Abused:      Physically Abused:      Sexually Abused:        MEDICATIONS: Reviewed from the MAR, physician orders, and/or earlier progress notes.  Post Discharge Medication Reconciliation Status: medication reconcilation previously completed during another office visit.    Current Outpatient Medications   Medication Sig     acetaminophen (TYLENOL) 500 MG tablet Take 1,000 mg by mouth every 8 hours as needed      amLODIPine (NORVASC) 2.5 MG tablet [AMLODIPINE (NORVASC) 2.5 MG TABLET] Take 1 tablet (2.5 mg total) by mouth daily.     aspirin 81 MG EC tablet [ASPIRIN 81 MG EC TABLET] Take 1 tablet (81 mg total) by mouth daily.     cholecalciferol, vitamin D3, (VITAMIN D3) 2,000 unit Tab [CHOLECALCIFEROL, VITAMIN D3, (VITAMIN D3) 2,000 UNIT TAB] Take 1 tablet (2,000 Units total) by mouth daily.     citalopram (CELEXA) 10 MG tablet Take 10 mg by mouth daily     clopidogreL (PLAVIX) 75 mg tablet [CLOPIDOGREL (PLAVIX) 75 MG TABLET] TAKE ONE TABLET (75 MG) BY MOUTH ONCE DAILY     cyanocobalamin 1000 MCG tablet [CYANOCOBALAMIN 1000 MCG TABLET] Take 1 tablet (1,000 mcg total) by mouth daily.     diclofenac (VOLTAREN) 1 % topical gel Apply 2 g topically 4 times daily as needed for moderate pain      hydroCHLOROthiazide (HYDRODIURIL) 25 MG tablet [HYDROCHLOROTHIAZIDE (HYDRODIURIL) 25 MG TABLET] TAKE ONE TABLET BY MOUTH ONCE DAILY .  - TAKE WITH LOSARTAN - COMBO NOT AVAILABLE -     insulin aspart (NOVOLOG PEN) 100 UNIT/ML pen Inject Subcutaneous 3 times daily (with meals) Per Sliding Scale  If Blood Sugar is less than 70, call MD.  If Blood Sugar is 150 to 199, give 3 Units.  If Blood Sugar is 200 to 249, give 6 Units.  If Blood Sugar is 250 to 299, give 9 Units.  If Blood Sugar is 300 to 349, give 12 Units.  If Blood Sugar is 350 to 399, give 15 Units.  If Blood Sugar is greater than 399, give 18 Units.  If Blood Sugar is greater than 400, call MD.     insulin aspart  U-100 (NOVOLOG FLEXPEN U-100 INSULIN) 100 unit/mL (3 mL) injection pen [INSULIN ASPART U-100 (NOVOLOG FLEXPEN U-100 INSULIN) 100 UNIT/ML (3 ML) INJECTION PEN] Inject 15 Units under the skin 3 (three) times a day before meals. (Patient taking differently: Inject Subcutaneous 3 times daily (before meals) 10 units with breakfast, 10 units with lunch, and 5 units with supper)     insulin glargine (LANTUS PEN) 100 UNIT/ML pen Inject 50 Units Subcutaneous At Bedtime     latanoprost (XALATAN) 0.005 % ophthalmic solution [LATANOPROST (XALATAN) 0.005 % OPHTHALMIC SOLUTION] Administer 1 drop to both eyes at bedtime.     losartan (COZAAR) 25 MG tablet Take 25 mg by mouth daily     magnesium oxide (MAGOX) 400 mg (241.3 mg magnesium) tablet [MAGNESIUM OXIDE (MAGOX) 400 MG (241.3 MG MAGNESIUM) TABLET] Take 1 tablet (400 mg total) by mouth 2 (two) times a day.     metFORMIN (GLUCOPHAGE) 500 MG tablet Take 500 mg by mouth 3 times daily (with meals)     polyethylene glycol (MIRALAX) 17 g packet Take 1 packet by mouth daily as needed for constipation     pravastatin (PRAVACHOL) 40 MG tablet Take 40 mg by mouth daily     pregabalin (LYRICA) 50 MG capsule [PREGABALIN (LYRICA) 50 MG CAPSULE] TAKE 2 CAPSULES (100 MG TOTAL) BY MOUTH ONCE DAILY AT BEDTIME.     senna (SENOKOT) 8.6 MG tablet Take 1 tablet by mouth 2 times daily as needed for constipation     No current facility-administered medications for this visit.     ALLERGIES:   Allergies   Allergen Reactions     Actos [Pioglitazone] Swelling     Edema     Atorvastatin Unknown     Back pain     Donepezil Unknown     Diarrhea     Gabapentin Diarrhea     Diarrhea     Niacin Unknown     Hyperglycemia     Simvastatin Unknown     Back pain     DIET: Diabetic    Vitals:    07/20/21 1500   BP: 134/54   Pulse: 52   Resp: 17   Temp: 97.3  F (36.3  C)   SpO2: 98%   Weight: 114.5 kg (252 lb 6.4 oz)   Height: 1.829 m (6')     Body mass index is 34.23 kg/m .    EXAMINATION:   General: Pleasant,  alert, and conversant elderly male, in no apparent distress.  Head: Normocephalic and atraumatic.   Eyes: PERRLA, sclerae clear. Slight disconjugate gaze, left eye moving medially.  ENT: Moist oral mucosa. He has upper dentures with some remaining teeth on the bottom. No rhinorrhea or nasal discharge. Hearing is adequate for conversation in a quiet room.  Cardiovascular: Regular rate and rhythm without appreciable murmur.   Respiratory: Lungs clear to auscultation bilaterally.   Abdomen: Nondistended.   Musculoskeletal/Extremities: Age-related degenerative joint disease. Lower extremity venous stasis changes.  Integument: No rashes, clinically significant lesions, or skin breakdown.   Cognitive/Psychiatric: Suspected cognitive impairment based on previous cognitive testing, although he did seem to do well during my visit with him today. His recall seems excellent. He is going to be evaluated by speech therapy. Affect is euthymic.    DIAGNOSTICS:   Recent Results (from the past 240 hour(s))   Basic metabolic panel    Collection Time: 07/19/21  6:30 AM   Result Value Ref Range    Sodium 136 136 - 145 mmol/L    Potassium 4.4 3.5 - 5.0 mmol/L    Chloride 100 98 - 107 mmol/L    Carbon Dioxide (CO2) 24 22 - 31 mmol/L    Anion Gap 12 5 - 18 mmol/L    Urea Nitrogen 26 8 - 28 mg/dL    Creatinine 1.44 (H) 0.70 - 1.30 mg/dL    Calcium 8.8 8.5 - 10.5 mg/dL    Glucose 104 70 - 125 mg/dL    GFR Estimate 43 (L) >60 mL/min/1.73m2       ASSESSMENT/Plan:    1. Carotid artery stenosis, symptomatic, left  Apparently preparing for upcoming surgery.    2. Paroxysmal atrial fibrillation (H)  Currently with a regular rate and rhythm. He is not on any anticoagulation therapy other than aspirin.    3. Oropharyngeal dysphagia  Okay for repeat video swallow with speech therapy.    4. Type 2 diabetes mellitus with diabetic polyneuropathy, with long-term current use of insulin (H)  Decrease prandial NovoLog from 15/15/15 to 10/10/5    5. Sinus  bradycardia  No current concerns    6. Cognitive impairment  He is going to be tested by speech therapy    7. 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 Mild major depression (H)  He tells me he feels sad every once in a while.    9. Slow transit constipation  Bowels are moving well now.      CHANGES:    None.    CARE PLAN:    The care plan, medications, vital signs, orders, and nursing notes have been reviewed, and all orders signed. Changes to care plan, if any, as noted. Otherwise, continue current plan of care.    The above has been created using voice recognition software. Please be aware that this may unintentionally  produce inaccuracies and/or nonsensical sentences.      Electronically signed by: PASCUAL Morales CNP        Sincerely,        PASCUAL Morales CNP

## 2021-07-22 PROBLEM — K59.01 SLOW TRANSIT CONSTIPATION: Status: ACTIVE | Noted: 2021-07-22

## 2021-07-22 PROBLEM — F32.0 MILD MAJOR DEPRESSION (H): Status: ACTIVE | Noted: 2021-07-22

## 2021-07-22 PROBLEM — I65.22 CAROTID ARTERY STENOSIS, SYMPTOMATIC, LEFT: Status: ACTIVE | Noted: 2021-07-22

## 2021-07-22 PROBLEM — R13.12 OROPHARYNGEAL DYSPHAGIA: Status: ACTIVE | Noted: 2021-07-22

## 2021-07-22 PROBLEM — R00.1 SINUS BRADYCARDIA: Status: ACTIVE | Noted: 2021-07-22

## 2021-07-22 PROBLEM — I12.9 BENIGN HYPERTENSIVE KIDNEY DISEASE WITH CHRONIC KIDNEY DISEASE STAGE I THROUGH STAGE IV, OR UNSPECIFIED: Status: ACTIVE | Noted: 2021-07-22

## 2021-07-22 PROBLEM — R41.89 COGNITIVE IMPAIRMENT: Status: ACTIVE | Noted: 2021-07-22

## 2021-07-22 NOTE — PROGRESS NOTES
"[facility]    Name:   Robert E Schamberger (Domenico)  :   1934  MRN:    [id number]     Facility:   [facility] ([facility]) [[id number]]   Room: [facility] / [address]  Code Status: FULL CODE and POLST AVAILABLE -     DOS:  2021  Previous visit:  2021 with Dr. Milly Rubio    PCP:  Ld Moy    CHIEF COMPLAINT / REASON FOR VISIT:  Chief Complaint   Patient presents with     Clinic Care Coordination - Follow-up     TCU Follow up      From 2021 until 2021 (confusion, aphasia, right-sided hemiparesis/critical stenosis of left ICA)      HPI: Sonu is an 87 year old male with a history of diabetes mellitus type 2 (with chronic kidney disease and polyneuropathy), hypertension, atrial fibrillation, and history of TIA (2019). He initially presented to the ED with increasing confusion, aphasia, and right-sided hemiparesis. A stroke code was called, and he received tPA. Imaging revealed critical stenosis of the left ICA. Vascular surgery was consulted, and his  presentation and his symptoms with this admission were felt likely due to symptomatic carotid artery stenosis and other factors. He is a high risk surgical candidate given the anatomy of the vessel, location of the stenosis, and body habitus among other comorbidities. PTA glargine and losartan were decreased, and pravastatin was increased. He was subsequently admitted here for rehab.      CURRENT/RECENT TCU ISSUES    Disposition: He is generally quite pleasant and gives the impression of being alert and oriented; however, there are some cognitive issues (see below). He went to the ER over the weekend following a fall with increased confusion. They suggested it was due to dehydration, and he was subsequently returned to the TCU.    Internal carotid artery stenosis: They discussed the possibility of surgery in a few weeks, and they wanted him in the TCU to \"strengthen up\" before surgery, according to the " "patient.    Diabetes mellitus type 2: Blood glucose levels have been running low despite earlier dose reduction of glargine (currently 50 units, but it has been increased to 60 units prior to his hospitalization). He also receives prandial NovoLog 15 units with each meal. Friday morning, his blood glucose level was 61, and at 5 PM on Saturday, it was 39. He tells me he has had no change in appetite. We are going to decrease his prandial NovoLog to 10 units with breakfast, 10 units with lunch, and 5 units with supper. His last A1c was \"about 7.\" He is also receiving 500 mg of metformin 3 times daily. His A1c in May 2021 was 6.8, so his diabetes has generally been well controlled. There did seem to be a bit of confusion when discussing at white blood glucose level he becomes symptomatic for hypoglycemia, and we will have to go with anything less than 80.    He also has diabetic polyneuropathy, receiving pregabalin kidney disease (stage IIIa).    Dysphagia: In the hospital, he underwent a video swallow on 07/09/2021, showing mild to moderate oropharyngeal dysphagia, and nectar thick liquids were ordered. Orders from the hospital were to repeat a video swallow in 1 to 2 weeks. Due to oropharyngeal dysphagia (R13.12) following cerebral infarct (I69.391), speech therapy is requesting a repeat video swallow study.    Cognition: Cognitive impairment has been noted, and a request was made for speech therapy in this regard (as well as his dysphagia). He scored 14/30 on the SLUMS and 3.9/5.6 on the CPT in February 2020. At that time, there was impairment of short-term memory and problem-solving.      ROS: No headaches or chest pains, coughing or congestion, nausea or vomiting, dizziness or dyspnea, dysuria, diplopia, constipation or diarrhea, difficulty chewing or swallowing, integumentary issues, or problems with sleep.    Past Medical History:   Diagnosis Date     Anemia in chronic kidney disease      B12 deficiency " 11/9/2017     Bilateral hearing loss 5/9/2016     Bursitis, hip, left 3/2/2017     Chest pain     Normal GXT 2006     Chronic kidney disease, stage 3 (moderate)     Worsening with hyperkalemia on Relafen-discontinued August 2016     Chronic kidney disease, stage 3a 5/7/2021     Closed displaced fracture of left clavicle 9/21/2017     Cognitive changes 11/30/2018    SLUMS 20/30 Nov 2018     Complete tear of left rotator cuff 2/15/2018    Associated with fall and clavicular fracture that occurred 2017, evaluated by orthopedics, given cortisone injection and physical therapy recommended     Degenerative disc disease, lumbar      Dementia without behavioral disturbance (H) 2/21/2020     Depression      Diabetic peripheral neuropathy (H) 11/30/2018     Erectile dysfunction      Fatigue 7/6/2017     Forearm tendonitis 11/29/2016     Glaucoma      Gout attack     Left foot     History of recurrent TIAs 5/7/2020     HTN (hypertension)      Hyperlipidemia      Ingrown toenail 7/6/2017     Intermittent asthma without complication 11/9/2017     Left leg cellulitis 2014     Low back pain      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     Morbid obesity (H)      QUETA on CPAP      Osteoarthritis      Osteoarthritis of both hands      Osteoarthritis of both knees     Right TKA     Pain of right heel 5/11/2018     Peripheral edema 5/7/2021     Peripheral neuropathy 10/9/2018     Recurrent falls 3/5/2021     Rib fracture 2005     Right-sided chest pain 5/9/2016     Screening     No AAA on CT scan 2009     Sepsis secondary to UTI (H) 3/5/2021    Hospitalized with urosepsis January 2021 and rehospitalized after fall found to have recurrent UTI treated with 30 days of Omnicef for acute prostatitis     Sick sinus syndrome (H) 5/7/2020    Abnormal event monitor with 2.8-second sinus pause     Squamous cell skin cancer 11/2/2015     TIA (transient ischemic attack) 09/06/2019    CT head showing chronic lacunar infarcts.  Carotid ultrasound  with atherosclerotic plaque but no severe stenosis.     Traumatic subarachnoid hemorrhage with loss of consciousness of 30 minutes or less (H) 9/21/2017    Fall down the stairs following alcohol use with traumatic subarachnoid hemorrhage treated conservatively at [facility] followed by stay at TCU with no residual neurologic deficits     Trochanteric bursitis of right hip 8/24/2018     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)      Unsteady gait 3/4/2019     Vitamin D deficiency 11/9/2017              History reviewed. No pertinent family history.  Social History     Socioeconomic History     Marital status:      Spouse name: None     Number of children: None     Years of education: None     Highest education level: None   Occupational History     None   Tobacco Use     Smoking status: Former Smoker     Smokeless tobacco: Never Used   Substance and Sexual Activity     Alcohol use: Yes     Alcohol/week: 14.0 standard drinks     Drug use: No     Sexual activity: None   Other Topics Concern     None   Social History Narrative    Semi retired  x 47 years     Social Determinants of Health     Financial Resource Strain:      Difficulty of Paying Living Expenses:    Food Insecurity:      Worried About Running Out of Food in the Last Year:      Ran Out of Food in the Last Year:    Transportation Needs:      Lack of Transportation (Medical):      Lack of Transportation (Non-Medical):    Physical Activity:      Days of Exercise per Week:      Minutes of Exercise per Session:    Stress:      Feeling of Stress :    Social Connections:      Frequency of Communication with Friends and Family:      Frequency of Social Gatherings with Friends and Family:      Attends Religion Services:      Active Member of Clubs or Organizations:      Attends Club or Organization Meetings:      Marital Status:    Intimate Partner Violence:      Fear of Current or Ex-Partner:      Emotionally Abused:       Physically Abused:      Sexually Abused:        MEDICATIONS: Reviewed from the MAR, physician orders, and/or earlier progress notes.  Post Discharge Medication Reconciliation Status: medication reconcilation previously completed during another office visit.    Current Outpatient Medications   Medication Sig     acetaminophen (TYLENOL) 500 MG tablet Take 1,000 mg by mouth every 8 hours as needed      amLODIPine (NORVASC) 2.5 MG tablet [AMLODIPINE (NORVASC) 2.5 MG TABLET] Take 1 tablet (2.5 mg total) by mouth daily.     aspirin 81 MG EC tablet [ASPIRIN 81 MG EC TABLET] Take 1 tablet (81 mg total) by mouth daily.     cholecalciferol, vitamin D3, (VITAMIN D3) 2,000 unit Tab [CHOLECALCIFEROL, VITAMIN D3, (VITAMIN D3) 2,000 UNIT TAB] Take 1 tablet (2,000 Units total) by mouth daily.     citalopram (CELEXA) 10 MG tablet Take 10 mg by mouth daily     clopidogreL (PLAVIX) 75 mg tablet [CLOPIDOGREL (PLAVIX) 75 MG TABLET] TAKE ONE TABLET (75 MG) BY MOUTH ONCE DAILY     cyanocobalamin 1000 MCG tablet [CYANOCOBALAMIN 1000 MCG TABLET] Take 1 tablet (1,000 mcg total) by mouth daily.     diclofenac (VOLTAREN) 1 % topical gel Apply 2 g topically 4 times daily as needed for moderate pain      hydroCHLOROthiazide (HYDRODIURIL) 25 MG tablet [HYDROCHLOROTHIAZIDE (HYDRODIURIL) 25 MG TABLET] TAKE ONE TABLET BY MOUTH ONCE DAILY .  - TAKE WITH LOSARTAN - COMBO NOT AVAILABLE -     insulin aspart (NOVOLOG PEN) 100 UNIT/ML pen Inject Subcutaneous 3 times daily (with meals) Per Sliding Scale  If Blood Sugar is less than 70, call MD.  If Blood Sugar is 150 to 199, give 3 Units.  If Blood Sugar is 200 to 249, give 6 Units.  If Blood Sugar is 250 to 299, give 9 Units.  If Blood Sugar is 300 to 349, give 12 Units.  If Blood Sugar is 350 to 399, give 15 Units.  If Blood Sugar is greater than 399, give 18 Units.  If Blood Sugar is greater than 400, call MD.     insulin aspart U-100 (NOVOLOG FLEXPEN U-100 INSULIN) 100 unit/mL (3 mL) injection pen  [INSULIN ASPART U-100 (NOVOLOG FLEXPEN U-100 INSULIN) 100 UNIT/ML (3 ML) INJECTION PEN] Inject 15 Units under the skin 3 (three) times a day before meals. (Patient taking differently: Inject Subcutaneous 3 times daily (before meals) 10 units with breakfast, 10 units with lunch, and 5 units with supper)     insulin glargine (LANTUS PEN) 100 UNIT/ML pen Inject 50 Units Subcutaneous At Bedtime     latanoprost (XALATAN) 0.005 % ophthalmic solution [LATANOPROST (XALATAN) 0.005 % OPHTHALMIC SOLUTION] Administer 1 drop to both eyes at bedtime.     losartan (COZAAR) 25 MG tablet Take 25 mg by mouth daily     magnesium oxide (MAGOX) 400 mg (241.3 mg magnesium) tablet [MAGNESIUM OXIDE (MAGOX) 400 MG (241.3 MG MAGNESIUM) TABLET] Take 1 tablet (400 mg total) by mouth 2 (two) times a day.     metFORMIN (GLUCOPHAGE) 500 MG tablet Take 500 mg by mouth 3 times daily (with meals)     polyethylene glycol (MIRALAX) 17 g packet Take 1 packet by mouth daily as needed for constipation     pravastatin (PRAVACHOL) 40 MG tablet Take 40 mg by mouth daily     pregabalin (LYRICA) 50 MG capsule [PREGABALIN (LYRICA) 50 MG CAPSULE] TAKE 2 CAPSULES (100 MG TOTAL) BY MOUTH ONCE DAILY AT BEDTIME.     senna (SENOKOT) 8.6 MG tablet Take 1 tablet by mouth 2 times daily as needed for constipation     No current facility-administered medications for this visit.     ALLERGIES:   Allergies   Allergen Reactions     Actos [Pioglitazone] Swelling     Edema     Atorvastatin Unknown     Back pain     Donepezil Unknown     Diarrhea     Gabapentin Diarrhea     Diarrhea     Niacin Unknown     Hyperglycemia     Simvastatin Unknown     Back pain     DIET: Diabetic    Vitals:    07/20/21 1500   BP: 134/54   Pulse: 52   Resp: 17   Temp: 97.3  F (36.3  C)   SpO2: 98%   Weight: 114.5 kg (252 lb 6.4 oz)   Height: 1.829 m (6')     Body mass index is 34.23 kg/m .    EXAMINATION:   General: Pleasant, alert, and conversant elderly male, in no apparent distress.  Head:  Normocephalic and atraumatic.   Eyes: PERRLA, sclerae clear. Slight disconjugate gaze, left eye moving medially.  ENT: Moist oral mucosa. He has upper dentures with some remaining teeth on the bottom. No rhinorrhea or nasal discharge. Hearing is adequate for conversation in a quiet room.  Cardiovascular: Regular rate and rhythm without appreciable murmur.   Respiratory: Lungs clear to auscultation bilaterally.   Abdomen: Nondistended.   Musculoskeletal/Extremities: Age-related degenerative joint disease. Lower extremity venous stasis changes.  Integument: No rashes, clinically significant lesions, or skin breakdown.   Cognitive/Psychiatric: Suspected cognitive impairment based on previous cognitive testing, although he did seem to do well during my visit with him today. His recall seems excellent. He is going to be evaluated by speech therapy. Affect is euthymic.    DIAGNOSTICS:   Recent Results (from the past 240 hour(s))   Basic metabolic panel    Collection Time: 07/19/21  6:30 AM   Result Value Ref Range    Sodium 136 136 - 145 mmol/L    Potassium 4.4 3.5 - 5.0 mmol/L    Chloride 100 98 - 107 mmol/L    Carbon Dioxide (CO2) 24 22 - 31 mmol/L    Anion Gap 12 5 - 18 mmol/L    Urea Nitrogen 26 8 - 28 mg/dL    Creatinine 1.44 (H) 0.70 - 1.30 mg/dL    Calcium 8.8 8.5 - 10.5 mg/dL    Glucose 104 70 - 125 mg/dL    GFR Estimate 43 (L) >60 mL/min/1.73m2       ASSESSMENT/Plan:    1. Carotid artery stenosis, symptomatic, left  Apparently preparing for upcoming surgery.    2. Paroxysmal atrial fibrillation (H)  Currently with a regular rate and rhythm. He is not on any anticoagulation therapy other than aspirin.    3. Oropharyngeal dysphagia  Okay for repeat video swallow with speech therapy.    4. Type 2 diabetes mellitus with diabetic polyneuropathy, with long-term current use of insulin (H)  Decrease prandial NovoLog from 15/15/15 to 10/10/5    5. Sinus bradycardia  No current concerns    6. Cognitive impairment  He is  going to be tested by speech therapy    7. 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 Mild major depression (H)  He tells me he feels sad every once in a while.    9. Slow transit constipation  Bowels are moving well now.      CHANGES:    None.    CARE PLAN:    The care plan, medications, vital signs, orders, and nursing notes have been reviewed, and all orders signed. Changes to care plan, if any, as noted. Otherwise, continue current plan of care.    The above has been created using voice recognition software. Please be aware that this may unintentionally  produce inaccuracies and/or nonsensical sentences.      Electronically signed by: PASCUAL Morales CNP

## 2021-07-23 ENCOUNTER — DISCHARGE SUMMARY NURSING HOME (OUTPATIENT)
Dept: GERIATRICS | Facility: CLINIC | Age: 86
End: 2021-07-23
Payer: MEDICARE

## 2021-07-23 VITALS
WEIGHT: 252.4 LBS | SYSTOLIC BLOOD PRESSURE: 129 MMHG | RESPIRATION RATE: 18 BRPM | OXYGEN SATURATION: 97 % | DIASTOLIC BLOOD PRESSURE: 66 MMHG | HEART RATE: 63 BPM | BODY MASS INDEX: 34.19 KG/M2 | TEMPERATURE: 97.5 F | HEIGHT: 72 IN

## 2021-07-23 DIAGNOSIS — E11.42 TYPE 2 DIABETES MELLITUS WITH DIABETIC POLYNEUROPATHY, WITH LONG-TERM CURRENT USE OF INSULIN (H): ICD-10-CM

## 2021-07-23 DIAGNOSIS — R00.1 SINUS BRADYCARDIA: ICD-10-CM

## 2021-07-23 DIAGNOSIS — I48.0 PAROXYSMAL ATRIAL FIBRILLATION (H): ICD-10-CM

## 2021-07-23 DIAGNOSIS — I65.22 CAROTID ARTERY STENOSIS, SYMPTOMATIC, LEFT: Primary | ICD-10-CM

## 2021-07-23 DIAGNOSIS — I12.9 BENIGN HYPERTENSIVE KIDNEY DISEASE WITH CHRONIC KIDNEY DISEASE STAGE I THROUGH STAGE IV, OR UNSPECIFIED: ICD-10-CM

## 2021-07-23 DIAGNOSIS — K59.01 SLOW TRANSIT CONSTIPATION: ICD-10-CM

## 2021-07-23 DIAGNOSIS — Z79.4 TYPE 2 DIABETES MELLITUS WITH DIABETIC POLYNEUROPATHY, WITH LONG-TERM CURRENT USE OF INSULIN (H): ICD-10-CM

## 2021-07-23 DIAGNOSIS — F32.0 MILD MAJOR DEPRESSION (H): ICD-10-CM

## 2021-07-23 DIAGNOSIS — R41.89 COGNITIVE IMPAIRMENT: ICD-10-CM

## 2021-07-23 DIAGNOSIS — R13.12 OROPHARYNGEAL DYSPHAGIA: ICD-10-CM

## 2021-07-23 PROCEDURE — 99316 NF DSCHRG MGMT 30 MIN+: CPT | Performed by: NURSE PRACTITIONER

## 2021-07-23 ASSESSMENT — MIFFLIN-ST. JEOR: SCORE: 1857.88

## 2021-07-23 NOTE — LETTER
2021        RE: Robert E Schamberger  [address] [place] [address]        [facility]    Name:   Robert E Schamberger (Domenico)  :   1934  MRN:    [id number]     Facility:   [facility] ([facility]) [[id number]]   Room: [place] [address]  Code Status: FULL CODE and POLST AVAILABLE -     DOS:  2021  Previous visit:  2021    PCP:  Ld Moy    CHIEF COMPLAINT / REASON FOR VISIT:  Chief Complaint   Patient presents with     Discharge Summary Nursing Home     DISCHARGE SUMMARY: Confusion, aphasia, right-sided hemiparesis/critical stenosis of left ICA      [facility] from 2021 until 2021 (confusion, aphasia, right-sided hemiparesis/critical stenosis of left ICA)  [facility] from 2021 until 2021 (expected discharge date)      HPI: Sonu is an 87 year old male with a history of diabetes mellitus type 2 (with chronic kidney disease and polyneuropathy), hypertension, atrial fibrillation, and history of TIA (2019). He initially presented to the ED with increasing confusion, aphasia, and right-sided hemiparesis. A stroke code was called, and he received tPA. Imaging revealed critical stenosis of the left ICA. Vascular surgery was consulted, and his  presentation and his symptoms with this admission were felt likely due to symptomatic carotid artery stenosis and other factors. He is a high risk surgical candidate given the anatomy of the vessel, location of the stenosis, and body habitus among other comorbidities. PTA glargine and losartan were decreased, and pravastatin was increased. He was subsequently admitted here for rehab.      CURRENT/RECENT TCU ISSUES    Disposition: He is generally quite pleasant and gives the impression of being alert and oriented; however, there are some cognitive issues (see below). He went to the ER during his TCU stay following a fall with increased confusion. They  "suggested it was due to dehydration, and he was subsequently discharged back to the TCU.    Internal carotid artery stenosis: They discussed the possibility of surgery in a few weeks, and they wanted him in the TCU to \"strengthen up\" before surgery, according to the patient.    Diabetes mellitus type 2: Blood glucose levels were running low despite earlier reduction of glargine (currently 50 units, but he is at been increased to 60 units prior to his hospitalization).  He also had been receiving prandial NovoLog 15 units with each meal; however, he was experiencing hypoglycemic episodes (61 and 39) that did not necessarily correlate with the time of day.  We decreased his prandial NovoLog to 10 units with breakfast, 10 units with lunch, and 5 units with supper.  This was explained in detail to the patient, and he seemed to understand.  He has also been receiving 500 mg of metformin 3 times daily, although he suggested that he was taking all 3 pills at bedtime.  I suggested that we schedule it twice daily and agreed on 1 tablet in the morning and 2 tablets in the evening to balance out his pill load.  We also decided to discontinue his sliding scale coverage, as he had not been doing that previously, and frequency and quantity of sliding scale dosing was minimal. Current blood glucose levels range from . Fasting blood glucose levels are in the mid 100s.    He suggested that he experiences hypoglycemic symptoms for anything less than 80, although he apparently could not be sure.    He also has diabetic polyneuropathy, receiving pregabalin kidney disease (stage IIIa).    Dysphagia: In the hospital, he underwent a video swallow on 07/09/2021, showing mild to moderate oropharyngeal dysphagia, and nectar thick liquids were ordered. Orders from the hospital were to repeat a video swallow in 1 to 2 weeks. Due to oropharyngeal dysphagia (R13.12) following cerebral infarct (I69.391), speech therapy also requested a " repeat video swallow study.  They will be following him in the home upon discharge.    Cognition: Cognitive impairment has been noted, and a request was made for speech therapy in this regard (as well as his dysphagia). He scored 14/30 on the SLUMS and 3.9/5.6 on the CPT in February 2020. At that time, there was impairment of short-term memory and problem-solving.    Discharge planning: Expecting to discharge on 07/27/2021 with home health nurse (insulin and blood glucose monitoring), physical therapy (practice once there is a bed mobility), and speech therapy (continuing to work with the patient on dysphagia).      ROS: No headaches or chest pains, coughing or congestion, nausea or vomiting, dizziness or dyspnea, dysuria, diplopia, constipation or diarrhea, difficulty chewing or swallowing, integumentary issues, or problems with sleep.    Past Medical History:   Diagnosis Date     Anemia in chronic kidney disease      B12 deficiency 11/9/2017     Bilateral hearing loss 5/9/2016     Bursitis, hip, left 3/2/2017     Chest pain     Normal GXT 2006     Chronic kidney disease, stage 3 (moderate)     Worsening with hyperkalemia on Relafen-discontinued August 2016     Chronic kidney disease, stage 3a 5/7/2021     Closed displaced fracture of left clavicle 9/21/2017     Cognitive changes 11/30/2018    SLUMS 20/30 Nov 2018     Complete tear of left rotator cuff 2/15/2018    Associated with fall and clavicular fracture that occurred 2017, evaluated by orthopedics, given cortisone injection and physical therapy recommended     Degenerative disc disease, lumbar      Dementia without behavioral disturbance (H) 2/21/2020     Depression      Diabetic peripheral neuropathy (H) 11/30/2018     Erectile dysfunction      Fatigue 7/6/2017     Forearm tendonitis 11/29/2016     Glaucoma      Gout attack     Left foot     History of recurrent TIAs 5/7/2020     HTN (hypertension)      Hyperlipidemia      Ingrown toenail 7/6/2017      Intermittent asthma without complication 11/9/2017     Left leg cellulitis 2014     Low back pain      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     Morbid obesity (H)      QUETA on CPAP      Osteoarthritis      Osteoarthritis of both hands      Osteoarthritis of both knees     Right TKA     Pain of right heel 5/11/2018     Peripheral edema 5/7/2021     Peripheral neuropathy 10/9/2018     Recurrent falls 3/5/2021     Rib fracture 2005     Right-sided chest pain 5/9/2016     Screening     No AAA on CT scan 2009     Sepsis secondary to UTI (H) 3/5/2021    Hospitalized with urosepsis January 2021 and rehospitalized after fall found to have recurrent UTI treated with 30 days of Omnicef for acute prostatitis     Sick sinus syndrome (H) 5/7/2020    Abnormal event monitor with 2.8-second sinus pause     Squamous cell skin cancer 11/2/2015     TIA (transient ischemic attack) 09/06/2019    CT head showing chronic lacunar infarcts.  Carotid ultrasound with atherosclerotic plaque but no severe stenosis.     Traumatic subarachnoid hemorrhage with loss of consciousness of 30 minutes or less (H) 9/21/2017    Fall down the stairs following alcohol use with traumatic subarachnoid hemorrhage treated conservatively at [facility] followed by stay at TCU with no residual neurologic deficits     Trochanteric bursitis of right hip 8/24/2018     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)      Unsteady gait 3/4/2019     Vitamin D deficiency 11/9/2017              History reviewed. No pertinent family history.  Social History     Socioeconomic History     Marital status:      Spouse name: None     Number of children: None     Years of education: None     Highest education level: None   Occupational History     None   Tobacco Use     Smoking status: Former Smoker     Smokeless tobacco: Never Used   Substance and Sexual Activity     Alcohol use: Yes     Alcohol/week: 14.0 standard drinks     Drug use: No     Sexual  activity: None   Other Topics Concern     None   Social History Narrative    Semi retired  x 47 years     Social Determinants of Health     Financial Resource Strain:      Difficulty of Paying Living Expenses:    Food Insecurity:      Worried About Running Out of Food in the Last Year:      Ran Out of Food in the Last Year:    Transportation Needs:      Lack of Transportation (Medical):      Lack of Transportation (Non-Medical):    Physical Activity:      Days of Exercise per Week:      Minutes of Exercise per Session:    Stress:      Feeling of Stress :    Social Connections:      Frequency of Communication with Friends and Family:      Frequency of Social Gatherings with Friends and Family:      Attends Gn[employer] Services:      Active Member of Clubs or Organizations:      Attends Club or Organization Meetings:      Marital Status:    Intimate Partner Violence:      Fear of Current or Ex-Partner:      Emotionally Abused:      Physically Abused:      Sexually Abused:        MEDICATIONS: Reviewed from the MAR, physician orders, and/or earlier progress notes.  Post Discharge Medication Reconciliation Status: medication reconcilation previously completed during another office visit.    Current Outpatient Medications   Medication Sig     acetaminophen (TYLENOL) 500 MG tablet Take 1,000 mg by mouth every 8 hours as needed      amLODIPine (NORVASC) 2.5 MG tablet [AMLODIPINE (NORVASC) 2.5 MG TABLET] Take 1 tablet (2.5 mg total) by mouth daily.     aspirin 81 MG EC tablet [ASPIRIN 81 MG EC TABLET] Take 1 tablet (81 mg total) by mouth daily.     cholecalciferol, vitamin D3, (VITAMIN D3) 2,000 unit Tab [CHOLECALCIFEROL, VITAMIN D3, (VITAMIN D3) 2,000 UNIT TAB] Take 1 tablet (2,000 Units total) by mouth daily.     citalopram (CELEXA) 10 MG tablet Take 10 mg by mouth daily     clopidogreL (PLAVIX) 75 mg tablet [CLOPIDOGREL (PLAVIX) 75 MG TABLET] TAKE ONE TABLET (75 MG) BY MOUTH ONCE DAILY     cyanocobalamin 1000 MCG  tablet [CYANOCOBALAMIN 1000 MCG TABLET] Take 1 tablet (1,000 mcg total) by mouth daily.     diclofenac (VOLTAREN) 1 % topical gel Apply 2 g topically 4 times daily as needed for moderate pain      hydroCHLOROthiazide (HYDRODIURIL) 25 MG tablet [HYDROCHLOROTHIAZIDE (HYDRODIURIL) 25 MG TABLET] TAKE ONE TABLET BY MOUTH ONCE DAILY .  - TAKE WITH LOSARTAN - COMBO NOT AVAILABLE -     insulin aspart U-100 (NOVOLOG FLEXPEN U-100 INSULIN) 100 unit/mL (3 mL) injection pen [INSULIN ASPART U-100 (NOVOLOG FLEXPEN U-100 INSULIN) 100 UNIT/ML (3 ML) INJECTION PEN] Inject 15 Units under the skin 3 (three) times a day before meals. (Patient taking differently: Inject Subcutaneous 3 times daily (before meals) 10 units with breakfast, 10 units with lunch, and 5 units with supper)     insulin glargine (LANTUS PEN) 100 UNIT/ML pen Inject 50 Units Subcutaneous At Bedtime     latanoprost (XALATAN) 0.005 % ophthalmic solution [LATANOPROST (XALATAN) 0.005 % OPHTHALMIC SOLUTION] Administer 1 drop to both eyes at bedtime.     losartan (COZAAR) 25 MG tablet Take 25 mg by mouth daily     magnesium oxide (MAGOX) 400 mg (241.3 mg magnesium) tablet [MAGNESIUM OXIDE (MAGOX) 400 MG (241.3 MG MAGNESIUM) TABLET] Take 1 tablet (400 mg total) by mouth 2 (two) times a day.     metFORMIN (GLUCOPHAGE) 500 MG tablet Take 500 mg by mouth 500 mg in the morning and 1000 mg in the evening     polyethylene glycol (MIRALAX) 17 g packet Take 1 packet by mouth daily as needed for constipation     pravastatin (PRAVACHOL) 40 MG tablet Take 40 mg by mouth daily     pregabalin (LYRICA) 50 MG capsule [PREGABALIN (LYRICA) 50 MG CAPSULE] TAKE 2 CAPSULES (100 MG TOTAL) BY MOUTH ONCE DAILY AT BEDTIME.     senna (SENOKOT) 8.6 MG tablet Take 1 tablet by mouth 2 times daily as needed for constipation     No current facility-administered medications for this visit.     ALLERGIES:   Allergies   Allergen Reactions     Actos [Pioglitazone] Swelling     Edema     Atorvastatin  Unknown     Back pain     Donepezil Unknown     Diarrhea     Gabapentin Diarrhea     Diarrhea     Niacin Unknown     Hyperglycemia     Simvastatin Unknown     Back pain     DIET: Diabetic    Vitals:    07/23/21 1116   BP: 129/66   Pulse: 63   Resp: 18   Temp: 97.5  F (36.4  C)   SpO2: 97%   Weight: 114.5 kg (252 lb 6.4 oz)   Height: 1.829 m (6')     Body mass index is 34.23 kg/m .    EXAMINATION:   General: Pleasant, alert, and conversant elderly male, sitting in a recliner in no apparent distress. Family member in attendance.  Head: Normocephalic and atraumatic.   Eyes: PERRLA, sclerae clear. Slight disconjugate gaze, left eye moving medially.  ENT: Moist oral mucosa. He has upper dentures with some remaining teeth on the bottom. No rhinorrhea or nasal discharge. Hearing is adequate for conversation in a quiet room.  Cardiovascular: Regular rate and rhythm without appreciable murmur.   Respiratory: Lungs clear to auscultation bilaterally.   Abdomen: Nondistended.   Musculoskeletal/Extremities: Age-related degenerative joint disease. Lower extremity venous stasis changes.  Integument: No rashes, clinically significant lesions, or skin breakdown.   Cognitive/Psychiatric: Suspected cognitive impairment based on previous cognitive testing, although he did seem to do well during my visit with him today. His recall seems excellent. He is going to be evaluated by speech therapy. Affect is euthymic.    DIAGNOSTICS:   Recent Results (from the past 240 hour(s))   Basic metabolic panel    Collection Time: 07/19/21  6:30 AM   Result Value Ref Range    Sodium 136 136 - 145 mmol/L    Potassium 4.4 3.5 - 5.0 mmol/L    Chloride 100 98 - 107 mmol/L    Carbon Dioxide (CO2) 24 22 - 31 mmol/L    Anion Gap 12 5 - 18 mmol/L    Urea Nitrogen 26 8 - 28 mg/dL    Creatinine 1.44 (H) 0.70 - 1.30 mg/dL    Calcium 8.8 8.5 - 10.5 mg/dL    Glucose 104 70 - 125 mg/dL    GFR Estimate 43 (L) >60 mL/min/1.73m2     Lab Results   Component Value Date     WBC 9.3 05/11/2021     Lab Results   Component Value Date    RBC 4.13 05/11/2021     Lab Results   Component Value Date    HGB 12.3 05/11/2021     Lab Results   Component Value Date    HCT 37.8 05/11/2021     Lab Results   Component Value Date    MCV 92 05/11/2021     Lab Results   Component Value Date    MCH 29.8 05/11/2021     Lab Results   Component Value Date    MCHC 32.5 05/11/2021     Lab Results   Component Value Date    RDW 13.0 05/11/2021     Lab Results   Component Value Date     05/11/2021       ASSESSMENT/Plan:    1. Carotid artery stenosis, symptomatic, left  Apparently preparing for upcoming surgery.    2. Paroxysmal atrial fibrillation (H)  Currently with a regular rate and rhythm. He is not on any anticoagulation therapy other than aspirin.    3. Oropharyngeal dysphagia  Okay for repeat video swallow with speech therapy.    4. Type 2 diabetes mellitus with diabetic polyneuropathy, with long-term current use of insulin (H)  Previously decreased prandial NovoLog from 15/15/15 to 10/10/5 with improved blood glucose levels. Adjusting metformin for simplification and better pill balance to 500 mg one tablet every morning and two tablets every evening. Also discontinuing sliding scale coverage, as the patient was not doing this prior to hospitalization.    5. Sinus bradycardia  No current concerns    6. Cognitive impairment  He is going to be tested by speech therapy    7. 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 Avoid nephrotoxic agents. May need to reconsider metformin.    8. Mild major depression (H)  He tells me he feels sad every once in a while.    9. Slow transit constipation  Bowels are moving well now.      DISCHARGE PLAN/FACE TO FACE:  I certify that this patient is under my care and that I had a face-to-face encounter that meets the physician face-to-face encounter requirements with this  patient.     Date of Face-to-Face Encounter:  07/23/2021     I certify that, based on my findings, the following services are medically necessary home health services:  Home health nurse, home PT, and speech therapy    My clinical findings support the need for the above skilled services because: (Please write a brief narrative summary that describes what the RN, PT, SLP, or other services will be doing in the home. A list of diagnoses in this section does not meet the CMS requirements):  Home health nurse for insulin management and blood glucose levelst/teaching; home PT for strengthening, balance, endurance, and safety with mobility/ambulation, particularly with stairs and bed mobility; speech therapy for continued work addressing dysphagia.    This patient is homebound because: (Please write a brief narrative summmary describing the functional limitations as to why this patient is homebound and specifically what makes this patient homebound.): Minimal ambulatory ability without assistance. Additionally, above services necessarily need to be performed in the home to be of benefit.    The patient is, or has been, under my care and I have initiated the establishment of the plan of care. This patient will be followed by a physician who will periodically review the plan of care.  Initial follow-up should be within 7-10 days.    Approximate time spent with this patient was greater than 30 minutes with greater than 50% spent in discussions regarding services required upon discharge, as well as a lengthy discussion regarding blood glucose management.      The above has been created using voice recognition software. Please be aware that this may unintentionally  produce inaccuracies and/or nonsensical sentences.      Electronically signed by: PASCUAL Morales CNP        Sincerely,        PASCUAL Morales CNP

## 2021-07-25 NOTE — PROGRESS NOTES
[facility]    Name:   Robert E Schamberger (Domenico)  :   1934  MRN:    [id number]     Facility:   [facility] [[id number]]   Room: [facility] / [address]  Code Status: FULL CODE and POLST AVAILABLE -     DOS:  2021  Previous visit:  2021    PCP:  Ld Moy    CHIEF COMPLAINT / REASON FOR VISIT:  Chief Complaint   Patient presents with     Discharge Summary Nursing Home     DISCHARGE SUMMARY: Confusion, aphasia, right-sided hemiparesis/critical stenosis of left ICA      [facility] from 2021 until 2021 (confusion, aphasia, right-sided hemiparesis/critical stenosis of left ICA)  [facility]      HPI: Sonu is an 87 year old male with a history of diabetes mellitus type 2 (with chronic kidney disease and polyneuropathy), hypertension, atrial fibrillation, and history of TIA (2019). He initially presented to the ED with increasing confusion, aphasia, and right-sided hemiparesis. A stroke code was called, and he received tPA. Imaging revealed critical stenosis of the left ICA. Vascular surgery was consulted, and his  presentation and his symptoms with this admission were felt likely due to symptomatic carotid artery stenosis and other factors. He is a high risk surgical candidate given the anatomy of the vessel, location of the stenosis, and body habitus among other comorbidities. PTA glargine and losartan were decreased, and pravastatin was increased. He was subsequently admitted here for rehab.      CURRENT/RECENT TCU ISSUES    Disposition: He is generally quite pleasant and gives the impression of being alert and oriented; however, there are some cognitive issues (see below). He went to the ER during his TCU stay following a fall with increased confusion. They suggested it was due to dehydration, and he was subsequently discharged back to the  "TCU.    Internal carotid artery stenosis: They discussed the possibility of surgery in a few weeks, and they wanted him in the TCU to \"strengthen up\" before surgery, according to the patient.    Diabetes mellitus type 2: Blood glucose levels were running low despite earlier reduction of glargine (currently 50 units, but he is at been increased to 60 units prior to his hospitalization).  He also had been receiving prandial NovoLog 15 units with each meal; however, he was experiencing hypoglycemic episodes (61 and 39) that did not necessarily correlate with the time of day.  We decreased his prandial NovoLog to 10 units with breakfast, 10 units with lunch, and 5 units with supper.  This was explained in detail to the patient, and he seemed to understand.  He has also been receiving 500 mg of metformin 3 times daily, although he suggested that he was taking all 3 pills at bedtime.  I suggested that we schedule it twice daily and agreed on 1 tablet in the morning and 2 tablets in the evening to balance out his pill load.  We also decided to discontinue his sliding scale coverage, as he had not been doing that previously, and frequency and quantity of sliding scale dosing was minimal. Current blood glucose levels range from . Fasting blood glucose levels are in the mid 100s.    He suggested that he experiences hypoglycemic symptoms for anything less than 80, although he apparently could not be sure.    He also has diabetic polyneuropathy, receiving pregabalin kidney disease (stage IIIa).    Dysphagia: In the hospital, he underwent a video swallow on 07/09/2021, showing mild to moderate oropharyngeal dysphagia, and nectar thick liquids were ordered. Orders from the hospital were to repeat a video swallow in 1 to 2 weeks. Due to oropharyngeal dysphagia (R13.12) following cerebral infarct (I69.391), speech therapy also requested a repeat video swallow study.  They will be following him in the home upon " discharge.    Cognition: Cognitive impairment has been noted, and a request was made for speech therapy in this regard (as well as his dysphagia). He scored 14/30 on the SLUMS and 3.9/5.6 on the CPT in February 2020. At that time, there was impairment of short-term memory and problem-solving.    Discharge planning: Expecting to discharge on 07/27/2021 with home health nurse (insulin and blood glucose monitoring), physical therapy (practice once there is a bed mobility), and speech therapy (continuing to work with the patient on dysphagia).      ROS: No headaches or chest pains, coughing or congestion, nausea or vomiting, dizziness or dyspnea, dysuria, diplopia, constipation or diarrhea, difficulty chewing or swallowing, integumentary issues, or problems with sleep.    Past Medical History:   Diagnosis Date     Anemia in chronic kidney disease      B12 deficiency 11/9/2017     Bilateral hearing loss 5/9/2016     Bursitis, hip, left 3/2/2017     Chest pain     Normal GXT 2006     Chronic kidney disease, stage 3 (moderate)     Worsening with hyperkalemia on Relafen-discontinued August 2016     Chronic kidney disease, stage 3a 5/7/2021     Closed displaced fracture of left clavicle 9/21/2017     Cognitive changes 11/30/2018    SLUMS 20/30 Nov 2018     Complete tear of left rotator cuff 2/15/2018    Associated with fall and clavicular fracture that occurred 2017, evaluated by orthopedics, given cortisone injection and physical therapy recommended     Degenerative disc disease, lumbar      Dementia without behavioral disturbance (H) 2/21/2020     Depression      Diabetic peripheral neuropathy (H) 11/30/2018     Erectile dysfunction      Fatigue 7/6/2017     Forearm tendonitis 11/29/2016     Glaucoma      Gout attack     Left foot     History of recurrent TIAs 5/7/2020     HTN (hypertension)      Hyperlipidemia      Ingrown toenail 7/6/2017     Intermittent asthma without complication 11/9/2017     Left leg cellulitis 2014      Low back pain      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     Morbid obesity (H)      QUETA on CPAP      Osteoarthritis      Osteoarthritis of both hands      Osteoarthritis of both knees     Right TKA     Pain of right heel 5/11/2018     Peripheral edema 5/7/2021     Peripheral neuropathy 10/9/2018     Recurrent falls 3/5/2021     Rib fracture 2005     Right-sided chest pain 5/9/2016     Screening     No AAA on CT scan 2009     Sepsis secondary to UTI (H) 3/5/2021    Hospitalized with urosepsis January 2021 and rehospitalized after fall found to have recurrent UTI treated with 30 days of Omnicef for acute prostatitis     Sick sinus syndrome (H) 5/7/2020    Abnormal event monitor with 2.8-second sinus pause     Squamous cell skin cancer 11/2/2015     TIA (transient ischemic attack) 09/06/2019    CT head showing chronic lacunar infarcts.  Carotid ultrasound with atherosclerotic plaque but no severe stenosis.     Traumatic subarachnoid hemorrhage with loss of consciousness of 30 minutes or less (H) 9/21/2017    Fall down the stairs following alcohol use with traumatic subarachnoid hemorrhage treated conservatively at [facility] followed by stay at TCU with no residual neurologic deficits     Trochanteric bursitis of right hip 8/24/2018     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)      Unsteady gait 3/4/2019     Vitamin D deficiency 11/9/2017              History reviewed. No pertinent family history.  Social History     Socioeconomic History     Marital status:      Spouse name: None     Number of children: None     Years of education: None     Highest education level: None   Occupational History     None   Tobacco Use     Smoking status: Former Smoker     Smokeless tobacco: Never Used   Substance and Sexual Activity     Alcohol use: Yes     Alcohol/week: 14.0 standard drinks     Drug use: No     Sexual activity: None   Other Topics Concern     None   Social History Narrative    Semi  retired  x 47 years     Social Determinants of Health     Financial Resource Strain:      Difficulty of Paying Living Expenses:    Food Insecurity:      Worried About Running Out of Food in the Last Year:      Ran Out of Food in the Last Year:    Transportation Needs:      Lack of Transportation (Medical):      Lack of Transportation (Non-Medical):    Physical Activity:      Days of Exercise per Week:      Minutes of Exercise per Session:    Stress:      Feeling of Stress :    Social Connections:      Frequency of Communication with Friends and Family:      Frequency of Social Gatherings with Friends and Family:      Attends [employer] Services:      Active Member of Clubs or Organizations:      Attends Club or Organization Meetings:      Marital Status:    Intimate Partner Violence:      Fear of Current or Ex-Partner:      Emotionally Abused:      Physically Abused:      Sexually Abused:        MEDICATIONS: Reviewed from the MAR, physician orders, and/or earlier progress notes.  Post Discharge Medication Reconciliation Status: medication reconcilation previously completed during another office visit.    Current Outpatient Medications   Medication Sig     acetaminophen (TYLENOL) 500 MG tablet Take 1,000 mg by mouth every 8 hours as needed      amLODIPine (NORVASC) 2.5 MG tablet [AMLODIPINE (NORVASC) 2.5 MG TABLET] Take 1 tablet (2.5 mg total) by mouth daily.     aspirin 81 MG EC tablet [ASPIRIN 81 MG EC TABLET] Take 1 tablet (81 mg total) by mouth daily.     cholecalciferol, vitamin D3, (VITAMIN D3) 2,000 unit Tab [CHOLECALCIFEROL, VITAMIN D3, (VITAMIN D3) 2,000 UNIT TAB] Take 1 tablet (2,000 Units total) by mouth daily.     citalopram (CELEXA) 10 MG tablet Take 10 mg by mouth daily     clopidogreL (PLAVIX) 75 mg tablet [CLOPIDOGREL (PLAVIX) 75 MG TABLET] TAKE ONE TABLET (75 MG) BY MOUTH ONCE DAILY     cyanocobalamin 1000 MCG tablet [CYANOCOBALAMIN 1000 MCG TABLET] Take 1 tablet (1,000 mcg total) by mouth  daily.     diclofenac (VOLTAREN) 1 % topical gel Apply 2 g topically 4 times daily as needed for moderate pain      hydroCHLOROthiazide (HYDRODIURIL) 25 MG tablet [HYDROCHLOROTHIAZIDE (HYDRODIURIL) 25 MG TABLET] TAKE ONE TABLET BY MOUTH ONCE DAILY .  - TAKE WITH LOSARTAN - COMBO NOT AVAILABLE -     insulin aspart U-100 (NOVOLOG FLEXPEN U-100 INSULIN) 100 unit/mL (3 mL) injection pen [INSULIN ASPART U-100 (NOVOLOG FLEXPEN U-100 INSULIN) 100 UNIT/ML (3 ML) INJECTION PEN] Inject 15 Units under the skin 3 (three) times a day before meals. (Patient taking differently: Inject Subcutaneous 3 times daily (before meals) 10 units with breakfast, 10 units with lunch, and 5 units with supper)     insulin glargine (LANTUS PEN) 100 UNIT/ML pen Inject 50 Units Subcutaneous At Bedtime     latanoprost (XALATAN) 0.005 % ophthalmic solution [LATANOPROST (XALATAN) 0.005 % OPHTHALMIC SOLUTION] Administer 1 drop to both eyes at bedtime.     losartan (COZAAR) 25 MG tablet Take 25 mg by mouth daily     magnesium oxide (MAGOX) 400 mg (241.3 mg magnesium) tablet [MAGNESIUM OXIDE (MAGOX) 400 MG (241.3 MG MAGNESIUM) TABLET] Take 1 tablet (400 mg total) by mouth 2 (two) times a day.     metFORMIN (GLUCOPHAGE) 500 MG tablet Take 500 mg by mouth 500 mg in the morning and 1000 mg in the evening     polyethylene glycol (MIRALAX) 17 g packet Take 1 packet by mouth daily as needed for constipation     pravastatin (PRAVACHOL) 40 MG tablet Take 40 mg by mouth daily     pregabalin (LYRICA) 50 MG capsule [PREGABALIN (LYRICA) 50 MG CAPSULE] TAKE 2 CAPSULES (100 MG TOTAL) BY MOUTH ONCE DAILY AT BEDTIME.     senna (SENOKOT) 8.6 MG tablet Take 1 tablet by mouth 2 times daily as needed for constipation     No current facility-administered medications for this visit.     ALLERGIES:   Allergies   Allergen Reactions     Actos [Pioglitazone] Swelling     Edema     Atorvastatin Unknown     Back pain     Donepezil Unknown     Diarrhea     Gabapentin Diarrhea      Diarrhea     Niacin Unknown     Hyperglycemia     Simvastatin Unknown     Back pain     DIET: Diabetic    Vitals:    07/23/21 1116   BP: 129/66   Pulse: 63   Resp: 18   Temp: 97.5  F (36.4  C)   SpO2: 97%   Weight: 114.5 kg (252 lb 6.4 oz)   Height: 1.829 m (6')     Body mass index is 34.23 kg/m .    EXAMINATION:   General: Pleasant, alert, and conversant elderly male, sitting in a recliner in no apparent distress. Family member in attendance.  Head: Normocephalic and atraumatic.   Eyes: PERRLA, sclerae clear. Slight disconjugate gaze, left eye moving medially.  ENT: Moist oral mucosa. He has upper dentures with some remaining teeth on the bottom. No rhinorrhea or nasal discharge. Hearing is adequate for conversation in a quiet room.  Cardiovascular: Regular rate and rhythm without appreciable murmur.   Respiratory: Lungs clear to auscultation bilaterally.   Abdomen: Nondistended.   Musculoskeletal/Extremities: Age-related degenerative joint disease. Lower extremity venous stasis changes.  Integument: No rashes, clinically significant lesions, or skin breakdown.   Cognitive/Psychiatric: Suspected cognitive impairment based on previous cognitive testing, although he did seem to do well during my visit with him today. His recall seems excellent. He is going to be evaluated by speech therapy. Affect is euthymic.    DIAGNOSTICS:   Recent Results (from the past 240 hour(s))   Basic metabolic panel    Collection Time: 07/19/21  6:30 AM   Result Value Ref Range    Sodium 136 136 - 145 mmol/L    Potassium 4.4 3.5 - 5.0 mmol/L    Chloride 100 98 - 107 mmol/L    Carbon Dioxide (CO2) 24 22 - 31 mmol/L    Anion Gap 12 5 - 18 mmol/L    Urea Nitrogen 26 8 - 28 mg/dL    Creatinine 1.44 (H) 0.70 - 1.30 mg/dL    Calcium 8.8 8.5 - 10.5 mg/dL    Glucose 104 70 - 125 mg/dL    GFR Estimate 43 (L) >60 mL/min/1.73m2     Lab Results   Component Value Date    WBC 9.3 05/11/2021     Lab Results   Component Value Date    RBC 4.13 05/11/2021      Lab Results   Component Value Date    HGB 12.3 05/11/2021     Lab Results   Component Value Date    HCT 37.8 05/11/2021     Lab Results   Component Value Date    MCV 92 05/11/2021     Lab Results   Component Value Date    MCH 29.8 05/11/2021     Lab Results   Component Value Date    MCHC 32.5 05/11/2021     Lab Results   Component Value Date    RDW 13.0 05/11/2021     Lab Results   Component Value Date     05/11/2021       ASSESSMENT/Plan:    1. Carotid artery stenosis, symptomatic, left  Apparently preparing for upcoming surgery.    2. Paroxysmal atrial fibrillation (H)  Currently with a regular rate and rhythm. He is not on any anticoagulation therapy other than aspirin.    3. Oropharyngeal dysphagia  Okay for repeat video swallow with speech therapy.    4. Type 2 diabetes mellitus with diabetic polyneuropathy, with long-term current use of insulin (H)  Previously decreased prandial NovoLog from 15/15/15 to 10/10/5 with improved blood glucose levels. Adjusting metformin for simplification and better pill balance to 500 mg one tablet every morning and two tablets every evening. Also discontinuing sliding scale coverage, as the patient was not doing this prior to hospitalization.    5. Sinus bradycardia  No current concerns    6. Cognitive impairment  He is going to be tested by speech therapy    7. 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 Avoid nephrotoxic agents. May need to reconsider metformin.    8. Mild major depression (H)  He tells me he feels sad every once in a while.    9. Slow transit constipation  Bowels are moving well now.      DISCHARGE PLAN/FACE TO FACE:  I certify that this patient is under my care and that I had a face-to-face encounter that meets the physician face-to-face encounter requirements with this patient.     Date of Face-to-Face Encounter:  07/23/2021     I certify that, based on  my findings, the following services are medically necessary home health services:  Home health nurse, home PT, and speech therapy    My clinical findings support the need for the above skilled services because: (Please write a brief narrative summary that describes what the RN, PT, SLP, or other services will be doing in the home. A list of diagnoses in this section does not meet the CMS requirements):  Home health nurse for insulin management and blood glucose levelst/teaching; home PT for strengthening, balance, endurance, and safety with mobility/ambulation, particularly with stairs and bed mobility; speech therapy for continued work addressing dysphagia.    This patient is homebound because: (Please write a brief narrative summmary describing the functional limitations as to why this patient is homebound and specifically what makes this patient homebound.): Minimal ambulatory ability without assistance. Additionally, above services necessarily need to be performed in the home to be of benefit.    The patient is, or has been, under my care and I have initiated the establishment of the plan of care. This patient will be followed by a physician who will periodically review the plan of care.  Initial follow-up should be within 7-10 days.    Approximate time spent with this patient was greater than 30 minutes with greater than 50% spent in discussions regarding services required upon discharge, as well as a lengthy discussion regarding blood glucose management.      The above has been created using voice recognition software. Please be aware that this may unintentionally  produce inaccuracies and/or nonsensical sentences.      Electronically signed by: PASCUAL Morales CNP

## 2021-07-26 ENCOUNTER — TELEPHONE (OUTPATIENT)
Dept: INTERNAL MEDICINE | Facility: CLINIC | Age: 86
End: 2021-07-26

## 2021-07-26 ENCOUNTER — MEDICAL CORRESPONDENCE (OUTPATIENT)
Dept: HEALTH INFORMATION MANAGEMENT | Facility: CLINIC | Age: 86
End: 2021-07-26

## 2021-07-26 NOTE — TELEPHONE ENCOUNTER
Referral for home care should be coming from the facility that he is being discharged from.  Any future orders needed can be given by me with verbal orders.  However, Sonu also should be scheduled to see me with a 30-minute hospital follow-up appointment in the next 2 to 3 weeks

## 2021-07-26 NOTE — TELEPHONE ENCOUNTER
7-26-21  Reason for Call:   appointment    Detailed comments: Sophie called from [facility] and is wondering if Dr Moy would approve orders if verbal orders are needed via fax/phone?  Sophie received orders from [facility] today that pt is being discharged today, sophie  received orders to start home care with no direction yet until  [facility]  goes to pt's home to evaluate patient to see whats needed  Sophie was just wondering when orders are needed if Dr Moy would be ok'ing them    Phone Number Patient can be reached at: [phone]    Best Time: anytime    Can we leave a detailed message on this number? YES    Call taken on 7/26/2021 at 10:02 AM by Rochelle Manzo

## 2021-07-26 NOTE — TELEPHONE ENCOUNTER
Called Sophie.  They have the orders already but just want to know if Dr Moy would follow once discharged.  Gave leigh Vasques CMA

## 2021-08-03 PROBLEM — A41.9 SEPSIS SECONDARY TO UTI (H): Status: RESOLVED | Noted: 2021-03-05 | Resolved: 2021-05-07

## 2021-08-03 PROBLEM — N39.0 SEPSIS SECONDARY TO UTI (H): Status: RESOLVED | Noted: 2021-03-05 | Resolved: 2021-05-07

## 2021-08-09 ENCOUNTER — TELEPHONE (OUTPATIENT)
Dept: INTERNAL MEDICINE | Facility: CLINIC | Age: 86
End: 2021-08-09

## 2021-08-09 NOTE — TELEPHONE ENCOUNTER
Most sore throats represent a viral illness.  As a precaution, I would recommend him getting tested for Covid and I can place that order if his wife can bring him to have this done.  In the meantime, he can take Tylenol or use throat lozenges.  She should monitor for any other symptoms that develop.

## 2021-08-09 NOTE — TELEPHONE ENCOUNTER
Called and spoke to pts wife.  He is using the tylenol and lthroat lozenges and is feeling a little better.  Declined COVID test at this time, but will call back if changes mind.

## 2021-08-09 NOTE — TELEPHONE ENCOUNTER
Reason for call:  Patient reporting a symptom    Symptom or request: Sore throat    Duration (how long have symptoms been present): 1 day    Have you been treated for this before? unknown    Additional comments: Pt's wife, Nely called and stated Patient has had a sore throat for 1 day. Patient does not have a fever. Nely is wondering what patient can take for his sore throat?     Phone Number patient can be reached at:  Home number on file [phone] (home)    Best Time:  ANY    Can we leave a detailed message on this number:  YES    Call taken on 8/9/2021 at 2:22 PM by Kimberlee Brennan

## 2021-08-17 ENCOUNTER — TELEPHONE (OUTPATIENT)
Dept: FAMILY MEDICINE | Facility: CLINIC | Age: 86
End: 2021-08-17

## 2021-08-17 ENCOUNTER — OFFICE VISIT (OUTPATIENT)
Dept: FAMILY MEDICINE | Facility: CLINIC | Age: 86
End: 2021-08-17
Payer: MEDICARE

## 2021-08-17 VITALS
DIASTOLIC BLOOD PRESSURE: 64 MMHG | HEART RATE: 50 BPM | BODY MASS INDEX: 34.23 KG/M2 | OXYGEN SATURATION: 96 % | HEIGHT: 72 IN | SYSTOLIC BLOOD PRESSURE: 142 MMHG

## 2021-08-17 DIAGNOSIS — Z01.818 PREOPERATIVE EXAMINATION: Primary | ICD-10-CM

## 2021-08-17 DIAGNOSIS — R41.89 COGNITIVE IMPAIRMENT: ICD-10-CM

## 2021-08-17 DIAGNOSIS — E11.42 TYPE 2 DIABETES MELLITUS WITH DIABETIC POLYNEUROPATHY, WITH LONG-TERM CURRENT USE OF INSULIN (H): ICD-10-CM

## 2021-08-17 DIAGNOSIS — I49.5 SICK SINUS SYNDROME (H): ICD-10-CM

## 2021-08-17 DIAGNOSIS — I10 HYPERTENSION, UNSPECIFIED TYPE: ICD-10-CM

## 2021-08-17 DIAGNOSIS — Z79.4 TYPE 2 DIABETES MELLITUS WITH DIABETIC POLYNEUROPATHY, WITH LONG-TERM CURRENT USE OF INSULIN (H): ICD-10-CM

## 2021-08-17 DIAGNOSIS — E78.5 HYPERLIPIDEMIA, UNSPECIFIED HYPERLIPIDEMIA TYPE: ICD-10-CM

## 2021-08-17 DIAGNOSIS — G47.33 OSA ON CPAP: ICD-10-CM

## 2021-08-17 DIAGNOSIS — G45.9 TIA (TRANSIENT ISCHEMIC ATTACK): ICD-10-CM

## 2021-08-17 DIAGNOSIS — I65.22 CAROTID ARTERY STENOSIS, SYMPTOMATIC, LEFT: ICD-10-CM

## 2021-08-17 LAB
ALBUMIN SERPL-MCNC: 3.4 G/DL (ref 3.5–5)
ALP SERPL-CCNC: 116 U/L (ref 45–120)
ALT SERPL W P-5'-P-CCNC: 11 U/L (ref 0–45)
ANION GAP SERPL CALCULATED.3IONS-SCNC: 7 MMOL/L (ref 5–18)
AST SERPL W P-5'-P-CCNC: 16 U/L (ref 0–40)
BILIRUB SERPL-MCNC: 0.6 MG/DL (ref 0–1)
BUN SERPL-MCNC: 25 MG/DL (ref 8–28)
CALCIUM SERPL-MCNC: 9 MG/DL (ref 8.5–10.5)
CHLORIDE BLD-SCNC: 102 MMOL/L (ref 98–107)
CO2 SERPL-SCNC: 28 MMOL/L (ref 22–31)
CREAT SERPL-MCNC: 1.45 MG/DL (ref 0.7–1.3)
GFR SERPL CREATININE-BSD FRML MDRD: 43 ML/MIN/1.73M2
GLUCOSE BLD-MCNC: 190 MG/DL (ref 70–125)
HGB BLD-MCNC: 12 G/DL (ref 13.3–17.7)
POTASSIUM BLD-SCNC: 5.3 MMOL/L (ref 3.5–5)
PROT SERPL-MCNC: 6.4 G/DL (ref 6–8)
SODIUM SERPL-SCNC: 137 MMOL/L (ref 136–145)

## 2021-08-17 PROCEDURE — 99214 OFFICE O/P EST MOD 30 MIN: CPT | Performed by: NURSE PRACTITIONER

## 2021-08-17 PROCEDURE — 36415 COLL VENOUS BLD VENIPUNCTURE: CPT | Performed by: NURSE PRACTITIONER

## 2021-08-17 PROCEDURE — 80053 COMPREHEN METABOLIC PANEL: CPT | Performed by: NURSE PRACTITIONER

## 2021-08-17 PROCEDURE — 85018 HEMOGLOBIN: CPT | Performed by: NURSE PRACTITIONER

## 2021-08-17 NOTE — PATIENT INSTRUCTIONS
Hold all supplements, NSAIDs for 7 days prior to surgery.    I will send a message to your cardiologist and vascular doctor about the Plavix and aspirin.    Reduce your long-acting insulin by 50% the evening before your surgery; this means that you should take 25 units of your Basaglar.    No Metformin or short acting insulin on the morning of your surgery.    Hold your losartan and hydrochlorothiazide on the morning of your surgery.     Follow your surgeon's direction on when to stop eating and drinking prior to surgery.    Your surgeon will be managing your pain after your surgery.      Remove all jewelry and metal piercings before your surgery.     Remove nail polish from fingers before surgery.    If you use a CPAP machine, bring this with you to surgery.

## 2021-08-17 NOTE — TELEPHONE ENCOUNTER
I saw Sonu today for a preop in anticipation of an upcoming left carotid artery surgery.    I am looking for some guidance on what to do with his Plavix, in preparation for his upcoming surgery.    Please call the patient's vascular clinic at [facility] and leave a question for Dr. Leland Hendrickson:    Patient has upcoming revascularization surgery for left carotid artery stenosis.  He is on Plavix due to stroke this past July.  Need recommendations on when/if he should stop Plavix or aspirin with upcoming surgery.

## 2021-08-17 NOTE — TELEPHONE ENCOUNTER
Please call patient's vascular physician at [facility].  See my telephone note from a few minutes ago.  I forgot to route this note.    Patient's vascular clinic telephone number: [phone]

## 2021-08-17 NOTE — PROGRESS NOTES
[facility]  [address] [place] [address]  Phone: [phone]  Fax: [phone]  Primary Provider: Ld Moy  Pre-op Performing Provider: GABRIEL CRAWLEY      PREOPERATIVE EVALUATION:  Today's date: 8/17/2021    Robert E Schamberger is a 87 year old male who presents for a preoperative evaluation.    Surgical Information:  Surgery/Procedure: Carotid artery surgery  Surgery Location: [facility]  Surgeon: Dr. Alfredo Hendrickson  Surgery Date: Unknown  Time of Surgery:   Where patient plans to recover: At home with family  Fax number for surgical facility: [phone]    Type of Anesthesia Anticipated: to be determined    Assessment & Plan     The proposed surgical procedure is considered HIGH risk.    Preoperative examination  We had a blayne conversation about his risk profile given his comorbidities.  Patient is cleared for surgery, pending final clearance with his cardiologist  - Comprehensive metabolic panel; Future  - Hemoglobin; Future    Carotid artery stenosis, symptomatic, left  He is working with Dr. Leland Saxena, vascular surgeon at [facility].  He has symptomatic severe left carotid stenosis with recurrent symptoms, TIA in 2019 and CVA July 2021. He is on Plavix and aspirin; he will presumable need to hold these medications for 5-7 days leading up to his surgery but I would like to check with his cardiologist and surgeon for specifics on this before making a recommendation today. He is working toward a revascularization surgery and is looking for clearance today    Type 2 diabetes mellitus with diabetic polyneuropathy, with long-term current use of insulin (H)  On Metformin, Basaglar, and aspart.  Hemoglobin A1c checked this past summer at 8.4.  Fasting blood sugars are typically around 150.  He will reduce his evening Basaglar by 50%.  No diabetes medications on the day of surgery    QUETA on CPAP  He will bring his CPAP machine with him to  surgery.    Hypertension  On amlodipine, losartan, and hydrochlorothiazide.  Recheck a CMP today.  He will hold his losartan hydrochlorothiazide on the day of his procedure.  Blood pressure looks okay today    Hyperlipidemia, unspecified hyperlipidemia type  On pravastatin 40 mg, previous intolerance to atorvastatin    TIA (transient ischemic attack)  Likely due to his left carotid artery stenosis    Sick sinus syndrome (H)  He last saw his cardiologist on 3/31 and has a follow-up appointment in anticipation of his upcoming carotid artery surgery on 8/23.  He had a 30-day event monitor on February 11 through March 14 which showed probable sick sinus syndrome with sinus arrhythmia with 2.8-second pause that was asymptomatic.  He has not on any rate lowering drugs.    Paroxysmal atrial fibrillation  He had a cardioversion in 2007 and is no longer on anticoagulation.  In review of his March cardiology note, it was suspected that he could be going in and out of atrial fibrillation, asymptomatic.  Given his frequent falls and unsteady gait, he was not deemed to be a candidate for anticoagulation    Cognitive impairment  Family members accompany him today and say that his dementia has been stable.  His dementia is presumably vascular in nature    Patient Instructions   Hold all supplements, NSAIDs for 7 days prior to surgery.    I will send a message to your cardiologist and vascular doctor about the Plavix and aspirin.    Reduce your long-acting insulin by 50% the evening before your surgery; this means that you should take 25 units of your Basaglar.    No Metformin or short acting insulin on the morning of your surgery.    Hold your losartan and hydrochlorothiazide on the morning of your surgery.     Follow your surgeon's direction on when to stop eating and drinking prior to surgery.    Your surgeon will be managing your pain after your surgery.      Remove all jewelry and metal piercings before your surgery.      Remove nail polish from fingers before surgery.    If you use a CPAP machine, bring this with you to surgery.      Risks and Recommendations:  The patient has the following additional risks and recommendations for perioperative complications:   - No identified additional risk factors other than previously addressed    Medication Instructions:  See above    RECOMMENDATION:  APPROVAL GIVEN to proceed with proposed procedure pending review of diagnostic evaluation.    31 minutes spent on the date of the encounter doing chart review, history and exam, documentation and further activities per the note      Subjective     HPI related to upcoming procedure: He is working with Dr. Leland Saxena, vascular surgeon at [facility].  He has symptomatic severe left carotid stenosis with recurrent symptoms, TIA in 2019 and CVA July 2021.  He is working toward a revascularization surgery and is looking for clearance today      Preop Questions 8/17/2021   1. Have you ever had a heart attack or stroke? YES - Stroke this past July   2. Have you ever had surgery on your heart or blood vessels, such as a stent placement, a coronary artery bypass, or surgery on an artery in your head, neck, heart, or legs? No   3. Do you have chest pain with activity? No   4. Do you have a history of  heart failure? No   5. Do you currently have a cold, bronchitis or symptoms of other infection? No   6. Do you have a cough, shortness of breath, or wheezing? No   7. Do you or anyone in your family have previous history of blood clots? No   8. Do you or does anyone in your family have a serious bleeding problem such as prolonged bleeding following surgeries or cuts? No   9. Have you ever had problems with anemia or been told to take iron pills? No   10. Have you had any abnormal blood loss such as black, tarry or bloody stools? No   11. Have you ever had a blood transfusion? No   12. Are you willing to have a blood transfusion if it is medically needed  before, during, or after your surgery? Yes   13. Have you or any of your relatives ever had problems with anesthesia? No   14. Do you have sleep apnea, excessive snoring or daytime drowsiness? YES - Will bring CPAP   14a. Do you have a CPAP machine? Yes   15. Do you have any artifical heart valves or other implanted medical devices like a pacemaker, defibrillator, or continuous glucose monitor? No   16. Do you have artificial joints? YES - RT knee   17. Are you allergic to latex? No       Health Care Directive:  Patient does not have a Health Care Directive or Living Will: Discussed advance care planning with patient; however, patient declined at this time.    Preoperative Review of :   reviewed - no record of controlled substances prescribed.      Status of Chronic Conditions:  See problem list for active medical problems.  Problems all longstanding and stable, except as noted/documented.  See ROS for pertinent symptoms related to these conditions.      Review of Systems  CONSTITUTIONAL: NEGATIVE for fever, chills, change in weight  INTEGUMENTARY/SKIN: NEGATIVE for worrisome rashes, moles or lesions  EYES: NEGATIVE for vision changes or irritation  ENT/MOUTH: NEGATIVE for ear, mouth and throat problems  RESP: NEGATIVE for significant cough or SOB  CV: NEGATIVE for chest pain, palpitations or peripheral edema  GI: NEGATIVE for nausea, abdominal pain, heartburn, or change in bowel habits  : NEGATIVE for frequency, dysuria, or hematuria  MUSCULOSKELETAL: NEGATIVE for significant arthralgias or myalgia  NEURO: NEGATIVE for weakness, dizziness or paresthesias  ENDOCRINE: NEGATIVE for temperature intolerance, skin/hair changes  HEME: NEGATIVE for bleeding problems  PSYCHIATRIC: NEGATIVE for changes in mood or affect    Patient Active Problem List    Diagnosis Date Noted     Carotid artery stenosis, symptomatic, left 07/22/2021     Priority: Medium     Sinus bradycardia 07/22/2021     Priority: Medium     Benign  hypertensive kidney disease with chronic kidney disease stage I through stage IV, or unspecified 07/22/2021     Priority: Medium     Cognitive impairment 07/22/2021     Priority: Medium     Mild major depression (H) 07/22/2021     Priority: Medium     Slow transit constipation 07/22/2021     Priority: Medium     Oropharyngeal dysphagia 07/22/2021     Priority: Medium     Chronic kidney disease, stage 3a 05/07/2021     Priority: Medium     Peripheral edema 05/07/2021     Priority: Medium     Recurrent falls 03/05/2021     Priority: Medium     HTN (hypertension)      Priority: Medium     Paroxysmal atrial fibrillation (H) 06/04/2020     Priority: Medium     Sick sinus syndrome (H) 05/07/2020     Priority: Medium     Abnormal event monitor with 2.8-second sinus pause         Dementia without behavioral disturbance (H) 02/21/2020     Priority: Medium     Type 2 diabetes mellitus with diabetic polyneuropathy, with long-term current use of insulin (H)      Priority: Medium     Physical deconditioning 02/14/2020     Priority: Medium     Advanced care planning/counseling discussion 02/14/2020     Priority: Medium     QUETA on CPAP      Priority: Medium     TIA (transient ischemic attack) 09/06/2019     Priority: Medium     CT head showing chronic lacunar infarcts.  Carotid ultrasound with   atherosclerotic plaque but no severe stenosis.         Moderate major depression (H) 06/03/2019     Priority: Medium     Unsteady gait 03/04/2019     Priority: Medium     Diabetic peripheral neuropathy (H) 11/30/2018     Priority: Medium     Trochanteric bursitis of right hip 08/24/2018     Priority: Medium     Entropion of left lower eyelid 07/31/2018     Priority: Medium     Complete tear of left rotator cuff 02/15/2018     Priority: Medium     Associated with fall and clavicular fracture that occurred 2017, evaluated   by orthopedics, given cortisone injection and physical therapy recommended         B12 deficiency 11/09/2017      Priority: Medium     Vitamin D deficiency 11/09/2017     Priority: Medium     Bursitis of left hip 03/02/2017     Priority: Medium     Bilateral hearing loss 05/09/2016     Priority: Medium     Anemia in chronic kidney disease      Priority: Medium     Squamous cell skin cancer 11/02/2015     Priority: Medium     Hyperlipidemia      Priority: Medium     Low back pain      Priority: Medium     Glaucoma      Priority: Medium     Erectile dysfunction      Priority: Medium     Morbid obesity (H)      Priority: Medium     Degenerative disc disease, lumbar      Priority: Medium     Osteoarthritis of both knees      Priority: Medium     Right TKA         Osteoarthritis of both hands      Priority: Medium     Lumbar Facet Syndrome      Priority: Medium     Created by Conversion          Past Medical History:   Diagnosis Date     Anemia in chronic kidney disease      B12 deficiency 11/9/2017     Bilateral hearing loss 5/9/2016     Bursitis, hip, left 3/2/2017     Chest pain     Normal GXT 2006     Chronic kidney disease, stage 3 (moderate)     Worsening with hyperkalemia on Relafen-discontinued August 2016     Chronic kidney disease, stage 3a 5/7/2021     Closed displaced fracture of left clavicle 9/21/2017     Cognitive changes 11/30/2018    SLUMS 20/30 Nov 2018     Complete tear of left rotator cuff 2/15/2018    Associated with fall and clavicular fracture that occurred 2017, evaluated by orthopedics, given cortisone injection and physical therapy recommended     Degenerative disc disease, lumbar      Dementia without behavioral disturbance (H) 2/21/2020     Depression      Diabetic peripheral neuropathy (H) 11/30/2018     Erectile dysfunction      Fatigue 7/6/2017     Forearm tendonitis 11/29/2016     Glaucoma      Gout attack     Left foot     History of recurrent TIAs 5/7/2020     HTN (hypertension)      Hyperlipidemia      Ingrown toenail 7/6/2017     Intermittent asthma without complication 11/9/2017     Left leg  cellulitis 2014     Low back pain      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     Morbid obesity (H)      QUETA on CPAP      Osteoarthritis      Osteoarthritis of both hands      Osteoarthritis of both knees     Right TKA     Pain of right heel 5/11/2018     Peripheral edema 5/7/2021     Peripheral neuropathy 10/9/2018     Recurrent falls 3/5/2021     Rib fracture 2005     Right-sided chest pain 5/9/2016     Screening     No AAA on CT scan 2009     Sepsis secondary to UTI (H) 3/5/2021    Hospitalized with urosepsis January 2021 and rehospitalized after fall found to have recurrent UTI treated with 30 days of Omnicef for acute prostatitis     Sick sinus syndrome (H) 5/7/2020    Abnormal event monitor with 2.8-second sinus pause     Squamous cell skin cancer 11/2/2015     TIA (transient ischemic attack) 09/06/2019    CT head showing chronic lacunar infarcts.  Carotid ultrasound with atherosclerotic plaque but no severe stenosis.     Traumatic subarachnoid hemorrhage with loss of consciousness of 30 minutes or less (H) 9/21/2017    Fall down the stairs following alcohol use with traumatic subarachnoid hemorrhage treated conservatively at [facility] followed by stay at [facility] with no residual neurologic deficits     Trochanteric bursitis of right hip 8/24/2018     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)      Unsteady gait 3/4/2019     Vitamin D deficiency 11/9/2017     Past Surgical History:   Procedure Laterality Date     BLEPHAROPLASTY  2018     C EXCIS STOMACH ULCER,LESN;LOCAL      Description: Gastric Excision;  Recorded: 03/23/2012;     COLONOSCOPY  2003    Normal     EYE SURGERY       HC REMOVAL GALLBLADDER      Description: Cholecystectomy;  Recorded: 03/23/2012;     HC REVISE MEDIAN N/CARPAL TUNNEL SURG      Description: Neuroplasty Decompression Median Nerve At Carpal Tunnel;  Recorded: 03/23/2012;     IR BILIARY TUBE CHANGE  7/25/2000     IR LUMBAR EPIDURAL STEROID INJECTION  3/23/2012     IR  LUMBAR EPIDURAL STEROID INJECTION  4/13/2012     RELEASE CARPAL TUNNEL Bilateral      TOTAL KNEE ARTHROPLASTY Right 2014    Complicated by torn Ranexa 1 and subsequent repair     Current Outpatient Medications   Medication Sig Dispense Refill     acetaminophen (TYLENOL) 500 MG tablet Take 1,000 mg by mouth every 8 hours as needed        amLODIPine (NORVASC) 2.5 MG tablet [AMLODIPINE (NORVASC) 2.5 MG TABLET] Take 1 tablet (2.5 mg total) by mouth daily. 90 tablet 3     aspirin 81 MG EC tablet [ASPIRIN 81 MG EC TABLET] Take 1 tablet (81 mg total) by mouth daily. 150 tablet 2     cholecalciferol, vitamin D3, (VITAMIN D3) 2,000 unit Tab [CHOLECALCIFEROL, VITAMIN D3, (VITAMIN D3) 2,000 UNIT TAB] Take 1 tablet (2,000 Units total) by mouth daily. 100 tablet 3     citalopram (CELEXA) 10 MG tablet Take 10 mg by mouth daily       clopidogreL (PLAVIX) 75 mg tablet [CLOPIDOGREL (PLAVIX) 75 MG TABLET] TAKE ONE TABLET (75 MG) BY MOUTH ONCE DAILY 90 tablet 1     cyanocobalamin 1000 MCG tablet [CYANOCOBALAMIN 1000 MCG TABLET] Take 1 tablet (1,000 mcg total) by mouth daily. 100 tablet 3     diclofenac (VOLTAREN) 1 % topical gel Apply 2 g topically 4 times daily as needed for moderate pain        hydroCHLOROthiazide (HYDRODIURIL) 25 MG tablet [HYDROCHLOROTHIAZIDE (HYDRODIURIL) 25 MG TABLET] TAKE ONE TABLET BY MOUTH ONCE DAILY .  - TAKE WITH LOSARTAN - COMBO NOT AVAILABLE - 90 tablet 3     insulin aspart U-100 (NOVOLOG FLEXPEN U-100 INSULIN) 100 unit/mL (3 mL) injection pen [INSULIN ASPART U-100 (NOVOLOG FLEXPEN U-100 INSULIN) 100 UNIT/ML (3 ML) INJECTION PEN] Inject 15 Units under the skin 3 (three) times a day before meals. (Patient taking differently: Inject Subcutaneous 3 times daily (before meals) 10 units with breakfast, 10 units with lunch, and 5 units with supper)  0     insulin glargine (LANTUS PEN) 100 UNIT/ML pen Inject 50 Units Subcutaneous At Bedtime       latanoprost (XALATAN) 0.005 % ophthalmic solution [LATANOPROST  (XALATAN) 0.005 % OPHTHALMIC SOLUTION] Administer 1 drop to both eyes at bedtime.       losartan (COZAAR) 100 MG tablet TAKE ONE TABLET BY MOUTH ONCE DAILY .  TAKE WITH HCTZ - COMBO UNAVAILABLE - 90 tablet 3     losartan (COZAAR) 25 MG tablet Take 25 mg by mouth daily       magnesium oxide (MAGOX) 400 mg (241.3 mg magnesium) tablet [MAGNESIUM OXIDE (MAGOX) 400 MG (241.3 MG MAGNESIUM) TABLET] Take 1 tablet (400 mg total) by mouth 2 (two) times a day. 100 tablet 1     metFORMIN (GLUCOPHAGE) 500 MG tablet Take 500 mg by mouth 500 mg in the morning and 1000 mg in the evening       polyethylene glycol (MIRALAX) 17 g packet Take 1 packet by mouth daily as needed for constipation       pravastatin (PRAVACHOL) 40 MG tablet Take 40 mg by mouth daily       pregabalin (LYRICA) 50 MG capsule [PREGABALIN (LYRICA) 50 MG CAPSULE] TAKE 2 CAPSULES (100 MG TOTAL) BY MOUTH ONCE DAILY AT BEDTIME. 180 capsule 3     senna (SENOKOT) 8.6 MG tablet Take 1 tablet by mouth 2 times daily as needed for constipation         Allergies   Allergen Reactions     Actos [Pioglitazone] Swelling     Edema     Atorvastatin Unknown     Back pain     Donepezil Unknown     Diarrhea     Gabapentin Diarrhea     Diarrhea     Niacin Unknown     Hyperglycemia     Simvastatin Unknown     Back pain        Social History     Tobacco Use     Smoking status: Former Smoker     Smokeless tobacco: Never Used   Substance Use Topics     Alcohol use: Yes     Alcohol/week: 14.0 standard drinks     No family history on file.  History   Drug Use No         Objective     BP (!) 142/64 (BP Location: Left arm, Patient Position: Sitting, Cuff Size: Adult Large)   Pulse 50   Ht 1.829 m (6')   SpO2 96%   BMI 34.23 kg/m      Physical Exam    GENERAL APPEARANCE: healthy, alert and no distress     EYES: EOMI,  PERRL     HENT: ear canals and TM's normal and nose and mouth without ulcers or lesions     NECK: no adenopathy, no asymmetry, masses, or scars and thyroid normal to  palpation     RESP: lungs clear to auscultation - no rales, rhonchi or wheezes     CV: regular rates and rhythm, normal S1 S2, no S3 or S4 and no murmur, click or rub     ABDOMEN:  soft, nontender, no HSM or masses and bowel sounds normal     MS: extremities normal- no gross deformities noted, no evidence of inflammation in joints, FROM in all extremities.     SKIN: no suspicious lesions or rashes     NEURO: Normal strength and tone, sensory exam grossly normal, mentation intact and speech normal     PSYCH: mentation appears normal. and affect normal/bright     LYMPHATICS: No cervical adenopathy    Recent Labs   Lab Test 07/19/21  0630 05/11/21  0916 05/07/21  1051 02/15/21  0753 02/11/21  0700 01/18/21  0659 01/13/21  0730   HGB  --  12.3*  --  12.3* 13.1*  --  12.2*   PLT  --  235  --   --  252   < > 368    134*  --   --   --   --  137   POTASSIUM 4.4 5.2*  --   --   --   --  4.9   CR 1.44* 1.52*  --   --   --   --  1.65*   A1C  --   --  6.8*  --   --   --  7.1*    < > = values in this interval not displayed.        Diagnostics:  Labs pending at this time.  Results will be reviewed when available.     EKG from 3/24/2021 scanned in from [facility], revealing sinus rhythm with first-degree AV block, ventricular rate 67.  QT/QTc 400/422    Revised Cardiac Risk Index (RCRI):  The patient has the following serious cardiovascular risks for perioperative complications:   - High risk surgery (>5% cardiac complication risk) = 1 point   - Diabetes Mellitus (on Insulin) = 1 point     RCRI Interpretation: 3 points: Class IV (high risk - >11% complication rate)    Estimated Functional Capacity: CANNOT perform 4 METS without symptoms           Signed Electronically by: Gallito Cheung CNP  Copy of this evaluation report is provided to requesting physician.

## 2021-08-23 ENCOUNTER — OFFICE VISIT (OUTPATIENT)
Dept: CARDIOLOGY | Facility: CLINIC | Age: 86
End: 2021-08-23
Payer: MEDICARE

## 2021-08-23 VITALS
RESPIRATION RATE: 16 BRPM | BODY MASS INDEX: 42.3 KG/M2 | SYSTOLIC BLOOD PRESSURE: 120 MMHG | DIASTOLIC BLOOD PRESSURE: 66 MMHG | HEART RATE: 58 BPM | WEIGHT: 311.9 LBS

## 2021-08-23 DIAGNOSIS — E78.5 HYPERLIPIDEMIA, UNSPECIFIED HYPERLIPIDEMIA TYPE: ICD-10-CM

## 2021-08-23 DIAGNOSIS — I48.0 PAROXYSMAL ATRIAL FIBRILLATION (H): ICD-10-CM

## 2021-08-23 DIAGNOSIS — I12.9 BENIGN HYPERTENSIVE KIDNEY DISEASE WITH CHRONIC KIDNEY DISEASE STAGE I THROUGH STAGE IV, OR UNSPECIFIED: ICD-10-CM

## 2021-08-23 DIAGNOSIS — I49.5 SICK SINUS SYNDROME (H): Primary | ICD-10-CM

## 2021-08-23 DIAGNOSIS — E66.01 MORBID OBESITY (H): ICD-10-CM

## 2021-08-23 DIAGNOSIS — N18.31 CHRONIC KIDNEY DISEASE, STAGE 3A (H): ICD-10-CM

## 2021-08-23 DIAGNOSIS — I34.2 NONRHEUMATIC MITRAL VALVE STENOSIS: ICD-10-CM

## 2021-08-23 DIAGNOSIS — Z79.4 TYPE 2 DIABETES MELLITUS WITH DIABETIC POLYNEUROPATHY, WITH LONG-TERM CURRENT USE OF INSULIN (H): ICD-10-CM

## 2021-08-23 DIAGNOSIS — E11.42 TYPE 2 DIABETES MELLITUS WITH DIABETIC POLYNEUROPATHY, WITH LONG-TERM CURRENT USE OF INSULIN (H): ICD-10-CM

## 2021-08-23 DIAGNOSIS — R00.1 SINUS BRADYCARDIA: ICD-10-CM

## 2021-08-23 DIAGNOSIS — G47.33 OSA ON CPAP: ICD-10-CM

## 2021-08-23 DIAGNOSIS — G45.9 TIA (TRANSIENT ISCHEMIC ATTACK): ICD-10-CM

## 2021-08-23 PROCEDURE — 99214 OFFICE O/P EST MOD 30 MIN: CPT | Performed by: INTERNAL MEDICINE

## 2021-08-23 NOTE — PATIENT INSTRUCTIONS
Mr Robert E Schamberger,  I enjoyed visiting with you again today.  I am glad to hear you are doing well.  Per our conversation I will get the note for you to have the surgery.  I will plan on seeing you 1 year or sooner if needed.  Jass Blank

## 2021-08-23 NOTE — LETTER
8/23/2021    Ld Moy MD  [address] [facility] [address]    RE: Robert E Schamberger       Dear Colleague,    I had the pleasure of seeing Robert E Schamberger in the [facility].        [facility]  Heart Care Clinic Follow-up Note    Assessment & Plan        (I49.5) Sick sinus syndrome (H)  (primary encounter diagnosis)-30-day event monitor February 2020 showed probable sick sinus syndrome with sinus arrhythmia and a 2.8-second pause that was asymptomatic.  He has first-degree heart block as well as type I second-degree AV block.  Heart rate went down to 50 but the monitor was entirely asymptomatic.  There is no advanced type II second-degree heart block or 30 be heart block and pacemaker not indicated yet.  Repeat event monitor April 2021 showed tendency to sinus bradycardia with once again first-degree AV block with rare type I second-degree AV block without any high-grade AV block or major pauses.  Once again no pacemaker yet.    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    (N18.31) Chronic kidney disease, stage 3a-creatinine mildly elevated 1.45 and stable.    (R00.1) Sinus bradycardia-as above asymptomatic and no need for pacemaker yet.    (I48.0) Paroxysmal atrial fibrillation (H))-  had cardioversion of this in 2007 and is no longer on anticoagulation.  I would suspect he is going in and out of atrial fibrillation, asymptomatic and not valvular.   Normally would recommend anticoagulation with Eliquis, Xarelto or possibly Coumadin, however given his frequent falls and unsteady gait very hesitant to do this.    No atrial fibrillation seen on recheck monitor.      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  His risk is increased, not prohibitive, and I understand this of being minimally invasive stenting under sedation.    (E78.5) Hyperlipidemia, unspecified hyperlipidemia type-cholesterol is 141 with an LDL of 82 which is acceptable.    (E66.01) Morbid obesity (H)-work on weight loss but wheelchair-bound.    (G47.33,  Z99.89) QUETA on CPAP-nightly CPAP.    (E11.42,  Z79.4) Type 2 diabetes mellitus with diabetic polyneuropathy, with long-term current use of insulin (H)-so noted.    (I34.2) Nonrheumatic mitral valve stenosis-seen on echo and only mild with significant mitral annular calcification.  Gradient no more than 4 mmHg and not prohibitive for surgery.    Plan  1.  Proceed with minimally invasive carotid revascularization a 90% left internal carotid artery lesion at increased but not prohibitive cardiovascular risk given his numerous comorbidities of chronic kidney disease, obesity, sleep apnea, sick sinus syndrome, diabetes as well as age and wheelchair status.  2.  Follow-up me in 1 year or sooner if profound bradycardia seen.  3.  Did warn him that given increased swelling following carotid enterectomy may end up needing pacemaker at that time.    Subjective  CC: 87-year-old white gentleman being seen in follow-up, he is here with his son.  He is still living at home independently with his wife who is having some health issues of her own.  He had right-sided weakness prompting presentation to [facility] where he was having a right-sided TIA due to left internal carotid artery disease he received TPA.  Since then he is back home, spends most of the time in a wheelchair, wears CPAP at nighttime.  No chest pains, palpitations, syncope, dizziness, major fatigue, PND, orthopnea or peripheral edema.    Medications  Current Outpatient Medications   Medication Sig Dispense Refill     acetaminophen (TYLENOL) 500 MG tablet Take 1,000 mg by mouth every 8 hours as needed        amLODIPine (NORVASC) 2.5  MG tablet [AMLODIPINE (NORVASC) 2.5 MG TABLET] Take 1 tablet (2.5 mg total) by mouth daily. 90 tablet 3     aspirin 81 MG EC tablet [ASPIRIN 81 MG EC TABLET] Take 1 tablet (81 mg total) by mouth daily. 150 tablet 2     cholecalciferol, vitamin D3, (VITAMIN D3) 2,000 unit Tab [CHOLECALCIFEROL, VITAMIN D3, (VITAMIN D3) 2,000 UNIT TAB] Take 1 tablet (2,000 Units total) by mouth daily. 100 tablet 3     citalopram (CELEXA) 10 MG tablet Take 10 mg by mouth daily       clopidogreL (PLAVIX) 75 mg tablet [CLOPIDOGREL (PLAVIX) 75 MG TABLET] TAKE ONE TABLET (75 MG) BY MOUTH ONCE DAILY 90 tablet 1     cyanocobalamin 1000 MCG tablet [CYANOCOBALAMIN 1000 MCG TABLET] Take 1 tablet (1,000 mcg total) by mouth daily. 100 tablet 3     diclofenac (VOLTAREN) 1 % topical gel Apply 2 g topically 4 times daily as needed for moderate pain        hydroCHLOROthiazide (HYDRODIURIL) 25 MG tablet [HYDROCHLOROTHIAZIDE (HYDRODIURIL) 25 MG TABLET] TAKE ONE TABLET BY MOUTH ONCE DAILY .  - TAKE WITH LOSARTAN - COMBO NOT AVAILABLE - 90 tablet 3     insulin aspart U-100 (NOVOLOG FLEXPEN U-100 INSULIN) 100 unit/mL (3 mL) injection pen [INSULIN ASPART U-100 (NOVOLOG FLEXPEN U-100 INSULIN) 100 UNIT/ML (3 ML) INJECTION PEN] Inject 15 Units under the skin 3 (three) times a day before meals. (Patient taking differently: Inject Subcutaneous 3 times daily (before meals) 10 units with breakfast, 10 units with lunch, and 5 units with supper)  0     insulin glargine (LANTUS PEN) 100 UNIT/ML pen Inject 50 Units Subcutaneous At Bedtime       latanoprost (XALATAN) 0.005 % ophthalmic solution [LATANOPROST (XALATAN) 0.005 % OPHTHALMIC SOLUTION] Administer 1 drop to both eyes at bedtime.       losartan (COZAAR) 25 MG tablet Take 25 mg by mouth daily       magnesium oxide (MAGOX) 400 mg (241.3 mg magnesium) tablet [MAGNESIUM OXIDE (MAGOX) 400 MG (241.3 MG MAGNESIUM) TABLET] Take 1 tablet (400 mg total) by mouth 2 (two) times a day. 100 tablet 1     metFORMIN  (GLUCOPHAGE) 500 MG tablet Take 500 mg by mouth 500 mg in the morning and 1000 mg in the evening       polyethylene glycol (MIRALAX) 17 g packet Take 1 packet by mouth daily as needed for constipation       pravastatin (PRAVACHOL) 40 MG tablet Take 40 mg by mouth daily       pregabalin (LYRICA) 50 MG capsule [PREGABALIN (LYRICA) 50 MG CAPSULE] TAKE 2 CAPSULES (100 MG TOTAL) BY MOUTH ONCE DAILY AT BEDTIME. 180 capsule 3     senna (SENOKOT) 8.6 MG tablet Take 1 tablet by mouth 2 times daily as needed for constipation         Objective  /66 (BP Location: Right arm, Patient Position: Sitting, Cuff Size: Adult Large)   Pulse 58   Resp 16   Wt 141.5 kg (311 lb 14.4 oz)   BMI 42.30 kg/m      General Appearance:    Alert, cooperative, no distress, appears stated age   Head:    Normocephalic, without obvious abnormality, atraumatic   Throat:   Lips, mucosa, and tongue normal; teeth and gums normal   Neck:   Supple, symmetrical, trachea midline, no adenopathy;        thyroid:  No enlargement/tenderness/nodules; no carotid    bruit or JVD   Back:     Symmetric, no curvature, ROM normal, no CVA tenderness   Lungs:     Clear to auscultation bilaterally, respirations unlabored   Chest wall:    No tenderness or deformity   Heart:    Regular rate and rhythm, S1 and S2 normal, no murmur, rub   or gallop   Abdomen:     Soft, non-tender, bowel sounds active all four quadrants,     no masses, no organomegaly   Extremities:   Normal, atraumatic, no cyanosis or edema   Pulses:   2+ and symmetric all extremities   Skin:   Skin color, texture, turgor normal, no rashes or lesions     Results    Lab Results personally reviewed   Lab Results   Component Value Date    CHOL 141 05/11/2021    CHOL 118 01/14/2020     Lab Results   Component Value Date    HDL 33 (L) 05/11/2021    HDL 28 (L) 01/14/2020     No components found for: LDLCALC  Lab Results   Component Value Date    TRIG 130 05/11/2021    TRIG 110 01/14/2020     Lab Results    Component Value Date    WBC 9.3 05/11/2021    HGB 12.0 (L) 08/17/2021    HCT 37.8 (L) 05/11/2021     05/11/2021     Lab Results   Component Value Date    BUN 25 08/17/2021     08/17/2021    CO2 28 08/17/2021     Review of Systems:   Enc Vitals  BP: 120/66  Pulse: 58  Resp: 16  Weight: 141.5 kg (311 lb 14.4 oz)                                              Thank you for allowing me to participate in the care of your patient.      Sincerely,     XAVI GUZMAN MD     [facility]  cc:   Ld Moy MD  [address] [place] [address]

## 2021-08-23 NOTE — PROGRESS NOTES
[facility]  Heart Care Clinic Follow-up Note    Assessment & Plan        (I49.5) Sick sinus syndrome (H)  (primary encounter diagnosis)-30-day event monitor February 2020 showed probable sick sinus syndrome with sinus arrhythmia and a 2.8-second pause that was asymptomatic.  He has first-degree heart block as well as type I second-degree AV block.  Heart rate went down to 50 but the monitor was entirely asymptomatic.  There is no advanced type II second-degree heart block or 30 be heart block and pacemaker not indicated yet.  Repeat event monitor April 2021 showed tendency to sinus bradycardia with once again first-degree AV block with rare type I second-degree AV block without any high-grade AV block or major pauses.  Once again no pacemaker yet.    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    (N18.31) Chronic kidney disease, stage 3a-creatinine mildly elevated 1.45 and stable.    (R00.1) Sinus bradycardia-as above asymptomatic and no need for pacemaker yet.    (I48.0) Paroxysmal atrial fibrillation (H))-  had cardioversion of this in 2007 and is no longer on anticoagulation.  I would suspect he is going in and out of atrial fibrillation, asymptomatic and not valvular.   Normally would recommend anticoagulation with Eliquis, Xarelto or possibly Coumadin, however given his frequent falls and unsteady gait very hesitant to do this.    No atrial fibrillation seen on recheck monitor.      (G45.9) TIA (transient ischemic attack)-right sided most likely secondary to left internal carotid artery 90% stenosis and scheduled for carotid endarterectomy.  His risk is increased, not prohibitive, and I understand this of being minimally invasive stenting under sedation.    (E78.5) Hyperlipidemia, unspecified hyperlipidemia type-cholesterol is 141 with an LDL of 82 which is acceptable.    (E66.01) Morbid  obesity (H)-work on weight loss but wheelchair-bound.    (G47.33,  Z99.89) QUETA on CPAP-nightly CPAP.    (E11.42,  Z79.4) Type 2 diabetes mellitus with diabetic polyneuropathy, with long-term current use of insulin (H)-so noted.    (I34.2) Nonrheumatic mitral valve stenosis-seen on echo and only mild with significant mitral annular calcification.  Gradient no more than 4 mmHg and not prohibitive for surgery.    Plan  1.  Proceed with minimally invasive carotid revascularization a 90% left internal carotid artery lesion at increased but not prohibitive cardiovascular risk given his numerous comorbidities of chronic kidney disease, obesity, sleep apnea, sick sinus syndrome, diabetes as well as age and wheelchair status.  2.  Follow-up me in 1 year or sooner if profound bradycardia seen.  3.  Did warn him that given increased swelling following carotid enterectomy may end up needing pacemaker at that time.    Subjective  CC: 87-year-old white gentleman being seen in follow-up, he is here with his son.  He is still living at home independently with his wife who is having some health issues of her own.  He had right-sided weakness prompting presentation to [facility] where he was having a right-sided TIA due to left internal carotid artery disease he received TPA.  Since then he is back home, spends most of the time in a wheelchair, wears CPAP at nighttime.  No chest pains, palpitations, syncope, dizziness, major fatigue, PND, orthopnea or peripheral edema.    Medications  Current Outpatient Medications   Medication Sig Dispense Refill     acetaminophen (TYLENOL) 500 MG tablet Take 1,000 mg by mouth every 8 hours as needed        amLODIPine (NORVASC) 2.5 MG tablet [AMLODIPINE (NORVASC) 2.5 MG TABLET] Take 1 tablet (2.5 mg total) by mouth daily. 90 tablet 3     aspirin 81 MG EC tablet [ASPIRIN 81 MG EC TABLET] Take 1 tablet (81 mg total) by mouth daily. 150 tablet 2     cholecalciferol, vitamin D3, (VITAMIN D3) 2,000  unit Tab [CHOLECALCIFEROL, VITAMIN D3, (VITAMIN D3) 2,000 UNIT TAB] Take 1 tablet (2,000 Units total) by mouth daily. 100 tablet 3     citalopram (CELEXA) 10 MG tablet Take 10 mg by mouth daily       clopidogreL (PLAVIX) 75 mg tablet [CLOPIDOGREL (PLAVIX) 75 MG TABLET] TAKE ONE TABLET (75 MG) BY MOUTH ONCE DAILY 90 tablet 1     cyanocobalamin 1000 MCG tablet [CYANOCOBALAMIN 1000 MCG TABLET] Take 1 tablet (1,000 mcg total) by mouth daily. 100 tablet 3     diclofenac (VOLTAREN) 1 % topical gel Apply 2 g topically 4 times daily as needed for moderate pain        hydroCHLOROthiazide (HYDRODIURIL) 25 MG tablet [HYDROCHLOROTHIAZIDE (HYDRODIURIL) 25 MG TABLET] TAKE ONE TABLET BY MOUTH ONCE DAILY .  - TAKE WITH LOSARTAN - COMBO NOT AVAILABLE - 90 tablet 3     insulin aspart U-100 (NOVOLOG FLEXPEN U-100 INSULIN) 100 unit/mL (3 mL) injection pen [INSULIN ASPART U-100 (NOVOLOG FLEXPEN U-100 INSULIN) 100 UNIT/ML (3 ML) INJECTION PEN] Inject 15 Units under the skin 3 (three) times a day before meals. (Patient taking differently: Inject Subcutaneous 3 times daily (before meals) 10 units with breakfast, 10 units with lunch, and 5 units with supper)  0     insulin glargine (LANTUS PEN) 100 UNIT/ML pen Inject 50 Units Subcutaneous At Bedtime       latanoprost (XALATAN) 0.005 % ophthalmic solution [LATANOPROST (XALATAN) 0.005 % OPHTHALMIC SOLUTION] Administer 1 drop to both eyes at bedtime.       losartan (COZAAR) 25 MG tablet Take 25 mg by mouth daily       magnesium oxide (MAGOX) 400 mg (241.3 mg magnesium) tablet [MAGNESIUM OXIDE (MAGOX) 400 MG (241.3 MG MAGNESIUM) TABLET] Take 1 tablet (400 mg total) by mouth 2 (two) times a day. 100 tablet 1     metFORMIN (GLUCOPHAGE) 500 MG tablet Take 500 mg by mouth 500 mg in the morning and 1000 mg in the evening       polyethylene glycol (MIRALAX) 17 g packet Take 1 packet by mouth daily as needed for constipation       pravastatin (PRAVACHOL) 40 MG tablet Take 40 mg by mouth daily        pregabalin (LYRICA) 50 MG capsule [PREGABALIN (LYRICA) 50 MG CAPSULE] TAKE 2 CAPSULES (100 MG TOTAL) BY MOUTH ONCE DAILY AT BEDTIME. 180 capsule 3     senna (SENOKOT) 8.6 MG tablet Take 1 tablet by mouth 2 times daily as needed for constipation         Objective  /66 (BP Location: Right arm, Patient Position: Sitting, Cuff Size: Adult Large)   Pulse 58   Resp 16   Wt 141.5 kg (311 lb 14.4 oz)   BMI 42.30 kg/m      General Appearance:    Alert, cooperative, no distress, appears stated age   Head:    Normocephalic, without obvious abnormality, atraumatic   Throat:   Lips, mucosa, and tongue normal; teeth and gums normal   Neck:   Supple, symmetrical, trachea midline, no adenopathy;        thyroid:  No enlargement/tenderness/nodules; no carotid    bruit or JVD   Back:     Symmetric, no curvature, ROM normal, no CVA tenderness   Lungs:     Clear to auscultation bilaterally, respirations unlabored   Chest wall:    No tenderness or deformity   Heart:    Regular rate and rhythm, S1 and S2 normal, no murmur, rub   or gallop   Abdomen:     Soft, non-tender, bowel sounds active all four quadrants,     no masses, no organomegaly   Extremities:   Normal, atraumatic, no cyanosis or edema   Pulses:   2+ and symmetric all extremities   Skin:   Skin color, texture, turgor normal, no rashes or lesions     Results    Lab Results personally reviewed   Lab Results   Component Value Date    CHOL 141 05/11/2021    CHOL 118 01/14/2020     Lab Results   Component Value Date    HDL 33 (L) 05/11/2021    HDL 28 (L) 01/14/2020     No components found for: LDLCALC  Lab Results   Component Value Date    TRIG 130 05/11/2021    TRIG 110 01/14/2020     Lab Results   Component Value Date    WBC 9.3 05/11/2021    HGB 12.0 (L) 08/17/2021    HCT 37.8 (L) 05/11/2021     05/11/2021     Lab Results   Component Value Date    BUN 25 08/17/2021     08/17/2021    CO2 28 08/17/2021     Review of Systems:   Enc Vitals  BP: 120/66  Pulse:  58  Resp: 16  Weight: 141.5 kg (311 lb 14.4 oz)

## 2021-08-24 ENCOUNTER — MEDICAL CORRESPONDENCE (OUTPATIENT)
Dept: HEALTH INFORMATION MANAGEMENT | Facility: CLINIC | Age: 86
End: 2021-08-24
Payer: MEDICARE

## 2021-09-08 ENCOUNTER — PATIENT OUTREACH (OUTPATIENT)
Dept: NURSING | Facility: CLINIC | Age: 86
End: 2021-09-08

## 2021-09-08 ENCOUNTER — PATIENT OUTREACH (OUTPATIENT)
Dept: CARE COORDINATION | Facility: CLINIC | Age: 86
End: 2021-09-08

## 2021-09-08 DIAGNOSIS — Z71.89 OTHER SPECIFIED COUNSELING: Chronic | ICD-10-CM

## 2021-09-08 DIAGNOSIS — Z71.89 OTHER SPECIFIED COUNSELING: Primary | Chronic | ICD-10-CM

## 2021-09-08 NOTE — PROGRESS NOTES
Clinic Care Coordination Contact  Care Coordination Transition Communication         Clinical Data: Patient was hospitalized at [facility] from 8/31 to 9/7 with diagnosis of LEFT TRANS CAROTID ARTERY REVASCULARIZATION WITH CONDUIT (TCAR)  Dementia without behavioral disturbance (HC)  Hyperlipidemia  Moderate major depression (HC)  Morbid obesity (HC)  QUETA on CPAP  Paroxysmal atrial fibrillation (HC)  Stage 3 chronic kidney disease (HC)  .     Transition to Facility:              Facility Name: [facility]              Contact name and phone number/fax:  [phone]    Plan: RN/SW Care Coordinator will await notification from facility staff informing RN/SW Care Coordinator of patient's discharge plans/needs. RN/SW Care Coordinator will review chart and outreach to facility staff every 4 weeks and as needed.

## 2021-09-10 ENCOUNTER — TRANSITIONAL CARE UNIT VISIT (OUTPATIENT)
Dept: GERIATRICS | Facility: CLINIC | Age: 86
End: 2021-09-10
Payer: MEDICARE

## 2021-09-10 VITALS
TEMPERATURE: 96.6 F | WEIGHT: 297 LBS | HEIGHT: 72 IN | SYSTOLIC BLOOD PRESSURE: 161 MMHG | OXYGEN SATURATION: 99 % | BODY MASS INDEX: 40.23 KG/M2 | DIASTOLIC BLOOD PRESSURE: 69 MMHG | RESPIRATION RATE: 18 BRPM | HEART RATE: 66 BPM

## 2021-09-10 DIAGNOSIS — I48.0 PAROXYSMAL ATRIAL FIBRILLATION (H): Primary | ICD-10-CM

## 2021-09-10 DIAGNOSIS — F32.1 MODERATE MAJOR DEPRESSION (H): ICD-10-CM

## 2021-09-10 DIAGNOSIS — Z79.4 TYPE 2 DIABETES MELLITUS WITH DIABETIC POLYNEUROPATHY, WITH LONG-TERM CURRENT USE OF INSULIN (H): ICD-10-CM

## 2021-09-10 DIAGNOSIS — K59.01 SLOW TRANSIT CONSTIPATION: ICD-10-CM

## 2021-09-10 DIAGNOSIS — Z98.890 S/P CAROTID ENDARTERECTOMY: ICD-10-CM

## 2021-09-10 DIAGNOSIS — R13.12 OROPHARYNGEAL DYSPHAGIA: ICD-10-CM

## 2021-09-10 DIAGNOSIS — E11.42 TYPE 2 DIABETES MELLITUS WITH DIABETIC POLYNEUROPATHY, WITH LONG-TERM CURRENT USE OF INSULIN (H): ICD-10-CM

## 2021-09-10 DIAGNOSIS — F32.0 MILD MAJOR DEPRESSION (H): ICD-10-CM

## 2021-09-10 DIAGNOSIS — R00.1 SINUS BRADYCARDIA: ICD-10-CM

## 2021-09-10 DIAGNOSIS — I12.9 BENIGN HYPERTENSIVE KIDNEY DISEASE WITH CHRONIC KIDNEY DISEASE STAGE I THROUGH STAGE IV, OR UNSPECIFIED: ICD-10-CM

## 2021-09-10 DIAGNOSIS — R41.89 COGNITIVE IMPAIRMENT: ICD-10-CM

## 2021-09-10 PROCEDURE — 99304 1ST NF CARE SF/LOW MDM 25: CPT | Performed by: NURSE PRACTITIONER

## 2021-09-10 PROCEDURE — 99207 PR CDG-HISTORY COMPONENT: MEETS DETAILED - DOWN CODED LACK OF PFSH: CPT | Performed by: NURSE PRACTITIONER

## 2021-09-10 PROCEDURE — U0003 INFECTIOUS AGENT DETECTION BY NUCLEIC ACID (DNA OR RNA); SEVERE ACUTE RESPIRATORY SYNDROME CORONAVIRUS 2 (SARS-COV-2) (CORONAVIRUS DISEASE [COVID-19]), AMPLIFIED PROBE TECHNIQUE, MAKING USE OF HIGH THROUGHPUT TECHNOLOGIES AS DESCRIBED BY CMS-2020-01-R: HCPCS | Performed by: INTERNAL MEDICINE

## 2021-09-10 ASSESSMENT — MIFFLIN-ST. JEOR: SCORE: 2060.18

## 2021-09-10 NOTE — LETTER
9/10/2021        RE: Robert E Schamberger  [address] [place] [address]        [facility]    Name:   Robert E Schamberger (Domenico)  :   1934  MRN:    [id number]     Facility:   [facility] ([facility]) [[id number]]   Room: [facility] / [place] [id number]  Code Status: FULL CODE and POLST AVAILABLE -     DOS:  2021  Previous visit:  2021 during last TCU stay    PCP:  Ld Moy    CHIEF COMPLAINT / REASON FOR VISIT:  Chief Complaint   Patient presents with     Hospital F/U     NEW ADMISSION: Carotid artery stenosis s/p left trans carotid artery revascularization      [facility] from 2021 until 2021 (confusion, aphasia, right-sided hemiparesis/critical stenosis of left ICA)  [facility] from 2021 until 2021  [facility] from 2021 until 2021 (left transcarotid artery revascularization with conduit - TCAR)      HPI: Sonu is an 87 year old male with a history of diabetes mellitus type 2 (with chronic kidney disease and polyneuropathy), hypertension, atrial fibrillation, and history of TIA (2019).  On 2021, he initially presented to the ED with increasing confusion, aphasia, and right-sided hemiparesis. A stroke code was called, and he received tPA. Imaging revealed critical stenosis of the left ICA. Vascular surgery was consulted, and his  presentation and his symptoms with this admission were felt likely due to symptomatic carotid artery stenosis and other factors. He was noted to be a high risk surgical candidate given the anatomy of the vessel, location of the stenosis, and body habitus among other comorbidities. PTA glargine and losartan were decreased, and pravastatin was increased. He was subsequently transferred here for rehab.    On 2021, he underwent a left TCAR with Dr. Hendrickson.  On the day of discharge, he was neurologically at baseline, being ambulatory with a walker and  "assist of 1.  He was tolerating a dysphagia diet and had been evaluated by speech, physical, and occupational therapies, and was voiding without difficulty.  His hospitalization was prolonged due to inability to find TCU placement, but he eventually returned here.      CURRENT/RECENT TCU ISSUES    Disposition: He is generally quite pleasant and gives the impression of being alert and oriented; however, there are some cognitive issues (see below).  Tells me he is \"here to  strength.\"  He tells me \"they did it the new way, and it was the first time I ever did it.\" He was unable to explain this any further.    The patient underwent P2 Y 12 testing and was found to be a nonresponder.  Therefore, he was started on ticagrelor in preparation for the procedure.  Pravastatin was not Dr. Hendrickson' preferred statin, but the patient demonstrated intolerance to atorvastatin previously.    Diabetes mellitus type 2: When last here, we resumed his PTA glargine 50 units.  It has been increased to 60 units prior to his hospitalization.  He was also receiving prandial NovoLog 15 units with each meal; however, he was experiencing hypoglycemic episodes that did not necessarily correlate with the time of day.  We decreased prandial NovoLog to 10 units with breakfast, 10 units with lunch, and 5 units with supper.  This has not changed since his last TCU stay.  Note: We also discontinued his sliding scale coverage.  He suggested that he experiences hypoglycemic symptoms for anything less than 80, although he apparently could not be sure.  He also has diabetic polyneuropathy -- receiving pregabalin -- and chronic kidney disease (stage IIIa).    Dysphagia: In the hospital, he underwent a video swallow on 07/09/2021, showing mild to moderate oropharyngeal dysphagia, and nectar thick liquids were ordered. Orders from the hospital were to repeat a video swallow in 1 to 2 weeks. Due to oropharyngeal dysphagia (R13.12) following cerebral " "infarct (I69.017), speech therapy also requested a repeat video swallow study.  They were to follow him in the home upon discharge.  As noted, speech therapy did see him during his most recent hospitalization.  His nurse today informed me that he had been given a salad and choked on it.    Cognition: Cognitive impairment has been noted, and a request was made for speech therapy in this regard (as well as his dysphagia). He scored 14/30 on the SLUMS and 3.9/5.6 on the CPT in February 2020. At that time, there was impairment of short-term memory and problem-solving.  They did give him a diagnosis of dementia without behavioral disturbance during his most recent hospitalization.      ROS: He says he had diarrhea last night with a lot of flatus.  He was toileted 4 times and \"once in my shorts.\"  Otherwise, no headaches or chest pains, coughing or congestion, nausea or vomiting, dizziness or dyspnea, dysuria, diplopia, difficulty with integumentary issues, or problems with sleep.    Past Medical History:   Diagnosis Date     Anemia in chronic kidney disease      B12 deficiency 11/9/2017     Bilateral hearing loss 5/9/2016     Bursitis, hip, left 3/2/2017     Chest pain     Normal GXT 2006     Chronic kidney disease, stage 3 (moderate)     Worsening with hyperkalemia on Relafen-discontinued August 2016     Chronic kidney disease, stage 3a 5/7/2021     Closed displaced fracture of left clavicle 9/21/2017     Cognitive changes 11/30/2018    SLUMS 20/30 Nov 2018     Complete tear of left rotator cuff 2/15/2018    Associated with fall and clavicular fracture that occurred 2017, evaluated by orthopedics, given cortisone injection and physical therapy recommended     Degenerative disc disease, lumbar      Dementia without behavioral disturbance (H) 2/21/2020     Depression      Diabetic peripheral neuropathy (H) 11/30/2018     Erectile dysfunction      Fatigue 7/6/2017     Forearm tendonitis 11/29/2016     Glaucoma      Gout " attack     Left foot     History of recurrent TIAs 5/7/2020     HTN (hypertension)      Hyperlipidemia      Ingrown toenail 7/6/2017     Intermittent asthma without complication 11/9/2017     Left leg cellulitis 2014     Low back pain      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     Morbid obesity (H)      QUETA on CPAP      Osteoarthritis      Osteoarthritis of both hands      Osteoarthritis of both knees     Right TKA     Pain of right heel 5/11/2018     Peripheral edema 5/7/2021     Peripheral neuropathy 10/9/2018     Recurrent falls 3/5/2021     Rib fracture 2005     Right-sided chest pain 5/9/2016     S/P left carotid endarterectomy 9/13/2021     Screening     No AAA on CT scan 2009     Sepsis secondary to UTI (H) 3/5/2021    Hospitalized with urosepsis January 2021 and rehospitalized after fall found to have recurrent UTI treated with 30 days of Omnicef for acute prostatitis     Sick sinus syndrome (H) 5/7/2020    Abnormal event monitor with 2.8-second sinus pause     Squamous cell skin cancer 11/2/2015     TIA (transient ischemic attack) 09/06/2019    CT head showing chronic lacunar infarcts.  Carotid ultrasound with atherosclerotic plaque but no severe stenosis.     Traumatic subarachnoid hemorrhage with loss of consciousness of 30 minutes or less (H) 9/21/2017    Fall down the stairs following alcohol use with traumatic subarachnoid hemorrhage treated conservatively at [facility] followed by stay at TCU with no residual neurologic deficits     Trochanteric bursitis of right hip 8/24/2018     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)      Unsteady gait 3/4/2019     Vitamin D deficiency 11/9/2017              History reviewed. No pertinent family history.  Social History     Socioeconomic History     Marital status:      Spouse name: None     Number of children: None     Years of education: None     Highest education level: None   Occupational History     None   Tobacco Use     Smoking  status: Former Smoker     Smokeless tobacco: Never Used   Substance and Sexual Activity     Alcohol use: Yes     Alcohol/week: 14.0 standard drinks     Drug use: No     Sexual activity: None   Other Topics Concern     None   Social History Narrative    Semi retired  x 47 years     Social Determinants of Health     Financial Resource Strain:      Difficulty of Paying Living Expenses:    Food Insecurity:      Worried About Running Out of Food in the Last Year:      Ran Out of Food in the Last Year:    Transportation Needs:      Lack of Transportation (Medical):      Lack of Transportation (Non-Medical):    Physical Activity:      Days of Exercise per Week:      Minutes of Exercise per Session:    Stress:      Feeling of Stress :    Social Connections:      Frequency of Communication with Friends and Family:      Frequency of Social Gatherings with Friends and Family:      Attends Oriental orthodox Services:      Active Member of Clubs or Organizations:      Attends Club or Organization Meetings:      Marital Status:    Intimate Partner Violence:      Fear of Current or Ex-Partner:      Emotionally Abused:      Physically Abused:      Sexually Abused:        MEDICATIONS: Reviewed from the MAR, physician orders, and/or earlier progress notes.  Post Discharge Medication Reconciliation Status: discharge medications reconciled, continue medications without change.  Updated by me today (09/13/2021) to include ticagrelor.  Current Outpatient Medications   Medication Sig     ticagrelor (BRILINTA) 90 MG tablet Take 90 mg by mouth 2 times daily     acetaminophen (TYLENOL) 500 MG tablet Take 1,000 mg by mouth every 8 hours as needed      amLODIPine (NORVASC) 2.5 MG tablet [AMLODIPINE (NORVASC) 2.5 MG TABLET] Take 1 tablet (2.5 mg total) by mouth daily.     aspirin 81 MG EC tablet [ASPIRIN 81 MG EC TABLET] Take 1 tablet (81 mg total) by mouth daily.     cholecalciferol, vitamin D3, (VITAMIN D3) 2,000 unit Tab [CHOLECALCIFEROL,  VITAMIN D3, (VITAMIN D3) 2,000 UNIT TAB] Take 1 tablet (2,000 Units total) by mouth daily.     citalopram (CELEXA) 10 MG tablet Take 10 mg by mouth daily     clopidogreL (PLAVIX) 75 mg tablet [CLOPIDOGREL (PLAVIX) 75 MG TABLET] TAKE ONE TABLET (75 MG) BY MOUTH ONCE DAILY     cyanocobalamin 1000 MCG tablet [CYANOCOBALAMIN 1000 MCG TABLET] Take 1 tablet (1,000 mcg total) by mouth daily.     diclofenac (VOLTAREN) 1 % topical gel Apply 2 g topically 4 times daily as needed for moderate pain      hydroCHLOROthiazide (HYDRODIURIL) 25 MG tablet [HYDROCHLOROTHIAZIDE (HYDRODIURIL) 25 MG TABLET] TAKE ONE TABLET BY MOUTH ONCE DAILY .  - TAKE WITH LOSARTAN - COMBO NOT AVAILABLE -     insulin aspart U-100 (NOVOLOG FLEXPEN U-100 INSULIN) 100 unit/mL (3 mL) injection pen [INSULIN ASPART U-100 (NOVOLOG FLEXPEN U-100 INSULIN) 100 UNIT/ML (3 ML) INJECTION PEN] Inject 15 Units under the skin 3 (three) times a day before meals. (Patient taking differently: Inject Subcutaneous 3 times daily (before meals) 10 units with breakfast, 10 units with lunch, and 5 units with supper)     insulin glargine (LANTUS PEN) 100 UNIT/ML pen Inject 50 Units Subcutaneous At Bedtime     latanoprost (XALATAN) 0.005 % ophthalmic solution [LATANOPROST (XALATAN) 0.005 % OPHTHALMIC SOLUTION] Administer 1 drop to both eyes at bedtime.     losartan (COZAAR) 25 MG tablet Take 25 mg by mouth daily     magnesium oxide (MAGOX) 400 mg (241.3 mg magnesium) tablet [MAGNESIUM OXIDE (MAGOX) 400 MG (241.3 MG MAGNESIUM) TABLET] Take 1 tablet (400 mg total) by mouth 2 (two) times a day.     metFORMIN (GLUCOPHAGE) 500 MG tablet Take 500 mg by mouth 1000 mg in the morning and 500 mg in the evening     polyethylene glycol (MIRALAX) 17 g packet Take 1 packet by mouth daily as needed for constipation     pravastatin (PRAVACHOL) 40 MG tablet Take 40 mg by mouth daily     pregabalin (LYRICA) 50 MG capsule [PREGABALIN (LYRICA) 50 MG CAPSULE] TAKE 2 CAPSULES (100 MG TOTAL) BY MOUTH  ONCE DAILY AT BEDTIME.     senna (SENOKOT) 8.6 MG tablet Take 1 tablet by mouth 2 times daily as needed for constipation     No current facility-administered medications for this visit.     ALLERGIES:   Allergies   Allergen Reactions     Actos [Pioglitazone] Swelling     Edema     Atorvastatin Unknown     Back pain     Donepezil Unknown and Diarrhea     Diarrhea     Gabapentin Diarrhea     Diarrhea     Niacin Unknown     Hyperglycemia     Simvastatin Unknown     Back pain     DIET: Diabetic    Vitals:    09/10/21 1612   BP: (!) 161/69   Pulse: 66   Resp: 18   Temp: (!) 96.6  F (35.9  C)   SpO2: 99%   Weight: 134.7 kg (297 lb)   Height: 1.829 m (6')     Body mass index is 40.28 kg/m .    EXAMINATION:   General: Pleasant, alert, and conversant elderly male, sitting in a recliner in no apparent distress. Family member in attendance.  Head: Normocephalic and atraumatic.   Eyes: PERRLA, sclerae clear. Slight disconjugate gaze, left eye moving medially.  ENT: Moist oral mucosa. He has upper dentures with some remaining teeth on the bottom. No rhinorrhea or nasal discharge. Hearing is adequate for conversation in a quiet room.  Cardiovascular: Regular rate and rhythm without appreciable murmur.  Positive left carotid bruit.  Respiratory: Lungs clear to auscultation bilaterally.   Abdomen: Nondistended.   Musculoskeletal/Extremities: Age-related degenerative joint disease. Lower extremity venous stasis changes.  Integument: No rashes, clinically significant lesions, or skin breakdown.   Cognitive/Psychiatric: Suspected cognitive impairment based on previous cognitive testing, although he did seem to do well during my visit with him today. His recall seems excellent. He is going to be evaluated by speech therapy. Affect is euthymic.    DIAGNOSTICS:   Recent Results (from the past 240 hour(s))   COVID-19 VIRUS (CORONAVIRUS) BY PCR (EXTERNAL RESULT)    Collection Time: 09/04/21  1:33 PM   Result Value Ref Range    COVID-19  Virus by PCR (External Result) Negative Negative   COVID-19 Virus (Coronavirus) by PCR Nasopharyngeal    Collection Time: 09/10/21  1:18 PM    Specimen: Nasopharyngeal; Swab   Result Value Ref Range    SARS CoV2 PCR Negative Negative     Lab Results   Component Value Date    WBC 9.3 05/11/2021     Lab Results   Component Value Date    RBC 4.13 05/11/2021     Lab Results   Component Value Date    HGB 12.3 05/11/2021     Lab Results   Component Value Date    HCT 37.8 05/11/2021     Lab Results   Component Value Date    MCV 92 05/11/2021     Lab Results   Component Value Date    MCH 29.8 05/11/2021     Lab Results   Component Value Date    MCHC 32.5 05/11/2021     Lab Results   Component Value Date    RDW 13.0 05/11/2021     Lab Results   Component Value Date     05/11/2021       ASSESSMENT/Plan:      ICD-10-CM    1. Paroxysmal atrial fibrillation (H)  I48.0    2. S/P left carotid endarterectomy  Z98.890    3. Oropharyngeal dysphagia  R13.12    4. Type 2 diabetes mellitus with diabetic polyneuropathy, with long-term current use of insulin (H)  E11.42     Z79.4    5. Sinus bradycardia  R00.1    6. Cognitive impairment  R41.89    7. Moderate major depression (H)  F32.1    8. Benign hypertensive kidney disease with chronic kidney disease stage I through stage IV, or unspecified  I12.9    9. Mild major depression (H)  F32.0    10. Slow transit constipation  K59.01        CHANGES:  None.    CARE PLAN:  The care plan has been reviewed and all orders signed.  Changes to care plan, if any, as noted.  Otherwise, continue current plan of care.        The above has been created using voice recognition software. Please be aware that this may unintentionally  produce inaccuracies and/or nonsensical sentences.      Electronically signed by: PASCUAL Morales CNP        Sincerely,        PASCUAL Morales CNP

## 2021-09-12 ENCOUNTER — LAB REQUISITION (OUTPATIENT)
Dept: LAB | Facility: CLINIC | Age: 86
End: 2021-09-12

## 2021-09-12 DIAGNOSIS — Z20.822 CONTACT WITH AND (SUSPECTED) EXPOSURE TO COVID-19: ICD-10-CM

## 2021-09-12 LAB — SARS-COV-2 RNA RESP QL NAA+PROBE: NEGATIVE

## 2021-09-13 PROBLEM — Z98.890 S/P CAROTID ENDARTERECTOMY: Status: ACTIVE | Noted: 2021-09-13

## 2021-09-13 NOTE — PROGRESS NOTES
[facility]    Name:   Robert E Schamberger (Domenico)  :   1934  MRN:    [id number]     Facility:   [facility] ([facility]) [[id number]]   Room: [facility] / [place] [address]  Code Status: FULL CODE and POLST AVAILABLE -     DOS:  09/10/2021  Previous visit:  2021 during last TCU stay    PCP:  Ld Moy    CHIEF COMPLAINT / REASON FOR VISIT:  Chief Complaint   Patient presents with     Hospital F/U     NEW ADMISSION: Carotid artery stenosis s/p left trans carotid artery revascularization      [facility] from 2021 until 2021 (confusion, aphasia, right-sided hemiparesis/critical stenosis of left ICA)  [facility] from 2021 until 2021  [facility] from 2021 until 2021 (left transcarotid artery revascularization with conduit - TCAR)      HPI: Sonu is an 87 year old male with a history of diabetes mellitus type 2 (with chronic kidney disease and polyneuropathy), hypertension, atrial fibrillation, and history of TIA (2019).  On 2021, he initially presented to the ED with increasing confusion, aphasia, and right-sided hemiparesis. A stroke code was called, and he received tPA. Imaging revealed critical stenosis of the left ICA. Vascular surgery was consulted, and his  presentation and his symptoms with this admission were felt likely due to symptomatic carotid artery stenosis and other factors. He was noted to be a high risk surgical candidate given the anatomy of the vessel, location of the stenosis, and body habitus among other comorbidities. PTA glargine and losartan were decreased, and pravastatin was increased. He was subsequently transferred here for rehab.    On 2021, he underwent a left TCAR with Dr. Hendrickson.  On the day of discharge, he was neurologically at baseline, being ambulatory with a walker and assist of 1.  He was tolerating a dysphagia diet and had been evaluated by speech, physical,  "and occupational therapies, and was voiding without difficulty.  His hospitalization was prolonged due to inability to find TCU placement, but he eventually returned here.      CURRENT/RECENT TCU ISSUES    Disposition: He is generally quite pleasant and gives the impression of being alert and oriented; however, there are some cognitive issues (see below).  Tells me he is \"here to  strength.\"  He tells me \"they did it the new way, and it was the first time I ever did it.\" He was unable to explain this any further.    The patient underwent P2 Y 12 testing and was found to be a nonresponder.  Therefore, he was started on ticagrelor in preparation for the procedure.  Pravastatin was not Dr. Hendrickson' preferred statin, but the patient demonstrated intolerance to atorvastatin previously.    Diabetes mellitus type 2: When last here, we resumed his PTA glargine 50 units.  It has been increased to 60 units prior to his hospitalization.  He was also receiving prandial NovoLog 15 units with each meal; however, he was experiencing hypoglycemic episodes that did not necessarily correlate with the time of day.  We decreased prandial NovoLog to 10 units with breakfast, 10 units with lunch, and 5 units with supper.  This has not changed since his last TCU stay.  Note: We also discontinued his sliding scale coverage.  He suggested that he experiences hypoglycemic symptoms for anything less than 80, although he apparently could not be sure.  He also has diabetic polyneuropathy -- receiving pregabalin -- and chronic kidney disease (stage IIIa).    Dysphagia: In the hospital, he underwent a video swallow on 07/09/2021, showing mild to moderate oropharyngeal dysphagia, and nectar thick liquids were ordered. Orders from the hospital were to repeat a video swallow in 1 to 2 weeks. Due to oropharyngeal dysphagia (R13.12) following cerebral infarct (I69.391), speech therapy also requested a repeat video swallow study.  They were to " "follow him in the home upon discharge.  As noted, speech therapy did see him during his most recent hospitalization.  His nurse today informed me that he had been given a salad and choked on it.    Cognition: Cognitive impairment has been noted, and a request was made for speech therapy in this regard (as well as his dysphagia). He scored 14/30 on the SLUMS and 3.9/5.6 on the CPT in February 2020. At that time, there was impairment of short-term memory and problem-solving.  They did give him a diagnosis of dementia without behavioral disturbance during his most recent hospitalization.      ROS: He says he had diarrhea last night with a lot of flatus.  He was toileted 4 times and \"once in my shorts.\"  Otherwise, no headaches or chest pains, coughing or congestion, nausea or vomiting, dizziness or dyspnea, dysuria, diplopia, difficulty with integumentary issues, or problems with sleep.    Past Medical History:   Diagnosis Date     Anemia in chronic kidney disease      B12 deficiency 11/9/2017     Bilateral hearing loss 5/9/2016     Bursitis, hip, left 3/2/2017     Chest pain     Normal GXT 2006     Chronic kidney disease, stage 3 (moderate)     Worsening with hyperkalemia on Relafen-discontinued August 2016     Chronic kidney disease, stage 3a 5/7/2021     Closed displaced fracture of left clavicle 9/21/2017     Cognitive changes 11/30/2018    SLUMS 20/30 Nov 2018     Complete tear of left rotator cuff 2/15/2018    Associated with fall and clavicular fracture that occurred 2017, evaluated by orthopedics, given cortisone injection and physical therapy recommended     Degenerative disc disease, lumbar      Dementia without behavioral disturbance (H) 2/21/2020     Depression      Diabetic peripheral neuropathy (H) 11/30/2018     Erectile dysfunction      Fatigue 7/6/2017     Forearm tendonitis 11/29/2016     Glaucoma      Gout attack     Left foot     History of recurrent TIAs 5/7/2020     HTN (hypertension)      " Hyperlipidemia      Ingrown toenail 7/6/2017     Intermittent asthma without complication 11/9/2017     Left leg cellulitis 2014     Low back pain      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     Morbid obesity (H)      QUETA on CPAP      Osteoarthritis      Osteoarthritis of both hands      Osteoarthritis of both knees     Right TKA     Pain of right heel 5/11/2018     Peripheral edema 5/7/2021     Peripheral neuropathy 10/9/2018     Recurrent falls 3/5/2021     Rib fracture 2005     Right-sided chest pain 5/9/2016     S/P left carotid endarterectomy 9/13/2021     Screening     No AAA on CT scan 2009     Sepsis secondary to UTI (H) 3/5/2021    Hospitalized with urosepsis January 2021 and rehospitalized after fall found to have recurrent UTI treated with 30 days of Omnicef for acute prostatitis     Sick sinus syndrome (H) 5/7/2020    Abnormal event monitor with 2.8-second sinus pause     Squamous cell skin cancer 11/2/2015     TIA (transient ischemic attack) 09/06/2019    CT head showing chronic lacunar infarcts.  Carotid ultrasound with atherosclerotic plaque but no severe stenosis.     Traumatic subarachnoid hemorrhage with loss of consciousness of 30 minutes or less (H) 9/21/2017    Fall down the stairs following alcohol use with traumatic subarachnoid hemorrhage treated conservatively at [facility] followed by stay at TCU with no residual neurologic deficits     Trochanteric bursitis of right hip 8/24/2018     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)      Unsteady gait 3/4/2019     Vitamin D deficiency 11/9/2017              History reviewed. No pertinent family history.  Social History     Socioeconomic History     Marital status:      Spouse name: None     Number of children: None     Years of education: None     Highest education level: None   Occupational History     None   Tobacco Use     Smoking status: Former Smoker     Smokeless tobacco: Never Used   Substance and Sexual Activity      Alcohol use: Yes     Alcohol/week: 14.0 standard drinks     Drug use: No     Sexual activity: None   Other Topics Concern     None   Social History Narrative    Semi retired  x 47 years     Social Determinants of Health     Financial Resource Strain:      Difficulty of Paying Living Expenses:    Food Insecurity:      Worried About Running Out of Food in the Last Year:      Ran Out of Food in the Last Year:    Transportation Needs:      Lack of Transportation (Medical):      Lack of Transportation (Non-Medical):    Physical Activity:      Days of Exercise per Week:      Minutes of Exercise per Session:    Stress:      Feeling of Stress :    Social Connections:      Frequency of Communication with Friends and Family:      Frequency of Social Gatherings with Friends and Family:      Attends Mosque Services:      Active Member of Clubs or Organizations:      Attends Club or Organization Meetings:      Marital Status:    Intimate Partner Violence:      Fear of Current or Ex-Partner:      Emotionally Abused:      Physically Abused:      Sexually Abused:        MEDICATIONS: Reviewed from the MAR, physician orders, and/or earlier progress notes.  Post Discharge Medication Reconciliation Status: discharge medications reconciled, continue medications without change.  Updated by me today (09/13/2021) to include ticagrelor.  Current Outpatient Medications   Medication Sig     ticagrelor (BRILINTA) 90 MG tablet Take 90 mg by mouth 2 times daily     acetaminophen (TYLENOL) 500 MG tablet Take 1,000 mg by mouth every 8 hours as needed      amLODIPine (NORVASC) 2.5 MG tablet [AMLODIPINE (NORVASC) 2.5 MG TABLET] Take 1 tablet (2.5 mg total) by mouth daily.     aspirin 81 MG EC tablet [ASPIRIN 81 MG EC TABLET] Take 1 tablet (81 mg total) by mouth daily.     cholecalciferol, vitamin D3, (VITAMIN D3) 2,000 unit Tab [CHOLECALCIFEROL, VITAMIN D3, (VITAMIN D3) 2,000 UNIT TAB] Take 1 tablet (2,000 Units total) by mouth daily.      citalopram (CELEXA) 10 MG tablet Take 10 mg by mouth daily     clopidogreL (PLAVIX) 75 mg tablet [CLOPIDOGREL (PLAVIX) 75 MG TABLET] TAKE ONE TABLET (75 MG) BY MOUTH ONCE DAILY     cyanocobalamin 1000 MCG tablet [CYANOCOBALAMIN 1000 MCG TABLET] Take 1 tablet (1,000 mcg total) by mouth daily.     diclofenac (VOLTAREN) 1 % topical gel Apply 2 g topically 4 times daily as needed for moderate pain      hydroCHLOROthiazide (HYDRODIURIL) 25 MG tablet [HYDROCHLOROTHIAZIDE (HYDRODIURIL) 25 MG TABLET] TAKE ONE TABLET BY MOUTH ONCE DAILY .  - TAKE WITH LOSARTAN - COMBO NOT AVAILABLE -     insulin aspart U-100 (NOVOLOG FLEXPEN U-100 INSULIN) 100 unit/mL (3 mL) injection pen [INSULIN ASPART U-100 (NOVOLOG FLEXPEN U-100 INSULIN) 100 UNIT/ML (3 ML) INJECTION PEN] Inject 15 Units under the skin 3 (three) times a day before meals. (Patient taking differently: Inject Subcutaneous 3 times daily (before meals) 10 units with breakfast, 10 units with lunch, and 5 units with supper)     insulin glargine (LANTUS PEN) 100 UNIT/ML pen Inject 50 Units Subcutaneous At Bedtime     latanoprost (XALATAN) 0.005 % ophthalmic solution [LATANOPROST (XALATAN) 0.005 % OPHTHALMIC SOLUTION] Administer 1 drop to both eyes at bedtime.     losartan (COZAAR) 25 MG tablet Take 25 mg by mouth daily     magnesium oxide (MAGOX) 400 mg (241.3 mg magnesium) tablet [MAGNESIUM OXIDE (MAGOX) 400 MG (241.3 MG MAGNESIUM) TABLET] Take 1 tablet (400 mg total) by mouth 2 (two) times a day.     metFORMIN (GLUCOPHAGE) 500 MG tablet Take 500 mg by mouth 1000 mg in the morning and 500 mg in the evening     polyethylene glycol (MIRALAX) 17 g packet Take 1 packet by mouth daily as needed for constipation     pravastatin (PRAVACHOL) 40 MG tablet Take 40 mg by mouth daily     pregabalin (LYRICA) 50 MG capsule [PREGABALIN (LYRICA) 50 MG CAPSULE] TAKE 2 CAPSULES (100 MG TOTAL) BY MOUTH ONCE DAILY AT BEDTIME.     senna (SENOKOT) 8.6 MG tablet Take 1 tablet by mouth 2 times  daily as needed for constipation     No current facility-administered medications for this visit.     ALLERGIES:   Allergies   Allergen Reactions     Actos [Pioglitazone] Swelling     Edema     Atorvastatin Unknown     Back pain     Donepezil Unknown and Diarrhea     Diarrhea     Gabapentin Diarrhea     Diarrhea     Niacin Unknown     Hyperglycemia     Simvastatin Unknown     Back pain     DIET: Diabetic    Vitals:    09/10/21 1612   BP: (!) 161/69   Pulse: 66   Resp: 18   Temp: (!) 96.6  F (35.9  C)   SpO2: 99%   Weight: 134.7 kg (297 lb)   Height: 1.829 m (6')     Body mass index is 40.28 kg/m .    EXAMINATION:   General: Pleasant, alert, and conversant elderly male, sitting in a recliner in no apparent distress. Family member in attendance.  Head: Normocephalic and atraumatic.   Eyes: PERRLA, sclerae clear. Slight disconjugate gaze, left eye moving medially.  ENT: Moist oral mucosa. He has upper dentures with some remaining teeth on the bottom. No rhinorrhea or nasal discharge. Hearing is adequate for conversation in a quiet room.  Cardiovascular: Regular rate and rhythm without appreciable murmur.  Positive left carotid bruit.  Respiratory: Lungs clear to auscultation bilaterally.   Abdomen: Nondistended.   Musculoskeletal/Extremities: Age-related degenerative joint disease. Lower extremity venous stasis changes.  Integument: No rashes, clinically significant lesions, or skin breakdown.   Cognitive/Psychiatric: Suspected cognitive impairment based on previous cognitive testing, although he did seem to do well during my visit with him today. His recall seems excellent. He is going to be evaluated by speech therapy. Affect is euthymic.    DIAGNOSTICS:   Recent Results (from the past 240 hour(s))   COVID-19 VIRUS (CORONAVIRUS) BY PCR (EXTERNAL RESULT)    Collection Time: 09/04/21  1:33 PM   Result Value Ref Range    COVID-19 Virus by PCR (External Result) Negative Negative   COVID-19 Virus (Coronavirus) by PCR  Nasopharyngeal    Collection Time: 09/10/21  1:18 PM    Specimen: Nasopharyngeal; Swab   Result Value Ref Range    SARS CoV2 PCR Negative Negative     Lab Results   Component Value Date    WBC 9.3 05/11/2021     Lab Results   Component Value Date    RBC 4.13 05/11/2021     Lab Results   Component Value Date    HGB 12.3 05/11/2021     Lab Results   Component Value Date    HCT 37.8 05/11/2021     Lab Results   Component Value Date    MCV 92 05/11/2021     Lab Results   Component Value Date    MCH 29.8 05/11/2021     Lab Results   Component Value Date    MCHC 32.5 05/11/2021     Lab Results   Component Value Date    RDW 13.0 05/11/2021     Lab Results   Component Value Date     05/11/2021       ASSESSMENT/Plan:      ICD-10-CM    1. Paroxysmal atrial fibrillation (H)  I48.0    2. S/P left carotid endarterectomy  Z98.890    3. Oropharyngeal dysphagia  R13.12    4. Type 2 diabetes mellitus with diabetic polyneuropathy, with long-term current use of insulin (H)  E11.42     Z79.4    5. Sinus bradycardia  R00.1    6. Cognitive impairment  R41.89    7. Moderate major depression (H)  F32.1    8. Benign hypertensive kidney disease with chronic kidney disease stage I through stage IV, or unspecified  I12.9    9. Mild major depression (H)  F32.0    10. Slow transit constipation  K59.01        CHANGES:  None.    CARE PLAN:  The care plan has been reviewed and all orders signed.  Changes to care plan, if any, as noted.  Otherwise, continue current plan of care.        The above has been created using voice recognition software. Please be aware that this may unintentionally  produce inaccuracies and/or nonsensical sentences.      Electronically signed by: PASCUAL Morales CNP

## 2021-09-14 ENCOUNTER — TRANSITIONAL CARE UNIT VISIT (OUTPATIENT)
Dept: GERIATRICS | Facility: CLINIC | Age: 86
End: 2021-09-14
Payer: MEDICARE

## 2021-09-14 VITALS
HEART RATE: 80 BPM | BODY MASS INDEX: 40.47 KG/M2 | TEMPERATURE: 98 F | RESPIRATION RATE: 18 BRPM | HEIGHT: 72 IN | SYSTOLIC BLOOD PRESSURE: 128 MMHG | OXYGEN SATURATION: 97 % | WEIGHT: 298.8 LBS | DIASTOLIC BLOOD PRESSURE: 68 MMHG

## 2021-09-14 DIAGNOSIS — R00.1 SINUS BRADYCARDIA: ICD-10-CM

## 2021-09-14 DIAGNOSIS — I48.0 PAROXYSMAL ATRIAL FIBRILLATION (H): Primary | ICD-10-CM

## 2021-09-14 DIAGNOSIS — E11.42 TYPE 2 DIABETES MELLITUS WITH DIABETIC POLYNEUROPATHY, WITH LONG-TERM CURRENT USE OF INSULIN (H): ICD-10-CM

## 2021-09-14 DIAGNOSIS — F32.1 MODERATE MAJOR DEPRESSION (H): ICD-10-CM

## 2021-09-14 DIAGNOSIS — Z98.890 S/P CAROTID ENDARTERECTOMY: ICD-10-CM

## 2021-09-14 DIAGNOSIS — R41.89 COGNITIVE IMPAIRMENT: ICD-10-CM

## 2021-09-14 DIAGNOSIS — I12.9 BENIGN HYPERTENSIVE KIDNEY DISEASE WITH CHRONIC KIDNEY DISEASE STAGE I THROUGH STAGE IV, OR UNSPECIFIED: ICD-10-CM

## 2021-09-14 DIAGNOSIS — K59.01 SLOW TRANSIT CONSTIPATION: ICD-10-CM

## 2021-09-14 DIAGNOSIS — Z79.4 TYPE 2 DIABETES MELLITUS WITH DIABETIC POLYNEUROPATHY, WITH LONG-TERM CURRENT USE OF INSULIN (H): ICD-10-CM

## 2021-09-14 DIAGNOSIS — R13.12 OROPHARYNGEAL DYSPHAGIA: ICD-10-CM

## 2021-09-14 DIAGNOSIS — F32.0 MILD MAJOR DEPRESSION (H): ICD-10-CM

## 2021-09-14 DIAGNOSIS — N18.31 CHRONIC KIDNEY DISEASE, STAGE 3A (H): ICD-10-CM

## 2021-09-14 PROCEDURE — 99310 SBSQ NF CARE HIGH MDM 45: CPT | Performed by: NURSE PRACTITIONER

## 2021-09-14 ASSESSMENT — MIFFLIN-ST. JEOR: SCORE: 2068.35

## 2021-09-14 NOTE — LETTER
2021        RE: Robert E Schamberger  [address] [place] [address]        [facility]    Name:   Robert E Schamberger (Domenico)  :   1934  MRN:    [id number]     Facility:   [facility] [[id number]]   Room: [place] [address]  Code Status: FULL CODE and POLST AVAILABLE -     DOS: 2021  Previous visit: 09/10/2021    PCP:  Ld Moy    CHIEF COMPLAINT / REASON FOR VISIT:  Chief Complaint   Patient presents with     Clinic Care Coordination - Follow-up     Carotid artery stenosis s/p left trans carotid artery revascularization      [facility] from 2021 until 2021 (confusion, aphasia, right-sided hemiparesis/critical stenosis of left ICA)  [facility] from 2021 until 2021  [facility] from 2021 until 2021 (left transcarotid artery revascularization with conduit - TCAR)      HPI: Sonu is an 87 year old male with a history of diabetes mellitus type 2 (with chronic kidney disease and polyneuropathy), hypertension, atrial fibrillation, and history of TIA (2019).  On 2021, he initially presented to the ED with increasing confusion, aphasia, and right-sided hemiparesis. A stroke code was called, and he received tPA. Imaging revealed critical stenosis of the left ICA. Vascular surgery was consulted, and his  presentation and his symptoms with this admission were felt likely due to symptomatic carotid artery stenosis and other factors. He was noted to be a high risk surgical candidate given the anatomy of the vessel, location of the stenosis, and body habitus among other comorbidities. PTA glargine and losartan were decreased, and pravastatin was increased. He was subsequently transferred here for rehab.    On 2021, he underwent a left TCAR with Dr. Hendrickson.  On the day of discharge, he was neurologically at baseline, being ambulatory with a walker and assist of 1.  He was  "tolerating a dysphagia diet and had been evaluated by speech, physical, and occupational therapies, and was voiding without difficulty.  His hospitalization was prolonged due to inability to find TCU placement, but he eventually returned here.      CURRENT/RECENT TCU ISSUES    Disposition: He is generally quite pleasant and gives the impression of being alert and oriented; however, there are some cognitive issues (see below).  Tells me he is \"here to  strength.\"  He tells me \"they did it the new way, and it was the first time I ever did it.\" He was unable to explain this any further.    He still has diarrhea, although he says he has a history of getting diarrhea frequently.    I asked him how he managed to accomplish transfers.  He says he can push his wheelchair to the bed, hit, and get in bed independently, although therapy is still wanting him to have assistance.    The patient underwent P2 Y 12 testing and was found to be a nonresponder.  Therefore, he was started on ticagrelor in preparation for the procedure (which she eventually underwent).  Pravastatin was not Dr. Hendrickson' preferred statin, but the patient demonstrated intolerance to atorvastatin previously.    Diabetes mellitus type 2: When last here, we resumed his PTA glargine 50 units.  It has been increased to 60 units prior to his hospitalization.  He was also receiving prandial NovoLog 15 units with each meal; however, he was experiencing hypoglycemic episodes that did not necessarily correlate with the time of day.  We decreased prandial NovoLog to 10 units with breakfast, 10 units with lunch, and 5 units with supper.  This has not changed since his last TCU stay.  Note: We also discontinued his sliding scale coverage.  He suggested that he experiences hypoglycemic symptoms for anything less than 80, although he apparently could not be sure.  He also has diabetic polyneuropathy -- receiving pregabalin -- and chronic kidney disease (stage IIIa).  " "He has had some rather low blood glucose levels lately (below 100 almost every morning and 1 as low as 54, although he denied any symptoms), and we will decrease his glargine from 50 units to 44 units.    He denies any numbness or tingling in the extremities.    Dysphagia: In the hospital, he underwent a video swallow on 07/09/2021, showing mild to moderate oropharyngeal dysphagia, and nectar thick liquids were ordered. Orders from the hospital were to repeat a video swallow in 1 to 2 weeks. Due to oropharyngeal dysphagia (R13.12) following cerebral infarct (I69.391), speech therapy also requested a repeat video swallow study.  They were to follow him in the home upon discharge.  As noted, speech therapy did see him during his most recent hospitalization.  When last seen, his nurse informed me that he had been given a salad and choked on it.  Today, he tells me he choked \"a bit\" last night at dinner, stating it was \"a hot dish or something.\"  His lungs were auscultated today, and I could appreciate right basilar rales.  We will order chest x-rays.  He is being seen by SLP.    Cognition: Cognitive impairment has been noted, and a request was made for speech therapy in this regard (as well as his dysphagia). He scored 14/30 on the SLUMS and 3.9/5.6 on the CPT in February 2020. At that time, there was impairment of short-term memory and problem-solving.  They did give him a diagnosis of dementia without behavioral disturbance during his most recent hospitalization.      ROS: He says he had diarrhea last night with a lot of flatus.  He was toileted 4 times and \"once in my shorts.\"  Otherwise, no headaches or chest pains, coughing or congestion, nausea or vomiting, dizziness or dyspnea, dysuria, diplopia, difficulty with integumentary issues, or problems with sleep (with CPAP).    Past Medical History:   Diagnosis Date     Anemia in chronic kidney disease      B12 deficiency 11/9/2017     Bilateral hearing loss 5/9/2016 "     Bursitis, hip, left 3/2/2017     Chest pain     Normal GXT 2006     Chronic kidney disease, stage 3 (moderate)     Worsening with hyperkalemia on Relafen-discontinued August 2016     Chronic kidney disease, stage 3a 5/7/2021     Closed displaced fracture of left clavicle 9/21/2017     Cognitive changes 11/30/2018    SLUMS 20/30 Nov 2018     Complete tear of left rotator cuff 2/15/2018    Associated with fall and clavicular fracture that occurred 2017, evaluated by orthopedics, given cortisone injection and physical therapy recommended     Degenerative disc disease, lumbar      Dementia without behavioral disturbance (H) 2/21/2020     Depression      Diabetic peripheral neuropathy (H) 11/30/2018     Erectile dysfunction      Fatigue 7/6/2017     Forearm tendonitis 11/29/2016     Glaucoma      Gout attack     Left foot     History of recurrent TIAs 5/7/2020     HTN (hypertension)      Hyperlipidemia      Ingrown toenail 7/6/2017     Intermittent asthma without complication 11/9/2017     Left leg cellulitis 2014     Low back pain      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     Morbid obesity (H)      QUETA on CPAP      Osteoarthritis      Osteoarthritis of both hands      Osteoarthritis of both knees     Right TKA     Pain of right heel 5/11/2018     Peripheral edema 5/7/2021     Peripheral neuropathy 10/9/2018     Recurrent falls 3/5/2021     Rib fracture 2005     Right-sided chest pain 5/9/2016     S/P left carotid endarterectomy 9/13/2021     Screening     No AAA on CT scan 2009     Sepsis secondary to UTI (H) 3/5/2021    Hospitalized with urosepsis January 2021 and rehospitalized after fall found to have recurrent UTI treated with 30 days of Omnicef for acute prostatitis     Sick sinus syndrome (H) 5/7/2020    Abnormal event monitor with 2.8-second sinus pause     Squamous cell skin cancer 11/2/2015     TIA (transient ischemic attack) 09/06/2019    CT head showing chronic lacunar infarcts.  Carotid ultrasound  with atherosclerotic plaque but no severe stenosis.     Traumatic subarachnoid hemorrhage with loss of consciousness of 30 minutes or less (H) 9/21/2017    Fall down the stairs following alcohol use with traumatic subarachnoid hemorrhage treated conservatively at [facility] followed by stay at TCU with no residual neurologic deficits     Trochanteric bursitis of right hip 8/24/2018     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)      Unsteady gait 3/4/2019     Vitamin D deficiency 11/9/2017              No family history on file.  Social History     Socioeconomic History     Marital status:      Spouse name: None     Number of children: None     Years of education: None     Highest education level: None   Occupational History     None   Tobacco Use     Smoking status: Former Smoker     Smokeless tobacco: Never Used   Substance and Sexual Activity     Alcohol use: Yes     Alcohol/week: 14.0 standard drinks     Drug use: No     Sexual activity: None   Other Topics Concern     None   Social History Narrative    Semi retired  x 47 years     Social Determinants of Health     Financial Resource Strain:      Difficulty of Paying Living Expenses:    Food Insecurity:      Worried About Running Out of Food in the Last Year:      Ran Out of Food in the Last Year:    Transportation Needs:      Lack of Transportation (Medical):      Lack of Transportation (Non-Medical):    Physical Activity:      Days of Exercise per Week:      Minutes of Exercise per Session:    Stress:      Feeling of Stress :    Social Connections:      Frequency of Communication with Friends and Family:      Frequency of Social Gatherings with Friends and Family:      Attends Taoism Services:      Active Member of Clubs or Organizations:      Attends Club or Organization Meetings:      Marital Status:    Intimate Partner Violence:      Fear of Current or Ex-Partner:      Emotionally Abused:      Physically Abused:       Sexually Abused:        MEDICATIONS: Reviewed from the MAR, physician orders, and/or earlier progress notes.  Post Discharge Medication Reconciliation Status: discharge medications reconciled, continue medications without change.  Updated by me today (09/14/2021) with a decrease in glargine from 50 units to 44 units reflected below.  Note that this encounter occurred on 09/14/2021 but documentation not completed until 09/20/2021, hence an even lower glargine dose noted below.  Current Outpatient Medications   Medication Sig     acetaminophen (TYLENOL) 500 MG tablet Take 1,000 mg by mouth every 8 hours as needed      amLODIPine (NORVASC) 2.5 MG tablet [AMLODIPINE (NORVASC) 2.5 MG TABLET] Take 1 tablet (2.5 mg total) by mouth daily.     aspirin 81 MG EC tablet [ASPIRIN 81 MG EC TABLET] Take 1 tablet (81 mg total) by mouth daily.     cholecalciferol, vitamin D3, (VITAMIN D3) 2,000 unit Tab [CHOLECALCIFEROL, VITAMIN D3, (VITAMIN D3) 2,000 UNIT TAB] Take 1 tablet (2,000 Units total) by mouth daily.     citalopram (CELEXA) 10 MG tablet Take 10 mg by mouth daily     cyanocobalamin 1000 MCG tablet [CYANOCOBALAMIN 1000 MCG TABLET] Take 1 tablet (1,000 mcg total) by mouth daily.     diclofenac (VOLTAREN) 1 % topical gel Apply 2 g topically daily as needed for moderate pain      hydroCHLOROthiazide (HYDRODIURIL) 25 MG tablet [HYDROCHLOROTHIAZIDE (HYDRODIURIL) 25 MG TABLET] TAKE ONE TABLET BY MOUTH ONCE DAILY .  - TAKE WITH LOSARTAN - COMBO NOT AVAILABLE -     insulin aspart (NOVOLOG PEN) 100 UNIT/ML pen Inject 5 Units Subcutaneous 3 times daily (with meals)      insulin aspart (NOVOLOG PEN) 100 UNIT/ML pen Inject Subcutaneous 3 times daily (with meals) <200 0 units; 201-250 2 units; 251-300 4 units, >300 5 units and update NP     insulin glargine (LANTUS PEN) 100 UNIT/ML pen Inject 20 Units Subcutaneous At Bedtime      latanoprost (XALATAN) 0.005 % ophthalmic solution [LATANOPROST (XALATAN) 0.005 % OPHTHALMIC SOLUTION]  Administer 1 drop to both eyes at bedtime.     losartan (COZAAR) 100 MG tablet Take 100 mg by mouth daily      magnesium oxide (MAGOX) 400 mg (241.3 mg magnesium) tablet [MAGNESIUM OXIDE (MAGOX) 400 MG (241.3 MG MAGNESIUM) TABLET] Take 1 tablet (400 mg total) by mouth 2 (two) times a day.     metFORMIN (GLUCOPHAGE) 500 MG tablet Take 500 mg by mouth 1000 mg in the morning and 500 mg in the evening     polyethylene glycol (MIRALAX) 17 g packet Take 1 packet by mouth daily as needed for constipation     pravastatin (PRAVACHOL) 20 MG tablet Take 20 mg by mouth daily      pregabalin (LYRICA) 50 MG capsule Take 50 mg by mouth At Bedtime     senna (SENOKOT) 8.6 MG tablet Take 1 tablet by mouth 2 times daily as needed for constipation     ticagrelor (BRILINTA) 90 MG tablet Take 90 mg by mouth 2 times daily     No current facility-administered medications for this visit.     ALLERGIES:   Allergies   Allergen Reactions     Actos [Pioglitazone] Swelling     Edema     Atorvastatin Unknown     Back pain     Donepezil Unknown and Diarrhea     Diarrhea     Gabapentin Diarrhea     Diarrhea     Niacin Unknown     Hyperglycemia     Simvastatin Unknown     Back pain     DIET: Diabetic    Vitals:    09/14/21 1240   BP: 128/68   Pulse: 80   Resp: 18   Temp: 98  F (36.7  C)   SpO2: 97%   Weight: 135.5 kg (298 lb 12.8 oz)   Height: 1.829 m (6')     Body mass index is 40.52 kg/m .    EXAMINATION:   General: Pleasant, alert, and conversant elderly male, sitting in a wheelchair, in no apparent distress.  His wife is in attendance.  Head: Normocephalic and atraumatic.   Eyes: PERRLA, sclerae clear. Slight disconjugate gaze, left eye moving medially.  ENT: Moist oral mucosa. He has upper dentures with some remaining teeth on the bottom. No rhinorrhea or nasal discharge. Hearing is adequate for conversation in a quiet room.  Cardiovascular: Regular rate and rhythm without appreciable murmur.  Positive left carotid bruit.  Respiratory: Loose  cough with faint rales appreciated in the right lung.  We will order chest x-rays.    Abdomen: Nondistended.   Musculoskeletal/Extremities: Age-related degenerative joint disease. Lower extremity venous stasis changes.  Bilateral 1-2+ pretibial edema.  Integument: Several areas of bruising on forearms and hand dorsi.  Otherwise, no rashes, clinically significant lesions, or skin breakdown.   Cognitive/Psychiatric: Suspected cognitive impairment based on previous cognitive testing, although he did seem to do well during my visit with him today. His recall seems excellent. He is going to be evaluated by speech therapy. Affect is euthymic.    DIAGNOSTICS:   Recent Results (from the past 240 hour(s))   Clostridium difficile toxin B PCR    Collection Time: 09/16/21 10:08 PM    Specimen: Per Rectum; Stool   Result Value Ref Range    C Difficile Toxin B by PCR Negative Negative     Lab Results   Component Value Date    WBC 9.3 05/11/2021     Lab Results   Component Value Date    RBC 4.13 05/11/2021     Lab Results   Component Value Date    HGB 12.3 05/11/2021     Lab Results   Component Value Date    HCT 37.8 05/11/2021     Lab Results   Component Value Date    MCV 92 05/11/2021     Lab Results   Component Value Date    MCH 29.8 05/11/2021     Lab Results   Component Value Date    MCHC 32.5 05/11/2021     Lab Results   Component Value Date    RDW 13.0 05/11/2021     Lab Results   Component Value Date     05/11/2021         ICD-10-CM    1. Paroxysmal atrial fibrillation (H)  I48.0    2. S/P left carotid endarterectomy  Z98.890    3. Oropharyngeal dysphagia  R13.12    4. Type 2 diabetes mellitus with diabetic polyneuropathy, with long-term current use of insulin (H)  E11.42     Z79.4    5. Sinus bradycardia  R00.1    6. Cognitive impairment  R41.89    7. Moderate major depression (H)  F32.1    8. Benign hypertensive kidney disease with chronic kidney disease stage I through stage IV, or unspecified  I12.9    9.  Chronic kidney disease, stage 3a  N18.31    10. Mild major depression (H)  F32.0    11. Slow transit constipation  K59.01        CHANGES:  1.  Okay for CPAP with home settings.  2.  CXR due to right basilar rales.  3.  Decrease glargine from 50 units to 44 units.    CARE PLAN:  The care plan has been reviewed and all orders signed.  Changes to care plan, if any, as noted.  Otherwise, continue current plan of care.  Total time spent with this patient was 39 minutes, with greater than 50% spent in counseling and coordination of care that included evaluation of recent blood glucose levels with insulin adjustment made, ordering of chest x-rays, and explanations of these items to the patient and his wife.    The above has been created using voice recognition software. Please be aware that this may unintentionally  produce inaccuracies and/or nonsensical sentences.      Electronically signed by: PASCUAL Morales CNP        Sincerely,        PASCUAL Morales CNP

## 2021-09-16 VITALS
RESPIRATION RATE: 18 BRPM | TEMPERATURE: 97.6 F | OXYGEN SATURATION: 97 % | BODY MASS INDEX: 40.36 KG/M2 | HEIGHT: 72 IN | SYSTOLIC BLOOD PRESSURE: 125 MMHG | HEART RATE: 52 BPM | WEIGHT: 298 LBS | DIASTOLIC BLOOD PRESSURE: 52 MMHG

## 2021-09-16 PROCEDURE — 87493 C DIFF AMPLIFIED PROBE: CPT | Performed by: INTERNAL MEDICINE

## 2021-09-16 ASSESSMENT — MIFFLIN-ST. JEOR: SCORE: 2064.72

## 2021-09-17 ENCOUNTER — TRANSITIONAL CARE UNIT VISIT (OUTPATIENT)
Dept: GERIATRICS | Facility: CLINIC | Age: 86
End: 2021-09-17
Payer: MEDICARE

## 2021-09-17 ENCOUNTER — LAB REQUISITION (OUTPATIENT)
Dept: LAB | Facility: CLINIC | Age: 86
End: 2021-09-17

## 2021-09-17 DIAGNOSIS — Z98.890 S/P CAROTID ENDARTERECTOMY: ICD-10-CM

## 2021-09-17 DIAGNOSIS — E16.2 HYPOGLYCEMIA: ICD-10-CM

## 2021-09-17 DIAGNOSIS — G63 POLYNEUROPATHY ASSOCIATED WITH UNDERLYING DISEASE (H): ICD-10-CM

## 2021-09-17 DIAGNOSIS — R53.81 PHYSICAL DECONDITIONING: ICD-10-CM

## 2021-09-17 DIAGNOSIS — I12.9 BENIGN HYPERTENSIVE KIDNEY DISEASE WITH CHRONIC KIDNEY DISEASE STAGE I THROUGH STAGE IV, OR UNSPECIFIED: ICD-10-CM

## 2021-09-17 DIAGNOSIS — Z79.4 TYPE 2 DIABETES MELLITUS WITH DIABETIC POLYNEUROPATHY, WITH LONG-TERM CURRENT USE OF INSULIN (H): Primary | ICD-10-CM

## 2021-09-17 DIAGNOSIS — E11.42 TYPE 2 DIABETES MELLITUS WITH DIABETIC POLYNEUROPATHY, WITH LONG-TERM CURRENT USE OF INSULIN (H): Primary | ICD-10-CM

## 2021-09-17 DIAGNOSIS — R19.7 DIARRHEA, UNSPECIFIED: ICD-10-CM

## 2021-09-17 LAB — C DIFF TOX B STL QL: NEGATIVE

## 2021-09-17 PROCEDURE — 99309 SBSQ NF CARE MODERATE MDM 30: CPT | Performed by: INTERNAL MEDICINE

## 2021-09-17 RX ORDER — PREGABALIN 50 MG/1
50 CAPSULE ORAL AT BEDTIME
COMMUNITY
End: 2021-01-01

## 2021-09-17 NOTE — LETTER
9/17/2021        RE: Robert E Schamberger  [address] [place] [address]        [facility]  TCU Episodic Visit   September 17, 2021      Chief Complaint   Patient presents with     Nursing Home Acute     DM with hypoglycemia, PAD, dementia, physical deconditioning      HPI:    Robert E Schamberger is a 87 year old  (4/14/1934), who is being seen today for an episodic care visit at [facility] where he is doing a rehab stay after a hospitalization at [facility] from 8/31/21 to 9/7/21. He is s/p left transcarotid artery revascularization with carotid stenting and flow reversal. Surgery without complication.     Today, Mr. Schamberger is seen in his room sitting in a recliner. History is limited due to dementia. I spoke with his nurse and morning blood sugars have been in 50s and 60s. During the day will be as high as 240, but most are <210. This morning he told his nurse his sugar would be low because he didn't eat much.     ALLERGIES: Actos [pioglitazone], Atorvastatin, Donepezil, Gabapentin, Niacin, and Simvastatin    Past Medical, Surgical, Family and Social History reviewed and updated in [facility].    Current Outpatient Medications   Medication Sig Dispense Refill     acetaminophen (TYLENOL) 500 MG tablet Take 1,000 mg by mouth every 8 hours as needed        amLODIPine (NORVASC) 2.5 MG tablet [AMLODIPINE (NORVASC) 2.5 MG TABLET] Take 1 tablet (2.5 mg total) by mouth daily. 90 tablet 3     aspirin 81 MG EC tablet [ASPIRIN 81 MG EC TABLET] Take 1 tablet (81 mg total) by mouth daily. 150 tablet 2     cholecalciferol, vitamin D3, (VITAMIN D3) 2,000 unit Tab [CHOLECALCIFEROL, VITAMIN D3, (VITAMIN D3) 2,000 UNIT TAB] Take 1 tablet (2,000 Units total) by mouth daily. 100 tablet 3     citalopram (CELEXA) 10 MG tablet Take 10 mg by mouth daily       cyanocobalamin 1000 MCG tablet [CYANOCOBALAMIN 1000 MCG TABLET] Take 1 tablet (1,000 mcg total) by mouth daily. 100 tablet 3     diclofenac (VOLTAREN) 1 %  topical gel Apply 2 g topically daily as needed for moderate pain        hydroCHLOROthiazide (HYDRODIURIL) 25 MG tablet [HYDROCHLOROTHIAZIDE (HYDRODIURIL) 25 MG TABLET] TAKE ONE TABLET BY MOUTH ONCE DAILY .  - TAKE WITH LOSARTAN - COMBO NOT AVAILABLE - 90 tablet 3     insulin aspart (NOVOLOG PEN) 100 UNIT/ML pen Inject 5 Units Subcutaneous 3 times daily (with meals)        insulin aspart (NOVOLOG PEN) 100 UNIT/ML pen Inject Subcutaneous 3 times daily (with meals) <200 0 units; 201-250 2 units; 251-300 4 units, >300 5 units and update NP       insulin glargine (LANTUS PEN) 100 UNIT/ML pen Inject 20 Units Subcutaneous At Bedtime        latanoprost (XALATAN) 0.005 % ophthalmic solution [LATANOPROST (XALATAN) 0.005 % OPHTHALMIC SOLUTION] Administer 1 drop to both eyes at bedtime.       losartan (COZAAR) 100 MG tablet Take 100 mg by mouth daily        magnesium oxide (MAGOX) 400 mg (241.3 mg magnesium) tablet [MAGNESIUM OXIDE (MAGOX) 400 MG (241.3 MG MAGNESIUM) TABLET] Take 1 tablet (400 mg total) by mouth 2 (two) times a day. 100 tablet 1     metFORMIN (GLUCOPHAGE) 500 MG tablet Take 500 mg by mouth 1000 mg in the morning and 500 mg in the evening       polyethylene glycol (MIRALAX) 17 g packet Take 1 packet by mouth daily as needed for constipation       pravastatin (PRAVACHOL) 20 MG tablet Take 20 mg by mouth daily        pregabalin (LYRICA) 50 MG capsule Take 50 mg by mouth At Bedtime       senna (SENOKOT) 8.6 MG tablet Take 1 tablet by mouth 2 times daily as needed for constipation       ticagrelor (BRILINTA) 90 MG tablet Take 90 mg by mouth 2 times daily       Medications reviewed:  Medications reconciled to facility chart and changes were made to reflect current medications as identified as above med list. Below are the changes that were made:   Medications stopped since last EPIC medication reconciliation:   Medications Discontinued During This Encounter   Medication Reason     insulin aspart U-100 (NOVOLOG  FLEXPEN U-100 INSULIN) 100 unit/mL (3 mL) injection pen      pregabalin (LYRICA) 50 MG capsule      clopidogreL (PLAVIX) 75 mg tablet      Medications started since last [facility] medication reconciliation:  Orders Placed This Encounter   Medications     insulin aspart (NOVOLOG PEN) 100 UNIT/ML pen     Sig: Inject 5 Units Subcutaneous 3 times daily (with meals)      pregabalin (LYRICA) 50 MG capsule     Sig: Take 50 mg by mouth At Bedtime     insulin aspart (NOVOLOG PEN) 100 UNIT/ML pen     Sig: Inject Subcutaneous 3 times daily (with meals) <200 0 units; 201-250 2 units; 251-300 4 units, >300 5 units and update NP       REVIEW OF SYSTEMS:  Unable to be obtained due to cognitive impairment.     PHYSICAL EXAM:  /52   Pulse 52   Temp 97.6  F (36.4  C)   Resp 18   Ht 1.829 m (6')   Wt 135.2 kg (298 lb)   SpO2 97%   BMI 40.42 kg/m    Gen: sitting in recliner, alert, cooperative and in no acute distress  Card: RRR, S1, S2, no murmurs  Resp: lungs clear to auscultation bilaterally, no crackles or wheezes  Ext: no LE edema  Neuro: CX II-XII grossly in tact; ROM in all four extremities grossly in tact  Psych: memory, judgement and insight impaired.   Skin: L neck incision well healed; kerlex over L mid shin w/ today's date for dressing change    Recent Labs:  Reviewed as per Epic    ASSESSMENT/PLAN:    DM, Type II  Hypoglycemia  Sugars 50s-60s in the mornings and during the day generally <200. No hold parameter and no sliding scale insulin. Was discharged on glargine 50 units at bedtime, currently on glargine 44 units as well as aspart 10 units with breakfast, 10 units with lunch, and 5 units with supper  -- metformin 1000 mg in the morning and 500 mg in the evening  -- decrease glargine to 20 units at bedtime; hold for glucose <150  -- decrease fixed premeal aspart to 5 units TID AC; hold for glucose <150  -- start aspart sliding scale insulin: glucose <200 0 units; 201-250 2 units; 251-300 4 units, >300 5 units  and update NP   -- follow sugars and adjust insulin as needed    Critical Left ICA Stenosis s/p Revascularization  Had left transcarotid artery revascularization with carotid stenting and flow reversal.   -- ASA 81 mg daily, pravastatin 40 mg daily and ticagrelor 90 mg BID  -- follow up with vascular as scheduled    HTN  SBPs 120s.   -- amlodipine 2.5 mg daily, hydrochlorothiazide 25 mg daily and losartan 100 mg daily  -- follow BPs and adjust medications as needed    Peripheral Neuropathy  Underlying DM  -- pregabalin 50 mg at bedtime     CKD, Stage III  Baseline Cr 1.3-1.6. Cr 1.59 on 9/7.   -- avoid nephrotoxic meds  -- consider BMP if hasn't had one at TCU    Physical Deconditioning  In setting of hospitalization and underlying medical conditions  -- ongoing PT/OT      Electronically signed by  Milly Rubio MD                              Sincerely,        Milly Rubio MD

## 2021-09-17 NOTE — PROGRESS NOTES
[facility] GERIATRICS  TCU Episodic Visit   September 17, 2021      Chief Complaint   Patient presents with     Nursing Home Acute     DM with hypoglycemia, PAD, dementia, physical deconditioning      HPI:    Robert E Schamberger is a 87 year old  (4/14/1934), who is being seen today for an episodic care visit at [facility] where he is doing a rehab stay after a hospitalization at [place] from 8/31/21 to 9/7/21. He is s/p left transcarotid artery revascularization with carotid stenting and flow reversal. Surgery without complication.     Today, Mr. Schamberger is seen in his room sitting in a recliner. History is limited due to dementia. I spoke with his nurse and morning blood sugars have been in 50s and 60s. During the day will be as high as 240, but most are <210. This morning he told his nurse his sugar would be low because he didn't eat much.     ALLERGIES: Actos [pioglitazone], Atorvastatin, Donepezil, Gabapentin, Niacin, and Simvastatin    Past Medical, Surgical, Family and Social History reviewed and updated in [facility].    Current Outpatient Medications   Medication Sig Dispense Refill     acetaminophen (TYLENOL) 500 MG tablet Take 1,000 mg by mouth every 8 hours as needed        amLODIPine (NORVASC) 2.5 MG tablet [AMLODIPINE (NORVASC) 2.5 MG TABLET] Take 1 tablet (2.5 mg total) by mouth daily. 90 tablet 3     aspirin 81 MG EC tablet [ASPIRIN 81 MG EC TABLET] Take 1 tablet (81 mg total) by mouth daily. 150 tablet 2     cholecalciferol, vitamin D3, (VITAMIN D3) 2,000 unit Tab [CHOLECALCIFEROL, VITAMIN D3, (VITAMIN D3) 2,000 UNIT TAB] Take 1 tablet (2,000 Units total) by mouth daily. 100 tablet 3     citalopram (CELEXA) 10 MG tablet Take 10 mg by mouth daily       cyanocobalamin 1000 MCG tablet [CYANOCOBALAMIN 1000 MCG TABLET] Take 1 tablet (1,000 mcg total) by mouth daily. 100 tablet 3     diclofenac (VOLTAREN) 1 % topical gel Apply 2 g topically daily as needed for moderate pain         hydroCHLOROthiazide (HYDRODIURIL) 25 MG tablet [HYDROCHLOROTHIAZIDE (HYDRODIURIL) 25 MG TABLET] TAKE ONE TABLET BY MOUTH ONCE DAILY .  - TAKE WITH LOSARTAN - COMBO NOT AVAILABLE - 90 tablet 3     insulin aspart (NOVOLOG PEN) 100 UNIT/ML pen Inject 5 Units Subcutaneous 3 times daily (with meals)        insulin aspart (NOVOLOG PEN) 100 UNIT/ML pen Inject Subcutaneous 3 times daily (with meals) <200 0 units; 201-250 2 units; 251-300 4 units, >300 5 units and update NP       insulin glargine (LANTUS PEN) 100 UNIT/ML pen Inject 20 Units Subcutaneous At Bedtime        latanoprost (XALATAN) 0.005 % ophthalmic solution [LATANOPROST (XALATAN) 0.005 % OPHTHALMIC SOLUTION] Administer 1 drop to both eyes at bedtime.       losartan (COZAAR) 100 MG tablet Take 100 mg by mouth daily        magnesium oxide (MAGOX) 400 mg (241.3 mg magnesium) tablet [MAGNESIUM OXIDE (MAGOX) 400 MG (241.3 MG MAGNESIUM) TABLET] Take 1 tablet (400 mg total) by mouth 2 (two) times a day. 100 tablet 1     metFORMIN (GLUCOPHAGE) 500 MG tablet Take 500 mg by mouth 1000 mg in the morning and 500 mg in the evening       polyethylene glycol (MIRALAX) 17 g packet Take 1 packet by mouth daily as needed for constipation       pravastatin (PRAVACHOL) 20 MG tablet Take 20 mg by mouth daily        pregabalin (LYRICA) 50 MG capsule Take 50 mg by mouth At Bedtime       senna (SENOKOT) 8.6 MG tablet Take 1 tablet by mouth 2 times daily as needed for constipation       ticagrelor (BRILINTA) 90 MG tablet Take 90 mg by mouth 2 times daily       Medications reviewed:  Medications reconciled to facility chart and changes were made to reflect current medications as identified as above med list. Below are the changes that were made:   Medications stopped since last EPIC medication reconciliation:   Medications Discontinued During This Encounter   Medication Reason     insulin aspart U-100 (NOVOLOG FLEXPEN U-100 INSULIN) 100 unit/mL (3 mL) injection pen      pregabalin  (LYRICA) 50 MG capsule      clopidogreL (PLAVIX) 75 mg tablet      Medications started since last [facility] medication reconciliation:  Orders Placed This Encounter   Medications     insulin aspart (NOVOLOG PEN) 100 UNIT/ML pen     Sig: Inject 5 Units Subcutaneous 3 times daily (with meals)      pregabalin (LYRICA) 50 MG capsule     Sig: Take 50 mg by mouth At Bedtime     insulin aspart (NOVOLOG PEN) 100 UNIT/ML pen     Sig: Inject Subcutaneous 3 times daily (with meals) <200 0 units; 201-250 2 units; 251-300 4 units, >300 5 units and update NP       REVIEW OF SYSTEMS:  Unable to be obtained due to cognitive impairment.     PHYSICAL EXAM:  /52   Pulse 52   Temp 97.6  F (36.4  C)   Resp 18   Ht 1.829 m (6')   Wt 135.2 kg (298 lb)   SpO2 97%   BMI 40.42 kg/m    Gen: sitting in recliner, alert, cooperative and in no acute distress  Card: RRR, S1, S2, no murmurs  Resp: lungs clear to auscultation bilaterally, no crackles or wheezes  Ext: no LE edema  Neuro: CX II-XII grossly in tact; ROM in all four extremities grossly in tact  Psych: memory, judgement and insight impaired.   Skin: L neck incision well healed; kerlex over L mid shin w/ today's date for dressing change    Recent Labs:  Reviewed as per Epic    ASSESSMENT/PLAN:    DM, Type II  Hypoglycemia  Sugars 50s-60s in the mornings and during the day generally <200. No hold parameter and no sliding scale insulin. Was discharged on glargine 50 units at bedtime, currently on glargine 44 units as well as aspart 10 units with breakfast, 10 units with lunch, and 5 units with supper  -- metformin 1000 mg in the morning and 500 mg in the evening  -- decrease glargine to 20 units at bedtime; hold for glucose <150  -- decrease fixed premeal aspart to 5 units TID AC; hold for glucose <150  -- start aspart sliding scale insulin: glucose <200 0 units; 201-250 2 units; 251-300 4 units, >300 5 units and update NP   -- follow sugars and adjust insulin as needed    Critical  Left ICA Stenosis s/p Revascularization  Had left transcarotid artery revascularization with carotid stenting and flow reversal.   -- ASA 81 mg daily, pravastatin 40 mg daily and ticagrelor 90 mg BID  -- follow up with vascular as scheduled    HTN  SBPs 120s.   -- amlodipine 2.5 mg daily, hydrochlorothiazide 25 mg daily and losartan 100 mg daily  -- follow BPs and adjust medications as needed    Peripheral Neuropathy  Underlying DM  -- pregabalin 50 mg at bedtime     CKD, Stage III  Baseline Cr 1.3-1.6. Cr 1.59 on 9/7.   -- avoid nephrotoxic meds  -- consider BMP if hasn't had one at TCU    Physical Deconditioning  In setting of hospitalization and underlying medical conditions  -- ongoing PT/OT      Electronically signed by  Milly Rubio MD

## 2021-09-21 ENCOUNTER — DISCHARGE SUMMARY NURSING HOME (OUTPATIENT)
Dept: GERIATRICS | Facility: CLINIC | Age: 86
End: 2021-09-21
Payer: MEDICARE

## 2021-09-21 VITALS
HEART RATE: 81 BPM | RESPIRATION RATE: 20 BRPM | TEMPERATURE: 97.9 F | DIASTOLIC BLOOD PRESSURE: 52 MMHG | OXYGEN SATURATION: 95 % | BODY MASS INDEX: 40.12 KG/M2 | HEIGHT: 72 IN | SYSTOLIC BLOOD PRESSURE: 140 MMHG | WEIGHT: 296.2 LBS

## 2021-09-21 DIAGNOSIS — I48.0 PAROXYSMAL ATRIAL FIBRILLATION (H): Primary | ICD-10-CM

## 2021-09-21 DIAGNOSIS — I12.9 BENIGN HYPERTENSIVE KIDNEY DISEASE WITH CHRONIC KIDNEY DISEASE STAGE I THROUGH STAGE IV, OR UNSPECIFIED: ICD-10-CM

## 2021-09-21 DIAGNOSIS — E11.42 TYPE 2 DIABETES MELLITUS WITH DIABETIC POLYNEUROPATHY, WITH LONG-TERM CURRENT USE OF INSULIN (H): ICD-10-CM

## 2021-09-21 DIAGNOSIS — R13.12 OROPHARYNGEAL DYSPHAGIA: ICD-10-CM

## 2021-09-21 DIAGNOSIS — N18.31 CHRONIC KIDNEY DISEASE, STAGE 3A (H): ICD-10-CM

## 2021-09-21 DIAGNOSIS — Z98.890 S/P CAROTID ENDARTERECTOMY: ICD-10-CM

## 2021-09-21 DIAGNOSIS — K59.01 SLOW TRANSIT CONSTIPATION: ICD-10-CM

## 2021-09-21 DIAGNOSIS — Z79.4 TYPE 2 DIABETES MELLITUS WITH DIABETIC POLYNEUROPATHY, WITH LONG-TERM CURRENT USE OF INSULIN (H): ICD-10-CM

## 2021-09-21 DIAGNOSIS — F32.0 MILD MAJOR DEPRESSION (H): ICD-10-CM

## 2021-09-21 DIAGNOSIS — R00.1 SINUS BRADYCARDIA: ICD-10-CM

## 2021-09-21 DIAGNOSIS — F32.1 MODERATE MAJOR DEPRESSION (H): ICD-10-CM

## 2021-09-21 DIAGNOSIS — R41.89 COGNITIVE IMPAIRMENT: ICD-10-CM

## 2021-09-21 PROCEDURE — 99316 NF DSCHRG MGMT 30 MIN+: CPT | Performed by: NURSE PRACTITIONER

## 2021-09-21 ASSESSMENT — MIFFLIN-ST. JEOR: SCORE: 2056.55

## 2021-09-21 NOTE — LETTER
2021        RE: Robert E Schamberger  [address] [place] [address]        [facility]    Name:   Robert E Schamberger (Domenico)  :   1934  MRN:    [id number]     Facility:   [facility] ([facility]) [[id number]]   Room: [place] [address]  Code Status: FULL CODE and POLST AVAILABLE -     DOS: 2021  Previous visit: 2021    PCP:  Ld Moy    CHIEF COMPLAINT / REASON FOR VISIT:  Chief Complaint   Patient presents with     Discharge Summary Nursing Home     Carotid artery stenosis s/p left trans carotid artery revascularization      [facility] from 2021 until 2021 (confusion, aphasia, right-sided hemiparesis/critical stenosis of left ICA)  [facility] from 2021 until 2021  [facility] from 2021 until 2021 (left transcarotid artery revascularization with conduit - TCAR)  [facility] from 2021 until 2021 (carotid artery stenosis s/p left transcarotid artery revascularization)      HPI: Sonu is an 87 year old male with a history of diabetes mellitus type 2 (with chronic kidney disease and polyneuropathy), hypertension, atrial fibrillation, and history of TIA (2019).  On 2021, he initially presented to the ED with increasing confusion, aphasia, and right-sided hemiparesis. A stroke code was called, and he received tPA. Imaging revealed critical stenosis of the left ICA. Vascular surgery was consulted, and his 2019 presentation and his symptoms with this admission were felt likely due to symptomatic carotid artery stenosis and other factors. He was noted to be a high risk surgical candidate given the anatomy of the vessel, location of the stenosis, and body habitus among other comorbidities. PTA glargine and losartan were decreased, and pravastatin was increased. He was subsequently transferred here for rehab.    On 2021, he underwent a left  "TCAR with Dr. Hendrickson.  On the day of discharge, he was neurologically at baseline, being ambulatory with a walker and assist of 1.  He was tolerating a dysphagia diet and had been evaluated by speech, physical, and occupational therapies, and was voiding without difficulty.  His hospitalization was prolonged due to inability to find TCU placement, but he eventually returned here.      CURRENT/RECENT TCU ISSUES    Disposition: He is generally quite pleasant and gives the impression of being alert and oriented; however, there are some cognitive issues (see below).  He told me \"they did it the new way, and it was the first time I ever did it.\" He was unable to explain this any further.    He still has diarrhea, although he says he has a history of getting diarrhea frequently.    I asked him how he managed to accomplish transfers.  He says he can push his wheelchair to the bed, hit, and get in bed independently, although therapy is still wanting him to have assistance.    The patient underwent P2 Y 12 testing and was found to be a nonresponder.  Therefore, he was started on ticagrelor in preparation for the procedure (which she eventually underwent).  Pravastatin was not Dr. Hendrickson' preferred statin, but the patient demonstrated intolerance to atorvastatin previously.    Diabetes mellitus type 2: When last here, we resumed his PTA glargine 50 units.  It has been increased to 60 units prior to his hospitalization.  He was also receiving prandial NovoLog 15 units with each meal; however, he was experiencing hypoglycemic episodes that did not necessarily correlate with the time of day.  We decreased prandial NovoLog to 10 units with breakfast, 10 units with lunch, and 5 units with supper.  This has not changed since his last TCU stay.  Note: We also discontinued his sliding scale coverage.  He suggested that he experiences hypoglycemic symptoms for anything less than 80, although he apparently could not be sure.  He also has " "diabetic polyneuropathy -- receiving pregabalin -- and chronic kidney disease (stage IIIa).  He has had some rather low blood glucose levels lately (below 100 almost every morning and one as low as 54, although he denied any symptoms), and we decreased his glargine from 50 units to 44 units.  With blood glucose levels are improving, Dr. Rubio decreased the glargine down to 20 units.  She also decrease prandial NovoLog to 5 units with each meal.    No outward signs of diabetes sequelae.  He denies any numbness or tingling in the extremities.    Dysphagia: In the hospital, he underwent a video swallow on 07/09/2021, showing mild to moderate oropharyngeal dysphagia, and nectar thick liquids were ordered. Orders from the hospital were to repeat a video swallow in 1 to 2 weeks. Due to oropharyngeal dysphagia (R13.12) following cerebral infarct (I69.391), speech therapy also requested a repeat video swallow study.  They were to follow him in the home upon discharge.  As noted, speech therapy did see him during his most recent hospitalization.  At an earlier visit, his nurse informed me that he had been given a salad and choked on it.  He also told me he choked \"a bit\" the previous night at dinner, stating it was \"a hot dish or something.\"  His lungs were auscultated when seen on 09/14, and I could appreciate right basilar rales.  We ordered chest x-rays.  He has been followed by SLP.    Cognition: Cognitive impairment has been noted, and a request was made for speech therapy in this regard (as well as his dysphagia). He scored 14/30 on the SLUMS and 3.9/5.6 on the CPT in February 2020. At that time, there was impairment of short-term memory and problem-solving.  They did give him a diagnosis of dementia without behavioral disturbance during his most recent hospitalization.    Discharge planning: He will be discharging today, 09/21/2021.  Services to be provided include a home health nurse for monitoring and managing blood " "glucose levels, a home health aide, home PT, OT, and speech therapy to observe swallow risk.      ROS: He says he had diarrhea last night with a lot of flatus.  He was toileted 4 times and \"once in my shorts.\"  Otherwise, no headaches or chest pains, coughing or congestion, nausea or vomiting, dizziness or dyspnea, dysuria, diplopia, difficulty with integumentary issues, or problems with sleep (with CPAP).    Past Medical History:   Diagnosis Date     Anemia in chronic kidney disease      B12 deficiency 11/9/2017     Bilateral hearing loss 5/9/2016     Bursitis, hip, left 3/2/2017     Chest pain     Normal GXT 2006     Chronic kidney disease, stage 3 (moderate)     Worsening with hyperkalemia on Relafen-discontinued August 2016     Chronic kidney disease, stage 3a 5/7/2021     Closed displaced fracture of left clavicle 9/21/2017     Cognitive changes 11/30/2018    SLUMS 20/30 Nov 2018     Complete tear of left rotator cuff 2/15/2018    Associated with fall and clavicular fracture that occurred 2017, evaluated by orthopedics, given cortisone injection and physical therapy recommended     Degenerative disc disease, lumbar      Dementia without behavioral disturbance (H) 2/21/2020     Depression      Diabetic peripheral neuropathy (H) 11/30/2018     Erectile dysfunction      Fatigue 7/6/2017     Forearm tendonitis 11/29/2016     Glaucoma      Gout attack     Left foot     History of recurrent TIAs 5/7/2020     HTN (hypertension)      Hyperlipidemia      Ingrown toenail 7/6/2017     Intermittent asthma without complication 11/9/2017     Left leg cellulitis 2014     Low back pain      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     Morbid obesity (H)      QUETA on CPAP      Osteoarthritis      Osteoarthritis of both hands      Osteoarthritis of both knees     Right TKA     Pain of right heel 5/11/2018     Peripheral edema 5/7/2021     Peripheral neuropathy 10/9/2018     Recurrent falls 3/5/2021     Rib fracture 2005     " Right-sided chest pain 5/9/2016     S/P left carotid endarterectomy 9/13/2021     Screening     No AAA on CT scan 2009     Sepsis secondary to UTI (H) 3/5/2021    Hospitalized with urosepsis January 2021 and rehospitalized after fall found to have recurrent UTI treated with 30 days of Omnicef for acute prostatitis     Sick sinus syndrome (H) 5/7/2020    Abnormal event monitor with 2.8-second sinus pause     Squamous cell skin cancer 11/2/2015     TIA (transient ischemic attack) 09/06/2019    CT head showing chronic lacunar infarcts.  Carotid ultrasound with atherosclerotic plaque but no severe stenosis.     Traumatic subarachnoid hemorrhage with loss of consciousness of 30 minutes or less (H) 9/21/2017    Fall down the stairs following alcohol use with traumatic subarachnoid hemorrhage treated conservatively at [facility] followed by stay at TCU with no residual neurologic deficits     Trochanteric bursitis of right hip 8/24/2018     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)      Unsteady gait 3/4/2019     Vitamin D deficiency 11/9/2017              History reviewed. No pertinent family history.  Social History     Socioeconomic History     Marital status:      Spouse name: None     Number of children: None     Years of education: None     Highest education level: None   Occupational History     None   Tobacco Use     Smoking status: Former Smoker     Smokeless tobacco: Never Used   Substance and Sexual Activity     Alcohol use: Yes     Alcohol/week: 14.0 standard drinks     Drug use: No     Sexual activity: None   Other Topics Concern     None   Social History Narrative    Semi retired  x 47 years     Social Determinants of Health     Financial Resource Strain:      Difficulty of Paying Living Expenses:    Food Insecurity:      Worried About Running Out of Food in the Last Year:      Ran Out of Food in the Last Year:    Transportation Needs:      Lack of Transportation (Medical):       Lack of Transportation (Non-Medical):    Physical Activity:      Days of Exercise per Week:      Minutes of Exercise per Session:    Stress:      Feeling of Stress :    Social Connections:      Frequency of Communication with Friends and Family:      Frequency of Social Gatherings with Friends and Family:      Attends [employer] Services:      Active Member of Clubs or Organizations:      Attends Club or Organization Meetings:      Marital Status:    Intimate Partner Violence:      Fear of Current or Ex-Partner:      Emotionally Abused:      Physically Abused:      Sexually Abused:        MEDICATIONS: Reviewed from the MAR, physician orders, and/or earlier progress notes.  Post Discharge Medication Reconciliation Status: discharge medications reconciled, continue medications without change.  Updated by me today (09/14/2021) with a decrease in glargine from 50 units to 44 units reflected below.  Note that this encounter occurred on 09/14/2021 but documentation not completed until 09/20/2021, hence an even lower glargine dose noted below.  Current Outpatient Medications   Medication Sig     acetaminophen (TYLENOL) 500 MG tablet Take 1,000 mg by mouth every 8 hours as needed      amLODIPine (NORVASC) 2.5 MG tablet [AMLODIPINE (NORVASC) 2.5 MG TABLET] Take 1 tablet (2.5 mg total) by mouth daily.     aspirin 81 MG EC tablet [ASPIRIN 81 MG EC TABLET] Take 1 tablet (81 mg total) by mouth daily.     cholecalciferol, vitamin D3, (VITAMIN D3) 2,000 unit Tab [CHOLECALCIFEROL, VITAMIN D3, (VITAMIN D3) 2,000 UNIT TAB] Take 1 tablet (2,000 Units total) by mouth daily.     citalopram (CELEXA) 10 MG tablet Take 10 mg by mouth daily     cyanocobalamin 1000 MCG tablet [CYANOCOBALAMIN 1000 MCG TABLET] Take 1 tablet (1,000 mcg total) by mouth daily.     diclofenac (VOLTAREN) 1 % topical gel Apply 2 g topically daily as needed for moderate pain      hydroCHLOROthiazide (HYDRODIURIL) 25 MG tablet [HYDROCHLOROTHIAZIDE (HYDRODIURIL) 25  MG TABLET] TAKE ONE TABLET BY MOUTH ONCE DAILY .  - TAKE WITH LOSARTAN - COMBO NOT AVAILABLE -     insulin aspart (NOVOLOG PEN) 100 UNIT/ML pen Inject 5 Units Subcutaneous 3 times daily (with meals)      insulin aspart (NOVOLOG PEN) 100 UNIT/ML pen Inject Subcutaneous 3 times daily (with meals) <200 0 units; 201-250 2 units; 251-300 4 units, >300 5 units and update NP     insulin glargine (LANTUS PEN) 100 UNIT/ML pen Inject 20 Units Subcutaneous At Bedtime      latanoprost (XALATAN) 0.005 % ophthalmic solution [LATANOPROST (XALATAN) 0.005 % OPHTHALMIC SOLUTION] Administer 1 drop to both eyes at bedtime.     losartan (COZAAR) 100 MG tablet Take 100 mg by mouth daily      magnesium oxide (MAGOX) 400 mg (241.3 mg magnesium) tablet [MAGNESIUM OXIDE (MAGOX) 400 MG (241.3 MG MAGNESIUM) TABLET] Take 1 tablet (400 mg total) by mouth 2 (two) times a day.     metFORMIN (GLUCOPHAGE) 500 MG tablet Take 500 mg by mouth 1000 mg in the morning and 500 mg in the evening     polyethylene glycol (MIRALAX) 17 g packet Take 1 packet by mouth daily as needed for constipation     pravastatin (PRAVACHOL) 20 MG tablet Take 20 mg by mouth daily      pregabalin (LYRICA) 50 MG capsule Take 50 mg by mouth At Bedtime     senna (SENOKOT) 8.6 MG tablet Take 1 tablet by mouth 2 times daily as needed for constipation     ticagrelor (BRILINTA) 90 MG tablet Take 90 mg by mouth 2 times daily     No current facility-administered medications for this visit.     ALLERGIES:   Allergies   Allergen Reactions     Actos [Pioglitazone] Swelling     Edema     Atorvastatin Unknown     Back pain     Donepezil Unknown and Diarrhea     Diarrhea     Gabapentin Diarrhea     Diarrhea     Niacin Unknown     Hyperglycemia     Simvastatin Unknown     Back pain     DIET: Diabetic    Vitals:    09/21/21 1005   BP: (!) 140/52   Pulse: 81   Resp: 20   Temp: 97.9  F (36.6  C)   SpO2: 95%   Weight: 134.4 kg (296 lb 3.2 oz)   Height: 1.829 m (6')     Body mass index is 40.17  kg/m .    EXAMINATION:   General: Pleasant, alert, and conversant elderly male, sitting in a recliner, in no apparent distress.  Head: Normocephalic and atraumatic.   Eyes: PERRLA, sclerae clear. Slight disconjugate gaze, left eye moving medially.  ENT: Moist oral mucosa. He has upper dentures with some remaining teeth on the bottom. No rhinorrhea or nasal discharge. Hearing is adequate for conversation in a quiet room.  Cardiovascular: Regular rate and rhythm without appreciable murmur.  Positive left carotid bruit.  Respiratory: Loose cough with faint rales appreciated in the right lung.  We did order chest x-rays, but they were unremarkable.    Abdomen: Nondistended.   Musculoskeletal/Extremities: Age-related degenerative joint disease. Lower extremity venous stasis changes.  Bilateral 1-2+ pretibial edema.  Integument: Several areas of bruising on forearms and hand dorsi.  Otherwise, no rashes, clinically significant lesions, or skin breakdown.   Cognitive/Psychiatric: Suspected cognitive impairment based on previous cognitive testing, although he did seem to do well during my visit with him today. His recall seems excellent. He is going to be evaluated by speech therapy. Affect is euthymic.    DIAGNOSTICS:   Recent Results (from the past 240 hour(s))   Clostridium difficile toxin B PCR    Collection Time: 09/16/21 10:08 PM    Specimen: Per Rectum; Stool   Result Value Ref Range    C Difficile Toxin B by PCR Negative Negative     Lab Results   Component Value Date    WBC 9.3 05/11/2021     Lab Results   Component Value Date    RBC 4.13 05/11/2021     Lab Results   Component Value Date    HGB 12.3 05/11/2021     Lab Results   Component Value Date    HCT 37.8 05/11/2021     Lab Results   Component Value Date    MCV 92 05/11/2021     Lab Results   Component Value Date    MCH 29.8 05/11/2021     Lab Results   Component Value Date    MCHC 32.5 05/11/2021     Lab Results   Component Value Date    RDW 13.0 05/11/2021      Lab Results   Component Value Date     05/11/2021         ICD-10-CM    1. Paroxysmal atrial fibrillation (H)  I48.0    2. S/P left carotid endarterectomy  Z98.890    3. Oropharyngeal dysphagia  R13.12    4. Type 2 diabetes mellitus with diabetic polyneuropathy, with long-term current use of insulin (H)  E11.42     Z79.4    5. Sinus bradycardia  R00.1    6. Cognitive impairment  R41.89    7. Moderate major depression (H)  F32.1    8. Benign hypertensive kidney disease with chronic kidney disease stage I through stage IV, or unspecified  I12.9    9. Chronic kidney disease, stage 3a  N18.31    10. Mild major depression (H)  F32.0    11. Slow transit constipation  K59.01        DISCHARGE PLAN/FACE TO FACE:  I certify that this patient is under my care and that I had a face-to-face encounter that meets the physician face-to-face encounter requirements with this patient.     Date of Face-to-Face Encounter:  09/21/2021     I certify that, based on my findings, the following services are medically necessary home health services:  Home health nurse, home health aide, home PT, OT, and speech therapies.    My clinical findings support the need for the above skilled services because: (Please write a brief narrative summary that describes what the RN, PT, SLP, or other services will be doing in the home. A list of diagnoses in this section does not meet the CMS requirements):  Home health nurse for blood glucose monitoring/management/teaching; home health aide for bathing and ADL needs; home PT for strengthening, balance, endurance, and safety with mobility/ambulation; home OT for strengthening, ADL needs, adaptive equipment, and safety; speech therapy for managing swallowing risk.    This patient is homebound because: (Please write a brief narrative summmary describing the functional limitations as to why this patient is homebound and specifically what makes this patient homebound.):  Above services necessarily need  to be performed in the home to be of benefit.    The patient is, or has been, under my care and I have initiated the establishment of the plan of care. This patient will be followed by a physician who will periodically review the plan of care.  Initial follow-up should be within 7-10 days.    Approximate time spent with this patient was greater than 30 minutes with greater than 50% spent in discussions regarding services required upon discharge.      The above has been created using voice recognition software. Please be aware that this may unintentionally  produce inaccuracies and/or nonsensical sentences.      Electronically signed by: PASCUAL Morales CNP        Sincerely,        PASCUAL Morales CNP

## 2021-09-21 NOTE — PROGRESS NOTES
[facility]    Name:   Robert E Schamberger (Domenico)  :   1934  MRN:    [id number]     Facility:   [facility] ([facility]) [[id number]]   Room: [facility] / [place] [address]  Code Status: FULL CODE and POLST AVAILABLE -     DOS: 2021  Previous visit: 09/10/2021    PCP:  Ld Moy    CHIEF COMPLAINT / REASON FOR VISIT:  Chief Complaint   Patient presents with     Clinic Care Coordination - Follow-up     Carotid artery stenosis s/p left trans carotid artery revascularization      [facility] from 2021 until 2021 (confusion, aphasia, right-sided hemiparesis/critical stenosis of left ICA)  [facility] from 2021 until 2021  [facility] from 2021 until 2021 (left transcarotid artery revascularization with conduit - TCAR)      HPI: Sonu is an 87 year old male with a history of diabetes mellitus type 2 (with chronic kidney disease and polyneuropathy), hypertension, atrial fibrillation, and history of TIA ().  On 2021, he initially presented to the ED with increasing confusion, aphasia, and right-sided hemiparesis. A stroke code was called, and he received tPA. Imaging revealed critical stenosis of the left ICA. Vascular surgery was consulted, and his  presentation and his symptoms with this admission were felt likely due to symptomatic carotid artery stenosis and other factors. He was noted to be a high risk surgical candidate given the anatomy of the vessel, location of the stenosis, and body habitus among other comorbidities. PTA glargine and losartan were decreased, and pravastatin was increased. He was subsequently transferred here for rehab.    On 2021, he underwent a left TCAR with Dr. Hendrickson.  On the day of discharge, he was neurologically at baseline, being ambulatory with a walker and assist of 1.  He was tolerating a dysphagia diet and had been evaluated by speech, physical, and occupational  "therapies, and was voiding without difficulty.  His hospitalization was prolonged due to inability to find TCU placement, but he eventually returned here.      CURRENT/RECENT TCU ISSUES    Disposition: He is generally quite pleasant and gives the impression of being alert and oriented; however, there are some cognitive issues (see below).  Tells me he is \"here to  strength.\"  He tells me \"they did it the new way, and it was the first time I ever did it.\" He was unable to explain this any further.    He still has diarrhea, although he says he has a history of getting diarrhea frequently.    I asked him how he managed to accomplish transfers.  He says he can push his wheelchair to the bed, hit, and get in bed independently, although therapy is still wanting him to have assistance.    The patient underwent P2 Y 12 testing and was found to be a nonresponder.  Therefore, he was started on ticagrelor in preparation for the procedure (which she eventually underwent).  Pravastatin was not Dr. Hendrickson' preferred statin, but the patient demonstrated intolerance to atorvastatin previously.    Diabetes mellitus type 2: When last here, we resumed his PTA glargine 50 units.  It has been increased to 60 units prior to his hospitalization.  He was also receiving prandial NovoLog 15 units with each meal; however, he was experiencing hypoglycemic episodes that did not necessarily correlate with the time of day.  We decreased prandial NovoLog to 10 units with breakfast, 10 units with lunch, and 5 units with supper.  This has not changed since his last TCU stay.  Note: We also discontinued his sliding scale coverage.  He suggested that he experiences hypoglycemic symptoms for anything less than 80, although he apparently could not be sure.  He also has diabetic polyneuropathy -- receiving pregabalin -- and chronic kidney disease (stage IIIa).  He has had some rather low blood glucose levels lately (below 100 almost every morning " "and 1 as low as 54, although he denied any symptoms), and we will decrease his glargine from 50 units to 44 units.    He denies any numbness or tingling in the extremities.    Dysphagia: In the hospital, he underwent a video swallow on 07/09/2021, showing mild to moderate oropharyngeal dysphagia, and nectar thick liquids were ordered. Orders from the hospital were to repeat a video swallow in 1 to 2 weeks. Due to oropharyngeal dysphagia (R13.12) following cerebral infarct (I69.391), speech therapy also requested a repeat video swallow study.  They were to follow him in the home upon discharge.  As noted, speech therapy did see him during his most recent hospitalization.  When last seen, his nurse informed me that he had been given a salad and choked on it.  Today, he tells me he choked \"a bit\" last night at dinner, stating it was \"a hot dish or something.\"  His lungs were auscultated today, and I could appreciate right basilar rales.  We will order chest x-rays.  He is being seen by SLP.    Cognition: Cognitive impairment has been noted, and a request was made for speech therapy in this regard (as well as his dysphagia). He scored 14/30 on the SLUMS and 3.9/5.6 on the CPT in February 2020. At that time, there was impairment of short-term memory and problem-solving.  They did give him a diagnosis of dementia without behavioral disturbance during his most recent hospitalization.      ROS: He says he had diarrhea last night with a lot of flatus.  He was toileted 4 times and \"once in my shorts.\"  Otherwise, no headaches or chest pains, coughing or congestion, nausea or vomiting, dizziness or dyspnea, dysuria, diplopia, difficulty with integumentary issues, or problems with sleep (with CPAP).    Past Medical History:   Diagnosis Date     Anemia in chronic kidney disease      B12 deficiency 11/9/2017     Bilateral hearing loss 5/9/2016     Bursitis, hip, left 3/2/2017     Chest pain     Normal GXT 2006     Chronic kidney " disease, stage 3 (moderate)     Worsening with hyperkalemia on Relafen-discontinued August 2016     Chronic kidney disease, stage 3a 5/7/2021     Closed displaced fracture of left clavicle 9/21/2017     Cognitive changes 11/30/2018    SLUMS 20/30 Nov 2018     Complete tear of left rotator cuff 2/15/2018    Associated with fall and clavicular fracture that occurred 2017, evaluated by orthopedics, given cortisone injection and physical therapy recommended     Degenerative disc disease, lumbar      Dementia without behavioral disturbance (H) 2/21/2020     Depression      Diabetic peripheral neuropathy (H) 11/30/2018     Erectile dysfunction      Fatigue 7/6/2017     Forearm tendonitis 11/29/2016     Glaucoma      Gout attack     Left foot     History of recurrent TIAs 5/7/2020     HTN (hypertension)      Hyperlipidemia      Ingrown toenail 7/6/2017     Intermittent asthma without complication 11/9/2017     Left leg cellulitis 2014     Low back pain      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     Morbid obesity (H)      QUETA on CPAP      Osteoarthritis      Osteoarthritis of both hands      Osteoarthritis of both knees     Right TKA     Pain of right heel 5/11/2018     Peripheral edema 5/7/2021     Peripheral neuropathy 10/9/2018     Recurrent falls 3/5/2021     Rib fracture 2005     Right-sided chest pain 5/9/2016     S/P left carotid endarterectomy 9/13/2021     Screening     No AAA on CT scan 2009     Sepsis secondary to UTI (H) 3/5/2021    Hospitalized with urosepsis January 2021 and rehospitalized after fall found to have recurrent UTI treated with 30 days of Omnicef for acute prostatitis     Sick sinus syndrome (H) 5/7/2020    Abnormal event monitor with 2.8-second sinus pause     Squamous cell skin cancer 11/2/2015     TIA (transient ischemic attack) 09/06/2019    CT head showing chronic lacunar infarcts.  Carotid ultrasound with atherosclerotic plaque but no severe stenosis.     Traumatic subarachnoid hemorrhage  with loss of consciousness of 30 minutes or less (H) 9/21/2017    Fall down the stairs following alcohol use with traumatic subarachnoid hemorrhage treated conservatively at [facility] followed by stay at TCU with no residual neurologic deficits     Trochanteric bursitis of right hip 8/24/2018     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)      Unsteady gait 3/4/2019     Vitamin D deficiency 11/9/2017              No family history on file.  Social History     Socioeconomic History     Marital status:      Spouse name: None     Number of children: None     Years of education: None     Highest education level: None   Occupational History     None   Tobacco Use     Smoking status: Former Smoker     Smokeless tobacco: Never Used   Substance and Sexual Activity     Alcohol use: Yes     Alcohol/week: 14.0 standard drinks     Drug use: No     Sexual activity: None   Other Topics Concern     None   Social History Narrative    Semi retired  x 47 years     Social Determinants of Health     Financial Resource Strain:      Difficulty of Paying Living Expenses:    Food Insecurity:      Worried About Running Out of Food in the Last Year:      Ran Out of Food in the Last Year:    Transportation Needs:      Lack of Transportation (Medical):      Lack of Transportation (Non-Medical):    Physical Activity:      Days of Exercise per Week:      Minutes of Exercise per Session:    Stress:      Feeling of Stress :    Social Connections:      Frequency of Communication with Friends and Family:      Frequency of Social Gatherings with Friends and Family:      Attends [employer] Services:      Active Member of Clubs or Organizations:      Attends Club or Organization Meetings:      Marital Status:    Intimate Partner Violence:      Fear of Current or Ex-Partner:      Emotionally Abused:      Physically Abused:      Sexually Abused:        MEDICATIONS: Reviewed from the MAR, physician orders, and/or earlier  progress notes.  Post Discharge Medication Reconciliation Status: discharge medications reconciled, continue medications without change.  Updated by me today (09/14/2021) with a decrease in glargine from 50 units to 44 units reflected below.  Note that this encounter occurred on 09/14/2021 but documentation not completed until 09/20/2021, hence an even lower glargine dose noted below.  Current Outpatient Medications   Medication Sig     acetaminophen (TYLENOL) 500 MG tablet Take 1,000 mg by mouth every 8 hours as needed      amLODIPine (NORVASC) 2.5 MG tablet [AMLODIPINE (NORVASC) 2.5 MG TABLET] Take 1 tablet (2.5 mg total) by mouth daily.     aspirin 81 MG EC tablet [ASPIRIN 81 MG EC TABLET] Take 1 tablet (81 mg total) by mouth daily.     cholecalciferol, vitamin D3, (VITAMIN D3) 2,000 unit Tab [CHOLECALCIFEROL, VITAMIN D3, (VITAMIN D3) 2,000 UNIT TAB] Take 1 tablet (2,000 Units total) by mouth daily.     citalopram (CELEXA) 10 MG tablet Take 10 mg by mouth daily     cyanocobalamin 1000 MCG tablet [CYANOCOBALAMIN 1000 MCG TABLET] Take 1 tablet (1,000 mcg total) by mouth daily.     diclofenac (VOLTAREN) 1 % topical gel Apply 2 g topically daily as needed for moderate pain      hydroCHLOROthiazide (HYDRODIURIL) 25 MG tablet [HYDROCHLOROTHIAZIDE (HYDRODIURIL) 25 MG TABLET] TAKE ONE TABLET BY MOUTH ONCE DAILY .  - TAKE WITH LOSARTAN - COMBO NOT AVAILABLE -     insulin aspart (NOVOLOG PEN) 100 UNIT/ML pen Inject 5 Units Subcutaneous 3 times daily (with meals)      insulin aspart (NOVOLOG PEN) 100 UNIT/ML pen Inject Subcutaneous 3 times daily (with meals) <200 0 units; 201-250 2 units; 251-300 4 units, >300 5 units and update NP     insulin glargine (LANTUS PEN) 100 UNIT/ML pen Inject 20 Units Subcutaneous At Bedtime      latanoprost (XALATAN) 0.005 % ophthalmic solution [LATANOPROST (XALATAN) 0.005 % OPHTHALMIC SOLUTION] Administer 1 drop to both eyes at bedtime.     losartan (COZAAR) 100 MG tablet Take 100 mg by mouth  daily      magnesium oxide (MAGOX) 400 mg (241.3 mg magnesium) tablet [MAGNESIUM OXIDE (MAGOX) 400 MG (241.3 MG MAGNESIUM) TABLET] Take 1 tablet (400 mg total) by mouth 2 (two) times a day.     metFORMIN (GLUCOPHAGE) 500 MG tablet Take 500 mg by mouth 1000 mg in the morning and 500 mg in the evening     polyethylene glycol (MIRALAX) 17 g packet Take 1 packet by mouth daily as needed for constipation     pravastatin (PRAVACHOL) 20 MG tablet Take 20 mg by mouth daily      pregabalin (LYRICA) 50 MG capsule Take 50 mg by mouth At Bedtime     senna (SENOKOT) 8.6 MG tablet Take 1 tablet by mouth 2 times daily as needed for constipation     ticagrelor (BRILINTA) 90 MG tablet Take 90 mg by mouth 2 times daily     No current facility-administered medications for this visit.     ALLERGIES:   Allergies   Allergen Reactions     Actos [Pioglitazone] Swelling     Edema     Atorvastatin Unknown     Back pain     Donepezil Unknown and Diarrhea     Diarrhea     Gabapentin Diarrhea     Diarrhea     Niacin Unknown     Hyperglycemia     Simvastatin Unknown     Back pain     DIET: Diabetic    Vitals:    09/14/21 1240   BP: 128/68   Pulse: 80   Resp: 18   Temp: 98  F (36.7  C)   SpO2: 97%   Weight: 135.5 kg (298 lb 12.8 oz)   Height: 1.829 m (6')     Body mass index is 40.52 kg/m .    EXAMINATION:   General: Pleasant, alert, and conversant elderly male, sitting in a wheelchair, in no apparent distress.  His wife is in attendance.  Head: Normocephalic and atraumatic.   Eyes: PERRLA, sclerae clear. Slight disconjugate gaze, left eye moving medially.  ENT: Moist oral mucosa. He has upper dentures with some remaining teeth on the bottom. No rhinorrhea or nasal discharge. Hearing is adequate for conversation in a quiet room.  Cardiovascular: Regular rate and rhythm without appreciable murmur.  Positive left carotid bruit.  Respiratory: Loose cough with faint rales appreciated in the right lung.  We will order chest x-rays.    Abdomen:  Nondistended.   Musculoskeletal/Extremities: Age-related degenerative joint disease. Lower extremity venous stasis changes.  Bilateral 1-2+ pretibial edema.  Integument: Several areas of bruising on forearms and hand dorsi.  Otherwise, no rashes, clinically significant lesions, or skin breakdown.   Cognitive/Psychiatric: Suspected cognitive impairment based on previous cognitive testing, although he did seem to do well during my visit with him today. His recall seems excellent. He is going to be evaluated by speech therapy. Affect is euthymic.    DIAGNOSTICS:   Recent Results (from the past 240 hour(s))   Clostridium difficile toxin B PCR    Collection Time: 09/16/21 10:08 PM    Specimen: Per Rectum; Stool   Result Value Ref Range    C Difficile Toxin B by PCR Negative Negative     Lab Results   Component Value Date    WBC 9.3 05/11/2021     Lab Results   Component Value Date    RBC 4.13 05/11/2021     Lab Results   Component Value Date    HGB 12.3 05/11/2021     Lab Results   Component Value Date    HCT 37.8 05/11/2021     Lab Results   Component Value Date    MCV 92 05/11/2021     Lab Results   Component Value Date    MCH 29.8 05/11/2021     Lab Results   Component Value Date    MCHC 32.5 05/11/2021     Lab Results   Component Value Date    RDW 13.0 05/11/2021     Lab Results   Component Value Date     05/11/2021         ICD-10-CM    1. Paroxysmal atrial fibrillation (H)  I48.0    2. S/P left carotid endarterectomy  Z98.890    3. Oropharyngeal dysphagia  R13.12    4. Type 2 diabetes mellitus with diabetic polyneuropathy, with long-term current use of insulin (H)  E11.42     Z79.4    5. Sinus bradycardia  R00.1    6. Cognitive impairment  R41.89    7. Moderate major depression (H)  F32.1    8. Benign hypertensive kidney disease with chronic kidney disease stage I through stage IV, or unspecified  I12.9    9. Chronic kidney disease, stage 3a  N18.31    10. Mild major depression (H)  F32.0    11. Slow transit  constipation  K59.01        CHANGES:  1.  Okay for CPAP with home settings.  2.  CXR due to right basilar rales.  3.  Decrease glargine from 50 units to 44 units.    CARE PLAN:  The care plan has been reviewed and all orders signed.  Changes to care plan, if any, as noted.  Otherwise, continue current plan of care.  Total time spent with this patient was 39 minutes, with greater than 50% spent in counseling and coordination of care that included evaluation of recent blood glucose levels with insulin adjustment made, ordering of chest x-rays, and explanations of these items to the patient and his wife.    The above has been created using voice recognition software. Please be aware that this may unintentionally  produce inaccuracies and/or nonsensical sentences.      Electronically signed by: PASCUAL Morales CNP

## 2021-09-22 ENCOUNTER — TELEPHONE (OUTPATIENT)
Dept: INTERNAL MEDICINE | Facility: CLINIC | Age: 86
End: 2021-09-22
Payer: MEDICARE

## 2021-09-22 NOTE — PROGRESS NOTES
[facility]    Name:   Robert E Schamberger (Domenico)  :   1934  MRN:    [id number]     Facility:   [facility] ([facility]) [[id number]]   Room: [facility] / [place] [address]  Code Status: FULL CODE and POLST AVAILABLE -     DOS: 2021  Previous visit: 2021    PCP:  Ld Moy    CHIEF COMPLAINT / REASON FOR VISIT:  Chief Complaint   Patient presents with     Discharge Summary Nursing Home     Carotid artery stenosis s/p left trans carotid artery revascularization      [facility] from 2021 until 2021 (confusion, aphasia, right-sided hemiparesis/critical stenosis of left ICA)  [facility] from 2021 until 2021  [facility] from 2021 until 2021 (left transcarotid artery revascularization with conduit - TCAR)  [facility] from 2021 until 2021 (carotid artery stenosis s/p left transcarotid artery revascularization)      HPI: Sonu is an 87 year old male with a history of diabetes mellitus type 2 (with chronic kidney disease and polyneuropathy), hypertension, atrial fibrillation, and history of TIA (2019).  On 2021, he initially presented to the ED with increasing confusion, aphasia, and right-sided hemiparesis. A stroke code was called, and he received tPA. Imaging revealed critical stenosis of the left ICA. Vascular surgery was consulted, and his  presentation and his symptoms with this admission were felt likely due to symptomatic carotid artery stenosis and other factors. He was noted to be a high risk surgical candidate given the anatomy of the vessel, location of the stenosis, and body habitus among other comorbidities. PTA glargine and losartan were decreased, and pravastatin was increased. He was subsequently transferred here for rehab.    On 2021, he underwent a left TCAR with Dr. Hendrickson.  On the day of discharge, he was neurologically at baseline, being  "ambulatory with a walker and assist of 1.  He was tolerating a dysphagia diet and had been evaluated by speech, physical, and occupational therapies, and was voiding without difficulty.  His hospitalization was prolonged due to inability to find TCU placement, but he eventually returned here.      CURRENT/RECENT TCU ISSUES    Disposition: He is generally quite pleasant and gives the impression of being alert and oriented; however, there are some cognitive issues (see below).  He told me \"they did it the new way, and it was the first time I ever did it.\" He was unable to explain this any further.    He still has diarrhea, although he says he has a history of getting diarrhea frequently.    I asked him how he managed to accomplish transfers.  He says he can push his wheelchair to the bed, hit, and get in bed independently, although therapy is still wanting him to have assistance.    The patient underwent P2 Y 12 testing and was found to be a nonresponder.  Therefore, he was started on ticagrelor in preparation for the procedure (which she eventually underwent).  Pravastatin was not Dr. Hendrickson' preferred statin, but the patient demonstrated intolerance to atorvastatin previously.    Diabetes mellitus type 2: When last here, we resumed his PTA glargine 50 units.  It has been increased to 60 units prior to his hospitalization.  He was also receiving prandial NovoLog 15 units with each meal; however, he was experiencing hypoglycemic episodes that did not necessarily correlate with the time of day.  We decreased prandial NovoLog to 10 units with breakfast, 10 units with lunch, and 5 units with supper.  This has not changed since his last TCU stay.  Note: We also discontinued his sliding scale coverage.  He suggested that he experiences hypoglycemic symptoms for anything less than 80, although he apparently could not be sure.  He also has diabetic polyneuropathy -- receiving pregabalin -- and chronic kidney disease (stage " "IIIa).  He has had some rather low blood glucose levels lately (below 100 almost every morning and one as low as 54, although he denied any symptoms), and we decreased his glargine from 50 units to 44 units.  With blood glucose levels are improving, Dr. Rubio decreased the glargine down to 20 units.  She also decrease prandial NovoLog to 5 units with each meal.    No outward signs of diabetes sequelae.  He denies any numbness or tingling in the extremities.    Dysphagia: In the hospital, he underwent a video swallow on 07/09/2021, showing mild to moderate oropharyngeal dysphagia, and nectar thick liquids were ordered. Orders from the hospital were to repeat a video swallow in 1 to 2 weeks. Due to oropharyngeal dysphagia (R13.12) following cerebral infarct (I69.391), speech therapy also requested a repeat video swallow study.  They were to follow him in the home upon discharge.  As noted, speech therapy did see him during his most recent hospitalization.  At an earlier visit, his nurse informed me that he had been given a salad and choked on it.  He also told me he choked \"a bit\" the previous night at dinner, stating it was \"a hot dish or something.\"  His lungs were auscultated when seen on 09/14, and I could appreciate right basilar rales.  We ordered chest x-rays.  He has been followed by SLP.    Cognition: Cognitive impairment has been noted, and a request was made for speech therapy in this regard (as well as his dysphagia). He scored 14/30 on the SLUMS and 3.9/5.6 on the CPT in February 2020. At that time, there was impairment of short-term memory and problem-solving.  They did give him a diagnosis of dementia without behavioral disturbance during his most recent hospitalization.    Discharge planning: He will be discharging today, 09/21/2021.  Services to be provided include a home health nurse for monitoring and managing blood glucose levels, a home health aide, home PT, OT, and speech therapy to observe swallow " "risk.      ROS: He says he had diarrhea last night with a lot of flatus.  He was toileted 4 times and \"once in my shorts.\"  Otherwise, no headaches or chest pains, coughing or congestion, nausea or vomiting, dizziness or dyspnea, dysuria, diplopia, difficulty with integumentary issues, or problems with sleep (with CPAP).    Past Medical History:   Diagnosis Date     Anemia in chronic kidney disease      B12 deficiency 11/9/2017     Bilateral hearing loss 5/9/2016     Bursitis, hip, left 3/2/2017     Chest pain     Normal GXT 2006     Chronic kidney disease, stage 3 (moderate)     Worsening with hyperkalemia on Relafen-discontinued August 2016     Chronic kidney disease, stage 3a 5/7/2021     Closed displaced fracture of left clavicle 9/21/2017     Cognitive changes 11/30/2018    SLUMS 20/30 Nov 2018     Complete tear of left rotator cuff 2/15/2018    Associated with fall and clavicular fracture that occurred 2017, evaluated by orthopedics, given cortisone injection and physical therapy recommended     Degenerative disc disease, lumbar      Dementia without behavioral disturbance (H) 2/21/2020     Depression      Diabetic peripheral neuropathy (H) 11/30/2018     Erectile dysfunction      Fatigue 7/6/2017     Forearm tendonitis 11/29/2016     Glaucoma      Gout attack     Left foot     History of recurrent TIAs 5/7/2020     HTN (hypertension)      Hyperlipidemia      Ingrown toenail 7/6/2017     Intermittent asthma without complication 11/9/2017     Left leg cellulitis 2014     Low back pain      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     Morbid obesity (H)      QUETA on CPAP      Osteoarthritis      Osteoarthritis of both hands      Osteoarthritis of both knees     Right TKA     Pain of right heel 5/11/2018     Peripheral edema 5/7/2021     Peripheral neuropathy 10/9/2018     Recurrent falls 3/5/2021     Rib fracture 2005     Right-sided chest pain 5/9/2016     S/P left carotid endarterectomy 9/13/2021     Screening  "    No AAA on CT scan 2009     Sepsis secondary to UTI (H) 3/5/2021    Hospitalized with urosepsis January 2021 and rehospitalized after fall found to have recurrent UTI treated with 30 days of Omnicef for acute prostatitis     Sick sinus syndrome (H) 5/7/2020    Abnormal event monitor with 2.8-second sinus pause     Squamous cell skin cancer 11/2/2015     TIA (transient ischemic attack) 09/06/2019    CT head showing chronic lacunar infarcts.  Carotid ultrasound with atherosclerotic plaque but no severe stenosis.     Traumatic subarachnoid hemorrhage with loss of consciousness of 30 minutes or less (H) 9/21/2017    Fall down the stairs following alcohol use with traumatic subarachnoid hemorrhage treated conservatively at [facility] followed by stay at TCU with no residual neurologic deficits     Trochanteric bursitis of right hip 8/24/2018     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)      Unsteady gait 3/4/2019     Vitamin D deficiency 11/9/2017              History reviewed. No pertinent family history.  Social History     Socioeconomic History     Marital status:      Spouse name: None     Number of children: None     Years of education: None     Highest education level: None   Occupational History     None   Tobacco Use     Smoking status: Former Smoker     Smokeless tobacco: Never Used   Substance and Sexual Activity     Alcohol use: Yes     Alcohol/week: 14.0 standard drinks     Drug use: No     Sexual activity: None   Other Topics Concern     None   Social History Narrative    Semi retired  x 47 years     Social Determinants of Health     Financial Resource Strain:      Difficulty of Paying Living Expenses:    Food Insecurity:      Worried About Running Out of Food in the Last Year:      Ran Out of Food in the Last Year:    Transportation Needs:      Lack of Transportation (Medical):      Lack of Transportation (Non-Medical):    Physical Activity:      Days of Exercise per  Week:      Minutes of Exercise per Session:    Stress:      Feeling of Stress :    Social Connections:      Frequency of Communication with Friends and Family:      Frequency of Social Gatherings with Friends and Family:      Attends Oriental orthodox Services:      Active Member of Clubs or Organizations:      Attends Club or Organization Meetings:      Marital Status:    Intimate Partner Violence:      Fear of Current or Ex-Partner:      Emotionally Abused:      Physically Abused:      Sexually Abused:        MEDICATIONS: Reviewed from the MAR, physician orders, and/or earlier progress notes.  Post Discharge Medication Reconciliation Status: discharge medications reconciled, continue medications without change.  Updated by me today (09/14/2021) with a decrease in glargine from 50 units to 44 units reflected below.  Note that this encounter occurred on 09/14/2021 but documentation not completed until 09/20/2021, hence an even lower glargine dose noted below.  Current Outpatient Medications   Medication Sig     acetaminophen (TYLENOL) 500 MG tablet Take 1,000 mg by mouth every 8 hours as needed      amLODIPine (NORVASC) 2.5 MG tablet [AMLODIPINE (NORVASC) 2.5 MG TABLET] Take 1 tablet (2.5 mg total) by mouth daily.     aspirin 81 MG EC tablet [ASPIRIN 81 MG EC TABLET] Take 1 tablet (81 mg total) by mouth daily.     cholecalciferol, vitamin D3, (VITAMIN D3) 2,000 unit Tab [CHOLECALCIFEROL, VITAMIN D3, (VITAMIN D3) 2,000 UNIT TAB] Take 1 tablet (2,000 Units total) by mouth daily.     citalopram (CELEXA) 10 MG tablet Take 10 mg by mouth daily     cyanocobalamin 1000 MCG tablet [CYANOCOBALAMIN 1000 MCG TABLET] Take 1 tablet (1,000 mcg total) by mouth daily.     diclofenac (VOLTAREN) 1 % topical gel Apply 2 g topically daily as needed for moderate pain      hydroCHLOROthiazide (HYDRODIURIL) 25 MG tablet [HYDROCHLOROTHIAZIDE (HYDRODIURIL) 25 MG TABLET] TAKE ONE TABLET BY MOUTH ONCE DAILY .  - TAKE WITH LOSARTAN - COMBO NOT  AVAILABLE -     insulin aspart (NOVOLOG PEN) 100 UNIT/ML pen Inject 5 Units Subcutaneous 3 times daily (with meals)      insulin aspart (NOVOLOG PEN) 100 UNIT/ML pen Inject Subcutaneous 3 times daily (with meals) <200 0 units; 201-250 2 units; 251-300 4 units, >300 5 units and update NP     insulin glargine (LANTUS PEN) 100 UNIT/ML pen Inject 20 Units Subcutaneous At Bedtime      latanoprost (XALATAN) 0.005 % ophthalmic solution [LATANOPROST (XALATAN) 0.005 % OPHTHALMIC SOLUTION] Administer 1 drop to both eyes at bedtime.     losartan (COZAAR) 100 MG tablet Take 100 mg by mouth daily      magnesium oxide (MAGOX) 400 mg (241.3 mg magnesium) tablet [MAGNESIUM OXIDE (MAGOX) 400 MG (241.3 MG MAGNESIUM) TABLET] Take 1 tablet (400 mg total) by mouth 2 (two) times a day.     metFORMIN (GLUCOPHAGE) 500 MG tablet Take 500 mg by mouth 1000 mg in the morning and 500 mg in the evening     polyethylene glycol (MIRALAX) 17 g packet Take 1 packet by mouth daily as needed for constipation     pravastatin (PRAVACHOL) 20 MG tablet Take 20 mg by mouth daily      pregabalin (LYRICA) 50 MG capsule Take 50 mg by mouth At Bedtime     senna (SENOKOT) 8.6 MG tablet Take 1 tablet by mouth 2 times daily as needed for constipation     ticagrelor (BRILINTA) 90 MG tablet Take 90 mg by mouth 2 times daily     No current facility-administered medications for this visit.     ALLERGIES:   Allergies   Allergen Reactions     Actos [Pioglitazone] Swelling     Edema     Atorvastatin Unknown     Back pain     Donepezil Unknown and Diarrhea     Diarrhea     Gabapentin Diarrhea     Diarrhea     Niacin Unknown     Hyperglycemia     Simvastatin Unknown     Back pain     DIET: Diabetic    Vitals:    09/21/21 1005   BP: (!) 140/52   Pulse: 81   Resp: 20   Temp: 97.9  F (36.6  C)   SpO2: 95%   Weight: 134.4 kg (296 lb 3.2 oz)   Height: 1.829 m (6')     Body mass index is 40.17 kg/m .    EXAMINATION:   General: Pleasant, alert, and conversant elderly male,  sitting in a recliner, in no apparent distress.  Head: Normocephalic and atraumatic.   Eyes: PERRLA, sclerae clear. Slight disconjugate gaze, left eye moving medially.  ENT: Moist oral mucosa. He has upper dentures with some remaining teeth on the bottom. No rhinorrhea or nasal discharge. Hearing is adequate for conversation in a quiet room.  Cardiovascular: Regular rate and rhythm without appreciable murmur.  Positive left carotid bruit.  Respiratory: Loose cough with faint rales appreciated in the right lung.  We did order chest x-rays, but they were unremarkable.    Abdomen: Nondistended.   Musculoskeletal/Extremities: Age-related degenerative joint disease. Lower extremity venous stasis changes.  Bilateral 1-2+ pretibial edema.  Integument: Several areas of bruising on forearms and hand dorsi.  Otherwise, no rashes, clinically significant lesions, or skin breakdown.   Cognitive/Psychiatric: Suspected cognitive impairment based on previous cognitive testing, although he did seem to do well during my visit with him today. His recall seems excellent. He is going to be evaluated by speech therapy. Affect is euthymic.    DIAGNOSTICS:   Recent Results (from the past 240 hour(s))   Clostridium difficile toxin B PCR    Collection Time: 09/16/21 10:08 PM    Specimen: Per Rectum; Stool   Result Value Ref Range    C Difficile Toxin B by PCR Negative Negative     Lab Results   Component Value Date    WBC 9.3 05/11/2021     Lab Results   Component Value Date    RBC 4.13 05/11/2021     Lab Results   Component Value Date    HGB 12.3 05/11/2021     Lab Results   Component Value Date    HCT 37.8 05/11/2021     Lab Results   Component Value Date    MCV 92 05/11/2021     Lab Results   Component Value Date    MCH 29.8 05/11/2021     Lab Results   Component Value Date    MCHC 32.5 05/11/2021     Lab Results   Component Value Date    RDW 13.0 05/11/2021     Lab Results   Component Value Date     05/11/2021         ICD-10-CM     1. Paroxysmal atrial fibrillation (H)  I48.0    2. S/P left carotid endarterectomy  Z98.890    3. Oropharyngeal dysphagia  R13.12    4. Type 2 diabetes mellitus with diabetic polyneuropathy, with long-term current use of insulin (H)  E11.42     Z79.4    5. Sinus bradycardia  R00.1    6. Cognitive impairment  R41.89    7. Moderate major depression (H)  F32.1    8. Benign hypertensive kidney disease with chronic kidney disease stage I through stage IV, or unspecified  I12.9    9. Chronic kidney disease, stage 3a  N18.31    10. Mild major depression (H)  F32.0    11. Slow transit constipation  K59.01        DISCHARGE PLAN/FACE TO FACE:  I certify that this patient is under my care and that I had a face-to-face encounter that meets the physician face-to-face encounter requirements with this patient.     Date of Face-to-Face Encounter:  09/21/2021     I certify that, based on my findings, the following services are medically necessary home health services:  Home health nurse, home health aide, home PT, OT, and speech therapies.    My clinical findings support the need for the above skilled services because: (Please write a brief narrative summary that describes what the RN, PT, SLP, or other services will be doing in the home. A list of diagnoses in this section does not meet the CMS requirements):  Home health nurse for blood glucose monitoring/management/teaching; home health aide for bathing and ADL needs; home PT for strengthening, balance, endurance, and safety with mobility/ambulation; home OT for strengthening, ADL needs, adaptive equipment, and safety; speech therapy for managing swallowing risk.    This patient is homebound because: (Please write a brief narrative summmary describing the functional limitations as to why this patient is homebound and specifically what makes this patient homebound.):  Above services necessarily need to be performed in the home to be of benefit.    The patient is, or has been,  under my care and I have initiated the establishment of the plan of care. This patient will be followed by a physician who will periodically review the plan of care.  Initial follow-up should be within 7-10 days.    Approximate time spent with this patient was greater than 30 minutes with greater than 50% spent in discussions regarding services required upon discharge.      The above has been created using voice recognition software. Please be aware that this may unintentionally  produce inaccuracies and/or nonsensical sentences.      Electronically signed by: PASCUAL Morales CNP

## 2021-09-22 NOTE — TELEPHONE ENCOUNTER
Reason for Call:  Other     Detailed comments: Wife, Nely called nad stated patient has a blister on the heel of his foot and he is a diabetic. Wife is just wondering how they should treat the blister?     Phone Number Patient can be reached at: Cell number on file:    Telephone Information:   Home:      [phone]        Best Time: ANY    Can we leave a detailed message on this number? YES    Call taken on 9/22/2021 at 9:48 AM by Kimberlee Brennan

## 2021-09-22 NOTE — TELEPHONE ENCOUNTER
..Reason for Call:  Other    Detailed comments: TC relayed message. However, Nely, still has an additional question. What can she do in the meantime until an appointment is had with podiatry?     Phone Number Patient can be reached at: Cell number on file:    Telephone Information:   Mobile [phone]       Best Time: ANY    Can we leave a detailed message on this number? YES    Call taken on 9/22/2021 at 3:15 PM by LOS Camacho

## 2021-09-22 NOTE — TELEPHONE ENCOUNTER
The best advice is for him to see a podiatrist.  She can bring him to [facility] in [place] or can schedule an appointment with one of our podiatrist and [facility].

## 2021-09-23 ENCOUNTER — TELEPHONE (OUTPATIENT)
Dept: INTERNAL MEDICINE | Facility: CLINIC | Age: 86
End: 2021-09-23

## 2021-09-23 NOTE — TELEPHONE ENCOUNTER
I would keep the area covered with a Band-Aid and watch for any signs of infection with increasing redness or pain.

## 2021-09-23 NOTE — TELEPHONE ENCOUNTER
Reason for Call:  Other     Detailed comments: Ellie @ [facility] calling to see if Dr Moy will follow for [facility]    Ellie [phone]        Call taken on 9/23/2021 at 10:40 AM by Laura L Goldberg, ARRT

## 2021-09-23 NOTE — TELEPHONE ENCOUNTER
Ellie notified.  Briana Kraus [facility] ............... 2:37 PM, 09/23/21

## 2021-09-26 NOTE — PROGRESS NOTES
Office Visit - Follow Up   Robert E Schamberger   83 y.o. male    Date of Visit: 2/15/2018    Chief Complaint   Patient presents with     Diabetes     Hypertension        Assessment and Plan   1. Type 2 diabetes mellitus with peripheral neuropathy  Reviewed diabetic log.  Sugars look well controlled with current insulin regimen.  Recheck hemoglobin A1c.  We will change him from Lantus to Basaglar at same units.  Prescription sent to his pharmacy.  Continue annual eye exam scheduled for next week.  - Glycosylated Hemoglobin A1c    2. Essential hypertension  Good control of blood pressure with current dose of valsartan/HCTZ    3. Hyperlipidemia  Recheck lipid profile on pravastatin and monitor LFTs.  Continue aspirin 81 mg daily  - Lipid Cascade  - Hepatic Profile    4. Complete tear of left rotator cuff  Evaluated by orthopedics with abnormal MRI.  Encouraging him to follow-up to get physical therapy scheduled    5. B12 deficiency  Recheck B12 level.  Initiated treatment in September.  Taking 1000 mcg daily  - Vitamin B12    6. Chronic kidney disease, stage 3 (moderate)  Monitor renal function  - Basic Metabolic Panel    7. Anemia in stage 3 chronic kidney disease  Recheck hemoglobin  - Hemoglobin    8. Mild intermittent asthma without complication  Stable    9. QUETA on CPAP  Wearing CPAP nightly    10. Ptosis of eyelid  Recent blepharoplasty    Return in about 3 months (around 5/15/2018) for Recheck.     History of Present Illness   This 83 y.o. old male with multiple medical problems here to follow-up.  He has type 2 diabetes and is on insulin.  Sugars look overall well controlled and I reviewed his diabetic log.  Most morning sugars under 120.  One episode of hypoglycemia.  Seldom over 200.  Last hemoglobin A1c was 7%.  He is scheduled to see his eye doctor next week.  He did have surgery for ptosis involving both upper eyelids.  Still healing from this.  He needs a new prescription for insulin.  Insurance no  Group Topic: BH Process Group     Date: 9/26/2021  Start Time: 0930  End Time: 1030  Facilitators: Sveta Bridges LCSW    Focus: Process  Number in attendance: 7    Patients were encouraged and joined in a discussion sharing their behaviors, events, and precipitants to their admission, current stressors, and areas of improvement, symptoms of continued concern, and a goal for the day. Pt listened to peers share to help relate experiences. Positive, supportive feedback was encouraged and provided.      Method: Group  Attendance: Present  Participation: Active  Patient Response: Attentive  Mood: Normal  Affect: Type: Euthymic (normal mood)   Range: Full (normal)   Congruency: Congruent   Stability: Stable  Behavior/Socialization: Engaged  Thought Process: Focused  Task Performance: Follows directions  Patient Evaluation: Independent - full participation     Pt participated and engaged in group well. Pt reports feeling \"content\" and shared their past SA while IP, processing further. Pt shared precipitants to admission such as their thoughts of jumping off a building, and shared their hx of tx. Pt shared recent stressors related to mom losing her job and pt feeling guilty for such due their mental health as well as school, and grandma's current wellbeing and feeling as grandma may pass away soon. Pt shared grandma is their purpose of life; pt shared a support system as mom and sister, and a goal as \"my anxiety\". Writer offered support.          longer covering Lantus.    No change in chronic low back pain.  Still having left shoulder pain since his fall last year.  Clavicular fracture at that time.  Evaluated by orthopedics and now diagnosed with left rotator cuff tear on MRI.  Cortisone injection given in physical therapy recommended but this has not been started.  He is still awaiting a phone call but never called the office either.    Continues to use pravastatin to manage hypercholesterolemia.  Tolerating the medication without any side effects.    Blood pressure is well controlled with valsartan/hydrochlorothiazide.    Wearing CPAP faithfully at night for sleep apnea    Review of Systems:  Otherwise, a comprehensive review of systems was negative except as noted.     Medications, Allergies and Problem List   Patient Active Problem List   Diagnosis     Lumbar Facet Syndrome     Type 2 diabetes mellitus with peripheral neuropathy     Hyperlipidemia     Osteoarthritis     Low back pain     HTN (hypertension)     Glaucoma     QUETA on CPAP     Erectile dysfunction     Morbid obesity     Degenerative disc disease, lumbar     Osteoarthritis of both knees     Depression     Osteoarthritis of both hands     Squamous cell skin cancer     Anemia in chronic kidney disease     Bilateral hearing loss     Chronic kidney disease, stage 3 (moderate)     Bursitis, hip, left     COPD (chronic obstructive pulmonary disease)     Fatigue     Traumatic subarachnoid hemorrhage with loss of consciousness of 30 minutes or less     B12 deficiency     Vitamin D deficiency     Intermittent asthma without complication     Complete tear of left rotator cuff       He has a past surgical history that includes pr removal gallbladder; pr revise median n/carpal tunnel surg; pr excis stomach ulcer,lesn;local; Total knee arthroplasty (Right, 2014); Carpal tunnel release (Bilateral); Eye surgery; Colonoscopy (2003); and Blepharoplasty (2018).    Allergies   Allergen Reactions     Actos  "[Pioglitazone] Swelling     Edema     Gabapentin Diarrhea     Diarrhea     Lipitor [Atorvastatin]      Back pain     Niacin      Hyperglycemia     Simvastatin      Back pain       Current Outpatient Prescriptions   Medication Sig Dispense Refill     acetaminophen (TYLENOL) 500 MG tablet Take 1,000 mg by mouth 3 (three) times a day. Special Instructions: max: 4000 mg in 24 hours Dx: pain  Three Times A Day; 08:00 AM, 12:00 PM, 04:00 PM       aspirin 81 mg chewable tablet Chew 81 mg daily.       cholecalciferol, vitamin D3, (VITAMIN D3) 2,000 unit Tab Take 1 tablet (2,000 Units total) by mouth daily. 100 tablet 3     citalopram (CELEXA) 20 MG tablet TAKE ONE TABLET BY MOUTH ONCE DAILY 90 tablet 3     cyanocobalamin 1000 MCG tablet Take 1 tablet (1,000 mcg total) by mouth daily. 100 tablet 3     diclofenac sodium (VOLTAREN) 1 % Gel Place 2-4 g on the skin.       insulin aspart (NOVOLOG) 100 unit/mL injection Inject 15 Units under the skin 3 (three) times a day before meals.       latanoprost (XALATAN) 0.005 % ophthalmic solution Administer 1 drop to both eyes at bedtime.       metFORMIN (GLUCOPHAGE-XR) 500 MG 24 hr tablet Take 3 tablets (1,500 mg total) by mouth daily. 270 tablet 3     pravastatin (PRAVACHOL) 10 MG tablet TAKE ONE TABLET BY MOUTH AT BEDTIME 90 tablet 3     valsartan-hydrochlorothiazide (DIOVAN-HCT) 320-25 mg per tablet take 1 tablet by mouth every day (needs appt for further refills) 90 tablet 3     insulin glargine (LANTUS; BASAGLAR) 100 unit/mL (3 mL) pen Inject 75 Units under the skin at bedtime. 25 adj dose pen 3     No current facility-administered medications for this visit.         Physical Exam   General Appearance:   Obese elderly male who otherwise appears well    /60 (Patient Site: Left Arm, Patient Position: Sitting, Cuff Size: Thigh)  Ht 5' 10\" (1.778 m)  Wt (!) 326 lb (147.9 kg)  BMI 46.78 kg/m2    HEENT: Normal  Respiratory: Normal respiratory effort.  Lungs are clear with no " rales or wheezes.  Heart: Regular rate and rhythm without murmurs, rubs, or gallops.  No carotid bruits.  Extremities: No peripheral edema.  Neurologic: Grossly nonfocal  Skin: No cyanosis or pallor  Psych: Alert and oriented ×3, mood appropriate         Additional Information   Social History   Substance Use Topics     Smoking status: Former Smoker     Smokeless tobacco: Never Used     Alcohol use None              Ld Moy MD

## 2021-09-29 ENCOUNTER — MEDICAL CORRESPONDENCE (OUTPATIENT)
Dept: HEALTH INFORMATION MANAGEMENT | Facility: CLINIC | Age: 86
End: 2021-09-29
Payer: MEDICARE

## 2021-10-04 ENCOUNTER — APPOINTMENT (OUTPATIENT)
Dept: NURSING | Facility: CLINIC | Age: 86
End: 2021-10-04
Payer: MEDICARE

## 2021-10-04 ENCOUNTER — PATIENT OUTREACH (OUTPATIENT)
Dept: NURSING | Facility: CLINIC | Age: 86
End: 2021-10-04

## 2021-10-04 ENCOUNTER — PATIENT OUTREACH (OUTPATIENT)
Dept: CARE COORDINATION | Facility: CLINIC | Age: 86
End: 2021-10-04

## 2021-10-04 DIAGNOSIS — I65.22 CAROTID ARTERY STENOSIS, SYMPTOMATIC, LEFT: Primary | ICD-10-CM

## 2021-10-04 NOTE — LETTER
[facility] CARE COORDINATION  [address] [facility] [address]    October 4, 2021    Robert E Schamberger  [address] [place] [address]      Dear Sonu,    I am a clinic community health worker who works with Ld Moy MD at [facility]. I wanted to thank you for spending the time to talk with me.  Below is a description of clinic care coordination and how I can further assist you.      The clinic care coordination team is made up of a registered nurse,  and community health worker who understand the health care system. The goal of clinic care coordination is to help you manage your health and improve access to the health care system in the most efficient manner. The team can assist you in meeting your health care goals by providing education, coordinating services, strengthening the communication among your providers and supporting you with any resource needs.    Please feel free to contact me at [phone] with any questions or concerns. We are focused on providing you with the highest-quality healthcare experience possible and that all starts with you.     Sincerely,     Gaviota Smith  Community Health Worker  [facility] Care Coordination   Office: [phone]

## 2021-10-04 NOTE — PROGRESS NOTES
Clinic Care Coordination Contact  Community Health Worker Initial Outreach    Patient accepts CC: No, Patients wife would like a letter sent in the mail to aptients home and they will discuss CCC and call CHW back if interested.     Patient will be sent Care Coordination introduction letter for future reference.

## 2021-10-05 ENCOUNTER — VIRTUAL VISIT (OUTPATIENT)
Dept: INTERNAL MEDICINE | Facility: CLINIC | Age: 86
End: 2021-10-05
Payer: MEDICARE

## 2021-10-05 DIAGNOSIS — F01.50 VASCULAR DEMENTIA WITHOUT BEHAVIORAL DISTURBANCE (H): ICD-10-CM

## 2021-10-05 DIAGNOSIS — E11.8 DM TYPE 2, CONTROLLED, WITH COMPLICATION (H): ICD-10-CM

## 2021-10-05 DIAGNOSIS — R29.6 RECURRENT FALLS: ICD-10-CM

## 2021-10-05 DIAGNOSIS — Z86.73 HISTORY OF CVA (CEREBROVASCULAR ACCIDENT): Primary | ICD-10-CM

## 2021-10-05 DIAGNOSIS — I10 ESSENTIAL HYPERTENSION: ICD-10-CM

## 2021-10-05 DIAGNOSIS — Z98.890 S/P CAROTID ENDARTERECTOMY: ICD-10-CM
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
## 2021-10-05 PROBLEM — G45.9 TIA (TRANSIENT ISCHEMIC ATTACK): Status: RESOLVED | Noted: 2019-09-06 | Resolved: 2021-10-05

## 2021-10-05 PROCEDURE — 99442 PR PHYSICIAN TELEPHONE EVALUATION 11-20 MIN: CPT | Mod: 95 | Performed by: INTERNAL MEDICINE

## 2021-10-05 ASSESSMENT — ASTHMA QUESTIONNAIRES
QUESTION_4 LAST FOUR WEEKS HOW OFTEN HAVE YOU USED YOUR RESCUE INHALER OR NEBULIZER MEDICATION (SUCH AS ALBUTEROL): NOT AT ALL
ACT_TOTALSCORE: 24
QUESTION_1 LAST FOUR WEEKS HOW MUCH OF THE TIME DID YOUR ASTHMA KEEP YOU FROM GETTING AS MUCH DONE AT WORK, SCHOOL OR AT HOME: NONE OF THE TIME
QUESTION_2 LAST FOUR WEEKS HOW OFTEN HAVE YOU HAD SHORTNESS OF BREATH: NOT AT ALL
QUESTION_3 LAST FOUR WEEKS HOW OFTEN DID YOUR ASTHMA SYMPTOMS (WHEEZING, COUGHING, SHORTNESS OF BREATH, CHEST TIGHTNESS OR PAIN) WAKE YOU UP AT NIGHT OR EARLIER THAN USUAL IN THE MORNING: NOT AT ALL
QUESTION_5 LAST FOUR WEEKS HOW WOULD YOU RATE YOUR ASTHMA CONTROL: WELL CONTROLLED

## 2021-10-05 ASSESSMENT — PATIENT HEALTH QUESTIONNAIRE - PHQ9: SUM OF ALL RESPONSES TO PHQ QUESTIONS 1-9: 0

## 2021-10-05 NOTE — PROGRESS NOTES
Sonu is a 87 year old who is being evaluated via a billable telephone visit.      What phone number would you like to be contacted at? [phone]  How would you like to obtain your AVS? Mail a copy    Assessment & Plan     History of CVA (cerebrovascular accident)  87-year-old male with multiple medical problems with hospitalization July 2021 with acute CVA presenting with increased confusion right-sided weakness.  CT without intracerebral bleed and given thrombolytics.  Symptoms resolved and discharged to TCU for further rehab.  Found to have severe carotid stenosis with CTA showing critical greater than 90% stenosis of proximal left internal carotid artery.  Interestingly, previous carotid ultrasound in early 2020 at time of hospitalization did not show any significant stenosis of either carotid artery.  Underwent carotid endarterectomy..  He has now completed course of Brilinta combined with aspirin.  We discussed whether he should resume Plavix which was started in early 2020 at time of hospitalization with TIA.  Plavix was added to aspirin at that time.  However, he is at high risk of falling as he is done in the past.  Increased risk of intracranial bleeding with combination of aspirin and Plavix.  At this point, having had carotid endarterectomy completed, I think the risks of continuing combination of aspirin and Plavix outweigh benefit and he will not resume Plavix.  He will however continue aspirin and I am suggesting taking 162 mg daily.  He will continue on his statin taking 20 mg of pravastatin daily.  He has not tolerated stronger statins previously.  We discussed good blood pressure control as well.    S/P left carotid endarterectomy  As above, underwent left carotid endarterectomy for critical stenosis found during hospitalization with CVA    DM type 2, controlled, with complication (H)  Wife reports that sugars are generally well controlled with current insulin regimen and will not make any  changes at this time    Vascular dementia without behavioral disturbance (H)  Ongoing cognitive impairment secondary to vascular dementia.    Essential hypertension  Systolic averaging around 140.  Continues on losartan, HCTZ and amlodipine.  Unable to tolerate higher doses of calcium channel blocker causing edema.  We will continue to monitor at home with goal of systolic under 140.  Some reluctance to push blood pressure lower which may increase his risk for falling.  We could change his losartan to valsartan.  We will schedule follow-up visit in 6-week to reassess.    Recurrent falls  As above, history of falling with unsteady gait needing walker at all times.  At risk for intracerebral hemorrhage with combination of aspirin and Plavix with Plavix being discontinued.               BMI:   Estimated body mass index is 40.17 kg/m  as calculated from the following:    Height as of 9/21/21: 1.829 m (6').    Weight as of 9/21/21: 134.4 kg (296 lb 3.2 oz).           Return in about 6 weeks (around 11/16/2021) for Follow up.    Ld Moy MD  [facility]    Brandt Ascencio is a 87 year old who presents for the following health issues     HPI telephone visit was completed today to address hospitalization this summer with CVA and subsequent carotid endarterectomy.  See assessment and plan for details.      Review of Systems   Blood sugars controlled.  Making progress with physical therapy      Objective           Vitals:  No vitals were obtained today due to virtual visit.    Physical Exam   No physical exam completed during telephone visit        Phone call duration: 15 minutes

## 2021-10-06 ASSESSMENT — ASTHMA QUESTIONNAIRES: ACT_TOTALSCORE: 24

## 2021-10-07 DIAGNOSIS — F32.1 MODERATE MAJOR DEPRESSION (H): ICD-10-CM

## 2021-10-07 DIAGNOSIS — E11.8 DM TYPE 2, CONTROLLED, WITH COMPLICATION (H): Primary | ICD-10-CM

## 2021-10-07 RX ORDER — CITALOPRAM HYDROBROMIDE 20 MG/1
TABLET ORAL
Qty: 90 TABLET | Refills: 3 | Status: SHIPPED | OUTPATIENT
Start: 2021-10-07 | End: 2021-01-01

## 2021-10-08 RX ORDER — INSULIN GLARGINE 100 [IU]/ML
INJECTION, SOLUTION SUBCUTANEOUS
Qty: 45 ML | Refills: 3 | Status: SHIPPED | OUTPATIENT
Start: 2021-10-08 | End: 2021-01-01

## 2021-10-08 RX ORDER — INSULIN ASPART 100 [IU]/ML
INJECTION, SOLUTION INTRAVENOUS; SUBCUTANEOUS
Qty: 60 ML | Refills: 3 | Status: SHIPPED | OUTPATIENT
Start: 2021-10-08 | End: 2021-01-01

## 2021-10-08 NOTE — TELEPHONE ENCOUNTER
"Routing to physician for approval:  Please verify dose on Insulin Aspart and glargine.         Disp Refills Start End OREN   citalopram (CELEXA) 20 MG tablet 90 tablet 3 8/24/2020  No   Sig: TAKE ONE TABLET BY MOUTH ONCE DAILY   Sent to pharmacy as: citalopram 20 mg tablet (celeXA)   E-Prescribing Status: Receipt confirmed by pharmacy (8/24/2020  4:16 PM CDT)     Last Written Prescription Date:  08/24/2020  Last Fill Quantity: 90,  # refills: 3   Last office visit provider:  10/05/2021 with Dr Moy.      Disp Refills Start End OREN   insulin aspart U-100 (NOVOLOG FLEXPEN U-100 INSULIN) 100 unit/mL (3 mL) injection pen  0 11/9/2020  No   Sig - Route: Inject 15 Units under the skin 3 (three) times a day before meals. - Subcutaneous   Class: No Print     Last Written Prescription Date:  11/09/2020  Last Fill Quantity: ???,  # refills: ???     Disp Refills Start End OREN   insulin glargine (BASAGLAR KWIKPEN) 100 unit/mL (3 mL) pen 15 mL 3 5/17/2021  No   Sig - Route: Inject 55 Units under the skin daily. - Subcutaneous   Sent to pharmacy as: Basaglar KwikPen U-100 Insulin 100 unit/mL (3 mL) subcutaneous   E-Prescribing Status: Receipt confirmed by pharmacy (5/17/2021  7:46 AM CDT)     Last Written Prescription Date:  05/17/2021  Last Fill Quantity: 15mL,  # refills: 3       Requested Prescriptions   Pending Prescriptions Disp Refills     citalopram (CELEXA) 20 MG tablet [Pharmacy Med Name: CITALOPRAM HYDROBROMIDE 20MG TABLET] 90 tablet 3     Sig: TAKE ONE TABLET BY MOUTH ONCE DAILY       SSRIs Protocol Passed - 10/7/2021 10:55 AM        Passed - Recent (12 mo) or future (30 days) visit within the authorizing provider's specialty     Patient has had an office visit with the authorizing provider or a provider within the authorizing providers department within the previous 12 mos or has a future within next 30 days. See \"Patient Info\" tab in inbasket, or \"Choose Columns\" in Meds & Orders section of the refill encounter.  " "            Passed - Medication is active on med list        Passed - Patient is age 18 or older           NOVOLOG FLEXPEN 100 UNIT/ML soln [Pharmacy Med Name: NOVOLOG FLEXPEN FLEXPEN SOLN PEN-INJ] 60 mL 3     Sig: INJECT 20 UNITS EVERY MORNING  (150 DAY SUPPLY)       Short Acting Insulin Protocol Passed - 10/7/2021 10:55 AM        Passed - Serum creatinine on file in past 12 months     Recent Labs   Lab Test 08/17/21  1027   CR 1.45*       Ok to refill medication if creatinine is low          Passed - HgbA1C in past 3 or 6 months     If HgbA1C is 8 or greater, it needs to be on file within the past 3 months.  If less than 8, must be on file within the past 6 months.     Recent Labs   Lab Test 05/07/21  1051   A1C 6.8*             Passed - Medication is active on med list        Passed - Patient is age 18 or older        Passed - Recent (6 mo) or future (30 days) visit within the authorizing provider's specialty     Patient had office visit in the last 6 months or has a visit in the next 30 days with authorizing provider or within the authorizing provider's specialty.  See \"Patient Info\" tab in inbasket, or \"Choose Columns\" in Meds & Orders section of the refill encounter.               insulin glargine (BASAGLAR KWIKPEN) 100 UNIT/ML pen [Pharmacy Med Name: BASAGLAR KWIKPEN 100UNIT SOLN PEN-INJ]  3     Sig: INJECT 55 UNITS UNDER THE SKIN DAILY.       Long Acting Insulin Protocol Passed - 10/7/2021 10:55 AM        Passed - Serum creatinine on file in past 12 months     Recent Labs   Lab Test 08/17/21  1027   CR 1.45*       Ok to refill medication if creatinine is low          Passed - HgbA1C in past 3 or 6 months     If HgbA1C is 8 or greater, it needs to be on file within the past 3 months.  If less than 8, must be on file within the past 6 months.     Recent Labs   Lab Test 05/07/21  1051   A1C 6.8*             Passed - Medication is active on med list        Passed - Patient is age 18 or older        Passed - " "Recent (6 mo) or future (30 days) visit within the authorizing provider's specialty     Patient had office visit in the last 6 months or has a visit in the next 30 days with authorizing provider or within the authorizing provider's specialty.  See \"Patient Info\" tab in inbasket, or \"Choose Columns\" in Meds & Orders section of the refill encounter.                 Samia Taylor 10/07/21 10:23 PM  "

## 2021-10-14 ENCOUNTER — MEDICAL CORRESPONDENCE (OUTPATIENT)
Dept: HEALTH INFORMATION MANAGEMENT | Facility: CLINIC | Age: 86
End: 2021-10-14
Payer: MEDICARE

## 2021-10-19 PROBLEM — F32.9 MAJOR DEPRESSION: Status: ACTIVE | Noted: 2019-06-03

## 2021-10-19 PROBLEM — F32.9 MAJOR DEPRESSION: Status: ACTIVE | Noted: 2021-07-22

## 2021-10-20 ENCOUNTER — TRANSFERRED RECORDS (OUTPATIENT)
Dept: HEALTH INFORMATION MANAGEMENT | Facility: CLINIC | Age: 86
End: 2021-10-20
Payer: MEDICARE

## 2021-10-20 LAB — RETINOPATHY: NEGATIVE

## 2021-11-01 DIAGNOSIS — E10.9 TYPE I DIABETES MELLITUS (H): ICD-10-CM

## 2021-11-02 ENCOUNTER — MEDICAL CORRESPONDENCE (OUTPATIENT)
Dept: HEALTH INFORMATION MANAGEMENT | Facility: CLINIC | Age: 86
End: 2021-11-02
Payer: MEDICARE

## 2021-11-02 RX ORDER — HYDROCHLOROTHIAZIDE 25 MG/1
TABLET ORAL
Qty: 90 TABLET | Refills: 3 | Status: SHIPPED | OUTPATIENT
Start: 2021-11-02 | End: 2022-01-01

## 2021-11-02 NOTE — TELEPHONE ENCOUNTER
"Routing refill request to provider for review/approval because:  Labs out of range:  Multiple    Last Written Prescription Date:  8/24/20  Last Fill Quantity: 90,  # refills: 3   Last office visit provider:  10/5/21     Requested Prescriptions   Pending Prescriptions Disp Refills     hydrochlorothiazide (HYDRODIURIL) 25 MG tablet [Pharmacy Med Name: HYDROCHLOROTHIAZIDE 25MG TABLET] 90 tablet 3     Sig: TAKE ONE TABLET BY MOUTH ONCE DAILY .  - TAKE WITH LOSARTAN - COMBO NOT AVAILABLE -       Diuretics (Including Combos) Protocol Failed - 11/1/2021 10:57 AM        Failed - Blood pressure under 140/90 in past 12 months     BP Readings from Last 3 Encounters:   09/21/21 (!) 140/52   09/16/21 125/52   09/14/21 128/68                 Failed - Normal serum creatinine on file in past 12 months     Recent Labs   Lab Test 08/17/21  1027   CR 1.45*              Failed - Normal serum potassium on file in past 12 months     Recent Labs   Lab Test 08/17/21  1027   POTASSIUM 5.3*                    Passed - Recent (12 mo) or future (30 days) visit within the authorizing provider's specialty     Patient has had an office visit with the authorizing provider or a provider within the authorizing providers department within the previous 12 mos or has a future within next 30 days. See \"Patient Info\" tab in inbasket, or \"Choose Columns\" in Meds & Orders section of the refill encounter.              Passed - Medication is active on med list        Passed - Patient is age 18 or older        Passed - Normal serum sodium on file in past 12 months     Recent Labs   Lab Test 08/17/21  1027                      Edy Antony RN 11/02/21 12:59 PM  "

## 2021-11-24 NOTE — TELEPHONE ENCOUNTER
"  Disp Refills Start End OREN    pravastatin (PRAVACHOL) 20 MG tablet 90 tablet 3 10/19/2020  No   Sig: TAKE ONE TABLET BY MOUTH AT BEDTIME   Sent to pharmacy as: pravastatin 20 mg tablet (PRAVACHOL)   E-Prescribing Status: Receipt confirmed by pharmacy (10/19/2020  5:05 PM CDT)       Last office visit provider:  10/5/21     Requested Prescriptions   Pending Prescriptions Disp Refills     pravastatin (PRAVACHOL) 20 MG tablet [Pharmacy Med Name: PRAVASTATIN SODIUM 20MG TABLET] 90 tablet 3     Sig: TAKE ONE TABLET BY MOUTH AT BEDTIME       Statins Protocol Passed - 11/22/2021  9:33 AM        Passed - LDL on file in past 12 months     Recent Labs   Lab Test 05/11/21  0916   LDL 82             Passed - No abnormal creatine kinase in past 12 months     No lab results found.             Passed - Recent (12 mo) or future (30 days) visit within the authorizing provider's specialty     Patient has had an office visit with the authorizing provider or a provider within the authorizing providers department within the previous 12 mos or has a future within next 30 days. See \"Patient Info\" tab in inbasket, or \"Choose Columns\" in Meds & Orders section of the refill encounter.              Passed - Medication is active on med list        Passed - Patient is age 18 or older           pregabalin (LYRICA) 50 MG capsule [Pharmacy Med Name: PREGABALIN 50MG CAPSULE] 180 capsule 3     Sig: TAKE TWO (2) CAPSULES (100 MG TOTAL) BY MOUTH ONCE DAILY AT BEDTIME.       There is no refill protocol information for this order          Edy Antony RN 11/24/21 7:33 AM  "

## 2021-11-24 NOTE — TELEPHONE ENCOUNTER
"  Disp Refills Start End OREN    pregabalin (LYRICA) 50 MG capsule 180 capsule 3 4/5/2021  No   Sig: TAKE 2 CAPSULES (100 MG TOTAL) BY MOUTH ONCE DAILY AT BEDTIME.   Sent to pharmacy as: pregabalin 50 mg capsule (LYRICA)   E-Prescribing Status: Receipt confirmed by pharmacy (4/5/2021  1:51 PM CDT)       pregabalin (LYRICA) 50 MG capsule [[id number]]    Electronically signed by: Ld Moy MD on 04/05/21 1348 Status: Active   Ordering user: Ld Moy MD 04/05/21 1348 Authorized by: Ld Moy MD   Frequency:  04/05/21 - Until Discontinued Released by: Ld Moy MD 04/05/21 1348   Diagnoses  Diabetic peripheral neuropathy (H) [E11.42]     Routing refill request to provider for review/approval because:  Controlled substance request     Last office visit provider:  10/5/21     Requested Prescriptions   Pending Prescriptions Disp Refills     pregabalin (LYRICA) 50 MG capsule [Pharmacy Med Name: PREGABALIN 50MG CAPSULE] 180 capsule 3     Sig: TAKE TWO (2) CAPSULES (100 MG TOTAL) BY MOUTH ONCE DAILY AT BEDTIME.       There is no refill protocol information for this order      Signed Prescriptions Disp Refills    pravastatin (PRAVACHOL) 20 MG tablet 90 tablet 1     Sig: Take 1 tablet (20 mg) by mouth At Bedtime       Statins Protocol Passed - 11/22/2021  9:33 AM        Passed - LDL on file in past 12 months     Recent Labs   Lab Test 05/11/21  0916   LDL 82             Passed - No abnormal creatine kinase in past 12 months     No lab results found.             Passed - Recent (12 mo) or future (30 days) visit within the authorizing provider's specialty     Patient has had an office visit with the authorizing provider or a provider within the authorizing providers department within the previous 12 mos or has a future within next 30 days. See \"Patient Info\" tab in inbasket, or \"Choose Columns\" in Meds & Orders section of the refill encounter.              Passed - Medication is " active on med list        Passed - Patient is age 18 or older             Edy Antony RN 11/24/21 7:35 AM

## 2021-12-02 NOTE — TELEPHONE ENCOUNTER
"Reason for call:  Patient reporting a symptom    Symptom or request: UTI, lower back pain, slight temperature, \"talking goofy\"    Duration (how long have symptoms been present): 2 days    Have you been treated for this before? Yes    Additional comments: Patients wife, Nely called and stated patient was in the ED on Tuesday for possible viral infection. Nely states she believes Patient has a UTI. Nely states that patient has all the symptoms he did from the last time he had a UTI. Nely is wondering if patient could get a Rx from PCP for uti. Nely stated that they did not do a UA at the hospital on Tuesday.     Phone Number patient can be reached at:  Home number on file [phone] (home)    Best Time:  any    Can we leave a detailed message on this number:  YES    Call taken on 12/2/2021 at 9:12 AM by Kimberlee Brennan    "

## 2021-12-02 NOTE — TELEPHONE ENCOUNTER
I will send a prescription for Cipro 500 mg twice daily for 7 days to his [facility] pharmacy.  However, if his symptoms get any worse or if he is not responding to the antibiotic over the next 24 to 48 hours, he will need to be evaluated.  ER would be most appropriate.

## 2021-12-07 PROBLEM — F01.50 VASCULAR DEMENTIA WITHOUT BEHAVIORAL DISTURBANCE (H): Status: ACTIVE | Noted: 2021-01-01

## 2021-12-07 PROBLEM — I65.22 CAROTID ARTERY STENOSIS, SYMPTOMATIC, LEFT: Status: RESOLVED | Noted: 2021-07-22 | Resolved: 2021-01-01

## 2021-12-07 PROBLEM — Z98.890 S/P CAROTID ENDARTERECTOMY: Status: RESOLVED | Noted: 2021-09-13 | Resolved: 2021-01-01

## 2021-12-07 PROBLEM — R41.89 COGNITIVE IMPAIRMENT: Status: RESOLVED | Noted: 2021-07-22 | Resolved: 2021-01-01

## 2021-12-07 NOTE — PROGRESS NOTES
Assessment & Plan     Type 2 diabetes mellitus with diabetic polyneuropathy, with long-term current use of insulin (H)  Reviewed diabetic log.  Sugars have been higher this past week probably associated with urinary tract infection.  Previously looking well controlled and will recheck A1c today.  Clarified his current insulin regimen of 44 units of Basaglar at bedtime and NovoLog before meals.  Suggesting that he increase his dose of NovoLog before dinner as this is his largest meal of the day and to decrease his dose before lunch.  10 units with breakfast, 5 units with lunch and 10 units with dinner will be new regimen.  He continues on Metformin.  Remains on statin.  Has peripheral neuropathy and chronic kidney disease.  Up-to-date with annual diabetic eye exams.  Diabetic foot exam completed today.  - Hemoglobin A1c  - Hemoglobin A1c    Diabetic peripheral neuropathy (H)  Continues Lyrica 50 mg at bedtime to manage neuropathy symptoms  - pregabalin (LYRICA) 50 MG capsule  Dispense: 180 capsule; Refill: 3    Chronic kidney disease, stage 3a (H)  Recheck renal function.  Avoid nephrotoxins  - Comprehensive metabolic panel (BMP + Alb, Alk Phos, ALT, AST, Total. Bili, TP)  - Comprehensive metabolic panel (BMP + Alb, Alk Phos, ALT, AST, Total. Bili, TP)    Vascular dementia without behavioral disturbance (H)  Ongoing cognitive impairment.  Wife providing assistance with ADLs    Paroxysmal atrial fibrillation (H)  Anticoagulation discontinued given high risk of falling.  Benefits no longer outweighing risks.    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  She did not want to bring him to the ER urgent care and prescription for ciprofloxacin was sent to his pharmacy.  Mental status has  significantly improved over the last week and he is now at his baseline.  He is still having some mild dysuria.  - UA Macro with Reflex to Micro and Culture - lab collect  - UA Macro with Reflex to Micro and Culture - lab collect  - Urine Microscopic    History of CVA (cerebrovascular accident)  Hospitalized July 2021 with acute CVA presenting with confusion and right-sided weakness.  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  Underwent carotid artery repair with conduit placed.  Continues on aspirin 162 mg daily along with pravastatin.    Essential hypertension  Blood pressure looks reasonably well controlled with current medications  - Comprehensive metabolic panel (BMP + Alb, Alk Phos, ALT, AST, Total. Bili, TP)  - CBC with platelets  - Comprehensive metabolic panel (BMP + Alb, Alk Phos, ALT, AST, Total. Bili, TP)  - CBC with platelets    QUETA on CPAP  Wearing CPAP faithfully    Hyperlipidemia, unspecified hyperlipidemia type  Recheck lipid profile on pravastatin  - Lipid Profile (Chol, Trig, HDL, LDL calc)  - Lipid Profile (Chol, Trig, HDL, LDL calc)    Vitamin D deficiency  Continues on vitamin D replacement    High priority for 2019-nCoV vaccine  He is needing his booster for Covid vaccination and also flu shot being provided today  - COVID-19,PF,MODERNA (18+ Yrs BOOSTER .25mL)       BMI:   Estimated body mass index is 40.14 kg/m  as calculated from the following:    Height as of this encounter: 1.829 m (6').    Weight as of this encounter: 134.3 kg (296 lb).                Return in about 3 months (around 3/7/2022) for Follow up.    Ld Moy MD  [facility]      40 minutes spent on the date of the encounter doing chart review, history and exam, documentation and further activities per the note    Subjective       HPI 87-year-old male here to follow-up multiple chronic medical problems including type 2 diabetes, history of CVA, hypertension,  peripheral neuropathy, vascular dementia, history of depression, recent UTI symptoms with increasing confusion, sleep apnea, and hypercholesterolemia.  See assessment and plan for details of visit    Current Outpatient Medications   Medication     acetaminophen (TYLENOL) 500 MG tablet     amLODIPine (NORVASC) 2.5 MG tablet     aspirin (ASA) 81 MG EC tablet     cholecalciferol, vitamin D3, (VITAMIN D3) 2,000 unit Tab     ciprofloxacin (CIPRO) 500 MG tablet     citalopram (CELEXA) 10 MG tablet     cyanocobalamin 1000 MCG tablet     diclofenac (VOLTAREN) 1 % topical gel     hydrochlorothiazide (HYDRODIURIL) 25 MG tablet     insulin aspart (NOVOLOG PEN) 100 UNIT/ML pen     insulin glargine (BASAGLAR KWIKPEN) 100 UNIT/ML pen     latanoprost (XALATAN) 0.005 % ophthalmic solution     losartan (COZAAR) 100 MG tablet     magnesium oxide (MAGOX) 400 mg (241.3 mg magnesium) tablet     metFORMIN (GLUCOPHAGE) 500 MG tablet     polyethylene glycol (MIRALAX) 17 g packet     pravastatin (PRAVACHOL) 20 MG tablet     pregabalin (LYRICA) 50 MG capsule     No current facility-administered medications for this visit.        Review of Systems  No chest pain.  No headaches.        Objective    Vitals:    12/07/21 1140   BP: 138/68   BP Location: Right arm   Patient Position: Sitting   Cuff Size: Adult Large   Pulse: 53   SpO2: 98%   Weight: 134.3 kg (296 lb)   Height: 1.829 m (6')        Physical Exam  Obese elderly male  Lungs clear bilaterally  Heart regular rate and rhythm  Diabetic foot exam with good pedal pulses.  Absent vibratory sensation but normal monofilament exam.  Trace edema.

## 2021-12-08 NOTE — LETTER
December 8, 2021      Robert E Schamberger  [address] [place] [address]        Dear Domenico,    Paul Orders   UA Macro with Reflex to Micro and Culture - lab collect   Result Value Ref Range    Color Urine Yellow Colorless, Straw, Light Yellow, Yellow    Appearance Urine Clear Clear    Glucose Urine Negative Negative mg/dL    Bilirubin Urine Negative Negative    Ketones Urine Negative Negative mg/dL    Specific Gravity Urine 1.025 1.005 - 1.030    Blood Urine Trace (A) Negative    pH Urine 7.0 5.0 - 8.0    Protein Albumin Urine 100  (A) Negative mg/dL    Urobilinogen Urine 0.2 0.2, 1.0 E.U./dL    Nitrite Urine Negative Negative    Leukocyte Esterase Urine Negative Negative   Hemoglobin A1c   Result Value Ref Range    Hemoglobin A1C 7.9 (H) 0.0 - 5.6 %      Comment:      Normal <5.7%   Prediabetes 5.7-6.4%    Diabetes 6.5% or higher     Note: Adopted from ADA consensus guidelines.   Comprehensive metabolic panel (BMP + Alb, Alk Phos, ALT, AST, Total. Bili, TP)   Result Value Ref Range    Sodium 136 136 - 145 mmol/L    Potassium 5.5 (H) 3.5 - 5.0 mmol/L    Chloride 100 98 - 107 mmol/L    Carbon Dioxide (CO2) 26 22 - 31 mmol/L    Anion Gap 10 5 - 18 mmol/L    Urea Nitrogen 27 8 - 28 mg/dL    Creatinine 1.71 (H) 0.70 - 1.30 mg/dL    Calcium 8.7 8.5 - 10.5 mg/dL    Glucose 198 (H) 70 - 125 mg/dL    Alkaline Phosphatase 117 45 - 120 U/L    AST 40 0 - 40 U/L    ALT 39 0 - 45 U/L    Protein Total 6.1 6.0 - 8.0 g/dL    Albumin 3.0 (L) 3.5 - 5.0 g/dL    Bilirubin Total 0.4 0.0 - 1.0 mg/dL    GFR Estimate 35 (L) >60 mL/min/1.73m2      Comment:      As of July 11, 2021, eGFR is calculated by the CKD-EPI creatinine equation, without race adjustment. eGFR can be influenced by muscle mass, exercise, and diet. The reported eGFR is an estimation only and is only applicable if the renal function is stable.   CBC with platelets   Result Value Ref Range    WBC Count 7.9 4.0 - 11.0 10e3/uL    RBC Count 3.93 (L) 4.40 - 5.90  Patient called and stated that she is all out of this medication.  PSR advised the patient that refills can take 48 to 72 hours to fill.   10e6/uL    Hemoglobin 11.5 (L) 13.3 - 17.7 g/dL    Hematocrit 35.1 (L) 40.0 - 53.0 %    MCV 89 78 - 100 fL    MCH 29.3 26.5 - 33.0 pg    MCHC 32.8 31.5 - 36.5 g/dL    RDW 12.2 10.0 - 15.0 %    Platelet Count 311 150 - 450 10e3/uL   Lipid Profile (Chol, Trig, HDL, LDL calc)   Result Value Ref Range    Cholesterol 117 <=199 mg/dL    Triglycerides 176 (H) <=149 mg/dL    Direct Measure HDL 26 (L) >=40 mg/dL      Comment:      HDL Cholesterol Reference Range:     0-2 years:   No reference ranges established for patients under 2 years old  [facility] lipid analytes.    2-8 years:  Greater than 45 mg/dL     18 years and older:   Female: Greater than or equal to 50 mg/dL   Male:   Greater than or equal to 40 mg/dL    LDL Cholesterol Calculated 56 <=129 mg/dL    Patient Fasting > 8hrs? Yes    Urine Microscopic   Result Value Ref Range    RBC Urine 5-10 (A) 0-2 /HPF /HPF    WBC Urine 0-5 0-5 /HPF /HPF    Mucus Urine Present (A) None Seen /LPF    Hyaline Casts Urine 0-2 (A) None Seen /LPF    Narrative    Urine Culture not indicated     Your kidney function has declined from last time.  Make sure you are staying well-hydrated drinking plenty of water and avoiding over-the-counter NSAIDs including ibuprofen, Motrin, Advil and Aleve.  It is okay to take Tylenol/acetaminophen.    Because of the declining kidney function, I would recommend cutting back on the dose of Metformin and only take 500 mg twice daily.    Your potassium is elevated and this is related to decreased kidney function.  Make sure you are restricting potassium in your diet.  You should avoid eating foods that contain high amounts of potassium including bananas, oranges, melons and potatoes.    Diabetes control is not as good as last time with A1c up to 7.9%.  I would suggest increasing your dose of Basaglar to 46 units at bedtime and I would take 10 units of NovoLog with each of your meals.    Mild anemia is stable.  The urinalysis looked okay  so hopefully the infection has completely resolved.  Cholesterol is well controlled.    If you have any questions or concerns, please call the clinic at the number listed above.       Sincerely,      Ld Moy MD

## 2022-01-01 ENCOUNTER — NURSING HOME VISIT (OUTPATIENT)
Dept: GERIATRICS | Facility: CLINIC | Age: 87
End: 2022-01-01
Payer: MEDICARE

## 2022-01-01 ENCOUNTER — TELEPHONE (OUTPATIENT)
Dept: GERIATRICS | Facility: CLINIC | Age: 87
End: 2022-01-01

## 2022-01-01 ENCOUNTER — TRANSITIONAL CARE UNIT VISIT (OUTPATIENT)
Dept: GERIATRICS | Facility: CLINIC | Age: 87
End: 2022-01-01
Payer: MEDICARE

## 2022-01-01 ENCOUNTER — HOSPITAL ENCOUNTER (OUTPATIENT)
Dept: CARDIOLOGY | Facility: CLINIC | Age: 87
Discharge: HOME | End: 2022-05-11
Attending: INTERNAL MEDICINE | Admitting: INTERNAL MEDICINE
Payer: MEDICARE

## 2022-01-01 ENCOUNTER — OFFICE VISIT (OUTPATIENT)
Dept: INTERNAL MEDICINE | Facility: CLINIC | Age: 87
End: 2022-01-01
Payer: MEDICARE

## 2022-01-01 ENCOUNTER — TELEPHONE (OUTPATIENT)
Dept: CARDIOLOGY | Facility: CLINIC | Age: 87
End: 2022-01-01
Payer: MEDICARE

## 2022-01-01 ENCOUNTER — LAB REQUISITION (OUTPATIENT)
Dept: LAB | Facility: CLINIC | Age: 87
End: 2022-01-01
Payer: MEDICARE

## 2022-01-01 ENCOUNTER — TELEPHONE (OUTPATIENT)
Dept: INTERNAL MEDICINE | Facility: CLINIC | Age: 87
End: 2022-01-01
Payer: MEDICARE

## 2022-01-01 ENCOUNTER — VIRTUAL VISIT (OUTPATIENT)
Dept: INTERNAL MEDICINE | Facility: CLINIC | Age: 87
End: 2022-01-01
Payer: MEDICARE

## 2022-01-01 ENCOUNTER — MEDICAL CORRESPONDENCE (OUTPATIENT)
Dept: HEALTH INFORMATION MANAGEMENT | Facility: CLINIC | Age: 87
End: 2022-01-01

## 2022-01-01 ENCOUNTER — NURSING HOME VISIT (OUTPATIENT)
Dept: GERIATRICS | Facility: CLINIC | Age: 87
End: 2022-01-01
Payer: OTHER MISCELLANEOUS

## 2022-01-01 ENCOUNTER — TELEPHONE (OUTPATIENT)
Dept: INTERNAL MEDICINE | Facility: CLINIC | Age: 87
End: 2022-01-01

## 2022-01-01 ENCOUNTER — OFFICE VISIT (OUTPATIENT)
Dept: CARDIOLOGY | Facility: CLINIC | Age: 87
End: 2022-01-01
Payer: MEDICARE

## 2022-01-01 ENCOUNTER — TELEPHONE (OUTPATIENT)
Dept: NURSING | Facility: CLINIC | Age: 87
End: 2022-01-01
Payer: MEDICARE

## 2022-01-01 ENCOUNTER — NURSE TRIAGE (OUTPATIENT)
Dept: NURSING | Facility: CLINIC | Age: 87
End: 2022-01-01
Payer: MEDICARE

## 2022-01-01 VITALS
HEIGHT: 72 IN | TEMPERATURE: 97.4 F | SYSTOLIC BLOOD PRESSURE: 146 MMHG | WEIGHT: 280 LBS | DIASTOLIC BLOOD PRESSURE: 60 MMHG | OXYGEN SATURATION: 94 % | RESPIRATION RATE: 18 BRPM | HEART RATE: 70 BPM | BODY MASS INDEX: 37.93 KG/M2

## 2022-01-01 VITALS
HEART RATE: 83 BPM | WEIGHT: 294 LBS | SYSTOLIC BLOOD PRESSURE: 149 MMHG | OXYGEN SATURATION: 95 % | DIASTOLIC BLOOD PRESSURE: 52 MMHG | HEIGHT: 72 IN | BODY MASS INDEX: 39.82 KG/M2 | TEMPERATURE: 97.5 F | RESPIRATION RATE: 18 BRPM

## 2022-01-01 VITALS
DIASTOLIC BLOOD PRESSURE: 52 MMHG | SYSTOLIC BLOOD PRESSURE: 121 MMHG | OXYGEN SATURATION: 98 % | RESPIRATION RATE: 19 BRPM | WEIGHT: 290 LBS | TEMPERATURE: 97 F | HEART RATE: 60 BPM | BODY MASS INDEX: 39.28 KG/M2 | HEIGHT: 72 IN

## 2022-01-01 VITALS
DIASTOLIC BLOOD PRESSURE: 52 MMHG | BODY MASS INDEX: 39.28 KG/M2 | WEIGHT: 290 LBS | HEART RATE: 60 BPM | TEMPERATURE: 97.1 F | HEIGHT: 72 IN | OXYGEN SATURATION: 99 % | RESPIRATION RATE: 19 BRPM | SYSTOLIC BLOOD PRESSURE: 121 MMHG

## 2022-01-01 VITALS
WEIGHT: 280.2 LBS | DIASTOLIC BLOOD PRESSURE: 68 MMHG | HEART RATE: 60 BPM | SYSTOLIC BLOOD PRESSURE: 154 MMHG | OXYGEN SATURATION: 98 % | RESPIRATION RATE: 19 BRPM | TEMPERATURE: 96.9 F | HEIGHT: 72 IN | BODY MASS INDEX: 37.95 KG/M2

## 2022-01-01 VITALS
BODY MASS INDEX: 24.41 KG/M2 | DIASTOLIC BLOOD PRESSURE: 73 MMHG | HEIGHT: 72 IN | SYSTOLIC BLOOD PRESSURE: 155 MMHG | WEIGHT: 180.2 LBS | TEMPERATURE: 97.3 F | OXYGEN SATURATION: 98 % | HEART RATE: 65 BPM | RESPIRATION RATE: 16 BRPM

## 2022-01-01 VITALS
WEIGHT: 290.2 LBS | DIASTOLIC BLOOD PRESSURE: 54 MMHG | RESPIRATION RATE: 18 BRPM | HEIGHT: 72 IN | HEART RATE: 58 BPM | OXYGEN SATURATION: 97 % | TEMPERATURE: 97.6 F | BODY MASS INDEX: 39.31 KG/M2 | SYSTOLIC BLOOD PRESSURE: 130 MMHG

## 2022-01-01 VITALS
WEIGHT: 281.2 LBS | OXYGEN SATURATION: 95 % | SYSTOLIC BLOOD PRESSURE: 138 MMHG | HEIGHT: 72 IN | BODY MASS INDEX: 38.09 KG/M2 | TEMPERATURE: 97.3 F | DIASTOLIC BLOOD PRESSURE: 57 MMHG | HEART RATE: 84 BPM | RESPIRATION RATE: 16 BRPM

## 2022-01-01 VITALS
HEART RATE: 71 BPM | WEIGHT: 290 LBS | HEIGHT: 72 IN | BODY MASS INDEX: 39.28 KG/M2 | RESPIRATION RATE: 20 BRPM | TEMPERATURE: 98.3 F | OXYGEN SATURATION: 98 % | SYSTOLIC BLOOD PRESSURE: 143 MMHG | DIASTOLIC BLOOD PRESSURE: 60 MMHG

## 2022-01-01 VITALS
TEMPERATURE: 98.3 F | WEIGHT: 290 LBS | DIASTOLIC BLOOD PRESSURE: 60 MMHG | HEIGHT: 72 IN | SYSTOLIC BLOOD PRESSURE: 143 MMHG | HEART RATE: 71 BPM | BODY MASS INDEX: 39.28 KG/M2 | RESPIRATION RATE: 20 BRPM | OXYGEN SATURATION: 98 %

## 2022-01-01 VITALS
RESPIRATION RATE: 16 BRPM | HEART RATE: 72 BPM | HEIGHT: 72 IN | BODY MASS INDEX: 39.87 KG/M2 | DIASTOLIC BLOOD PRESSURE: 40 MMHG | SYSTOLIC BLOOD PRESSURE: 124 MMHG

## 2022-01-01 VITALS — DIASTOLIC BLOOD PRESSURE: 60 MMHG | SYSTOLIC BLOOD PRESSURE: 130 MMHG | OXYGEN SATURATION: 97 % | HEART RATE: 53 BPM

## 2022-01-01 VITALS
TEMPERATURE: 98.1 F | SYSTOLIC BLOOD PRESSURE: 121 MMHG | OXYGEN SATURATION: 94 % | WEIGHT: 290 LBS | RESPIRATION RATE: 19 BRPM | DIASTOLIC BLOOD PRESSURE: 52 MMHG | HEART RATE: 60 BPM | HEIGHT: 72 IN | BODY MASS INDEX: 39.28 KG/M2

## 2022-01-01 VITALS
WEIGHT: 294 LBS | HEART RATE: 54 BPM | OXYGEN SATURATION: 98 % | HEIGHT: 72 IN | BODY MASS INDEX: 39.82 KG/M2 | SYSTOLIC BLOOD PRESSURE: 122 MMHG | DIASTOLIC BLOOD PRESSURE: 78 MMHG

## 2022-01-01 VITALS
DIASTOLIC BLOOD PRESSURE: 58 MMHG | RESPIRATION RATE: 17 BRPM | SYSTOLIC BLOOD PRESSURE: 143 MMHG | HEIGHT: 72 IN | WEIGHT: 290 LBS | BODY MASS INDEX: 39.28 KG/M2 | HEART RATE: 69 BPM | OXYGEN SATURATION: 94 % | TEMPERATURE: 97.5 F

## 2022-01-01 DIAGNOSIS — I49.5 SICK SINUS SYNDROME (H): ICD-10-CM

## 2022-01-01 DIAGNOSIS — I10 PRIMARY HYPERTENSION: ICD-10-CM

## 2022-01-01 DIAGNOSIS — I65.21 CAROTID STENOSIS, RIGHT: ICD-10-CM

## 2022-01-01 DIAGNOSIS — R06.2 WHEEZING: ICD-10-CM

## 2022-01-01 DIAGNOSIS — E11.42 TYPE 2 DIABETES MELLITUS WITH DIABETIC POLYNEUROPATHY, WITH LONG-TERM CURRENT USE OF INSULIN (H): ICD-10-CM

## 2022-01-01 DIAGNOSIS — S22.43XA CLOSED FRACTURE OF MULTIPLE RIBS OF BOTH SIDES, INITIAL ENCOUNTER: ICD-10-CM

## 2022-01-01 DIAGNOSIS — Z51.5 HOSPICE CARE PATIENT: ICD-10-CM

## 2022-01-01 DIAGNOSIS — F01.50 VASCULAR DEMENTIA WITHOUT BEHAVIORAL DISTURBANCE (H): ICD-10-CM

## 2022-01-01 DIAGNOSIS — F32.5 MAJOR DEPRESSIVE DISORDER IN FULL REMISSION, UNSPECIFIED WHETHER RECURRENT (H): ICD-10-CM

## 2022-01-01 DIAGNOSIS — R53.81 PHYSICAL DECONDITIONING: ICD-10-CM

## 2022-01-01 DIAGNOSIS — N18.31 ANEMIA IN STAGE 3A CHRONIC KIDNEY DISEASE (H): ICD-10-CM

## 2022-01-01 DIAGNOSIS — I48.0 PAROXYSMAL ATRIAL FIBRILLATION (H): Primary | ICD-10-CM

## 2022-01-01 DIAGNOSIS — E66.01 MORBID OBESITY (H): ICD-10-CM

## 2022-01-01 DIAGNOSIS — E78.5 HYPERLIPIDEMIA, UNSPECIFIED HYPERLIPIDEMIA TYPE: ICD-10-CM

## 2022-01-01 DIAGNOSIS — N18.31 CHRONIC KIDNEY DISEASE, STAGE 3A (H): ICD-10-CM

## 2022-01-01 DIAGNOSIS — I12.9 BENIGN HYPERTENSIVE KIDNEY DISEASE WITH CHRONIC KIDNEY DISEASE STAGE I THROUGH STAGE IV, OR UNSPECIFIED: ICD-10-CM

## 2022-01-01 DIAGNOSIS — I10 ESSENTIAL (PRIMARY) HYPERTENSION: ICD-10-CM

## 2022-01-01 DIAGNOSIS — R29.6 FALLS FREQUENTLY: ICD-10-CM

## 2022-01-01 DIAGNOSIS — E55.9 VITAMIN D DEFICIENCY: ICD-10-CM

## 2022-01-01 DIAGNOSIS — T14.8XXA ABRASION: ICD-10-CM

## 2022-01-01 DIAGNOSIS — I48.0 PAROXYSMAL ATRIAL FIBRILLATION (H): ICD-10-CM

## 2022-01-01 DIAGNOSIS — Z79.4 TYPE 2 DIABETES MELLITUS WITH DIABETIC POLYNEUROPATHY, WITH LONG-TERM CURRENT USE OF INSULIN (H): ICD-10-CM

## 2022-01-01 DIAGNOSIS — K92.2 ACUTE LOWER GI BLEEDING: ICD-10-CM

## 2022-01-01 DIAGNOSIS — I12.9 BENIGN HYPERTENSIVE KIDNEY DISEASE WITH CHRONIC KIDNEY DISEASE STAGE I THROUGH STAGE IV, OR UNSPECIFIED: Primary | ICD-10-CM

## 2022-01-01 DIAGNOSIS — I10 ESSENTIAL HYPERTENSION: ICD-10-CM

## 2022-01-01 DIAGNOSIS — F32.0 MILD MAJOR DEPRESSION (H): ICD-10-CM

## 2022-01-01 DIAGNOSIS — T14.8XXA BRUISE: ICD-10-CM

## 2022-01-01 DIAGNOSIS — Z86.73 HISTORY OF CVA (CEREBROVASCULAR ACCIDENT): ICD-10-CM

## 2022-01-01 DIAGNOSIS — R29.6 RECURRENT FALLS: Primary | ICD-10-CM

## 2022-01-01 DIAGNOSIS — D64.9 CHRONIC ANEMIA: ICD-10-CM

## 2022-01-01 DIAGNOSIS — G47.33 OSA ON CPAP: ICD-10-CM

## 2022-01-01 DIAGNOSIS — I50.30 HEART FAILURE WITH PRESERVED EJECTION FRACTION, NYHA CLASS I (H): ICD-10-CM

## 2022-01-01 DIAGNOSIS — I34.2 NONRHEUMATIC MITRAL VALVE STENOSIS: ICD-10-CM

## 2022-01-01 DIAGNOSIS — E11.69 TYPE 2 DIABETES MELLITUS WITH OTHER SPECIFIED COMPLICATION, WITHOUT LONG-TERM CURRENT USE OF INSULIN (H): Primary | ICD-10-CM

## 2022-01-01 DIAGNOSIS — E11.42 TYPE 2 DIABETES MELLITUS WITH DIABETIC POLYNEUROPATHY, WITH LONG-TERM CURRENT USE OF INSULIN (H): Primary | ICD-10-CM

## 2022-01-01 DIAGNOSIS — K92.2 ACUTE LOWER GI BLEEDING: Primary | ICD-10-CM

## 2022-01-01 DIAGNOSIS — E10.9 TYPE I DIABETES MELLITUS (H): ICD-10-CM

## 2022-01-01 DIAGNOSIS — R42 DIZZINESS: ICD-10-CM

## 2022-01-01 DIAGNOSIS — K59.01 SLOW TRANSIT CONSTIPATION: ICD-10-CM

## 2022-01-01 DIAGNOSIS — R53.1 WEAKNESS: ICD-10-CM

## 2022-01-01 DIAGNOSIS — E11.42 DIABETIC PERIPHERAL NEUROPATHY (H): ICD-10-CM

## 2022-01-01 DIAGNOSIS — Z53.9 ERRONEOUS ENCOUNTER--DISREGARD: Primary | ICD-10-CM

## 2022-01-01 DIAGNOSIS — Z79.4 TYPE 2 DIABETES MELLITUS WITH DIABETIC POLYNEUROPATHY, WITH LONG-TERM CURRENT USE OF INSULIN (H): Primary | ICD-10-CM
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
## 2022-01-01 DIAGNOSIS — D63.1 ANEMIA IN CHRONIC KIDNEY DISEASE (CODE): ICD-10-CM

## 2022-01-01 DIAGNOSIS — Z23 NEED FOR VIRAL IMMUNIZATION: ICD-10-CM

## 2022-01-01 DIAGNOSIS — Z51.5 HOSPICE CARE PATIENT: Primary | ICD-10-CM

## 2022-01-01 DIAGNOSIS — N18.30 CHRONIC KIDNEY DISEASE, STAGE 3 UNSPECIFIED (H): ICD-10-CM

## 2022-01-01 DIAGNOSIS — M62.81 GENERALIZED MUSCLE WEAKNESS: Primary | ICD-10-CM

## 2022-01-01 DIAGNOSIS — E78.00 PURE HYPERCHOLESTEROLEMIA: ICD-10-CM

## 2022-01-01 DIAGNOSIS — E10.22 TYPE 1 DIABETES MELLITUS WITH DIABETIC CHRONIC KIDNEY DISEASE, UNSPECIFIED CKD STAGE (H): Primary | ICD-10-CM

## 2022-01-01 DIAGNOSIS — D63.1 ANEMIA IN STAGE 3A CHRONIC KIDNEY DISEASE (H): ICD-10-CM

## 2022-01-01 DIAGNOSIS — J18.9 PNEUMONIA OF LEFT LOWER LOBE DUE TO INFECTIOUS ORGANISM: Primary | ICD-10-CM

## 2022-01-01 DIAGNOSIS — R60.0 BILATERAL LOWER EXTREMITY EDEMA: ICD-10-CM

## 2022-01-01 DIAGNOSIS — Z98.890 S/P CAROTID ENDARTERECTOMY: ICD-10-CM

## 2022-01-01 DIAGNOSIS — E11.69 TYPE 2 DIABETES MELLITUS WITH OTHER SPECIFIED COMPLICATION, WITHOUT LONG-TERM CURRENT USE OF INSULIN (H): ICD-10-CM

## 2022-01-01 DIAGNOSIS — R42 DIZZINESS AND GIDDINESS: ICD-10-CM

## 2022-01-01 LAB
ALBUMIN SERPL-MCNC: 3.1 G/DL (ref 3.5–5)
ALP SERPL-CCNC: 194 U/L (ref 45–120)
ALT SERPL W P-5'-P-CCNC: 57 U/L (ref 0–45)
ANION GAP SERPL CALCULATED.3IONS-SCNC: 10 MMOL/L (ref 7–15)
ANION GAP SERPL CALCULATED.3IONS-SCNC: 7 MMOL/L (ref 7–15)
ANION GAP SERPL CALCULATED.3IONS-SCNC: 9 MMOL/L (ref 5–18)
ANION GAP SERPL CALCULATED.3IONS-SCNC: 9 MMOL/L (ref 7–15)
AST SERPL W P-5'-P-CCNC: 33 U/L (ref 0–40)
BILIRUB SERPL-MCNC: 0.5 MG/DL (ref 0–1)
BUN SERPL-MCNC: 29 MG/DL (ref 8–28)
BUN SERPL-MCNC: 31 MG/DL (ref 8–23)
BUN SERPL-MCNC: 33.2 MG/DL (ref 8–23)
BUN SERPL-MCNC: 34 MG/DL (ref 8–23)
CALCIUM SERPL-MCNC: 7.8 MG/DL (ref 8.8–10.2)
CALCIUM SERPL-MCNC: 8.2 MG/DL (ref 8.8–10.2)
CALCIUM SERPL-MCNC: 8.2 MG/DL (ref 8.8–10.2)
CALCIUM SERPL-MCNC: 8.9 MG/DL (ref 8.5–10.5)
CHLORIDE BLD-SCNC: 101 MMOL/L (ref 98–107)
CHLORIDE SERPL-SCNC: 102 MMOL/L (ref 98–107)
CHLORIDE SERPL-SCNC: 103 MMOL/L (ref 98–107)
CHLORIDE SERPL-SCNC: 103 MMOL/L (ref 98–107)
CO2 SERPL-SCNC: 25 MMOL/L (ref 22–31)
CREAT SERPL-MCNC: 1.73 MG/DL (ref 0.7–1.3)
CREAT SERPL-MCNC: 1.74 MG/DL (ref 0.67–1.17)
CREAT SERPL-MCNC: 2.08 MG/DL (ref 0.67–1.17)
CREAT SERPL-MCNC: 2.1 MG/DL (ref 0.67–1.17)
DEPRECATED CALCIDIOL+CALCIFEROL SERPL-MC: 39 UG/L (ref 20–75)
DEPRECATED HCO3 PLAS-SCNC: 23 MMOL/L (ref 22–29)
DEPRECATED HCO3 PLAS-SCNC: 25 MMOL/L (ref 22–29)
DEPRECATED HCO3 PLAS-SCNC: 25 MMOL/L (ref 22–29)
ERYTHROCYTE [DISTWIDTH] IN BLOOD BY AUTOMATED COUNT: 13 % (ref 10–15)
ERYTHROCYTE [DISTWIDTH] IN BLOOD BY AUTOMATED COUNT: 13.2 % (ref 10–15)
ERYTHROCYTE [DISTWIDTH] IN BLOOD BY AUTOMATED COUNT: 13.2 % (ref 10–15)
ERYTHROCYTE [DISTWIDTH] IN BLOOD BY AUTOMATED COUNT: 14.4 % (ref 10–15)
GFR SERPL CREATININE-BSD FRML MDRD: 30 ML/MIN/1.73M2
GFR SERPL CREATININE-BSD FRML MDRD: 30 ML/MIN/1.73M2
GFR SERPL CREATININE-BSD FRML MDRD: 37 ML/MIN/1.73M2
GFR SERPL CREATININE-BSD FRML MDRD: 38 ML/MIN/1.73M2
GLUCOSE BLD-MCNC: 215 MG/DL (ref 70–125)
GLUCOSE SERPL-MCNC: 49 MG/DL (ref 70–99)
GLUCOSE SERPL-MCNC: 77 MG/DL (ref 70–99)
GLUCOSE SERPL-MCNC: 90 MG/DL (ref 70–99)
HBA1C MFR BLD: 7.9 % (ref 0–5.6)
HCT VFR BLD AUTO: 31.6 % (ref 40–53)
HCT VFR BLD AUTO: 34.9 % (ref 40–53)
HCT VFR BLD AUTO: 36.6 % (ref 40–53)
HCT VFR BLD AUTO: 37 % (ref 40–53)
HGB BLD-MCNC: 11.4 G/DL (ref 13.3–17.7)
HGB BLD-MCNC: 11.9 G/DL (ref 13.3–17.7)
HGB BLD-MCNC: 11.9 G/DL (ref 13.3–17.7)
HGB BLD-MCNC: 9.9 G/DL (ref 13.3–17.7)
IRON SATN MFR SERPL: 28 % (ref 15–46)
IRON SERPL-MCNC: 81 UG/DL (ref 35–180)
MCH RBC QN AUTO: 28.7 PG (ref 26.5–33)
MCH RBC QN AUTO: 28.9 PG (ref 26.5–33)
MCH RBC QN AUTO: 29.1 PG (ref 26.5–33)
MCH RBC QN AUTO: 29.2 PG (ref 26.5–33)
MCHC RBC AUTO-ENTMCNC: 31.3 G/DL (ref 31.5–36.5)
MCHC RBC AUTO-ENTMCNC: 32.2 G/DL (ref 31.5–36.5)
MCHC RBC AUTO-ENTMCNC: 32.5 G/DL (ref 31.5–36.5)
MCHC RBC AUTO-ENTMCNC: 32.7 G/DL (ref 31.5–36.5)
MCV RBC AUTO: 88 FL (ref 78–100)
MCV RBC AUTO: 90 FL (ref 78–100)
MCV RBC AUTO: 91 FL (ref 78–100)
MCV RBC AUTO: 92 FL (ref 78–100)
PLATELET # BLD AUTO: 206 10E3/UL (ref 150–450)
PLATELET # BLD AUTO: 273 10E3/UL (ref 150–450)
PLATELET # BLD AUTO: 312 10E3/UL (ref 150–450)
PLATELET # BLD AUTO: 316 10E3/UL (ref 150–450)
POTASSIUM BLD-SCNC: 5 MMOL/L (ref 3.5–5)
POTASSIUM SERPL-SCNC: 4.5 MMOL/L (ref 3.4–5.3)
POTASSIUM SERPL-SCNC: 4.7 MMOL/L (ref 3.4–5.3)
POTASSIUM SERPL-SCNC: 4.9 MMOL/L (ref 3.4–5.3)
PROT SERPL-MCNC: 6.4 G/DL (ref 6–8)
RBC # BLD AUTO: 3.43 10E6/UL (ref 4.4–5.9)
RBC # BLD AUTO: 3.9 10E6/UL (ref 4.4–5.9)
RBC # BLD AUTO: 4.09 10E6/UL (ref 4.4–5.9)
RBC # BLD AUTO: 4.14 10E6/UL (ref 4.4–5.9)
SODIUM SERPL-SCNC: 135 MMOL/L (ref 136–145)
SODIUM SERPL-SCNC: 135 MMOL/L (ref 136–145)
SODIUM SERPL-SCNC: 136 MMOL/L (ref 136–145)
SODIUM SERPL-SCNC: 136 MMOL/L (ref 136–145)
TIBC SERPL-MCNC: 289 UG/DL (ref 240–430)
WBC # BLD AUTO: 8.1 10E3/UL (ref 4–11)
WBC # BLD AUTO: 8.2 10E3/UL (ref 4–11)
WBC # BLD AUTO: 8.6 10E3/UL (ref 4–11)
WBC # BLD AUTO: 8.6 10E3/UL (ref 4–11)

## 2022-01-01 PROCEDURE — 85027 COMPLETE CBC AUTOMATED: CPT | Performed by: INTERNAL MEDICINE

## 2022-01-01 PROCEDURE — 99309 SBSQ NF CARE MODERATE MDM 30: CPT | Performed by: NURSE PRACTITIONER

## 2022-01-01 PROCEDURE — 80048 BASIC METABOLIC PNL TOTAL CA: CPT | Mod: ORL | Performed by: INTERNAL MEDICINE

## 2022-01-01 PROCEDURE — 83036 HEMOGLOBIN GLYCOSYLATED A1C: CPT | Performed by: INTERNAL MEDICINE

## 2022-01-01 PROCEDURE — 93270 REMOTE 30 DAY ECG REV/REPORT: CPT

## 2022-01-01 PROCEDURE — 36415 COLL VENOUS BLD VENIPUNCTURE: CPT | Performed by: NURSE PRACTITIONER

## 2022-01-01 PROCEDURE — 99308 SBSQ NF CARE LOW MDM 20: CPT | Mod: GW | Performed by: NURSE PRACTITIONER

## 2022-01-01 PROCEDURE — 99214 OFFICE O/P EST MOD 30 MIN: CPT | Performed by: INTERNAL MEDICINE

## 2022-01-01 PROCEDURE — 83550 IRON BINDING TEST: CPT | Performed by: INTERNAL MEDICINE

## 2022-01-01 PROCEDURE — 85027 COMPLETE CBC AUTOMATED: CPT | Mod: ORL | Performed by: NURSE PRACTITIONER

## 2022-01-01 PROCEDURE — 80048 BASIC METABOLIC PNL TOTAL CA: CPT | Performed by: NURSE PRACTITIONER

## 2022-01-01 PROCEDURE — 0064A COVID-19,PF,MODERNA (18+ YRS BOOSTER .25ML): CPT | Performed by: INTERNAL MEDICINE

## 2022-01-01 PROCEDURE — 36415 COLL VENOUS BLD VENIPUNCTURE: CPT | Performed by: INTERNAL MEDICINE

## 2022-01-01 PROCEDURE — 99309 SBSQ NF CARE MODERATE MDM 30: CPT | Mod: GW | Performed by: NURSE PRACTITIONER

## 2022-01-01 PROCEDURE — 80048 BASIC METABOLIC PNL TOTAL CA: CPT | Mod: ORL | Performed by: NURSE PRACTITIONER

## 2022-01-01 PROCEDURE — 99308 SBSQ NF CARE LOW MDM 20: CPT | Mod: GW | Performed by: INTERNAL MEDICINE

## 2022-01-01 PROCEDURE — 99215 OFFICE O/P EST HI 40 MIN: CPT | Mod: 25 | Performed by: INTERNAL MEDICINE

## 2022-01-01 PROCEDURE — 93228 REMOTE 30 DAY ECG REV/REPORT: CPT | Performed by: INTERNAL MEDICINE

## 2022-01-01 PROCEDURE — 99305 1ST NF CARE MODERATE MDM 35: CPT | Performed by: INTERNAL MEDICINE

## 2022-01-01 PROCEDURE — 82306 VITAMIN D 25 HYDROXY: CPT | Performed by: INTERNAL MEDICINE

## 2022-01-01 PROCEDURE — 80053 COMPREHEN METABOLIC PANEL: CPT | Performed by: INTERNAL MEDICINE

## 2022-01-01 PROCEDURE — 85027 COMPLETE CBC AUTOMATED: CPT | Performed by: NURSE PRACTITIONER

## 2022-01-01 PROCEDURE — 91306 COVID-19,PF,MODERNA (18+ YRS BOOSTER .25ML): CPT | Performed by: INTERNAL MEDICINE

## 2022-01-01 PROCEDURE — P9603 ONE-WAY ALLOW PRORATED MILES: HCPCS | Mod: ORL | Performed by: INTERNAL MEDICINE

## 2022-01-01 PROCEDURE — P9604 ONE-WAY ALLOW PRORATED TRIP: HCPCS | Performed by: NURSE PRACTITIONER

## 2022-01-01 PROCEDURE — 99443 PR PHYSICIAN TELEPHONE EVALUATION 21-30 MIN: CPT | Mod: 95 | Performed by: INTERNAL MEDICINE

## 2022-01-01 PROCEDURE — 36415 COLL VENOUS BLD VENIPUNCTURE: CPT | Mod: ORL | Performed by: NURSE PRACTITIONER

## 2022-01-01 PROCEDURE — 36415 COLL VENOUS BLD VENIPUNCTURE: CPT | Mod: ORL | Performed by: INTERNAL MEDICINE

## 2022-01-01 PROCEDURE — P9604 ONE-WAY ALLOW PRORATED TRIP: HCPCS | Mod: ORL | Performed by: NURSE PRACTITIONER

## 2022-01-01 RX ORDER — AMLODIPINE BESYLATE 2.5 MG/1
TABLET ORAL
Qty: 90 TABLET | Refills: 3 | Status: SHIPPED | OUTPATIENT
Start: 2022-01-01 | End: 2022-01-01

## 2022-01-01 RX ORDER — INSULIN GLARGINE 100 [IU]/ML
40 INJECTION, SOLUTION SUBCUTANEOUS AT BEDTIME
Qty: 15 ML
Start: 2022-01-01 | End: 2022-01-01

## 2022-01-01 RX ORDER — FUROSEMIDE 40 MG
40 TABLET ORAL 2 TIMES DAILY
COMMUNITY

## 2022-01-01 RX ORDER — INSULIN GLARGINE 100 [IU]/ML
42 INJECTION, SOLUTION SUBCUTANEOUS AT BEDTIME
Qty: 45 ML | Refills: 3
Start: 2022-01-01 | End: 2022-01-01

## 2022-01-01 RX ORDER — BISACODYL 10 MG
10 SUPPOSITORY, RECTAL RECTAL DAILY PRN
COMMUNITY

## 2022-01-01 RX ORDER — PRAVASTATIN SODIUM 20 MG
TABLET ORAL
Qty: 90 TABLET | Refills: 1 | Status: SHIPPED | OUTPATIENT
Start: 2022-01-01 | End: 2022-01-01

## 2022-01-01 RX ORDER — AMOXICILLIN AND CLAVULANATE POTASSIUM 500; 125 MG/1; MG/1
1 TABLET, FILM COATED ORAL 2 TIMES DAILY
Qty: 20 TABLET | Refills: 0
Start: 2022-01-01 | End: 2022-01-01

## 2022-01-01 RX ORDER — INSULIN ASPART 100 [IU]/ML
INJECTION, SOLUTION INTRAVENOUS; SUBCUTANEOUS
COMMUNITY
End: 2022-01-01

## 2022-01-01 RX ORDER — CITALOPRAM HYDROBROMIDE 20 MG/1
TABLET ORAL
COMMUNITY
Start: 2022-01-01 | End: 2022-01-01

## 2022-01-01 RX ORDER — ONDANSETRON 4 MG/1
4 TABLET, FILM COATED ORAL EVERY 8 HOURS PRN
COMMUNITY

## 2022-01-01 RX ORDER — AZITHROMYCIN 250 MG/1
TABLET, FILM COATED ORAL
Qty: 6 TABLET | Refills: 0
Start: 2022-01-01 | End: 2022-01-01

## 2022-01-01 RX ORDER — PEN NEEDLE, DIABETIC 31 GX5/16"
NEEDLE, DISPOSABLE MISCELLANEOUS
Qty: 300 EACH | Refills: 3 | Status: SHIPPED | OUTPATIENT
Start: 2022-01-01 | End: 2022-01-01

## 2022-01-01 RX ORDER — MORPHINE SULFATE 30 MG/1
2.5 TABLET ORAL
COMMUNITY

## 2022-01-01 RX ORDER — SENNOSIDES A AND B 8.6 MG/1
1 TABLET, FILM COATED ORAL 2 TIMES DAILY
COMMUNITY

## 2022-01-01 RX ORDER — AMLODIPINE BESYLATE 5 MG/1
5 TABLET ORAL DAILY
COMMUNITY
End: 2022-01-01

## 2022-01-01 RX ORDER — PREGABALIN 50 MG/1
50-100 CAPSULE ORAL AT BEDTIME
Qty: 180 CAPSULE | Refills: 3 | Status: SHIPPED | OUTPATIENT
Start: 2022-01-01 | End: 2022-01-01

## 2022-01-01 RX ORDER — HYDROCHLOROTHIAZIDE 25 MG/1
25 TABLET ORAL DAILY
Qty: 90 TABLET | Refills: 3
Start: 2022-01-01 | End: 2022-01-01

## 2022-01-01 RX ORDER — INSULIN GLARGINE 100 [IU]/ML
46 INJECTION, SOLUTION SUBCUTANEOUS AT BEDTIME
Start: 2022-01-01 | End: 2022-01-01

## 2022-01-01 RX ORDER — PREGABALIN 50 MG/1
50-100 CAPSULE ORAL AT BEDTIME
Qty: 180 CAPSULE | Refills: 3 | Status: CANCELLED | OUTPATIENT
Start: 2022-01-01

## 2022-01-01 RX ORDER — PREGABALIN 50 MG/1
50 CAPSULE ORAL AT BEDTIME
Qty: 3 CAPSULE | Refills: 0 | Status: SHIPPED | OUTPATIENT
Start: 2022-01-01 | End: 2022-01-01

## 2022-01-01 RX ORDER — METFORMIN HCL 500 MG
1000 TABLET, EXTENDED RELEASE 24 HR ORAL
COMMUNITY
End: 2022-01-01

## 2022-01-01 RX ORDER — PREGABALIN 50 MG/1
50 CAPSULE ORAL AT BEDTIME
Qty: 30 CAPSULE | Refills: 0 | Status: SHIPPED | OUTPATIENT
Start: 2022-01-01 | End: 2022-01-01

## 2022-01-01 RX ORDER — INSULIN GLARGINE 100 [IU]/ML
30 INJECTION, SOLUTION SUBCUTANEOUS AT BEDTIME
Start: 2022-01-01 | End: 2022-01-01

## 2022-01-01 RX ORDER — PREGABALIN 50 MG/1
50 CAPSULE ORAL AT BEDTIME
Qty: 30 CAPSULE | Refills: 0 | Status: SHIPPED | OUTPATIENT
Start: 2022-01-01

## 2022-01-01 RX ORDER — CITALOPRAM HYDROBROMIDE 20 MG/1
10 TABLET ORAL DAILY
COMMUNITY
Start: 2022-01-01

## 2022-01-01 ASSESSMENT — MIFFLIN-ST. JEOR: SCORE: 2046.58

## 2022-01-01 ASSESSMENT — PATIENT HEALTH QUESTIONNAIRE - PHQ9: SUM OF ALL RESPONSES TO PHQ QUESTIONS 1-9: 0

## 2022-01-21 NOTE — TELEPHONE ENCOUNTER
Patient's spouse calling wondering if ok to take patient to [facility] vs [facility]. Advised to bring to whichever ER is closest. Nothing further needed.     Rocío Willett RN 01/21/22 11:15 AM   [facility] Triage Nurse Advisor

## 2022-01-21 NOTE — TELEPHONE ENCOUNTER
Pt's wife Nely is calling. Consent on file.    Black stools that started yesterday.  Entire stool looked black yesterday. Now it is more of a mixture of black and brown.  Painful BM's at the beginning of a BM. Then is ok. Stool is soft. No history of diarrhea.  Normal BM's 4 times per week, which is his normal. No other pain. He denies feeling weak, dizzy, light headed.   Not taking iron or Pepto-bismal.     Care advice reviewed. They do not want to go to the ED if possible.  I advised her that I would talk to his PCP and then call them back, with further care advice.  She verbalized understanding.    Monitor, be seen, or ED?      COVID 19 Nurse Triage Plan/Patient Instructions    Please be aware that novel coronavirus (COVID-19) may be circulating in the community. If you develop symptoms such as fever, cough, or SOB or if you have concerns about the presence of another infection including coronavirus (COVID-19), please contact your health care provider or visit [email or web address].     Disposition/Instructions    In-Person Visit with provider recommended. Reference Visit Selection Guide.    Thank you for taking steps to prevent the spread of this virus.  o Limit your contact with others.  o Wear a simple mask to cover your cough.  o Wash your hands well and often.    Resources    [facility]: About COVID-19: [email or web address]    CDC: What to Do If You're Sick: www.cdc.gov/coronavirus/2019-ncov/about/steps-when-sick.html    CDC: Ending Home Isolation: www.cdc.gov/coronavirus/2019-ncov/hcp/disposition-in-home-patients.html     CDC: Caring for Someone: www.cdc.gov/coronavirus/2019-ncov/if-you-are-sick/care-for-someone.html     [facility]: Interim Guidance for Hospital Discharge to Home: www.health.[facility].[place]/diseases/coronavirus/hcp/hospdischarge.pdf    [facility] clinical trials (COVID-19 research studies): [email or web address].[email or web address]-clinical-trials     Below are the COVID-19  hotlines at the [place] Department of Health ([facility]). Interpreters are available.   o For health questions: Call 340-338-1519 or 1-772.474.7531 (7 a.m. to 7 p.m.)  o For questions about schools and childcare: Call 153-836-7375 or 1-398.268.5002 (7 a.m. to 7 p.m.)     Reason for Disposition    Bloody, black, or tarry bowel movements (Exception: chronic-unchanged  black-grey bowel movements and is taking iron pills or Pepto-bismol)    Additional Information    Negative: Passed out (i.e., fainted, collapsed and was not responding)    Negative: Shock suspected (e.g., cold/pale/clammy skin, too weak to stand, low BP, rapid pulse)    Negative: Vomiting red blood or black (coffee ground) material    Negative: Sounds like a life-threatening emergency to the triager    Negative: Diarrhea is the main symptom    Negative: Rectal symptoms    Negative: SEVERE rectal bleeding (large blood clots; on and off, or constant bleeding)    Negative: SEVERE dizziness (e.g., unable to stand, requires support to walk, feels like passing out now)    Negative: MODERATE rectal bleeding (small blood clots, passing blood without stool, or toilet water turns red) more than once a day    Protocols used: RECTAL BLEEDING-A-OH    Samia Taylor RN  [facility] Nurse Advisor  1/21/2022 at 10:32 AM

## 2022-01-21 NOTE — TELEPHONE ENCOUNTER
Black stools implies an upper GI bleed and warrants an evaluation in the ER.  I would also recommend having evaluation there.

## 2022-01-24 PROBLEM — K92.2 ACUTE LOWER GI BLEEDING: Status: ACTIVE | Noted: 2022-01-01

## 2022-01-24 NOTE — LETTER
January 25, 2022      Robert E Schamberger  [address] [place] [address]        Dear Paul Acuña   CBC with platelets   Result Value Ref Range    WBC Count 8.1 4.0 - 11.0 10e3/uL    RBC Count 4.14 (L) 4.40 - 5.90 10e6/uL    Hemoglobin 11.9 (L) 13.3 - 17.7 g/dL    Hematocrit 36.6 (L) 40.0 - 53.0 %    MCV 88 78 - 100 fL    MCH 28.7 26.5 - 33.0 pg    MCHC 32.5 31.5 - 36.5 g/dL    RDW 13.2 10.0 - 15.0 %    Platelet Count 273 150 - 450 10e3/uL   Iron and iron binding capacity   Result Value Ref Range    Iron 81 35 - 180 ug/dL    Iron Binding Capacity 289 240 - 430 ug/dL    Iron Sat Index 28 15 - 46 %     Your hemoglobin is stable.  No worsening anemia.  Iron levels actually look pretty good and are reassuring.    If you have any questions or concerns, please call the clinic at the number listed above.       Sincerely,      Ld Moy MD

## 2022-01-24 NOTE — PATIENT INSTRUCTIONS
After 1 week, you may resume taking 2 81 mg aspirins daily.  Monitor for any recurrent dark stools or blood in your stools or any abdominal pain.

## 2022-01-25 NOTE — PROGRESS NOTES
Assessment & Plan     Acute lower GI bleeding  87-year-old male with complicated medical history here to follow-up after ER evaluation this weekend presenting with melena combined with small amount of bright red blood.  No abdominal pain.  Hemoglobin 12.0 unchanged from baseline.  Suspect lower GI bleed.  Exam today without tenderness.  Hemodynamically stable.  Rechecking CBC.  We discussed possible etiologies including AVMs and diverticulosis but we also discussed that colon cancer cannot be excluded.  However, he is a poor candidate for colonoscopy and would not be a candidate for any surgical procedure.  He and his family understand and will take a conservative approach monitoring for any recurrence.  Cutting back on his aspirin to only 81 mg daily for the next week makes sense.  - CBC with platelets  - CBC with platelets    Chronic anemia  Chronic anemia that has been attributed to chronic kidney disease but will check iron studies to make sure iron deficiency anemia is not a contributing factor especially with GI bleeding that took place  - Iron and iron binding capacity  - Iron and iron binding capacity    Paroxysmal atrial fibrillation (H)  No longer anticoagulated but does take 162 mg of aspirin daily    History of CVA (cerebrovascular accident)  Continues to do well as he recovers from CVA    Essential hypertension  Blood pressure well controlled with current medication including losartan, amlodipine and HCTZ       BMI:   Estimated body mass index is 39.87 kg/m  as calculated from the following:    Height as of this encounter: 1.829 m (6').    Weight as of this encounter: 133.4 kg (294 lb).                Return in about 2 months (around 4/5/2022) for Follow up.    Ld Moy MD  [facility]          Subjective       HPI 87-year-old male here to follow-up after ER evaluation for lower GI bleed presenting with melena and small amount of red blood associated with  stool.  No abdominal pain.  See assessment and plan for details of visit    Current Outpatient Medications   Medication     acetaminophen (TYLENOL) 500 MG tablet     amLODIPine (NORVASC) 2.5 MG tablet     aspirin (ASA) 81 MG EC tablet     cholecalciferol, vitamin D3, (VITAMIN D3) 2,000 unit Tab     citalopram (CELEXA) 10 MG tablet     cyanocobalamin 1000 MCG tablet     diclofenac (VOLTAREN) 1 % topical gel     GLOBAL EASE INJECT PEN NEEDLES 31G X 8 MM miscellaneous     hydrochlorothiazide (HYDRODIURIL) 25 MG tablet     insulin aspart (NOVOLOG PEN) 100 UNIT/ML pen     insulin glargine (BASAGLAR KWIKPEN) 100 UNIT/ML pen     latanoprost (XALATAN) 0.005 % ophthalmic solution     losartan (COZAAR) 100 MG tablet     magnesium oxide (MAGOX) 400 mg (241.3 mg magnesium) tablet     metFORMIN (GLUCOPHAGE) 500 MG tablet     polyethylene glycol (MIRALAX) 17 g packet     pravastatin (PRAVACHOL) 20 MG tablet     pregabalin (LYRICA) 50 MG capsule     No current facility-administered medications for this visit.        Review of Systems  No nausea or vomiting.  Stools have returned to normal.        Objective    Vitals:    01/24/22 1536   BP: 122/78   BP Location: Right arm   Patient Position: Sitting   Cuff Size: Adult Large   Pulse: 54   SpO2: 98%   Weight: 133.4 kg (294 lb)   Height: 1.829 m (6')        Physical Exam  Obese but otherwise well-appearing elderly male  Abdomen nontender

## 2022-02-24 NOTE — TELEPHONE ENCOUNTER
"Routing refill request to provider for review/approval because:  Allergy to statins    Last Written Prescription Date:  11/24/21  Last Fill Quantity: 90,  # refills: 1   Last office visit provider:  1/24/22    Requested Prescriptions   Pending Prescriptions Disp Refills     pravastatin (PRAVACHOL) 20 MG tablet [Pharmacy Med Name: PRAVASTATIN SODIUM 20MG TABLET] 90 tablet 1     Sig: TAKE ONE (1) TABLET (20 MG) BY MOUTH AT BEDTIME       Statins Protocol Passed - 2/23/2022 12:26 PM        Passed - LDL on file in past 12 months     Recent Labs   Lab Test 12/07/21  1244   LDL 56             Passed - No abnormal creatine kinase in past 12 months     No lab results found.             Passed - Recent (12 mo) or future (30 days) visit within the authorizing provider's specialty     Patient has had an office visit with the authorizing provider or a provider within the authorizing providers department within the previous 12 mos or has a future within next 30 days. See \"Patient Info\" tab in inbasket, or \"Choose Columns\" in Meds & Orders section of the refill encounter.              Passed - Medication is active on med list        Passed - Patient is age 18 or older             Sofie Camarena RN 02/24/22 12:43 AM  "

## 2022-03-23 NOTE — TELEPHONE ENCOUNTER
"Routing refill request to provider for review/approval because:  Labs out of range:  Creatinine.   Blood pressure  Early refill request.       Last Written Prescription Date:  5/7/2021  Last Fill Quantity: 90,  # refills: 3   Last office visit provider:  1/24/2022     Requested Prescriptions   Pending Prescriptions Disp Refills     amLODIPine (NORVASC) 2.5 MG tablet [Pharmacy Med Name: AMLODIPINE BESYLATE 2.5MG TABLET] 90 tablet 3     Sig: TAKE ONE (1) TABLET (2.5 MG TOTAL) BY MOUTH DAILY.-- REPLACES FIVE (5) MG DOSE --       Calcium Channel Blockers Protocol  Failed - 3/23/2022  1:35 PM        Failed - Normal serum creatinine on file in past 12 months     Recent Labs   Lab Test 12/07/21  1244   CR 1.71*       Ok to refill medication if creatinine is low          Passed - Blood pressure under 140/90 in past 12 months     BP Readings from Last 3 Encounters:   01/24/22 122/78   12/07/21 138/68   09/21/21 (!) 140/52                 Passed - Recent (12 mo) or future (30 days) visit within the authorizing provider's specialty     Patient has had an office visit with the authorizing provider or a provider within the authorizing providers department within the previous 12 mos or has a future within next 30 days. See \"Patient Info\" tab in inbasket, or \"Choose Columns\" in Meds & Orders section of the refill encounter.              Passed - Medication is active on med list        Passed - Patient is age 18 or older             Olivia Villarreal RN 03/23/22 1:36 PM  "

## 2022-04-13 PROBLEM — F32.9 MAJOR DEPRESSION: Status: RESOLVED | Noted: 2019-06-03 | Resolved: 2022-01-01

## 2022-04-13 PROBLEM — K92.2 ACUTE LOWER GI BLEEDING: Status: RESOLVED | Noted: 2022-01-01 | Resolved: 2022-01-01

## 2022-04-13 PROBLEM — E11.8 DM TYPE 2, CONTROLLED, WITH COMPLICATION (H): Status: RESOLVED | Noted: 2021-07-08 | Resolved: 2022-01-01

## 2022-04-13 NOTE — PROGRESS NOTES
Sonu is a 87 year old who is being evaluated via a billable telephone visit.      What phone number would you like to be contacted at? [phone]  How would you like to obtain your AVS? Vincent    Assessment & Plan     Type 2 diabetes mellitus with diabetic polyneuropathy, with long-term current use of insulin (H)  Complicated by peripheral neuropathy and chronic kidney disease.  Continues on Basaglar 42 units daily along with Humalog prior to meals.  Reports that his morning sugars are generally well controlled between 110-140.  Last A1c was not at goal at 7.9% and this will be rechecked next week when he sees his cardiologist.  I am placing orders.  He is up-to-date with annual diabetic eye exams.  Diabetic foot exam completed late last year.  Continues on statin.  - insulin glargine (BASAGLAR KWIKPEN) 100 UNIT/ML pen; Inject 42 Units Subcutaneous At Bedtime  - Hemoglobin A1c; Future    Diabetic peripheral neuropathy  Continues Lyrica    Paroxysmal atrial fibrillation (H)  Anticoagulation discontinued given high risk of recurrent falls.  Risks outweigh benefit.  He does take iron 62 mg of aspirin daily.    Chronic kidney disease, stage 3a (H)  He should continue to try to avoid NSAIDs.  Monitor renal function.    - Comprehensive metabolic panel (BMP + Alb, Alk Phos, ALT, AST, Total. 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  Will complete PHQ-9 at his wellness visit this summer    Vascular dementia without behavioral disturbance (H)  Stable    Essential hypertension  He reports good blood pressure control at his recent dentist visit.  Continue current medical management  - Comprehensive metabolic panel (BMP + Alb, Alk Phos, ALT, AST, Total. Bili, TP); Future    History of CVA (cerebrovascular accident)  Hospitalized July 2021 with acute  CVA presenting with confusion and right-sided weakness.  Carotid artery repair with conduit placed.  Continue aspirin at increased dose of 162 mg daily along with pravastatin and good blood pressure and diabetes control.    Acute lower GI bleeding  He has had no recurrent melena.  No abdominal pain    Hyperlipidemia, unspecified hyperlipidemia type  Lipid profile checked in December demonstrating generally good control of cholesterol with current dose of pravastatin.  He has not been able to tolerate higher intensity statins causing side effects    Vitamin D deficiency  Recheck vitamin D level given history of deficiency  - Vitamin D deficiency screening; Future             BMI:   Estimated body mass index is 39.87 kg/m  as calculated from the following:    Height as of 1/24/22: 1.829 m (6').    Weight as of 1/24/22: 133.4 kg (294 lb).           Return in about 4 months (around 8/13/2022) for Annual wellness visit.    Ld Moy MD  [facility]    Brandt Ascencio is a 87 year old who presents for the following health issues   Telephone visit performed today to follow-up chronic medical problems including type 2 diabetes, hypertension, vascular dementia, paroxysmal atrial fibrillation, chronic kidney disease and history of CVA along with several other concerns.  See assessment and plan for details        Review of Systems   Denies any chest pain.  No increasing shortness of breath.  No abdominal pain.      Objective           Vitals:  No vitals were obtained today due to virtual visit.    Physical Exam   No physical exam completed during telephone visit        Phone call duration: 21 minutes

## 2022-05-04 NOTE — LETTER
5/4/2022    Ld Moy MD  [address] [facility] [address]    RE: Robert E Schamberger       Dear Colleague,     I had the pleasure of seeing Robert E Schamberger in the [facility].      [facility]  Heart Care Clinic Follow-up Note    Assessment & Plan        (I48.0) Paroxysmal atrial fibrillation (H)  (primary encounter diagnosis)  Comment: 30-day event monitor February 2020 showed probable sick sinus syndrome with sinus arrhythmia and a 2.8-second pause that was asymptomatic.  He has first-degree heart block as well as type I second-degree AV block.  Heart rate went down to 50 but was entirely asymptomatic.  There is no advanced type II second-degree heart block or 30 be heart block and pacemaker not indicated yet.  Repeat event monitor April 2021 showed tendency to sinus bradycardia with once again first-degree AV block with rare type I second-degree AV block without any high-grade AV block or major pauses.  Once again no pacemaker yet.  Given his recurrent falls we will go ahead and repeat event monitor.  Depending this might consider pacemaker.    (I49.5) Sick sinus syndrome (H)  Comment: As above. had cardioversion of this in 2007 and is no longer on anticoagulation.  I would suspect he is going in and out of atrial fibrillation, asymptomatic and not valvular.   Normally would recommend anticoagulation with Eliquis, Xarelto or possibly Coumadin, however given his frequent falls and unsteady gait this is been stopped by primary.      (I34.2) Nonrheumatic mitral valve stenosis  Comment: Seen on echo and only mild with significant mitral annular calcification.  Gradient no more than 4 mmHg and not prohibitive for surgery.  Recheck echo 1 year.    (I10) Essential hypertension  Comment: Under good control currently, however if anything a little low given his 88-year-old who is following.  We will take liberty of discontinuing amlodipine, continue HCTZ and  losartan.    (E78.00) Pure hypercholesterolemia  Comment: Cholesterol is 141 with an LDL of 82 which is acceptable.    (E66.01) Morbid obesity (H)  Comment: Work on weight loss.    (G47.33,  Z99.89) QUETA on CPAP  Comment: So noted and uses CPAP although has difficulty with the mask.  Suggest he talk with his sleep doctor.    (N18.31) Chronic kidney disease, stage 3a (H)  Comment: Creatinine 1.71.    Plan  1.  Repeat event monitor looking for bradycardia and atrial fibrillation.  2.  Work on weight loss.  3.  Draw blood work for Dr. Austen Hernandze.  4.  Follow-up me 1 year or sooner if needed.    Subjective  CC: 88-year-old white gentleman here for a clinic follow-up today.  Since have seen him he has had his left carotid procedure.  He is been following, frequently.  Not sure why.  He thinks he might be having TIAs.  He states he might also be blacking out.  His wife and son feel that it is due to him losing balance.  He denies any chest discomfort, palpitations, PND, orthopnea, or significant peripheral edema.    Medications  Current Outpatient Medications   Medication Sig Dispense Refill     acetaminophen (TYLENOL) 500 MG tablet Take 1,000 mg by mouth every 8 hours as needed        amLODIPine (NORVASC) 2.5 MG tablet TAKE ONE (1) TABLET (2.5 MG TOTAL) BY MOUTH DAILY.-- REPLACES FIVE (5) MG DOSE -- 90 tablet 3     aspirin (ASA) 81 MG EC tablet Take 2 tablets (162 mg) by mouth daily 150 tablet 2     cholecalciferol, vitamin D3, (VITAMIN D3) 2,000 unit Tab [CHOLECALCIFEROL, VITAMIN D3, (VITAMIN D3) 2,000 UNIT TAB] Take 1 tablet (2,000 Units total) by mouth daily. 100 tablet 3     citalopram (CELEXA) 10 MG tablet Take 5 mg by mouth daily        cyanocobalamin 1000 MCG tablet [CYANOCOBALAMIN 1000 MCG TABLET] Take 1 tablet (1,000 mcg total) by mouth daily. 100 tablet 3     diclofenac (VOLTAREN) 1 % topical gel Apply 2 g topically daily as needed for moderate pain        GLOBAL EASE INJECT PEN NEEDLES 31G X 8 MM  miscellaneous USE AS DIRECTED THREE (3) TIMES DAILY 300 each 3     hydrochlorothiazide (HYDRODIURIL) 25 MG tablet TAKE ONE TABLET BY MOUTH ONCE DAILY .  - TAKE WITH LOSARTAN - COMBO NOT AVAILABLE -  90 tablet 3     insulin aspart (NOVOLOG PEN) 100 UNIT/ML pen 10 units breakfast 5 units lunch 10 units at dinner (Patient taking differently: 10 units breakfast 10 units lunch 10 units at dinner) 15 mL      insulin glargine (BASAGLAR KWIKPEN) 100 UNIT/ML pen Inject 42 Units Subcutaneous At Bedtime 45 mL 3     latanoprost (XALATAN) 0.005 % ophthalmic solution [LATANOPROST (XALATAN) 0.005 % OPHTHALMIC SOLUTION] Administer 1 drop to both eyes at bedtime.       losartan (COZAAR) 100 MG tablet Take 100 mg by mouth daily        magnesium oxide (MAGOX) 400 mg (241.3 mg magnesium) tablet [MAGNESIUM OXIDE (MAGOX) 400 MG (241.3 MG MAGNESIUM) TABLET] Take 1 tablet (400 mg total) by mouth 2 (two) times a day. 100 tablet 1     metFORMIN (GLUCOPHAGE) 500 MG tablet Take 500 mg by mouth 2 times daily (with meals) 500 mg in the morning and 500 mg in the evening       polyethylene glycol (MIRALAX) 17 g packet Take 1 packet by mouth daily as needed for constipation       pravastatin (PRAVACHOL) 20 MG tablet TAKE ONE (1) TABLET (20 MG) BY MOUTH AT BEDTIME 90 tablet 1     pregabalin (LYRICA) 50 MG capsule Take 1-2 capsules ( mg) by mouth At Bedtime 180 capsule 3       Objective  /40 (BP Location: Right arm, Patient Position: Sitting, Cuff Size: Adult Large)   Pulse 72   Resp 16   Ht 1.829 m (6')   BMI 39.87 kg/m      General Appearance:    Alert, cooperative, no distress, appears stated age   Head:    Normocephalic, without obvious abnormality, atraumatic   Throat:   Lips, mucosa, and tongue normal; teeth and gums normal   Neck:   Supple, symmetrical, trachea midline, no adenopathy;        thyroid:  No enlargement/tenderness/nodules; no carotid    bruit or JVD   Back:     Symmetric, no curvature, ROM normal, no CVA tenderness    Lungs:     Clear to auscultation bilaterally, respirations unlabored   Chest wall:    No tenderness or deformity   Heart:    Regular rate and rhythm, S1 and S2 normal, no murmur, rub   or gallop   Abdomen:     Soft, non-tender, bowel sounds active all four quadrants,     no masses, no organomegaly   Extremities:   Normal, atraumatic, no cyanosis or edema   Pulses:   2+ and symmetric all extremities   Skin:   Skin color, texture, turgor normal, no rashes or lesions     Results    Lab Results personally reviewed   Lab Results   Component Value Date    CHOL 117 12/07/2021    CHOL 141 05/11/2021     Lab Results   Component Value Date    HDL 26 (L) 12/07/2021    HDL 33 (L) 05/11/2021     No components found for: LDLCALC  Lab Results   Component Value Date    TRIG 176 (H) 12/07/2021    TRIG 130 05/11/2021     Lab Results   Component Value Date    WBC 8.1 01/24/2022    HGB 11.9 (L) 01/24/2022    HCT 36.6 (L) 01/24/2022     01/24/2022     Lab Results   Component Value Date    BUN 27 12/07/2021     12/07/2021    CO2 26 12/07/2021               Thank you for allowing me to participate in the care of your patient.      Sincerely,     XAVI GUZMAN MD     [facility]  cc:   Referred Self,

## 2022-05-04 NOTE — PROGRESS NOTES
[facility]  Heart Care Clinic Follow-up Note    Assessment & Plan        (I48.0) Paroxysmal atrial fibrillation (H)  (primary encounter diagnosis)  Comment: 30-day event monitor February 2020 showed probable sick sinus syndrome with sinus arrhythmia and a 2.8-second pause that was asymptomatic.  He has first-degree heart block as well as type I second-degree AV block.  Heart rate went down to 50 but was entirely asymptomatic.  There is no advanced type II second-degree heart block or 30 be heart block and pacemaker not indicated yet.  Repeat event monitor April 2021 showed tendency to sinus bradycardia with once again first-degree AV block with rare type I second-degree AV block without any high-grade AV block or major pauses.  Once again no pacemaker yet.  Given his recurrent falls we will go ahead and repeat event monitor.  Depending this might consider pacemaker.    (I49.5) Sick sinus syndrome (H)  Comment: As above. had cardioversion of this in 2007 and is no longer on anticoagulation.  I would suspect he is going in and out of atrial fibrillation, asymptomatic and not valvular.   Normally would recommend anticoagulation with Eliquis, Xarelto or possibly Coumadin, however given his frequent falls and unsteady gait this is been stopped by primary.      (I34.2) Nonrheumatic mitral valve stenosis  Comment: Seen on echo and only mild with significant mitral annular calcification.  Gradient no more than 4 mmHg and not prohibitive for surgery.  Recheck echo 1 year.    (I10) Essential hypertension  Comment: Under good control currently, however if anything a little low given his 88-year-old who is following.  We will take liberty of discontinuing amlodipine, continue HCTZ and losartan.    (E78.00) Pure hypercholesterolemia  Comment: Cholesterol is 141 with an LDL of 82 which is acceptable.    (E66.01) Morbid obesity (H)  Comment: Work on weight loss.    (G47.33,  Z99.89) QUETA on CPAP  Comment: So noted and  uses CPAP although has difficulty with the mask.  Suggest he talk with his sleep doctor.    (N18.31) Chronic kidney disease, stage 3a (H)  Comment: Creatinine 1.71.    Plan  1.  Repeat event monitor looking for bradycardia and atrial fibrillation.  2.  Work on weight loss.  3.  Draw blood work for Dr. Austen Hernandez.  4.  Follow-up me 1 year or sooner if needed.    Subjective  CC: 88-year-old white gentleman here for a clinic follow-up today.  Since have seen him he has had his left carotid procedure.  He is been following, frequently.  Not sure why.  He thinks he might be having TIAs.  He states he might also be blacking out.  His wife and son feel that it is due to him losing balance.  He denies any chest discomfort, palpitations, PND, orthopnea, or significant peripheral edema.    Medications  Current Outpatient Medications   Medication Sig Dispense Refill     acetaminophen (TYLENOL) 500 MG tablet Take 1,000 mg by mouth every 8 hours as needed        amLODIPine (NORVASC) 2.5 MG tablet TAKE ONE (1) TABLET (2.5 MG TOTAL) BY MOUTH DAILY.-- REPLACES FIVE (5) MG DOSE -- 90 tablet 3     aspirin (ASA) 81 MG EC tablet Take 2 tablets (162 mg) by mouth daily 150 tablet 2     cholecalciferol, vitamin D3, (VITAMIN D3) 2,000 unit Tab [CHOLECALCIFEROL, VITAMIN D3, (VITAMIN D3) 2,000 UNIT TAB] Take 1 tablet (2,000 Units total) by mouth daily. 100 tablet 3     citalopram (CELEXA) 10 MG tablet Take 5 mg by mouth daily        cyanocobalamin 1000 MCG tablet [CYANOCOBALAMIN 1000 MCG TABLET] Take 1 tablet (1,000 mcg total) by mouth daily. 100 tablet 3     diclofenac (VOLTAREN) 1 % topical gel Apply 2 g topically daily as needed for moderate pain        GLOBAL EASE INJECT PEN NEEDLES 31G X 8 MM miscellaneous USE AS DIRECTED THREE (3) TIMES DAILY 300 each 3     hydrochlorothiazide (HYDRODIURIL) 25 MG tablet TAKE ONE TABLET BY MOUTH ONCE DAILY .  - TAKE WITH LOSARTAN - COMBO NOT AVAILABLE -  90 tablet 3     insulin aspart (NOVOLOG  PEN) 100 UNIT/ML pen 10 units breakfast 5 units lunch 10 units at dinner (Patient taking differently: 10 units breakfast 10 units lunch 10 units at dinner) 15 mL      insulin glargine (BASAGLAR KWIKPEN) 100 UNIT/ML pen Inject 42 Units Subcutaneous At Bedtime 45 mL 3     latanoprost (XALATAN) 0.005 % ophthalmic solution [LATANOPROST (XALATAN) 0.005 % OPHTHALMIC SOLUTION] Administer 1 drop to both eyes at bedtime.       losartan (COZAAR) 100 MG tablet Take 100 mg by mouth daily        magnesium oxide (MAGOX) 400 mg (241.3 mg magnesium) tablet [MAGNESIUM OXIDE (MAGOX) 400 MG (241.3 MG MAGNESIUM) TABLET] Take 1 tablet (400 mg total) by mouth 2 (two) times a day. 100 tablet 1     metFORMIN (GLUCOPHAGE) 500 MG tablet Take 500 mg by mouth 2 times daily (with meals) 500 mg in the morning and 500 mg in the evening       polyethylene glycol (MIRALAX) 17 g packet Take 1 packet by mouth daily as needed for constipation       pravastatin (PRAVACHOL) 20 MG tablet TAKE ONE (1) TABLET (20 MG) BY MOUTH AT BEDTIME 90 tablet 1     pregabalin (LYRICA) 50 MG capsule Take 1-2 capsules ( mg) by mouth At Bedtime 180 capsule 3       Objective  /40 (BP Location: Right arm, Patient Position: Sitting, Cuff Size: Adult Large)   Pulse 72   Resp 16   Ht 1.829 m (6')   BMI 39.87 kg/m      General Appearance:    Alert, cooperative, no distress, appears stated age   Head:    Normocephalic, without obvious abnormality, atraumatic   Throat:   Lips, mucosa, and tongue normal; teeth and gums normal   Neck:   Supple, symmetrical, trachea midline, no adenopathy;        thyroid:  No enlargement/tenderness/nodules; no carotid    bruit or JVD   Back:     Symmetric, no curvature, ROM normal, no CVA tenderness   Lungs:     Clear to auscultation bilaterally, respirations unlabored   Chest wall:    No tenderness or deformity   Heart:    Regular rate and rhythm, S1 and S2 normal, no murmur, rub   or gallop   Abdomen:     Soft, non-tender, bowel  sounds active all four quadrants,     no masses, no organomegaly   Extremities:   Normal, atraumatic, no cyanosis or edema   Pulses:   2+ and symmetric all extremities   Skin:   Skin color, texture, turgor normal, no rashes or lesions     Results    Lab Results personally reviewed   Lab Results   Component Value Date    CHOL 117 12/07/2021    CHOL 141 05/11/2021     Lab Results   Component Value Date    HDL 26 (L) 12/07/2021    HDL 33 (L) 05/11/2021     No components found for: LDLCALC  Lab Results   Component Value Date    TRIG 176 (H) 12/07/2021    TRIG 130 05/11/2021     Lab Results   Component Value Date    WBC 8.1 01/24/2022    HGB 11.9 (L) 01/24/2022    HCT 36.6 (L) 01/24/2022     01/24/2022     Lab Results   Component Value Date    BUN 27 12/07/2021     12/07/2021    CO2 26 12/07/2021

## 2022-05-04 NOTE — LETTER
May 6, 2022      Robert E Schamberger  [address] [place] [address]        Dear Domenico Abad   Comprehensive metabolic panel   Result Value Ref Range    Sodium 135 (L) 136 - 145 mmol/L    Potassium 5.0 3.5 - 5.0 mmol/L    Chloride 101 98 - 107 mmol/L    Carbon Dioxide (CO2) 25 22 - 31 mmol/L    Anion Gap 9 5 - 18 mmol/L    Urea Nitrogen 29 (H) 8 - 28 mg/dL    Creatinine 1.73 (H) 0.70 - 1.30 mg/dL    Calcium 8.9 8.5 - 10.5 mg/dL    Glucose 215 (H) 70 - 125 mg/dL    Alkaline Phosphatase 194 (H) 45 - 120 U/L    AST 33 0 - 40 U/L    ALT 57 (H) 0 - 45 U/L    Protein Total 6.4 6.0 - 8.0 g/dL    Albumin 3.1 (L) 3.5 - 5.0 g/dL    Bilirubin Total 0.5 0.0 - 1.0 mg/dL    GFR Estimate 38 (L) >60 mL/min/1.73m2      Comment:      Effective December 21, 2021 eGFRcr in adults is calculated using the 2021 CKD-EPI creatinine equation which includes age and gender (James et al., [facility], DOI: 10.1056/JQMBon5615934)   CBC with platelets   Result Value Ref Range    WBC Count 8.6 4.0 - 11.0 10e3/uL    RBC Count 4.09 (L) 4.40 - 5.90 10e6/uL    Hemoglobin 11.9 (L) 13.3 - 17.7 g/dL    Hematocrit 37.0 (L) 40.0 - 53.0 %    MCV 91 78 - 100 fL    MCH 29.1 26.5 - 33.0 pg    MCHC 32.2 31.5 - 36.5 g/dL    RDW 13.2 10.0 - 15.0 %    Platelet Count 206 150 - 450 10e3/uL   Hemoglobin A1c   Result Value Ref Range    Hemoglobin A1C 7.9 (H) 0.0 - 5.6 %      Comment:      Normal <5.7%   Prediabetes 5.7-6.4%    Diabetes 6.5% or higher     Note: Adopted from ADA consensus guidelines.   Vitamin D Deficiency Screening   Result Value Ref Range    Vitamin D, Total (25-Hydroxy) 39 20 - 75 ug/L    Narrative    Season, race, dietary intake, and treatment affect the concentration of 25-hydroxy-Vitamin D. Values may decrease during winter months and increase during summer months. Values 20-29 ug/L may indicate Vitamin D insufficiency and values <20 ug/L may indicate Vitamin D deficiency.    Vitamin D determination is routinely performed by an  immunoassay specific for 25 hydroxyvitamin D3.  If an individual is on vitamin D2(ergocalciferol) supplementation, please specify 25 OH vitamin D2 and D3 level determination by LCMSMS test VITD23.         Your diabetes needs to be better controlled.  I would recommend increasing your dose of Basaglar to 44 units daily.  Everything else looks stable      If you have any questions or concerns, please call the clinic at the number listed above.       Sincerely,      Ld Moy MD  Internal Medicine  [facility]

## 2022-05-18 NOTE — TELEPHONE ENCOUNTER
----- Message from ANAMIKA Drew sent at 5/18/2022  7:08 AM CDT -----  Regarding: MD notify  MD Notify saved to Sleep Number for your review, thanks!            Noted. Pt wearing for bradycardia and paroxysmal afib. Hx of cpap with documented mask difficulty.        ==Called patient and spoke with his wife,Nely. She states that he was unavailable. She states he has been more wobbly lately in the morning. This event happened 2 days ago and we are receiving it today. She thinks he may have been sleeping or they could have been getting up for him to go to the bathroom.    Of note, this morning, he got up to go to the bathroom at 5 am and did have a fall and they do not know why. EMS had to come and get him off the ground and his vitals were stable so he was not taken in as he had no obvious injury. Again, above strip is from 2 days ago, not this morning. Will update LBF. [place]        [place]. Blank,  Nely, pt's wife, believes he has been more wobbly and unsteady lately in response to this strip above. This event was sent to me today and it is dated 5/16 from 0547. Nely does not recall any specific symptoms at that time as he has been unsteady in the morning for some time. This morning he actually fell getting out of bed and EMS had to come get him up- no injury and vitally stable so he was not taken in. This is unrelated to the strip above. Any recommendations?  Thanks,  Cedric

## 2022-05-18 NOTE — TELEPHONE ENCOUNTER
Jass Blank MD Caswell, Mallory J, RN  Caller: Unspecified (Today, 12:30 PM)  Very difficult situation with this 88-year-old gentleman, given falls as well as seeing some bradycardia I am concerned that it might be reasonable to have pacemaker placed now.   Can you please pass this on to the EP device nurse practitioners to get EP opinion about whether we should have pacer placed now?  Very gray area, but given his falls and tendency to bradycardia I think that would be a very reasonable.   LF         ==Noted. Will route to EP support team to see if patient's chart can be reviewed for pacemaker implant. Called patient and spoke with his wife, Nely. She verbalized understanding to just continue current treatment plan and wearing the monitor at this point. We will contact her with next steps after EP review of chart.  -[facility] [place]      Rigo wood,   Could this patient's chart be reviewed for pacemaker? He has been having frequent falls and bradycardia, wearing an event monitor now.   Thanks,  Mal

## 2022-05-18 NOTE — TELEPHONE ENCOUNTER
Noted, will forward to Dr. Biggs for review.    Dr. Biggs- pt refrred to EP by Dr. Blank for PPM implant after event monitor showing bradycardia and pt having frequent falls.     Pt has significant cardiac hx, AFIB, SSS, HTN.    Please let us know your recommendations.    Lucita

## 2022-06-02 NOTE — TELEPHONE ENCOUNTER
Jass Blank MD Caswell, Mallory J RN  Caller: Unspecified (Today,  9:58 AM)  This appears to be 7:30 in the morning, I am assuming sleep apnea, defer to EP but at this point time no other orders.   LF       ==Spoke with Lithia. She cannot recall specifically if Sonu was symptomatic or anything during event below but she does believe he was awake and not sleeping. Will forward off to EP to see about the status of possible pacemaker. She reiterates his unsteadiness and frequent falls.       Hi,  This patient had another pause- biotel alert below. Dr. Blank just requested sending to the team again to see if Dr. Biggs had any input yet on the pacemaker.  See telephone encounter from 5/18.  Thanks Cedric wood

## 2022-06-02 NOTE — TELEPHONE ENCOUNTER
----- Message from Megan Farr, EP sent at 6/2/2022  7:19 AM CDT -----  Regarding: MD notify  MD notified saved for your review, thanks!            Another [employer] alert on [facility]. See encounter dated 5/18. Will route to [facility] + Ep as it looks like they are still doing some workup for pacer.-[facility]      Dr. Blank,  See [employer] alert documentation from 5/18- another alert with 3.3 second pause.  pause on this patient on no rate lowering medications. Prior alert was routed to EP and then it looks like sent to Dr. Biggs for consideration of pacemaker. Any other recommendations besides route this to EP as well?  Thanks,  Mal

## 2022-06-03 NOTE — TELEPHONE ENCOUNTER
Pt med list and health profile sent to [facility] attn: Aliza as requested.

## 2022-06-03 NOTE — TELEPHONE ENCOUNTER
Reason for Call:  Other records    Detailed comments: Pt will be going to [facility] transitional care for a week starting June 27th, 2022 and they are asking for records for pt for med list and health history.    Can be faxed to: [phone]  ATTN: Aliza.    Phone Number Patient can be reached at: Home number on file [phone] (home)    Best Time: any    Can we leave a detailed message on this number? Not Applicable    Call taken on 6/3/2022 at 10:33 AM by Aleksey Jiménez

## 2022-06-09 NOTE — TELEPHONE ENCOUNTER
Call from Nely who is requesting referral from PCP to be faxed to [facility] b/c patient is going to respite care x 1 week starting 6/24/22.    Per note on 6/3/22 - note indicated med list and health profile were already sent.    Advised wife to have the nursing home call as to what they are requesting so the clinic send the correct information to them.    She verbalized understanding and will have them call the clinic.      Rajendra Jiménez RN/[facility]

## 2022-06-09 NOTE — TELEPHONE ENCOUNTER
Additional Information    Information only question and nurse able to answer    Negative: Nursing judgment    Negative: Nursing judgment    Negative: Nursing judgment    Negative: Nursing judgment    Protocols used: INFORMATION ONLY CALL - NO TRIAGE-A-OH

## 2022-06-16 NOTE — TELEPHONE ENCOUNTER
Patient's son dropped off physician's orders form for [facility]--needs completed ASAP--please fax to [phone] when completed.  Thank you

## 2022-06-17 NOTE — TELEPHONE ENCOUNTER
I spoke to Antunez wife Nely and let her know form has been faxed to [facility].   Francheska Almonte, ROGELIO  4:26 PM  6/17/2022

## 2022-06-21 NOTE — PROGRESS NOTES
Assessment & Plan     Type 2 diabetes mellitus with diabetic polyneuropathy, with long-term current use of insulin (H)  Reviewed diabetic log and sugars do not appear to be ideally well controlled especially of late.  Sugars often over 200.  He is now using 46 units of Basaglar and continues Humalog 10 units before meals.  Last A1c 7.9%.  There is some question whether he is administering his insulin correctly.  He may need to have one of his family members take over this task.  - insulin glargine (BASAGLAR KWIKPEN) 100 UNIT/ML pen; Inject 46 Units Subcutaneous At Bedtime    Vascular dementia without behavioral disturbance (H)  Progressive cognitive dysfunction needing 24/7 supervision.  Wife is needing to have cardioversion completed and Domenico will need to go to a nursing home for a short stay while she is not available to provide his care.    Paroxysmal atrial fibrillation (H)  Anticoagulation contraindicated with poor balance and recurrent falls.  Fortunately, recent event monitor showing no recurrent atrial fibrillation    Sick sinus syndrome (H)  Recent cardiac monitor showing frequent bradycardia and pauses.  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  May be contributing to some of the bradycardia seen in the early morning    Essential hypertension  Blood pressure looking well controlled with current medication    History of CVA (cerebrovascular accident)  Continue 162 mg aspirin along with pravastatin and good blood pressure and diabetes control    Need for viral immunization  COVID booster today  - COVID-19,PF,MODERNA (18+ YRS BOOSTER .25ML)      40 minutes spent on the date of the encounter doing  chart review, history and exam, documentation and further activities per the note       BMI:   Estimated body mass index is 39.87 kg/m  as calculated from the following:    Height as of 5/4/22: 1.829 m (6').    Weight as of 1/24/22: 133.4 kg (294 lb).           Return in about 6 weeks (around 8/2/2022) for Follow up.    Ld Moy MD  [facility]    Brandt Ascencio is a 88 year old accompanied by his spouse and son., presenting for the following health issues: Here to follow-up medical problems including vascular dementia with history of CVA along with type 2 diabetes, hypertension, chronic kidney disease, paroxysmal atrial fibrillation and sick sinus syndrome.  See assessment plan for details  Follow Up (Needs H&P and paperwork for [facility])      History of Present Illness       Reason for visit:  Check up    He eats 4 or more servings of fruits and vegetables daily.He consumes 0 sweetened beverage(s) daily.He exercises with enough effort to increase his heart rate 9 or less minutes per day.  He exercises with enough effort to increase his heart rate 3 or less days per week.   He is taking medications regularly.             Review of Systems   Denies chest pain.  No increasing shortness of breath or edema.  No abdominal pain      Objective    /60 (BP Location: Left arm, Patient Position: Sitting, Cuff Size: Adult Large)   Pulse 53   SpO2 97%   There is no height or weight on file to calculate BMI.  Physical Exam   Obese elderly male  Lungs clear bilaterally  Heart regular rate and rhythm   Abdomen soft nontender  Trace bilateral lower extremity edema        .  ..

## 2022-06-22 NOTE — TELEPHONE ENCOUNTER
Reason for Call:  Other call back    Detailed comments:   Magy - Admission Supervisor at [facility].    Needing a hard copy script signed by the dr for medication Lyrica.  Needing medication orders (list) signed and dated by the dr.   Needing PAS (pre admition screening) senior linkage line prior to admitting from the dr.   Once the form is filled out, it will be automatically sent to the facility.  Before Friday at 4pm - Pt is being admitted June 27th 22.     Phone # [phone]  Fax #: [phone]    Phone Number Patient can be reached at: Home number on file [phone] (home)    Best Time: any    Can we leave a detailed message on this number? Not Applicable    Call taken on 6/22/2022 at 3:51 PM by Aleksey Jiménez

## 2022-06-22 NOTE — TELEPHONE ENCOUNTER
I called Magy back to get more information about the PAS form that needs to be completed. She states it needs to be filled out online by Dr. Moy. The website is [employer]. She states there are a series of questions that need to be answered and once it is submitted it transfers straight to the facility. Phone number for Linkage Line is [phone] if needed.    Medication list printed and placed in Dr. Moy's basket for signature.    Benji pended for print.

## 2022-06-24 NOTE — TELEPHONE ENCOUNTER
Online form painfully completed.  Lyrica prescription ready to send along with signed medication list

## 2022-07-05 NOTE — TELEPHONE ENCOUNTER
Wilmar Biggs MD Holen, Megan, RN; Melissa Rodriguez, RN; P [facility] Support Pool - [facility]  Caller: Unspecified (1 month ago)  Hi team,   Patient chart reviewed as well as his monitor.   It does not appear that we have any direct correlation of his falls with rhythm disturbance.  There is significant suggestion of autonomic disturbance on the monitor.   If the clinical symptoms persist then the patient would probably be better suited to receive a implantable loop recorder (ILR) to better define the rhythm/symptom correlation.   Thanks   Wilmra

## 2022-07-05 NOTE — TELEPHONE ENCOUNTER
Noted.  Phone call to patient, reviewed the below information with the patients wife, Nely, reviewed consent to communicate.     She states that the patient is currently in TCU after another fall, pt was sent to [facility] and recently discharged.  She states he is having a care conference at the nursing home this afternoon to discuss his plans, she thinks he may stay there permanently.    Discussed loop recorder in detail and explained that if they are interested then she should call back to schedule.  Reviewed contact information.

## 2022-07-07 NOTE — PROGRESS NOTES
[facility] GERIATRICS    PRIMARY CARE PROVIDER AND CLINIC:  Ld Moy MD, [address] [facility] [address]  Chief Complaint   Patient presents with     Hospital F/U      [place] Medical Record Number:  [id number]  Place of Service where encounter took place:  [facility] ([facility]) [[id number]]    Robert E Schamberger  is a 88 year old  (4/14/1934), admitted to the above facility from  [facility]. Hospital stay 6/27 through 6/30..   HPI:    Patient admitted to TCU for acute rehab following hospitalization as above for frequent falls, dizziness and weakness. Fall at home with head strike and left occipital scalp abrasion. Work up was essentially unremarkable. PMH notable for paroxysmal afib (no acg 2/2 falls), h/o CVA, TIA, HTN, HLD, QUETA, DM2, dementia and depression    Today patient is found sitting up in his room. Family including wife and daughter present. Patient denies noting SOB, chest pain or dizziness since TCU admission. Family inquiring about UA results inpatient 2/2 some noted urinary retention/urgency. Patient denies noting urgency or dysuria since admit to unit. Encouraged patient/family to report this to nursing if occurs. Trace edema noted and patient says has this occasionally. Is open to wearing compression socks and denies associated discomfort.     CODE STATUS/ADVANCE DIRECTIVES DISCUSSION:  Full Code  CPR/Full code   ALLERGIES:   Allergies   Allergen Reactions     Actos [Pioglitazone] Swelling     Edema     Atorvastatin Unknown     Back pain     Donepezil Unknown and Diarrhea     Diarrhea     Gabapentin Diarrhea     Diarrhea     Niacin Unknown     Hyperglycemia     Simvastatin Unknown     Back pain      PAST MEDICAL HISTORY:   Past Medical History:   Diagnosis Date     Acute lower GI bleeding 1/24/2022    Melena turning to bright red blood with hemoglobin stable at 12.0, lower GI bleed suspected at ER evaluation     Anemia in chronic kidney disease      B12 deficiency  11/09/2017     Bilateral hearing loss 05/09/2016     Bursitis, hip, left 03/02/2017     Carotid artery stenosis, symptomatic, left 07/22/2021    Left carotid artery repair with conduit 2021 following CVA     Chest pain     Normal GXT 2006     Chronic kidney disease, stage 3 (moderate)     Worsening with hyperkalemia on Relafen-discontinued August 2016     Chronic kidney disease, stage 3a (H) 05/07/2021     Closed displaced fracture of left clavicle 09/21/2017     Cognitive changes 11/30/2018    SLUMS 20/30 Nov 2018     Cognitive impairment 07/22/2021     Complete tear of left rotator cuff 02/15/2018    Associated with fall and clavicular fracture that occurred 2017, evaluated by orthopedics, given cortisone injection and physical therapy recommended     Degenerative disc disease, lumbar      Dementia without behavioral disturbance (H) 02/21/2020     Depression      Diabetic peripheral neuropathy (H) 11/30/2018     Erectile dysfunction      Essential hypertension 10/05/2021     Fatigue 07/06/2017     Forearm tendonitis 11/29/2016     Glaucoma      Gout attack     Left foot     History of CVA (cerebrovascular accident) 10/05/2021    Hospitalized July 2021 with acute CVA presenting with confusion and right-sided weakness.  Received thrombolytics and discharged to TCU.  Subsequent left carotid endarterectomy     History of recurrent TIAs 05/07/2020     HTN (hypertension)      Hyperlipidemia      Ingrown toenail 07/06/2017     Intermittent asthma without complication 11/09/2017     Left leg cellulitis 2014     Low back pain      Moderate major depression (H) 06/03/2019     Morbid obesity (H)      QUETA on CPAP      Osteoarthritis      Osteoarthritis of both hands      Osteoarthritis of both knees     Right TKA     Pain of right heel 05/11/2018     Peripheral edema 05/07/2021     Peripheral neuropathy 10/09/2018     Recurrent falls 03/05/2021     Rib fracture 2005     Right-sided chest pain 05/09/2016     S/P left carotid  endarterectomy 09/13/2021     Screening     No AAA on CT scan 2009     Sepsis secondary to UTI (H) 03/05/2021    Hospitalized with urosepsis January 2021 and rehospitalized after fall found to have recurrent UTI treated with 30 days of Omnicef for acute prostatitis     Sick sinus syndrome (H) 05/07/2020    Abnormal event monitor with 2.8-second sinus pause     Squamous cell skin cancer 11/02/2015     TIA (transient ischemic attack) 09/06/2019    CT head showing chronic lacunar infarcts.  Carotid ultrasound with atherosclerotic plaque but no severe stenosis.     Traumatic subarachnoid hemorrhage with loss of consciousness of 30 minutes or less (H) 09/21/2017    Fall down the stairs following alcohol use with traumatic subarachnoid hemorrhage treated conservatively at [facility] followed by stay at TCU with no residual neurologic deficits     Trochanteric bursitis of right hip 08/24/2018     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)      Unsteady gait 03/04/2019     Vascular dementia without behavioral disturbance (H) 12/07/2021     Vitamin D deficiency 11/09/2017      PAST SURGICAL HISTORY:   has a past surgical history that includes IR Biliary Tube Change (7/25/2000); IR Lumbar Epidural Steroid Injection (3/23/2012); IR Lumbar Epidural Steroid Injection (4/13/2012); REMOVAL GALLBLADDER; REVISE MEDIAN N/CARPAL TUNNEL SURG; EXCIS STOMACH ULCER,LESN;LOCAL; Total Knee Arthroplasty (Right, 2014); Release carpal tunnel (Bilateral); Eye surgery; Colonoscopy (2003); and Blepharoplasty (2018).  FAMILY HISTORY: family history is not on file.  SOCIAL HISTORY:   reports that he has quit smoking. He has never used smokeless tobacco. He reports current alcohol use of about 14.0 standard drinks of alcohol per week. He reports that he does not use drugs.  Patient's living condition: lives with spouse    Post Discharge Medication Reconciliation Status: discharge medications reconciled and changed, per  note/orders  Current Outpatient Medications   Medication Sig     acetaminophen (TYLENOL) 500 MG tablet Take 1,000 mg by mouth every 8 hours as needed      amLODIPine (NORVASC) 5 MG tablet Take 5 mg by mouth daily     aspirin (ASA) 81 MG EC tablet Take 2 tablets (162 mg) by mouth daily     cholecalciferol, vitamin D3, (VITAMIN D3) 2,000 unit Tab [CHOLECALCIFEROL, VITAMIN D3, (VITAMIN D3) 2,000 UNIT TAB] Take 1 tablet (2,000 Units total) by mouth daily.     citalopram (CELEXA) 20 MG tablet Take 10 mg by mouth daily     cyanocobalamin 1000 MCG tablet [CYANOCOBALAMIN 1000 MCG TABLET] Take 1 tablet (1,000 mcg total) by mouth daily.     diclofenac (VOLTAREN) 1 % topical gel Apply 2 g topically daily as needed for moderate pain      GLOBAL EASE INJECT PEN NEEDLES 31G X 8 MM miscellaneous USE AS DIRECTED THREE (3) TIMES DAILY     hydrochlorothiazide (HYDRODIURIL) 25 MG tablet TAKE ONE TABLET BY MOUTH ONCE DAILY .  - TAKE WITH LOSARTAN - COMBO NOT AVAILABLE -      insulin aspart (NOVOLOG FLEXPEN) 100 UNIT/ML pen Inject as per sliding scale: if  150 - 199 = 1; 200 - 249 = 2; 250 - 299 = 3; 300 - 349  = 4; 350 - 399 = 5; 400+ = 6 400 or greater give 6  units and call MD, subcutaneously three times a day  for Type 2 Diabetes Check blood glucose 3 times  daily before meals. <70 see Hypoglycemia Protocol,   No insulin, give prandial insulin if ordered.     insulin glargine (BASAGLAR KWIKPEN) 100 UNIT/ML pen Inject 46 Units Subcutaneous At Bedtime (Patient taking differently: Inject 45 Units Subcutaneous At Bedtime)     latanoprost (XALATAN) 0.005 % ophthalmic solution [LATANOPROST (XALATAN) 0.005 % OPHTHALMIC SOLUTION] Administer 1 drop to both eyes at bedtime.     losartan (COZAAR) 100 MG tablet Take 100 mg by mouth daily      magnesium oxide (MAGOX) 400 mg (241.3 mg magnesium) tablet [MAGNESIUM OXIDE (MAGOX) 400 MG (241.3 MG MAGNESIUM) TABLET] Take 1 tablet (400 mg total) by mouth 2 (two) times a day.     metFORMIN  (GLUCOPHAGE) 500 MG tablet Take 500 mg by mouth 2 times daily (with meals) 500 mg in the morning and 500 mg in the evening     ONETOUCH VERIO IQ test strip Use to test blood glucose 3 times a day     polyethylene glycol (MIRALAX) 17 g packet Take 1 packet by mouth daily as needed for constipation     pravastatin (PRAVACHOL) 20 MG tablet TAKE ONE (1) TABLET (20 MG) BY MOUTH AT BEDTIME     pregabalin (LYRICA) 50 MG capsule Take 1-2 capsules ( mg) by mouth At Bedtime     No current facility-administered medications for this visit.       ROS:  10 point ROS of systems including Constitutional, Eyes, Respiratory, Cardiovascular, Gastroenterology, Genitourinary, Integumentary, Musculoskeletal, Psychiatric were all negative except for pertinent positives noted in my HPI.    Vitals:  BP (!) 146/60   Pulse 70   Temp 97.4  F (36.3  C)   Resp 18   Ht 1.829 m (6')   Wt 127 kg (280 lb)   SpO2 94%   BMI 37.97 kg/m    Exam:  GENERAL APPEARANCE: Alert, in no distress   ENT: Mouth and posterior oropharynx normal, moist mucous membranes   EYES: EOM, conjunctivae, lids, pupils and irises normal   NECK: No adenopathy,masses or thyromegaly   RESP: respiratory effort and palpation of chest normal, Lungs clear to auscultation  CV: VS as above - trace edema to bilateral LE.   ABDOMEN: normal bowel sounds, soft, nontender, no hepatosplenomegaly or other masses   : palpation of bladder WNL   M/S: Gait and station normal   Digits and nails normal - PEREZ  SKIN: Inspection of skin and subcutaneous tissue baseline, Palpation of skin and subcutaneous tissue baseline  NEURO: Cranial nerves 2-12 are normal tested and grossly at patient's baseline, Examination of sensation by touch normal   PSYCH: oriented X self and general situation, affect and mood normal             Lab/Diagnostic data:  Recent labs in [facility] reviewed by me today.     ASSESSMENT/PLAN:  Generalized muscle weakness  - progressive  Dizziness  - not thought cardiac  origin  Physical deconditioning  Falls frequently  - fall prec, anticipate needs, toilet program  - Supportive cares and treatments  - PT/OT to optimize function, safety and independence  - ongoing discharge planning, SW follow and care conferences per unit protocol  - continue on Vit D supplement      Paroxysmal atrial fibrillation (H)  - on ASA only 2/2 frequent falls- cards do not think falls cardiac related. Monitored on tele inpatient  - follow VS  - f/w cardiologist as directed    History of CVA (cerebrovascular accident)  Hyperlipidemia  -h/o severe left carotid stenosis, s/p left carotid endarterectomy  - remains on ASA  - continue on statin      Type 2 diabetes mellitus with diabetic polyneuropathy, with long-term current use of insulin (H)  Peripheral neuropathy  Lab Results   Component Value Date    A1C 7.9 05/04/2022    A1C 7.9 12/07/2021    A1C 6.8 05/07/2021    A1C 7.1 01/13/2021    A1C 6.6 11/09/2020     - continues on Lantus 45 units HS and Novolog sliding scale insulin TID with meals  - monitor ac, HS BGL and adjust regimen prn  - continues on Lyrica    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 Limited BP information, was reportedly not orthostatic inpatient  - baseline Cr approx 1.7 of late and in this range currently  -  -avoid nephrotoxins  - recheck periodic BMP to ensure stability  - continue on amlodipine, Cozaar and hydrochlorothiazide  - follow VS, clinically    Bilateral lower extremity edema  - trace  - fit for Tenso compression socks      Depression  - chronic, situational stressors in play  - continue on Celexa  - monitor mood and behaviors      Orders:  Written on unit and discussed with nursing          Electronically signed by:  PASCUAL Welch CNP

## 2022-07-07 NOTE — LETTER
7/7/2022        RE: Robert E Schamberger  [address] [place] [address]        [facility] GERIATR[facility]    PRIMARY CARE PROVIDER AND CLINIC:  Ld Moy MD, [address] [facility] / [facility] [id number]  Chief Complaint   Patient presents with     Hospital F/U      [facility] Medical Record Number:  [id number]  Place of Service where encounter took place:  [facility] ([facility]) [[id number]]    Robert E Schamberger  is a 88 year old  (4/14/1934), admitted to the above facility from  [facility]. Hospital stay 6/27 through 6/30..   HPI:    Patient admitted to TCU for acute rehab following hospitalization as above for frequent falls, dizziness and weakness. Fall at home with head strike and left occipital scalp abrasion. Work up was essentially unremarkable. PMH notable for paroxysmal afib (no acg 2/2 falls), h/o CVA, TIA, HTN, HLD, QUETA, DM2, dementia and depression    Today patient is found sitting up in his room. Family including wife and daughter present. Patient denies noting SOB, chest pain or dizziness since TCU admission. Family inquiring about UA results inpatient 2/2 some noted urinary retention/urgency. Patient denies noting urgency or dysuria since admit to unit. Encouraged patient/family to report this to nursing if occurs. Trace edema noted and patient says has this occasionally. Is open to wearing compression socks and denies associated discomfort.     CODE STATUS/ADVANCE DIRECTIVES DISCUSSION:  Full Code  CPR/Full code   ALLERGIES:   Allergies   Allergen Reactions     Actos [Pioglitazone] Swelling     Edema     Atorvastatin Unknown     Back pain     Donepezil Unknown and Diarrhea     Diarrhea     Gabapentin Diarrhea     Diarrhea     Niacin Unknown     Hyperglycemia     Simvastatin Unknown     Back pain      PAST MEDICAL HISTORY:   Past Medical History:   Diagnosis Date     Acute lower GI bleeding 1/24/2022    Melena turning to bright red blood with hemoglobin stable at 12.0, lower GI  bleed suspected at ER evaluation     Anemia in chronic kidney disease      B12 deficiency 11/09/2017     Bilateral hearing loss 05/09/2016     Bursitis, hip, left 03/02/2017     Carotid artery stenosis, symptomatic, left 07/22/2021    Left carotid artery repair with conduit 2021 following CVA     Chest pain     Normal GXT 2006     Chronic kidney disease, stage 3 (moderate)     Worsening with hyperkalemia on Relafen-discontinued August 2016     Chronic kidney disease, stage 3a (H) 05/07/2021     Closed displaced fracture of left clavicle 09/21/2017     Cognitive changes 11/30/2018    SLUMS 20/30 Nov 2018     Cognitive impairment 07/22/2021     Complete tear of left rotator cuff 02/15/2018    Associated with fall and clavicular fracture that occurred 2017, evaluated by orthopedics, given cortisone injection and physical therapy recommended     Degenerative disc disease, lumbar      Dementia without behavioral disturbance (H) 02/21/2020     Depression      Diabetic peripheral neuropathy (H) 11/30/2018     Erectile dysfunction      Essential hypertension 10/05/2021     Fatigue 07/06/2017     Forearm tendonitis 11/29/2016     Glaucoma      Gout attack     Left foot     History of CVA (cerebrovascular accident) 10/05/2021    Hospitalized July 2021 with acute CVA presenting with confusion and right-sided weakness.  Received thrombolytics and discharged to TCU.  Subsequent left carotid endarterectomy     History of recurrent TIAs 05/07/2020     HTN (hypertension)      Hyperlipidemia      Ingrown toenail 07/06/2017     Intermittent asthma without complication 11/09/2017     Left leg cellulitis 2014     Low back pain      Moderate major depression (H) 06/03/2019     Morbid obesity (H)      QUETA on CPAP      Osteoarthritis      Osteoarthritis of both hands      Osteoarthritis of both knees     Right TKA     Pain of right heel 05/11/2018     Peripheral edema 05/07/2021     Peripheral neuropathy 10/09/2018     Recurrent falls  03/05/2021     Rib fracture 2005     Right-sided chest pain 05/09/2016     S/P left carotid endarterectomy 09/13/2021     Screening     No AAA on CT scan 2009     Sepsis secondary to UTI (H) 03/05/2021    Hospitalized with urosepsis January 2021 and rehospitalized after fall found to have recurrent UTI treated with 30 days of Omnicef for acute prostatitis     Sick sinus syndrome (H) 05/07/2020    Abnormal event monitor with 2.8-second sinus pause     Squamous cell skin cancer 11/02/2015     TIA (transient ischemic attack) 09/06/2019    CT head showing chronic lacunar infarcts.  Carotid ultrasound with atherosclerotic plaque but no severe stenosis.     Traumatic subarachnoid hemorrhage with loss of consciousness of 30 minutes or less (H) 09/21/2017    Fall down the stairs following alcohol use with traumatic subarachnoid hemorrhage treated conservatively at [facility] followed by stay at TCU with no residual neurologic deficits     Trochanteric bursitis of right hip 08/24/2018     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)      Unsteady gait 03/04/2019     Vascular dementia without behavioral disturbance (H) 12/07/2021     Vitamin D deficiency 11/09/2017      PAST SURGICAL HISTORY:   has a past surgical history that includes IR Biliary Tube Change (7/25/2000); IR Lumbar Epidural Steroid Injection (3/23/2012); IR Lumbar Epidural Steroid Injection (4/13/2012); REMOVAL GALLBLADDER; REVISE MEDIAN N/CARPAL TUNNEL SURG; EXCIS STOMACH ULCER,LESN;LOCAL; Total Knee Arthroplasty (Right, 2014); Release carpal tunnel (Bilateral); Eye surgery; Colonoscopy (2003); and Blepharoplasty (2018).  FAMILY HISTORY: family history is not on file.  SOCIAL HISTORY:   reports that he has quit smoking. He has never used smokeless tobacco. He reports current alcohol use of about 14.0 standard drinks of alcohol per week. He reports that he does not use drugs.  Patient's living condition: lives with spouse    Post Discharge  Medication Reconciliation Status: discharge medications reconciled and changed, per note/orders  Current Outpatient Medications   Medication Sig     acetaminophen (TYLENOL) 500 MG tablet Take 1,000 mg by mouth every 8 hours as needed      amLODIPine (NORVASC) 5 MG tablet Take 5 mg by mouth daily     aspirin (ASA) 81 MG EC tablet Take 2 tablets (162 mg) by mouth daily     cholecalciferol, vitamin D3, (VITAMIN D3) 2,000 unit Tab [CHOLECALCIFEROL, VITAMIN D3, (VITAMIN D3) 2,000 UNIT TAB] Take 1 tablet (2,000 Units total) by mouth daily.     citalopram (CELEXA) 20 MG tablet Take 10 mg by mouth daily     cyanocobalamin 1000 MCG tablet [CYANOCOBALAMIN 1000 MCG TABLET] Take 1 tablet (1,000 mcg total) by mouth daily.     diclofenac (VOLTAREN) 1 % topical gel Apply 2 g topically daily as needed for moderate pain      GLOBAL EASE INJECT PEN NEEDLES 31G X 8 MM miscellaneous USE AS DIRECTED THREE (3) TIMES DAILY     hydrochlorothiazide (HYDRODIURIL) 25 MG tablet TAKE ONE TABLET BY MOUTH ONCE DAILY .  - TAKE WITH LOSARTAN - COMBO NOT AVAILABLE -      insulin aspart (NOVOLOG FLEXPEN) 100 UNIT/ML pen Inject as per sliding scale: if  150 - 199 = 1; 200 - 249 = 2; 250 - 299 = 3; 300 - 349  = 4; 350 - 399 = 5; 400+ = 6 400 or greater give 6  units and call MD, subcutaneously three times a day  for Type 2 Diabetes Check blood glucose 3 times  daily before meals. <70 see Hypoglycemia Protocol,   No insulin, give prandial insulin if ordered.     insulin glargine (BASAGLAR KWIKPEN) 100 UNIT/ML pen Inject 46 Units Subcutaneous At Bedtime (Patient taking differently: Inject 45 Units Subcutaneous At Bedtime)     latanoprost (XALATAN) 0.005 % ophthalmic solution [LATANOPROST (XALATAN) 0.005 % OPHTHALMIC SOLUTION] Administer 1 drop to both eyes at bedtime.     losartan (COZAAR) 100 MG tablet Take 100 mg by mouth daily      magnesium oxide (MAGOX) 400 mg (241.3 mg magnesium) tablet [MAGNESIUM OXIDE (MAGOX) 400 MG (241.3 MG MAGNESIUM)  TABLET] Take 1 tablet (400 mg total) by mouth 2 (two) times a day.     metFORMIN (GLUCOPHAGE) 500 MG tablet Take 500 mg by mouth 2 times daily (with meals) 500 mg in the morning and 500 mg in the evening     ONETOUCH VERIO IQ test strip Use to test blood glucose 3 times a day     polyethylene glycol (MIRALAX) 17 g packet Take 1 packet by mouth daily as needed for constipation     pravastatin (PRAVACHOL) 20 MG tablet TAKE ONE (1) TABLET (20 MG) BY MOUTH AT BEDTIME     pregabalin (LYRICA) 50 MG capsule Take 1-2 capsules ( mg) by mouth At Bedtime     No current facility-administered medications for this visit.       ROS:  10 point ROS of systems including Constitutional, Eyes, Respiratory, Cardiovascular, Gastroenterology, Genitourinary, Integumentary, Musculoskeletal, Psychiatric were all negative except for pertinent positives noted in my HPI.    Vitals:  BP (!) 146/60   Pulse 70   Temp 97.4  F (36.3  C)   Resp 18   Ht 1.829 m (6')   Wt 127 kg (280 lb)   SpO2 94%   BMI 37.97 kg/m    Exam:  GENERAL APPEARANCE: Alert, in no distress   ENT: Mouth and posterior oropharynx normal, moist mucous membranes   EYES: EOM, conjunctivae, lids, pupils and irises normal   NECK: No adenopathy,masses or thyromegaly   RESP: respiratory effort and palpation of chest normal, Lungs clear to auscultation  CV: VS as above - trace edema to bilateral LE.   ABDOMEN: normal bowel sounds, soft, nontender, no hepatosplenomegaly or other masses   : palpation of bladder WNL   M/S: Gait and station normal   Digits and nails normal - PEREZ  SKIN: Inspection of skin and subcutaneous tissue baseline, Palpation of skin and subcutaneous tissue baseline  NEURO: Cranial nerves 2-12 are normal tested and grossly at patient's baseline, Examination of sensation by touch normal   PSYCH: oriented X self and general situation, affect and mood normal             Lab/Diagnostic data:  Recent labs in [facility] reviewed by me today.      ASSESSMENT/PLAN:  Generalized muscle weakness  - progressive  Dizziness  - not thought cardiac origin  Physical deconditioning  Falls frequently  - fall prec, anticipate needs, toilet program  - Supportive cares and treatments  - PT/OT to optimize function, safety and independence  - ongoing discharge planning, SW follow and care conferences per unit protocol  - continue on Vit D supplement      Paroxysmal atrial fibrillation (H)  - on ASA only 2/2 frequent falls- cards do not think falls cardiac related. Monitored on tele inpatient  - follow VS  - f/w cardiologist as directed    History of CVA (cerebrovascular accident)  Hyperlipidemia  -h/o severe left carotid stenosis, s/p left carotid endarterectomy  - remains on ASA  - continue on statin      Type 2 diabetes mellitus with diabetic polyneuropathy, with long-term current use of insulin (H)  Peripheral neuropathy  Lab Results   Component Value Date    A1C 7.9 05/04/2022    A1C 7.9 12/07/2021    A1C 6.8 05/07/2021    A1C 7.1 01/13/2021    A1C 6.6 11/09/2020     - continues on Lantus 45 units HS and Novolog sliding scale insulin TID with meals  - monitor ac, HS BGL and adjust regimen prn  - continues on Lyrica    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 Limited BP information, was reportedly not orthostatic inpatient  - baseline Cr approx 1.7 of late and in this range currently  -  -avoid nephrotoxins  - recheck periodic BMP to ensure stability  - continue on amlodipine, Cozaar and hydrochlorothiazide  - follow VS, clinically    Bilateral lower extremity edema  - trace  - fit for Tenso compression socks      Depression  - chronic, situational stressors in play  - continue on Celexa  - monitor mood and behaviors      Orders:  Written on unit and discussed with nursing          Electronically signed by:  PASCUAL Welch CNP                     Sincerely,        Gosia Ng  PASCUAL Terry CNP

## 2022-07-12 NOTE — LETTER
7/12/2022        RE: Robert E Schamberger  [address] [place] [address]        [facility]IATR[facility]    Chief Complaint   Patient presents with     RECHECK     HPI:  Robert E Schamberger is a 88 year old  (4/14/1934), who is being seen today for an episodic care visit at: [facility] ([facility]) [[id number]]. Today's concern is:     Patient in TCU for acute rehab and medical management following hosp for progressive weakness, frequent falls and dizziness. W/U essentially unremarkable inpatient.     Seen today for recheck of acute and chronic conditions. Nursing reports noting some hypoglycemia this morning    Patient found in room sitting up in chair. Alert, calm, NAD. Wife also present. Patient denies noting symptoms with hypoglycemia this morning. Denies noting dizziness or chest pain, SOB in TCU. States has been working with therapy. Reports is eating and sleeping okay and is wearing compression socks. Noting improvement in LE edema.     Allergies, and PMH/PSH reviewed in [facility] today.  REVIEW OF SYSTEMS:  4 point ROS including Respiratory, CV, GI and , other than that noted in the HPI,  is negative    Objective:   /57   Pulse 84   Temp 97.3  F (36.3  C)   Resp 16   Ht 1.829 m (6')   Wt 127.6 kg (281 lb 3.2 oz)   SpO2 95%   BMI 38.14 kg/m    Resp: Effort WNL, LSCTA   CV: VS as above, trace bilateral LE edema, compression socks on  Abd- soft, nontender, BS +  Musc- PEREZ  Psych- alert, calm, pleasant      Recent labs in [facility] reviewed by me today.     Assessment/Plan:  Generalized muscle weakness  - progressive  Dizziness  - not thought cardiac origin  Physical deconditioning  Falls frequently  - fall prec, anticipate needs, toilet program- no falls in CU  - Supportive cares and treatments  - PT/OT to optimize function, safety and independence  - ongoing discharge planning, SW follow and care conferences per unit protocol  - continue on Vit D supplement        Paroxysmal atrial fibrillation  (H)  - on ASA only 2/2 frequent falls- cards do not think falls cardiac related. Monitored on tele inpatient  - follow VS (which have been stable)  - f/w cardiologist as directed     History of CVA (cerebrovascular accident)  Hyperlipidemia  -h/o severe left carotid stenosis, s/p left carotid endarterectomy  - remains on ASA  - continue on statin        Type 2 diabetes mellitus with diabetic polyneuropathy, with long-term current use of insulin (H)  Peripheral neuropathy        Lab Results   Component Value Date     A1C 7.9 05/04/2022     A1C 7.9 12/07/2021     A1C 6.8 05/07/2021     A1C 7.1 01/13/2021     A1C 6.6 11/09/2020      - basal hypoglycemia- minimal to no s/s  - decrease Lantus 45 to Lantus 40 units HS and Novolog sliding scale insulin TID with meals  - monitor ac, HS BGL and adjust regimen prn  - continues on Lyrica     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 Variable but generally well controlled- no hypotension noted, was reportedly not orthostatic inpatient  - baseline Cr approx 1.7 of late and in this range currently  -  -avoid nephrotoxins  - recheck periodic BMP to ensure stability  - continue on amlodipine, Cozaar and hydrochlorothiazide  - follow VS, clinically     Bilateral lower extremity edema  - trace  - fitted for Tenso compression socks  - follow clinically        Depression  - chronic, situational stressors in play- mood has been stable in TCU  - continue on Celexa  - continue to monitor mood and behaviors            Orders:  Written on unit and discussed with nursing    Electronically signed by: PASCUAL Welch CNP           Sincerely,        PASCUAL Welch CNP

## 2022-07-12 NOTE — PROGRESS NOTES
[facility] GERIATRICS    Chief Complaint   Patient presents with     RECHECK     HPI:  Robert E Schamberger is a 88 year old  (4/14/1934), who is being seen today for an episodic care visit at: [facility] ([facility]) [[id number]]. Today's concern is:     Patient in TCU for acute rehab and medical management following hosp for progressive weakness, frequent falls and dizziness. W/U essentially unremarkable inpatient.     Seen today for recheck of acute and chronic conditions. Nursing reports noting some hypoglycemia this morning    Patient found in room sitting up in chair. Alert, calm, NAD. Wife also present. Patient denies noting symptoms with hypoglycemia this morning. Denies noting dizziness or chest pain, SOB in TCU. States has been working with therapy. Reports is eating and sleeping okay and is wearing compression socks. Noting improvement in LE edema.     Allergies, and PMH/PSH reviewed in [facility] today.  REVIEW OF SYSTEMS:  4 point ROS including Respiratory, CV, GI and , other than that noted in the HPI,  is negative    Objective:   /57   Pulse 84   Temp 97.3  F (36.3  C)   Resp 16   Ht 1.829 m (6')   Wt 127.6 kg (281 lb 3.2 oz)   SpO2 95%   BMI 38.14 kg/m    Resp: Effort WNL, LSCTA   CV: VS as above, trace bilateral LE edema, compression socks on  Abd- soft, nontender, BS +  Musc- PEREZ  Psych- alert, calm, pleasant      Recent labs in [facility] reviewed by me today.     Assessment/Plan:  Generalized muscle weakness  - progressive  Dizziness  - not thought cardiac origin  Physical deconditioning  Falls frequently  - fall prec, anticipate needs, toilet program- no falls in CU  - Supportive cares and treatments  - PT/OT to optimize function, safety and independence  - ongoing discharge planning, SW follow and care conferences per unit protocol  - continue on Vit D supplement        Paroxysmal atrial fibrillation (H)  - on ASA only 2/2 frequent falls- cards do not think falls cardiac related. Monitored  on tele inpatient  - follow VS (which have been stable)  - f/w cardiologist as directed     History of CVA (cerebrovascular accident)  Hyperlipidemia  -h/o severe left carotid stenosis, s/p left carotid endarterectomy  - remains on ASA  - continue on statin        Type 2 diabetes mellitus with diabetic polyneuropathy, with long-term current use of insulin (H)  Peripheral neuropathy        Lab Results   Component Value Date     A1C 7.9 05/04/2022     A1C 7.9 12/07/2021     A1C 6.8 05/07/2021     A1C 7.1 01/13/2021     A1C 6.6 11/09/2020      - basal hypoglycemia- minimal to no s/s  - decrease Lantus 45 to Lantus 40 units HS and Novolog sliding scale insulin TID with meals  - monitor ac, HS BGL and adjust regimen prn  - continues on Lyrica     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 Variable but generally well controlled- no hypotension noted, was reportedly not orthostatic inpatient  - baseline Cr approx 1.7 of late and in this range currently  -  -avoid nephrotoxins  - recheck periodic BMP to ensure stability  - continue on amlodipine, Cozaar and hydrochlorothiazide  - follow VS, clinically     Bilateral lower extremity edema  - trace  - fitted for Tenso compression socks  - follow clinically        Depression  - chronic, situational stressors in play- mood has been stable in TCU  - continue on Celexa  - continue to monitor mood and behaviors            Orders:  Written on unit and discussed with nursing    Electronically signed by: PASCUAL Welch CNP

## 2022-07-18 NOTE — PROGRESS NOTES
[facility] GERIATRICS  INITIAL VISIT NOTE  July 19, 2022    PRIMARY CARE PROVIDER AND CLINIC:  Ld Moy [address] [facility] [id number]    [facility] Medical Record Number:  [id number]  Place of Service where encounter took place:  [facility]) [[id number]]    Chief Complaint   Patient presents with     Hospital F/U       HPI:    Robert E Schamberger is a 88 year old  (4/14/1934) male seen today at [facility]. Medical history is notable for HTN, a-fib, DM II, CKD and carotid artery stenosis. He was hospitalized at [place] from 6/27/22 to 6/30/22 where he presented with five days of weakness and dizziness and many (?20) falls. Imaging without fractures and labs without infectious or metabolic etiology. Question raised if peripheral neuropathy could be contributing?     History obtained from: facility chart records, facility staff, patient report, [facility] chart review and Care Everywhere [facility] chart review.      At [facility], bedtime basal insulin was decreased from 45 to 40 units on 7/12/22 due to lower morning blood sugars.     Today Mr. Schamberger is seen in his room laying in bed. He is a limited historian ([employer]abb[employer] 12/15). He still had his CPAP on, but was awake and conversant. Says he slept well overnight. Talked with his nurse and his glucose was 78 this morning. Talked with Mr Schamberger and he is OK with a further reduction in the bedtime insulin. No chest pain or dyspnea. No abdominal pain. Apart from the morning blood sugar, no concerns per discussion with nursing. He is working with therapies.     CODE STATUS:: CPR/Full code     ALLERGIES:  Allergies   Allergen Reactions     Actos [Pioglitazone] Swelling     Edema     Atorvastatin Unknown     Back pain     Donepezil Unknown and Diarrhea     Diarrhea     Gabapentin Diarrhea     Diarrhea     Niacin Unknown     Hyperglycemia     Simvastatin Unknown     Back pain       PAST MEDICAL HISTORY:   Past Medical History:    Diagnosis Date     Acute lower GI bleeding 1/24/2022    Melena turning to bright red blood with hemoglobin stable at 12.0, lower GI bleed suspected at ER evaluation     Anemia in chronic kidney disease      B12 deficiency 11/09/2017     Bilateral hearing loss 05/09/2016     Bursitis, hip, left 03/02/2017     Carotid artery stenosis, symptomatic, left 07/22/2021    Left carotid artery repair with conduit 2021 following CVA     Chest pain     Normal GXT 2006     Chronic kidney disease, stage 3 (moderate)     Worsening with hyperkalemia on Relafen-discontinued August 2016     Chronic kidney disease, stage 3a (H) 05/07/2021     Closed displaced fracture of left clavicle 09/21/2017     Cognitive changes 11/30/2018    SLUMS 20/30 Nov 2018     Cognitive impairment 07/22/2021     Complete tear of left rotator cuff 02/15/2018    Associated with fall and clavicular fracture that occurred 2017, evaluated by orthopedics, given cortisone injection and physical therapy recommended     Degenerative disc disease, lumbar      Dementia without behavioral disturbance (H) 02/21/2020     Depression      Diabetic peripheral neuropathy (H) 11/30/2018     Erectile dysfunction      Essential hypertension 10/05/2021     Fatigue 07/06/2017     Forearm tendonitis 11/29/2016     Glaucoma      Gout attack     Left foot     History of CVA (cerebrovascular accident) 10/05/2021    Hospitalized July 2021 with acute CVA presenting with confusion and right-sided weakness.  Received thrombolytics and discharged to TCU.  Subsequent left carotid endarterectomy     History of recurrent TIAs 05/07/2020     HTN (hypertension)      Hyperlipidemia      Ingrown toenail 07/06/2017     Intermittent asthma without complication 11/09/2017     Left leg cellulitis 2014     Low back pain      Moderate major depression (H) 06/03/2019     Morbid obesity (H)      QUETA on CPAP      Osteoarthritis      Osteoarthritis of both hands      Osteoarthritis of both knees      Right TKA     Pain of right heel 05/11/2018     Peripheral edema 05/07/2021     Peripheral neuropathy 10/09/2018     Recurrent falls 03/05/2021     Rib fracture 2005     Right-sided chest pain 05/09/2016     S/P left carotid endarterectomy 09/13/2021     Screening     No AAA on CT scan 2009     Sepsis secondary to UTI (H) 03/05/2021    Hospitalized with urosepsis January 2021 and rehospitalized after fall found to have recurrent UTI treated with 30 days of Omnicef for acute prostatitis     Sick sinus syndrome (H) 05/07/2020    Abnormal event monitor with 2.8-second sinus pause     Squamous cell skin cancer 11/02/2015     TIA (transient ischemic attack) 09/06/2019    CT head showing chronic lacunar infarcts.  Carotid ultrasound with atherosclerotic plaque but no severe stenosis.     Traumatic subarachnoid hemorrhage with loss of consciousness of 30 minutes or less (H) 09/21/2017    Fall down the stairs following alcohol use with traumatic subarachnoid hemorrhage treated conservatively at [facility] followed by stay at TCU with no residual neurologic deficits     Trochanteric bursitis of right hip 08/24/2018     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)      Unsteady gait 03/04/2019     Vascular dementia without behavioral disturbance (H) 12/07/2021     Vitamin D deficiency 11/09/2017       PAST SURGICAL HISTORY:   Past Surgical History:   Procedure Laterality Date     BLEPHAROPLASTY  2018     COLONOSCOPY  2003    Normal     EYE SURGERY       HC REMOVAL GALLBLADDER      Description: Cholecystectomy;  Recorded: 03/23/2012;     HC REVISE MEDIAN N/CARPAL TUNNEL SURG      Description: Neuroplasty Decompression Median Nerve At Carpal Tunnel;  Recorded: 03/23/2012;     IR BILIARY TUBE CHANGE  7/25/2000     IR LUMBAR EPIDURAL STEROID INJECTION  3/23/2012     IR LUMBAR EPIDURAL STEROID INJECTION  4/13/2012     RELEASE CARPAL TUNNEL Bilateral      TOTAL KNEE ARTHROPLASTY Right 2014    Complicated by torn  Ranexa 1 and subsequent repair     [facility] EXCIS STOMACH ULCER,LESN;LOCAL      Description: Gastric Excision;  Recorded: 03/23/2012;       FAMILY HISTORY:   Unable to review due to cognitive impairment    SOCIAL HISTORY:   Patient's living condition: lives with spouse    MEDICATIONS  Post Discharge Medication Reconciliation Status: discharge medications reconciled and changed, per note/orders.  Current Outpatient Medications   Medication Sig Dispense Refill     acetaminophen (TYLENOL) 500 MG tablet Take 1,000 mg by mouth every 8 hours as needed        amLODIPine (NORVASC) 5 MG tablet Take 5 mg by mouth daily       aspirin (ASA) 81 MG EC tablet Take 2 tablets (162 mg) by mouth daily 150 tablet 2     cholecalciferol, vitamin D3, (VITAMIN D3) 2,000 unit Tab [CHOLECALCIFEROL, VITAMIN D3, (VITAMIN D3) 2,000 UNIT TAB] Take 1 tablet (2,000 Units total) by mouth daily. 100 tablet 3     citalopram (CELEXA) 20 MG tablet Take 10 mg by mouth daily       cyanocobalamin 1000 MCG tablet [CYANOCOBALAMIN 1000 MCG TABLET] Take 1 tablet (1,000 mcg total) by mouth daily. 100 tablet 3     diclofenac (VOLTAREN) 1 % topical gel Apply 2 g topically daily as needed for moderate pain        hydrochlorothiazide (HYDRODIURIL) 25 MG tablet TAKE ONE TABLET BY MOUTH ONCE DAILY .  - TAKE WITH LOSARTAN - COMBO NOT AVAILABLE -  90 tablet 3     insulin aspart (NOVOLOG FLEXPEN) 100 UNIT/ML pen Inject as per sliding scale: if  150 - 199 = 1; 200 - 249 = 2; 250 - 299 = 3; 300 - 349  = 4; 350 - 399 = 5; 400+ = 6 400 or greater give 6  units and call MD, subcutaneously three times a day  for Type 2 Diabetes Check blood glucose 3 times  daily before meals. <70 see Hypoglycemia Protocol,   No insulin, give prandial insulin if ordered.       insulin glargine (BASAGLAR KWIKPEN) 100 UNIT/ML pen Inject 40 Units Subcutaneous At Bedtime (Patient taking differently: Inject 30 Units Subcutaneous At Bedtime) 15 mL      latanoprost (XALATAN) 0.005 % ophthalmic  solution [LATANOPROST (XALATAN) 0.005 % OPHTHALMIC SOLUTION] Administer 1 drop to both eyes at bedtime.       losartan (COZAAR) 100 MG tablet Take 100 mg by mouth daily        magnesium oxide (MAGOX) 400 mg (241.3 mg magnesium) tablet [MAGNESIUM OXIDE (MAGOX) 400 MG (241.3 MG MAGNESIUM) TABLET] Take 1 tablet (400 mg total) by mouth 2 (two) times a day. 100 tablet 1     metFORMIN (GLUCOPHAGE XR) 500 MG 24 hr tablet Take 1,000 mg by mouth daily (with dinner)       polyethylene glycol (MIRALAX) 17 g packet Take 1 packet by mouth daily as needed for constipation       pravastatin (PRAVACHOL) 20 MG tablet TAKE ONE (1) TABLET (20 MG) BY MOUTH AT BEDTIME 90 tablet 1     pregabalin (LYRICA) 50 MG capsule Take 1-2 capsules ( mg) by mouth At Bedtime (Patient taking differently: Take 50 mg by mouth At Bedtime) 180 capsule 3     GLOBAL EASE INJECT PEN NEEDLES 31G X 8 MM miscellaneous USE AS DIRECTED THREE (3) TIMES DAILY 300 each 3     ONETOUCH VERIO IQ test strip Use to test blood glucose 3 times a day 300 strip 3       ROS:  Unable to obtain due to cognitive impairment or aphasia. [facility] 12/15    PHYSICAL EXAM:  BP (!) 155/73   Pulse 65   Temp 97.3  F (36.3  C)   Resp 16   Ht 1.829 m (6')   Wt 81.7 kg (180 lb 3.2 oz)   SpO2 98%   BMI 24.44 kg/m    Gen: laying in bed, alert, cooperative and in no acute distress  HEENT: normocephalic; good hearing acuity; moist mucous membranes in mouth; eyes without scleral icterus or scleral injection  Card: RRR, S1, S2, no murmurs  Resp: lungs clear to auscultation bilaterally, no crackles or wheezes anteriorly or laterally   Ext: decreased muscle tone; no LE edema  Neuro: CX II-XII grossly in tact; ROM in all four extremities grossly in tact  Psych: memory, judgement and insight impaired.   Skin: pale; no suspicious rashes or lesions     LABORATORY/IMAGING DATA:  Reviewed as per [facility] and/or [facility]    ASSESSMENT/PLAN:    DM, Type II  Hgb A1c 7.9 in May. Goal is <8 (maybe  even <9) even age. Sugars 60s-70s (am), 140s-160s, couple >200 (noon) and 160s-230s, most <200 (candis). Glargine decreased from 45 to 40 units on 7/12/22.   -- decrease glargine from 40 units to 30 units given morning sugars in 60s-70s  -- aspart sliding scale insulin TID AC   -- metformin 1000 mg daily with dinner   -- follow sugars and adjust insulin as needed    Right ICA Stenosis  Left ICA Stenosis s/p CEA  Imaging with 50% R ICA stenosis and patient L ICA stent.   --  mg daily and pravastatin 20 mg daily    Paroxysmal Atrial Fibrillation  CHADS2-VASc is 5. Not on systemic anti-coag given frequent falls. HR 60s-70s. Not on any rate control meds. Follows with cardiology - known sick sinus. No high grade heart block during recent hospitalization.   --  mg daily   -- follow clinically     HTN  SBPs 120s-150s, most 130s-140s  -- amlodipine 5mg daily, hydrochlorothiazide 25 mg daily, losartan 100 mg daily   -- follow BPs and adjust medications as needed    History of CVA  --  mg daily and pravastatin 20 mg daily    Vascular Dementia Without Behavioral Disturbance  BIMS 12/15  -- OT following for cog assessment    Mild Major Depression  Mood and spirits seemed good today  -- citalopram 10 mg daily  -- supportive cares    Peripheral Neuropathy  Underlying DM  -- pregabalin 50 mg at bedtime     QUETA  -- CPAP per home settings    CKD, Stage III  Baseline Cr 1.4-1.8. Cr 1.74 on 7/8.   -- avoid nephrotoxic meds  -- periodic BMP    Slow Transit Constipation  -- Miralax 17g daily PRN  -- adjust bowel regimen as needed    Recurrent Falls  Physical Deconditioning  In setting of hospitalization and underlying medical conditions  -- ongoing PT/OT          Electronically signed by:  Milly Rubio MD

## 2022-07-19 NOTE — LETTER
7/19/2022        RE: Robert E Schamberger  [address] [place] [address]        M [facility] GERIATRICS  INITIAL VISIT NOTE  July 19, 2022    PRIMARY CARE PROVIDER AND CLINIC:  Ld Moy [address] [facility] [id number]    M [facility] Medical Record Number:  [id number]  Place of Service where encounter took place:  [facility]) [[id number]]    Chief Complaint   Patient presents with     Hospital F/U       HPI:    Robert E Schamberger is a 88 year old  (4/14/1934) male seen today at [facility]. Medical history is notable for HTN, a-fib, DM II, CKD and carotid artery stenosis. He was hospitalized at [place] from 6/27/22 to 6/30/22 where he presented with five days of weakness and dizziness and many (?20) falls. Imaging without fractures and labs without infectious or metabolic etiology. Question raised if peripheral neuropathy could be contributing?     History obtained from: facility chart records, facility staff, patient report, [facility] chart review and Care Everywhere [facility] chart review.      At [facility], bedtime basal insulin was decreased from 45 to 40 units on 7/12/22 due to lower morning blood sugars.     Today Mr. Schamberger is seen in his room laying in bed. He is a limited historian ([employer] 12/15). He still had his CPAP on, but was awake and conversant. Says he slept well overnight. Talked with his nurse and his glucose was 78 this morning. Talked with Mr Schamberger and he is OK with a further reduction in the bedtime insulin. No chest pain or dyspnea. No abdominal pain. Apart from the morning blood sugar, no concerns per discussion with nursing. He is working with therapies.     CODE STATUS:: CPR/Full code     ALLERGIES:  Allergies   Allergen Reactions     Actos [Pioglitazone] Swelling     Edema     Atorvastatin Unknown     Back pain     Donepezil Unknown and Diarrhea     Diarrhea     Gabapentin Diarrhea     Diarrhea     Niacin Unknown     Hyperglycemia      Simvastatin Unknown     Back pain       PAST MEDICAL HISTORY:   Past Medical History:   Diagnosis Date     Acute lower GI bleeding 1/24/2022    Melena turning to bright red blood with hemoglobin stable at 12.0, lower GI bleed suspected at ER evaluation     Anemia in chronic kidney disease      B12 deficiency 11/09/2017     Bilateral hearing loss 05/09/2016     Bursitis, hip, left 03/02/2017     Carotid artery stenosis, symptomatic, left 07/22/2021    Left carotid artery repair with conduit 2021 following CVA     Chest pain     Normal GXT 2006     Chronic kidney disease, stage 3 (moderate)     Worsening with hyperkalemia on Relafen-discontinued August 2016     Chronic kidney disease, stage 3a (H) 05/07/2021     Closed displaced fracture of left clavicle 09/21/2017     Cognitive changes 11/30/2018    SLUMS 20/30 Nov 2018     Cognitive impairment 07/22/2021     Complete tear of left rotator cuff 02/15/2018    Associated with fall and clavicular fracture that occurred 2017, evaluated by orthopedics, given cortisone injection and physical therapy recommended     Degenerative disc disease, lumbar      Dementia without behavioral disturbance (H) 02/21/2020     Depression      Diabetic peripheral neuropathy (H) 11/30/2018     Erectile dysfunction      Essential hypertension 10/05/2021     Fatigue 07/06/2017     Forearm tendonitis 11/29/2016     Glaucoma      Gout attack     Left foot     History of CVA (cerebrovascular accident) 10/05/2021    Hospitalized July 2021 with acute CVA presenting with confusion and right-sided weakness.  Received thrombolytics and discharged to TCU.  Subsequent left carotid endarterectomy     History of recurrent TIAs 05/07/2020     HTN (hypertension)      Hyperlipidemia      Ingrown toenail 07/06/2017     Intermittent asthma without complication 11/09/2017     Left leg cellulitis 2014     Low back pain      Moderate major depression (H) 06/03/2019     Morbid obesity (H)      QUETA on CPAP       Osteoarthritis      Osteoarthritis of both hands      Osteoarthritis of both knees     Right TKA     Pain of right heel 05/11/2018     Peripheral edema 05/07/2021     Peripheral neuropathy 10/09/2018     Recurrent falls 03/05/2021     Rib fracture 2005     Right-sided chest pain 05/09/2016     S/P left carotid endarterectomy 09/13/2021     Screening     No AAA on CT scan 2009     Sepsis secondary to UTI (H) 03/05/2021    Hospitalized with urosepsis January 2021 and rehospitalized after fall found to have recurrent UTI treated with 30 days of Omnicef for acute prostatitis     Sick sinus syndrome (H) 05/07/2020    Abnormal event monitor with 2.8-second sinus pause     Squamous cell skin cancer 11/02/2015     TIA (transient ischemic attack) 09/06/2019    CT head showing chronic lacunar infarcts.  Carotid ultrasound with atherosclerotic plaque but no severe stenosis.     Traumatic subarachnoid hemorrhage with loss of consciousness of 30 minutes or less (H) 09/21/2017    Fall down the stairs following alcohol use with traumatic subarachnoid hemorrhage treated conservatively at [facility] followed by stay at TCU with no residual neurologic deficits     Trochanteric bursitis of right hip 08/24/2018     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)      Unsteady gait 03/04/2019     Vascular dementia without behavioral disturbance (H) 12/07/2021     Vitamin D deficiency 11/09/2017       PAST SURGICAL HISTORY:   Past Surgical History:   Procedure Laterality Date     BLEPHAROPLASTY  2018     COLONOSCOPY  2003    Normal     EYE SURGERY       HC REMOVAL GALLBLADDER      Description: Cholecystectomy;  Recorded: 03/23/2012;     HC REVISE MEDIAN N/CARPAL TUNNEL SURG      Description: Neuroplasty Decompression Median Nerve At Carpal Tunnel;  Recorded: 03/23/2012;     IR BILIARY TUBE CHANGE  7/25/2000     IR LUMBAR EPIDURAL STEROID INJECTION  3/23/2012     IR LUMBAR EPIDURAL STEROID INJECTION  4/13/2012     RELEASE  CARPAL TUNNEL Bilateral      TOTAL KNEE ARTHROPLASTY Right 2014    Complicated by torn Ranexa 1 and subsequent repair     ZZC EXCIS STOMACH ULCER,LESN;LOCAL      Description: Gastric Excision;  Recorded: 03/23/2012;       FAMILY HISTORY:   Unable to review due to cognitive impairment    SOCIAL HISTORY:   Patient's living condition: lives with spouse    MEDICATIONS  Post Discharge Medication Reconciliation Status: discharge medications reconciled and changed, per note/orders.  Current Outpatient Medications   Medication Sig Dispense Refill     acetaminophen (TYLENOL) 500 MG tablet Take 1,000 mg by mouth every 8 hours as needed        amLODIPine (NORVASC) 5 MG tablet Take 5 mg by mouth daily       aspirin (ASA) 81 MG EC tablet Take 2 tablets (162 mg) by mouth daily 150 tablet 2     cholecalciferol, vitamin D3, (VITAMIN D3) 2,000 unit Tab [CHOLECALCIFEROL, VITAMIN D3, (VITAMIN D3) 2,000 UNIT TAB] Take 1 tablet (2,000 Units total) by mouth daily. 100 tablet 3     citalopram (CELEXA) 20 MG tablet Take 10 mg by mouth daily       cyanocobalamin 1000 MCG tablet [CYANOCOBALAMIN 1000 MCG TABLET] Take 1 tablet (1,000 mcg total) by mouth daily. 100 tablet 3     diclofenac (VOLTAREN) 1 % topical gel Apply 2 g topically daily as needed for moderate pain        hydrochlorothiazide (HYDRODIURIL) 25 MG tablet TAKE ONE TABLET BY MOUTH ONCE DAILY .  - TAKE WITH LOSARTAN - COMBO NOT AVAILABLE -  90 tablet 3     insulin aspart (NOVOLOG FLEXPEN) 100 UNIT/ML pen Inject as per sliding scale: if  150 - 199 = 1; 200 - 249 = 2; 250 - 299 = 3; 300 - 349  = 4; 350 - 399 = 5; 400+ = 6 400 or greater give 6  units and call MD, subcutaneously three times a day  for Type 2 Diabetes Check blood glucose 3 times  daily before meals. <70 see Hypoglycemia Protocol,   No insulin, give prandial insulin if ordered.       insulin glargine (BASAGLAR KWIKPEN) 100 UNIT/ML pen Inject 40 Units Subcutaneous At Bedtime (Patient taking differently: Inject 30  Units Subcutaneous At Bedtime) 15 mL      latanoprost (XALATAN) 0.005 % ophthalmic solution [LATANOPROST (XALATAN) 0.005 % OPHTHALMIC SOLUTION] Administer 1 drop to both eyes at bedtime.       losartan (COZAAR) 100 MG tablet Take 100 mg by mouth daily        magnesium oxide (MAGOX) 400 mg (241.3 mg magnesium) tablet [MAGNESIUM OXIDE (MAGOX) 400 MG (241.3 MG MAGNESIUM) TABLET] Take 1 tablet (400 mg total) by mouth 2 (two) times a day. 100 tablet 1     metFORMIN (GLUCOPHAGE XR) 500 MG 24 hr tablet Take 1,000 mg by mouth daily (with dinner)       polyethylene glycol (MIRALAX) 17 g packet Take 1 packet by mouth daily as needed for constipation       pravastatin (PRAVACHOL) 20 MG tablet TAKE ONE (1) TABLET (20 MG) BY MOUTH AT BEDTIME 90 tablet 1     pregabalin (LYRICA) 50 MG capsule Take 1-2 capsules ( mg) by mouth At Bedtime (Patient taking differently: Take 50 mg by mouth At Bedtime) 180 capsule 3     GLOBAL EASE INJECT PEN NEEDLES 31G X 8 MM miscellaneous USE AS DIRECTED THREE (3) TIMES DAILY 300 each 3     ONETOUCH VERIO IQ test strip Use to test blood glucose 3 times a day 300 strip 3       ROS:  Unable to obtain due to cognitive impairment or aphasia. [facility] 12/15    PHYSICAL EXAM:  BP (!) 155/73   Pulse 65   Temp 97.3  F (36.3  C)   Resp 16   Ht 1.829 m (6')   Wt 81.7 kg (180 lb 3.2 oz)   SpO2 98%   BMI 24.44 kg/m    Gen: laying in bed, alert, cooperative and in no acute distress  HEENT: normocephalic; good hearing acuity; moist mucous membranes in mouth; eyes without scleral icterus or scleral injection  Card: RRR, S1, S2, no murmurs  Resp: lungs clear to auscultation bilaterally, no crackles or wheezes anteriorly or laterally   Ext: decreased muscle tone; no LE edema  Neuro: CX II-XII grossly in tact; ROM in all four extremities grossly in tact  Psych: memory, judgement and insight impaired.   Skin: pale; no suspicious rashes or lesions     LABORATORY/IMAGING DATA:  Reviewed as per Epic and/or  CareEverywhere    ASSESSMENT/PLAN:    DM, Type II  Hgb A1c 7.9 in May. Goal is <8 (maybe even <9) even age. Sugars 60s-70s (am), 140s-160s, couple >200 (noon) and 160s-230s, most <200 (candis). Glargine decreased from 45 to 40 units on 7/12/22.   -- decrease glargine from 40 units to 30 units given morning sugars in 60s-70s  -- aspart sliding scale insulin TID AC   -- metformin 1000 mg daily with dinner   -- follow sugars and adjust insulin as needed    Right ICA Stenosis  Left ICA Stenosis s/p CEA  Imaging with 50% R ICA stenosis and patient L ICA stent.   --  mg daily and pravastatin 20 mg daily    Paroxysmal Atrial Fibrillation  CHADS2-VASc is 5. Not on systemic anti-coag given frequent falls. HR 60s-70s. Not on any rate control meds. Follows with cardiology - known sick sinus. No high grade heart block during recent hospitalization.   --  mg daily   -- follow clinically     HTN  SBPs 120s-150s, most 130s-140s  -- amlodipine 5mg daily, hydrochlorothiazide 25 mg daily, losartan 100 mg daily   -- follow BPs and adjust medications as needed    History of CVA  --  mg daily and pravastatin 20 mg daily    Vascular Dementia Without Behavioral Disturbance  BIMS 12/15  -- OT following for cog assessment    Mild Major Depression  Mood and spirits seemed good today  -- citalopram 10 mg daily  -- supportive cares    Peripheral Neuropathy  Underlying DM  -- pregabalin 50 mg at bedtime     QUETA  -- CPAP per home settings    CKD, Stage III  Baseline Cr 1.4-1.8. Cr 1.74 on 7/8.   -- avoid nephrotoxic meds  -- periodic BMP    Slow Transit Constipation  -- Miralax 17g daily PRN  -- adjust bowel regimen as needed    Recurrent Falls  Physical Deconditioning  In setting of hospitalization and underlying medical conditions  -- ongoing PT/OT          Electronically signed by:  Milly Rubio MD                 Sincerely,        Milly Rubio MD

## 2022-08-09 NOTE — TELEPHONE ENCOUNTER
"Nurse called reporting that patient fell asleep while in his wheelchair, slipped out of the chair and landed on his face. He has small abrasions on his face, hands and knees. No other injuries. On ASA. Patient states that he may have had a \"mini stroke\" earlier today, but unclear why he thinks this. Nurse reports his neuro status is at baseline and no acute issues noted by staff.     Order given to monitor neuro status, VS. If any change in neuros, send to ED for eval.       PASCUAL Bethea CNP on 8/9/2022 at 6:41 PM    "

## 2022-08-22 NOTE — PROGRESS NOTES
"[facility] GERIATRICS  Chief Complaint   Patient presents with     alf Regulatory     [place] Medical Record Number:  [id number]  Place of Service where encounter took place:  [facility] () [[id number]]    HPI:    Robert E Schamberger  is 88 year old (4/14/1934), who is being seen today for a federally mandated E/M visit.-      Nursing reports patient with fall 8/17- found sitting next to bed. Reported was \"trying to get to my w/c\" Has dementia with memory impairment, poor insight and poor safety awareness. Resides in LTC/SNF.    Seen today for check after fall and for recheck of multiple chronic medical conditions:    1. Fall, subsequent encounter    2. Falls frequently    3. Vascular dementia without behavioral disturbance (H)    4. History of CVA (cerebrovascular accident)    5. Paroxysmal atrial fibrillation (H)    6. 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 Type 2 diabetes mellitus with diabetic polyneuropathy, with long-term current use of insulin (H)    8. Mild major depression (H)    9. Bilateral lower extremity edema      Today patient is found in room sitting up in w/c. Wife Nely is also present. Denies pain. Denies SOB, chest pain or dizziness. Discussed fall and importance of using call lite. Patient denies noting new injury      ALLERGIES:Actos [pioglitazone], Atorvastatin, Donepezil, Gabapentin, Niacin, and Simvastatin  PAST MEDICAL HISTORY:   Past Medical History:   Diagnosis Date     Acute lower GI bleeding 1/24/2022    Melena turning to bright red blood with hemoglobin stable at 12.0, lower GI bleed suspected at ER evaluation     Anemia in chronic kidney disease      B12 deficiency 11/09/2017     Bilateral hearing loss 05/09/2016     Bursitis, hip, left 03/02/2017     Carotid artery stenosis, symptomatic, left 07/22/2021    Left carotid artery repair with conduit 2021 following CVA     Chest " pain     Normal GXT 2006     Chronic kidney disease, stage 3 (moderate)     Worsening with hyperkalemia on Relafen-discontinued August 2016     Chronic kidney disease, stage 3a (H) 05/07/2021     Closed displaced fracture of left clavicle 09/21/2017     Cognitive changes 11/30/2018    SLUMS 20/30 Nov 2018     Cognitive impairment 07/22/2021     Complete tear of left rotator cuff 02/15/2018    Associated with fall and clavicular fracture that occurred 2017, evaluated by orthopedics, given cortisone injection and physical therapy recommended     Degenerative disc disease, lumbar      Dementia without behavioral disturbance (H) 02/21/2020     Depression      Diabetic peripheral neuropathy (H) 11/30/2018     Erectile dysfunction      Essential hypertension 10/05/2021     Fatigue 07/06/2017     Forearm tendonitis 11/29/2016     Glaucoma      Gout attack     Left foot     History of CVA (cerebrovascular accident) 10/05/2021    Hospitalized July 2021 with acute CVA presenting with confusion and right-sided weakness.  Received thrombolytics and discharged to TCU.  Subsequent left carotid endarterectomy     History of recurrent TIAs 05/07/2020     HTN (hypertension)      Hyperlipidemia      Ingrown toenail 07/06/2017     Intermittent asthma without complication 11/09/2017     Left leg cellulitis 2014     Low back pain      Moderate major depression (H) 06/03/2019     Morbid obesity (H)      QUETA on CPAP      Osteoarthritis      Osteoarthritis of both hands      Osteoarthritis of both knees     Right TKA     Pain of right heel 05/11/2018     Peripheral edema 05/07/2021     Peripheral neuropathy 10/09/2018     Recurrent falls 03/05/2021     Rib fracture 2005     Right-sided chest pain 05/09/2016     S/P left carotid endarterectomy 09/13/2021     Screening     No AAA on CT scan 2009     Sepsis secondary to UTI (H) 03/05/2021    Hospitalized with urosepsis January 2021 and rehospitalized after fall found to have recurrent UTI  treated with 30 days of Omnicef for acute prostatitis     Sick sinus syndrome (H) 05/07/2020    Abnormal event monitor with 2.8-second sinus pause     Squamous cell skin cancer 11/02/2015     TIA (transient ischemic attack) 09/06/2019    CT head showing chronic lacunar infarcts.  Carotid ultrasound with atherosclerotic plaque but no severe stenosis.     Traumatic subarachnoid hemorrhage with loss of consciousness of 30 minutes or less (H) 09/21/2017    Fall down the stairs following alcohol use with traumatic subarachnoid hemorrhage treated conservatively at [facility] followed by stay at TCU with no residual neurologic deficits     Trochanteric bursitis of right hip 08/24/2018     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)      Unsteady gait 03/04/2019     Vascular dementia without behavioral disturbance (H) 12/07/2021     Vitamin D deficiency 11/09/2017     PAST SURGICAL HISTORY:   has a past surgical history that includes IR Biliary Tube Change (7/25/2000); IR Lumbar Epidural Steroid Injection (3/23/2012); IR Lumbar Epidural Steroid Injection (4/13/2012); REMOVAL GALLBLADDER; REVISE MEDIAN N/CARPAL TUNNEL SURG; EXCIS STOMACH ULCER,LESN;LOCAL; Total Knee Arthroplasty (Right, 2014); Release carpal tunnel (Bilateral); Eye surgery; Colonoscopy (2003); and Blepharoplasty (2018).  FAMILY HISTORY: family history is not on file.  SOCIAL HISTORY:  reports that he has quit smoking. He has never used smokeless tobacco. He reports current alcohol use of about 14.0 standard drinks of alcohol per week. He reports that he does not use drugs.    MEDICATIONS:     Review of your medicines          Accurate as of August 22, 2022 11:59 PM. If you have any questions, ask your nurse or doctor.            START taking      Dose / Directions   amoxicillin-clavulanate 500-125 MG tablet  Commonly known as: AUGMENTIN  Started by: PASCUAL Welch CNP      Dose: 1 tablet  Take 1 tablet by mouth 2 times daily  for 10 days  Quantity: 20 tablet  Refills: 0     azithromycin 250 MG tablet  Commonly known as: ZITHROMAX  Started by: PASCUAL Welch CNP      Start taking on: August 25, 2022  Take 2 tablets (500 mg) by mouth daily for 1 day, THEN 1 tablet (250 mg) daily for 4 days.  Quantity: 6 tablet  Refills: 0        CONTINUE these medicines which have NOT CHANGED      Dose / Directions   acetaminophen 500 MG tablet  Commonly known as: TYLENOL      Dose: 1,000 mg  Take 1,000 mg by mouth every 8 hours as needed  Refills: 0     amLODIPine 5 MG tablet  Commonly known as: NORVASC      Dose: 5 mg  Take 5 mg by mouth daily  Refills: 0     aspirin 81 MG EC tablet  Commonly known as: ASA      Dose: 162 mg  Take 2 tablets (162 mg) by mouth daily  Quantity: 150 tablet  Refills: 2     cholecalciferol 50 MCG (2000 UT) tablet      Dose: 1 tablet  [CHOLECALCIFEROL, VITAMIN D3, (VITAMIN D3) 2,000 UNIT TAB] Take 1 tablet (2,000 Units total) by mouth daily.  Quantity: 100 tablet  Refills: 3     citalopram 20 MG tablet  Commonly known as: celeXA      Dose: 10 mg  Take 10 mg by mouth daily  Refills: 0     diclofenac 1 % topical gel  Commonly known as: VOLTAREN      Dose: 2 g  Apply 2 g topically daily as needed for moderate pain  Refills: 0     Global Ease Inject Pen Needles 31G X 8 MM miscellaneous  Used for: Type 2 diabetes mellitus with diabetic polyneuropathy, with long-term current use of insulin (H)  Generic drug: insulin pen needle      USE AS DIRECTED THREE (3) TIMES DAILY  Quantity: 300 each  Refills: 3     hydrochlorothiazide 25 MG tablet  Commonly known as: HYDRODIURIL  Used for: Type I diabetes mellitus (H)      TAKE ONE TABLET BY MOUTH ONCE DAILY .  - TAKE WITH LOSARTAN - COMBO NOT AVAILABLE -  Quantity: 90 tablet  Refills: 3     insulin glargine 100 UNIT/ML pen  Used for: Type 2 diabetes mellitus with diabetic polyneuropathy, with long-term current use of insulin (H)      Dose: 30 Units  Inject 30 Units Subcutaneous At  Bedtime  Refills: 0     latanoprost 0.005 % ophthalmic solution  Commonly known as: XALATAN      Dose: 1 drop  [LATANOPROST (XALATAN) 0.005 % OPHTHALMIC SOLUTION] Administer 1 drop to both eyes at bedtime.  Refills: 0     losartan 100 MG tablet  Commonly known as: COZAAR      Dose: 100 mg  Take 100 mg by mouth daily  Refills: 0     magnesium oxide 400 MG tablet  Commonly known as: MAG-OX  Used for: Hypomagnesemia      Dose: 400 mg  [MAGNESIUM OXIDE (MAGOX) 400 MG (241.3 MG MAGNESIUM) TABLET] Take 1 tablet (400 mg total) by mouth 2 (two) times a day.  Quantity: 100 tablet  Refills: 1     metFORMIN 500 MG 24 hr tablet  Commonly known as: GLUCOPHAGE XR      Dose: 1,000 mg  Take 1,000 mg by mouth daily (with dinner)  Refills: 0     NovoLOG FLEXPEN 100 UNIT/ML pen  Generic drug: insulin aspart      Inject as per sliding scale: if  150 - 199 = 1; 200 - 249 = 2; 250 - 299 = 3; 300 - 349  = 4; 350 - 399 = 5; 400+ = 6 400 or greater give 6  units and call MD, subcutaneously three times a day  for Type 2 Diabetes Check blood glucose 3 times  daily before meals. <70 see Hypoglycemia Protocol,   No insulin, give prandial insulin if ordered.  Refills: 0     ONETOUCH VERIO IQ test strip  Used for: Type 2 diabetes mellitus with diabetic polyneuropathy, with long-term current use of insulin (H)  Generic drug: blood glucose      Use to test blood glucose 3 times a day  Quantity: 300 strip  Refills: 3     polyethylene glycol 17 g packet  Commonly known as: MIRALAX      Dose: 1 packet  Take 1 packet by mouth daily as needed for constipation  Refills: 0     pravastatin 20 MG tablet  Commonly known as: PRAVACHOL  Used for: Hyperlipidemia, unspecified hyperlipidemia type      TAKE ONE (1) TABLET (20 MG) BY MOUTH AT BEDTIME  Quantity: 90 tablet  Refills: 1     vitamin B-12 1000 MCG tablet  Commonly known as: CYANOCOBALAMIN      Dose: 1,000 mcg  [CYANOCOBALAMIN 1000 MCG TABLET] Take 1 tablet (1,000 mcg total) by mouth  daily.  Quantity: 100 tablet  Refills: 3           Case Management:  I have reviewed the care plan and MDS and do agree with the plan. Patient's desire to return to the community is present, but is not able due to care needs . Information reviewed:  Medications, vital signs, orders, and nursing notes.    ROS:  Limited secondary to cognitive impairment but today pt reports as above    Vitals:  /54   Pulse 58   Temp 97.6  F (36.4  C)   Resp 18   Ht 1.829 m (6')   Wt 131.6 kg (290 lb 3.2 oz)   SpO2 97%   BMI 39.36 kg/m    Body mass index is 39.36 kg/m .  Exam:  Resp: Effort WNL, LS decreased in bilateral bases  CV: VS as above, trace bilateral LE edema  Abd- soft, nontender, BS +  Musc- PEREZ- w/c  Skin- healing abrasions on bilateral knees  Psych- alert, calm, pleasant      Lab/Diagnostic data:   Recent labs in EPIC reviewed by me today.     ASSESSMENT/PLAN  Fall, subsequent encounter  - found next to bed on floor 8/17. Trying to self transfer.   Falls frequently  - fall precautions- anticipate needs, call lite in reach, frequent safety checks  Vascular dementia without behavioral disturbance (H)  - known. Residing in SNF/LTC with supportive cares and treatmens. SLUMS 14/30 2020 with CPT 3.9/5.6. BIMS 13/15 7/2022  - Progressive disease, continued global decline is anticipated.  Continue supportive cares and treatments. Ongoing advanced care planning. Is currently Full Code per POLST  History of CVA (cerebrovascular accident)  - noted. H/o severe left carotid stenosis s/p carotid endartectom  - remains on ASA and statin    Paroxysmal atrial fibrillation (H)  - chronic, HR WNL. Cards do not think falls are cardiac related. 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 BPs variable but generally adequately  controlled. No hypotension noted.   - continue on hydrochlorothiazide, losartan and amlodipine  - monitor VS  - avoid nephrotoxins    Type 2 diabetes mellitus with diabetic polyneuropathy with long-term current use of insulin  Lab Results   Component Value Date    A1C 7.9 05/04/2022    A1C 7.9 12/07/2021    A1C 6.8 05/07/2021    A1C 7.1 01/13/2021    A1C 6.6 11/09/2020     - generally controlled  - continues on metformin and Lantus insulin 30 units HS and Novolog insulin sliding scale at mealtime      Mild major depression (H)  - chronic, situational stressors in play- medical issues, hospitalization, care transitions. Mood stable currently. PHQ 9 0/27 7/2022  - continues on Celexa  - monitor mood and behaviors    Bilateral lower extremity edema  - chronic, minimal  - continue compression and encourage elevation    Plan of care discussed with patient, wife and nursing        Electronically signed by:  PASCUAL Welch CNP

## 2022-08-22 NOTE — LETTER
"    8/22/2022        RE: Robert E Schamberger  [address] [place] [address]        [facility] GERIATRICS  Chief Complaint   Patient presents with     alf Regulatory     [facility] Medical Record Number:  [id number]  Place of Service where encounter took place:  [facility] () [[id number]]    HPI:    Robert E Schamberger  is 88 year old (4/14/1934), who is being seen today for a federally mandated E/M visit.-      Nursing reports patient with fall 8/17- found sitting next to bed. Reported was \"trying to get to my w/c\" Has dementia with memory impairment, poor insight and poor safety awareness. Resides in LTC/SNF.    Seen today for check after fall and for recheck of multiple chronic medical conditions:    1. Fall, subsequent encounter    2. Falls frequently    3. Vascular dementia without behavioral disturbance (H)    4. History of CVA (cerebrovascular accident)    5. Paroxysmal atrial fibrillation (H)    6. 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 Type 2 diabetes mellitus with diabetic polyneuropathy, with long-term current use of insulin (H)    8. Mild major depression (H)    9. Bilateral lower extremity edema      Today patient is found in room sitting up in w/c. Wife Nely is also present. Denies pain. Denies SOB, chest pain or dizziness. Discussed fall and importance of using call lite. Patient denies noting new injury      ALLERGIES:Actos [pioglitazone], Atorvastatin, Donepezil, Gabapentin, Niacin, and Simvastatin  PAST MEDICAL HISTORY:   Past Medical History:   Diagnosis Date     Acute lower GI bleeding 1/24/2022    Melena turning to bright red blood with hemoglobin stable at 12.0, lower GI bleed suspected at ER evaluation     Anemia in chronic kidney disease      B12 deficiency 11/09/2017     Bilateral hearing loss 05/09/2016     Bursitis, hip, left 03/02/2017     Carotid artery stenosis, " symptomatic, left 07/22/2021    Left carotid artery repair with conduit 2021 following CVA     Chest pain     Normal GXT 2006     Chronic kidney disease, stage 3 (moderate)     Worsening with hyperkalemia on Relafen-discontinued August 2016     Chronic kidney disease, stage 3a (H) 05/07/2021     Closed displaced fracture of left clavicle 09/21/2017     Cognitive changes 11/30/2018    SLUMS 20/30 Nov 2018     Cognitive impairment 07/22/2021     Complete tear of left rotator cuff 02/15/2018    Associated with fall and clavicular fracture that occurred 2017, evaluated by orthopedics, given cortisone injection and physical therapy recommended     Degenerative disc disease, lumbar      Dementia without behavioral disturbance (H) 02/21/2020     Depression      Diabetic peripheral neuropathy (H) 11/30/2018     Erectile dysfunction      Essential hypertension 10/05/2021     Fatigue 07/06/2017     Forearm tendonitis 11/29/2016     Glaucoma      Gout attack     Left foot     History of CVA (cerebrovascular accident) 10/05/2021    Hospitalized July 2021 with acute CVA presenting with confusion and right-sided weakness.  Received thrombolytics and discharged to TCU.  Subsequent left carotid endarterectomy     History of recurrent TIAs 05/07/2020     HTN (hypertension)      Hyperlipidemia      Ingrown toenail 07/06/2017     Intermittent asthma without complication 11/09/2017     Left leg cellulitis 2014     Low back pain      Moderate major depression (H) 06/03/2019     Morbid obesity (H)      QUETA on CPAP      Osteoarthritis      Osteoarthritis of both hands      Osteoarthritis of both knees     Right TKA     Pain of right heel 05/11/2018     Peripheral edema 05/07/2021     Peripheral neuropathy 10/09/2018     Recurrent falls 03/05/2021     Rib fracture 2005     Right-sided chest pain 05/09/2016     S/P left carotid endarterectomy 09/13/2021     Screening     No AAA on CT scan 2009     Sepsis secondary to UTI (H) 03/05/2021     Hospitalized with urosepsis January 2021 and rehospitalized after fall found to have recurrent UTI treated with 30 days of Omnicef for acute prostatitis     Sick sinus syndrome (H) 05/07/2020    Abnormal event monitor with 2.8-second sinus pause     Squamous cell skin cancer 11/02/2015     TIA (transient ischemic attack) 09/06/2019    CT head showing chronic lacunar infarcts.  Carotid ultrasound with atherosclerotic plaque but no severe stenosis.     Traumatic subarachnoid hemorrhage with loss of consciousness of 30 minutes or less (H) 09/21/2017    Fall down the stairs following alcohol use with traumatic subarachnoid hemorrhage treated conservatively at [facility] followed by stay at TCU with no residual neurologic deficits     Trochanteric bursitis of right hip 08/24/2018     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)      Unsteady gait 03/04/2019     Vascular dementia without behavioral disturbance (H) 12/07/2021     Vitamin D deficiency 11/09/2017     PAST SURGICAL HISTORY:   has a past surgical history that includes IR Biliary Tube Change (7/25/2000); IR Lumbar Epidural Steroid Injection (3/23/2012); IR Lumbar Epidural Steroid Injection (4/13/2012); REMOVAL GALLBLADDER; REVISE MEDIAN N/CARPAL TUNNEL SURG; EXCIS STOMACH ULCER,LESN;LOCAL; Total Knee Arthroplasty (Right, 2014); Release carpal tunnel (Bilateral); Eye surgery; Colonoscopy (2003); and Blepharoplasty (2018).  FAMILY HISTORY: family history is not on file.  SOCIAL HISTORY:  reports that he has quit smoking. He has never used smokeless tobacco. He reports current alcohol use of about 14.0 standard drinks of alcohol per week. He reports that he does not use drugs.    MEDICATIONS:     Review of your medicines          Accurate as of August 22, 2022 11:59 PM. If you have any questions, ask your nurse or doctor.            START taking      Dose / Directions   amoxicillin-clavulanate 500-125 MG tablet  Commonly known as:  AUGMENTIN  Started by: PASCUAL Welch CNP      Dose: 1 tablet  Take 1 tablet by mouth 2 times daily for 10 days  Quantity: 20 tablet  Refills: 0     azithromycin 250 MG tablet  Commonly known as: ZITHROMAX  Started by: PASCUAL Welch CNP      Start taking on: August 25, 2022  Take 2 tablets (500 mg) by mouth daily for 1 day, THEN 1 tablet (250 mg) daily for 4 days.  Quantity: 6 tablet  Refills: 0        CONTINUE these medicines which have NOT CHANGED      Dose / Directions   acetaminophen 500 MG tablet  Commonly known as: TYLENOL      Dose: 1,000 mg  Take 1,000 mg by mouth every 8 hours as needed  Refills: 0     amLODIPine 5 MG tablet  Commonly known as: NORVASC      Dose: 5 mg  Take 5 mg by mouth daily  Refills: 0     aspirin 81 MG EC tablet  Commonly known as: ASA      Dose: 162 mg  Take 2 tablets (162 mg) by mouth daily  Quantity: 150 tablet  Refills: 2     cholecalciferol 50 MCG (2000 UT) tablet      Dose: 1 tablet  [CHOLECALCIFEROL, VITAMIN D3, (VITAMIN D3) 2,000 UNIT TAB] Take 1 tablet (2,000 Units total) by mouth daily.  Quantity: 100 tablet  Refills: 3     citalopram 20 MG tablet  Commonly known as: celeXA      Dose: 10 mg  Take 10 mg by mouth daily  Refills: 0     diclofenac 1 % topical gel  Commonly known as: VOLTAREN      Dose: 2 g  Apply 2 g topically daily as needed for moderate pain  Refills: 0     Global Ease Inject Pen Needles 31G X 8 MM miscellaneous  Used for: Type 2 diabetes mellitus with diabetic polyneuropathy, with long-term current use of insulin (H)  Generic drug: insulin pen needle      USE AS DIRECTED THREE (3) TIMES DAILY  Quantity: 300 each  Refills: 3     hydrochlorothiazide 25 MG tablet  Commonly known as: HYDRODIURIL  Used for: Type I diabetes mellitus (H)      TAKE ONE TABLET BY MOUTH ONCE DAILY .  - TAKE WITH LOSARTAN - COMBO NOT AVAILABLE -  Quantity: 90 tablet  Refills: 3     insulin glargine 100 UNIT/ML pen  Used for: Type 2 diabetes mellitus with  diabetic polyneuropathy, with long-term current use of insulin (H)      Dose: 30 Units  Inject 30 Units Subcutaneous At Bedtime  Refills: 0     latanoprost 0.005 % ophthalmic solution  Commonly known as: XALATAN      Dose: 1 drop  [LATANOPROST (XALATAN) 0.005 % OPHTHALMIC SOLUTION] Administer 1 drop to both eyes at bedtime.  Refills: 0     losartan 100 MG tablet  Commonly known as: COZAAR      Dose: 100 mg  Take 100 mg by mouth daily  Refills: 0     magnesium oxide 400 MG tablet  Commonly known as: MAG-OX  Used for: Hypomagnesemia      Dose: 400 mg  [MAGNESIUM OXIDE (MAGOX) 400 MG (241.3 MG MAGNESIUM) TABLET] Take 1 tablet (400 mg total) by mouth 2 (two) times a day.  Quantity: 100 tablet  Refills: 1     metFORMIN 500 MG 24 hr tablet  Commonly known as: GLUCOPHAGE XR      Dose: 1,000 mg  Take 1,000 mg by mouth daily (with dinner)  Refills: 0     NovoLOG FLEXPEN 100 UNIT/ML pen  Generic drug: insulin aspart      Inject as per sliding scale: if  150 - 199 = 1; 200 - 249 = 2; 250 - 299 = 3; 300 - 349  = 4; 350 - 399 = 5; 400+ = 6 400 or greater give 6  units and call MD, subcutaneously three times a day  for Type 2 Diabetes Check blood glucose 3 times  daily before meals. <70 see Hypoglycemia Protocol,   No insulin, give prandial insulin if ordered.  Refills: 0     ONETOUCH VERIO IQ test strip  Used for: Type 2 diabetes mellitus with diabetic polyneuropathy, with long-term current use of insulin (H)  Generic drug: blood glucose      Use to test blood glucose 3 times a day  Quantity: 300 strip  Refills: 3     polyethylene glycol 17 g packet  Commonly known as: MIRALAX      Dose: 1 packet  Take 1 packet by mouth daily as needed for constipation  Refills: 0     pravastatin 20 MG tablet  Commonly known as: PRAVACHOL  Used for: Hyperlipidemia, unspecified hyperlipidemia type      TAKE ONE (1) TABLET (20 MG) BY MOUTH AT BEDTIME  Quantity: 90 tablet  Refills: 1     vitamin B-12 1000 MCG tablet  Commonly known as:  CYANOCOBALAMIN      Dose: 1,000 mcg  [CYANOCOBALAMIN 1000 MCG TABLET] Take 1 tablet (1,000 mcg total) by mouth daily.  Quantity: 100 tablet  Refills: 3           Case Management:  I have reviewed the care plan and MDS and do agree with the plan. Patient's desire to return to the community is present, but is not able due to care needs . Information reviewed:  Medications, vital signs, orders, and nursing notes.    ROS:  Limited secondary to cognitive impairment but today pt reports as above    Vitals:  /54   Pulse 58   Temp 97.6  F (36.4  C)   Resp 18   Ht 1.829 m (6')   Wt 131.6 kg (290 lb 3.2 oz)   SpO2 97%   BMI 39.36 kg/m    Body mass index is 39.36 kg/m .  Exam:  Resp: Effort WNL, LS decreased in bilateral bases  CV: VS as above, trace bilateral LE edema  Abd- soft, nontender, BS +  Musc- PEREZ- w/c  Skin- healing abrasions on bilateral knees  Psych- alert, calm, pleasant      Lab/Diagnostic data:   Recent labs in EPIC reviewed by me today.     ASSESSMENT/PLAN  Fall, subsequent encounter  - found next to bed on floor 8/17. Trying to self transfer.   Falls frequently  - fall precautions- anticipate needs, call lite in reach, frequent safety checks  Vascular dementia without behavioral disturbance (H)  - known. Residing in SNF/LTC with supportive cares and treatmens. SLUMS 14/30 2020 with CPT 3.9/5.6. BIMS 13/15 7/2022  - Progressive disease, continued global decline is anticipated.  Continue supportive cares and treatments. Ongoing advanced care planning. Is currently Full Code per POLST  History of CVA (cerebrovascular accident)  - noted. H/o severe left carotid stenosis s/p carotid endartectom  - remains on ASA and statin    Paroxysmal atrial fibrillation (H)  - chronic, HR WNL. Cards do not think falls are cardiac related. 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 BPs variable but generally adequately controlled. No hypotension noted.   - continue on hydrochlorothiazide, losartan and amlodipine  - monitor VS  - avoid nephrotoxins    Type 2 diabetes mellitus with diabetic polyneuropathy with long-term current use of insulin  Lab Results   Component Value Date    A1C 7.9 05/04/2022    A1C 7.9 12/07/2021    A1C 6.8 05/07/2021    A1C 7.1 01/13/2021    A1C 6.6 11/09/2020     - generally controlled  - continues on metformin and Lantus insulin 30 units HS and Novolog insulin sliding scale at mealtime      Mild major depression (H)  - chronic, situational stressors in play- medical issues, hospitalization, care transitions. Mood stable currently. PHQ 9 0/27 7/2022  - continues on Celexa  - monitor mood and behaviors    Bilateral lower extremity edema  - chronic, minimal  - continue compression and encourage elevation    Plan of care discussed with patient, wife and nursing        Electronically signed by:  PASCUAL Welch CNP            Sincerely,        PASCUAL Welch CNP

## 2022-08-24 NOTE — TELEPHONE ENCOUNTER
[facility]AT[facility] NON-EMERGENT  ENCOUNTER    Robert E Schamberger is a 88 year old  (4/14/1934), phone call received today regarding: fall    Pt fell in his room while getting dressed. Skin tear to both knees, no other injury. Vitals WNL ROM and neuros all intact. Facility will continue to monitor per protocol.    Electronically signed by:   Sally Dean RN

## 2022-08-25 NOTE — PROGRESS NOTES
[facility] GERIATRICS    No chief complaint on file.    HPI:  Robert E Schamberger is a 88 year old  (4/14/1934), who is being seen today for an episodic care visit at: No question data found.     Nursing reports patient had another fall yesterday. Had a fall without injury 8/17 as well. This time lost balance when attempting to dress and fell on to left knee re-opening previous scrape. No other new injuries noted.     Today patient is found lying in bed. Alert, calm, NAD. Denies pain. Denies SOB but reporting congested cough. Also reporting some pain to left side and points to low rib area. Denies chest pain, dizziness. Denies fever or chills. VS reviewed and stable.     CXR done and reviewed- + LLL PNA and left and right rib fractures.     Impression:  1. Cardiomegaly, no good evidence of CHF.  2. Mild left lower lobe pneumonia.  3. There are fractures of the mid posterior right fourth and fifth ribs. Please assess whether acute or chronic.  4. There's a fracture of approximately the mid posterior fifth or sixth rib, no other fracture on the left, please correlate clinically  as to whether this could be acute.          Allergies, and PMH/PSH reviewed in [facility] today.  REVIEW OF SYSTEMS:  4 point ROS including Respiratory, CV, GI and , other than that noted in the HPI,  is negative    Objective:   VS reviewed in PCC and stable    Resp: Effort WNL, LS decreased in bilateral bases, congested cough  CV: VS as above- trace edema noted  Abd- soft, nontender, BS +  Musc- PEREZ- in bed currently  Skin- no bruising noted on torso. Large bruise to left arm.  Psych- alert, calm, pleasant      Recent labs in [facility] reviewed by me today.     Assessment/Plan:  Pneumonia of left lower lobe due to infectious organism  - with congested cough, increased weakness and falls.   - add Zpak and 10 day course of Augmentin  - encourage po fluids  - monitor VS, resp status closely    Closed fracture of multiple ribs of both sides,  initial encounter  - h/o falls and rib fractures. New left side pain that does correlate with approx location of imaging findings 5-6 left rib fracture  - continue prn acetaminophen  - pulmonary toilet/mobilize  - CBC and BMP this week    Falls frequently  *- h/o and re-exacerbation of late with 2 recent falls  - PT/OT to optimize function, safety and independence  - supportive cares and treatments in SNF  (orders also written today for urine sample for UA/UC)    Bruise  - left arm - likely from last fall  - monitor closely until healed  Abrasion  - bilateral knees, left reopened after latest fall.  - keep wounds clean and dry and monitor closely until healed    Writer contacted wife and relayed above clinical findings and treatment plan. Nely verbalized understanding    Called and gave above orders to unit nurse.         Electronically signed by: PASCUAL Welch CNP

## 2022-08-26 NOTE — TELEPHONE ENCOUNTER
[facility] TELEPHONE ENCOUNTER        Robert E Schamberger is a 88 year old  (4/14/1934),Nurse called today to report: lab results.    Recent Labs   Lab Test 08/26/22  0849 07/08/22  0654    136   POTASSIUM 4.7 4.5   CHLORIDE 103 102   CO2 23 25   ANIONGAP 10 9   GLC 77 49*   BUN 34.0* 33.2*   CR 2.08* 1.74*   JUDE 8.2* 8.2*     - Dx with LLL PNA yesterday. Frequent falls, new cough. New on antibiotics    ASSESSMENT/PLAN  PNA  MARTY    ORDERS:    Hold hydrochlorothiazide until Tuesday 8/30  Encourage po fluids over the w/e.   BMP 8/29        Electronically signed by:   PASCUAL Welch CNP

## 2022-08-28 NOTE — PROGRESS NOTES
[facility] GERIATRIC SERVICES TELEPHONE ENCOUNTER    Chief Complaint   Patient presents with     Opioid Refill        Robert E Schamberger is a 88 year old  (4/14/1934), Nurse called today to report: Patient is completely out of his Lyrica    ASSESSMENT/PLAN    ICD-10-CM    1. Diabetic peripheral neuropathy (H)  E11.42 pregabalin (LYRICA) 50 MG capsule   Facility with after hours call for refill of script on current patient. Refilled through Tuesday. Will need a full script on Monday. Lyrica Sig #3 sent to [facility].  Order given for Tuesday controlled substance check. It should read:    Nursing to check controlled substance Tuesday day shift and update primary provider if less than one week's worth of medication is available for a refill.      Electronically signed by:   Dr. Jelly Young, APRN, DNP, AGNP-BC, PMHNP-BC  [facility]  Office Hours: Tues-Fri 3522-7430  Office: [address] [place] [address]  [facility] Cell: [phone]  Fax: [phone]  Triage Phone: [phone]  Answering Service: [phone]    Email: [email or web address]

## 2022-08-30 NOTE — TELEPHONE ENCOUNTER
[facility] Triage Nurse Telephone Encounter    Provider: PASCUAL Welch CNP   Facility: [facility] Facility Type:  LTC    Caller: Meet   Call Back Number: [phone]    Allergies:    Allergies   Allergen Reactions     Actos [Pioglitazone] Swelling     Edema     Atorvastatin Unknown     Back pain     Donepezil Unknown and Diarrhea     Diarrhea     Gabapentin Diarrhea     Diarrhea     Niacin Unknown     Hyperglycemia     Simvastatin Unknown     Back pain        Reason for call:           Verbal Order/Direction given by Provider: Continue to hold HCTZ another week.. Recheck BMP 9/6    Provider giving Order:  PASCUAL Welch CNP     Verbal Order given to: To Barron RN

## 2022-09-01 NOTE — PROGRESS NOTES
"[facility] GERIATRICS    Chief Complaint   Patient presents with     Nursing Home Acute     HPI:  Robert E Schamberger is a 88 year old  (4/14/1934), who is being seen today for an episodic care visit at: [facility] () [[id number]]. Today's concern is:     Patient seen in LTC/SNF for recheck after 2 recent falls and PNA and new left sided rib fractures. Started on Z-karoline and 10 day course of Augmentin 8/25. Also sustained bilateral knee abrasions and bruise to left arm. PT/OT ordered as well.    Today patient is found sitting up in w/c in room. Alert, calm, NAD. Smiling. Reports feeling \"better\". Denies SOB and cough is lessening. Denies pain. Reports someone came in to walk with him but then never returned.  VS reviewed.     Recent Labs   Lab Test 08/29/22  0600 08/26/22  0849   * 136   POTASSIUM 4.9 4.7   CHLORIDE 103 103   CO2 25 23   ANIONGAP 7 10   GLC 90 77   BUN 31.0* 34.0*   CR 2.10* 2.08*   JUDE 7.8* 8.2*     Bump in Cr noted    Allergies, and PMH/PSH reviewed in EPIC today.  REVIEW OF SYSTEMS:  Limited secondary to cognitive impairment but today pt reports as above    Objective:   BP (!) 154/68   Pulse 60   Temp 96.9  F (36.1  C)   Resp 19   Ht 1.829 m (6')   Wt 127.1 kg (280 lb 3.2 oz)   SpO2 98%   BMI 38.00 kg/m      Resp: Effort WNL, LS decreased on left posteriorly  CV: VS as above, 1+ bilateral LE edema  Abd- soft, nontender, BS +  Musc- PEREZ  Psych- alert, calm, pleasant      Recent labs in EPIC reviewed by me today.     Assessment/Plan:  Pneumonia of left lower lobe due to infectious organism  - with congested cough, weakness and falls. More alert today, reports lessening cough. Discussed with nursing who states patient has been walking with staff. No new concerns. Intakes overall have remained good  - completed Zpak and completing 10 day Augmentin course, will be done 9/4.  - monitor resp status, VS  Benign hypertensive kidney disease with chronic kidney " disease stage I through stage IV, or unspecified  - Cr elevated from baseline. BPs 130- 150s  - hold hydrochlorothiazide 2/2 decreased intake and bump in Cr with PNA  - recheck BMP next week  - encourage po fluids  Falls frequently  - in setting of acute illness  - PT/OT to optimize function, safety and independence  - Supportive cares and treatments      Bruise  - left arm after fall. Healing well    Abrasion  - dressed with foam dressings  - ongoing wound care- keep clean and dry  - monitor until healed    Orders:  Written on unit and discussed with nursing    Electronically signed by: PASCUAL Welch CNP

## 2022-09-01 NOTE — LETTER
"    9/1/2022        RE: Robert E Schamberger  [address] [place] [address]        [facility] G[facility]ATR[facility]    Chief Complaint   Patient presents with     Nursing Home Acute     HPI:  Robert E Schamberger is a 88 year old  (4/14/1934), who is being seen today for an episodic care visit at: [facility] () [[id number]]. Today's concern is:     Patient seen in LTC/SNF for recheck after 2 recent falls and PNA and new left sided rib fractures. Started on Z-karoline and 10 day course of Augmentin 8/25. Also sustained bilateral knee abrasions and bruise to left arm. PT/OT ordered as well.    Today patient is found sitting up in w/c in room. Alert, calm, NAD. Smiling. Reports feeling \"better\". Denies SOB and cough is lessening. Denies pain. Reports someone came in to walk with him but then never returned.  VS reviewed.     Recent Labs   Lab Test 08/29/22  0600 08/26/22  0849   * 136   POTASSIUM 4.9 4.7   CHLORIDE 103 103   CO2 25 23   ANIONGAP 7 10   GLC 90 77   BUN 31.0* 34.0*   CR 2.10* 2.08*   JUDE 7.8* 8.2*     Bump in Cr noted    Allergies, and PMH/PSH reviewed in EPIC today.  REVIEW OF SYSTEMS:  Limited secondary to cognitive impairment but today pt reports as above    Objective:   BP (!) 154/68   Pulse 60   Temp 96.9  F (36.1  C)   Resp 19   Ht 1.829 m (6')   Wt 127.1 kg (280 lb 3.2 oz)   SpO2 98%   BMI 38.00 kg/m      Resp: Effort WNL, LS decreased on left posteriorly  CV: VS as above, 1+ bilateral LE edema  Abd- soft, nontender, BS +  Musc- PEREZ  Psych- alert, calm, pleasant      Recent labs in [facility] reviewed by me today.     Assessment/Plan:  Pneumonia of left lower lobe due to infectious organism  - with congested cough, weakness and falls. More alert today, reports lessening cough. Discussed with nursing who states patient has been walking with staff. No new concerns. Intakes overall have remained good  - completed Zpak and completing 10 day Augmentin course, will be done " 9/4.  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 BPs 130- 150s  - hold hydrochlorothiazide 2/2 decreased intake and bump in Cr with PNA  - recheck BMP next week  - encourage po fluids  Falls frequently  - in setting of acute illness  - PT/OT to optimize function, safety and independence  - Supportive cares and treatments      Bruise  - left arm after fall. Healing well    Abrasion  - dressed with foam dressings  - ongoing wound care- keep clean and dry  - monitor until healed    Orders:  Written on unit and discussed with nursing    Electronically signed by: PASCUAL Welch CNP           Sincerely,        PASCUAL Welch CNP

## 2022-09-04 NOTE — TELEPHONE ENCOUNTER
Staff called about status update that he is on day 10 of ABX treatment for pneumonia with Zpak and Augmentin.  Family does not think he responded well to treatment.  Vitals are stable.  No fever.  Falling asleep often.      Asking the nurse what they would like:    States there is wheezing - ordering a CXR AP and lateral view to see what is active new or old    Asking for a BMP and ok to get one tomorrow on the holiday as she said they are coming out then.    Electronically signed by Muna Mcahado RN, CNP

## 2022-09-05 NOTE — TELEPHONE ENCOUNTER
Sonu RODRIGUEZ Schambergeangel is a 88 year old  (4/14/1934), Nurse called today to report: recent CXR shows L pleural effusion, sats 80% on RA    ASSESSMENT/PLAN    Unable to provide adequate supplemental O2, will be sent to hospital for evaluation    Electronically signed by:   Demarco Brody NP

## 2022-09-20 NOTE — LETTER
9/20/2022        RE: Robert E Schamberger  [address] [place] [address]        [facility]  Chief Complaint   Patient presents with     Hospital F/U     [facility] [facility] Medical Record Number:  [id number]  Place of Service where encounter took place:  [facility] () [[id number]]  Code Status: DNR/DNI Hospice    HISTORY:      HPI:  Robert E Schamberger  is 88 year old (4/14/1934) Residing in the long-term care at [facility]. He is with Past medical history includes obesity, obstructive sleep apnea, diabetes mellitus type 2, atrial fibrillation, stroke (July 2021 with right sided weakness, treatment with left carotid endarterectomy), depression, dysphagia, weakness with falls, diastolic CHF. He Is with a complex medical history and was hospitalized at [facility] from 9/5 to 9/16/2022 with bilateral MRSA pneumonia complicated by renal failure.  Per hospital records he is terminally ill due to progressive weakness and respiratory failure.  He is now on [facility] hospice      Today he is seen to review vital signs,  routine visit and  hospital follow-up.  Today he was seen at the bedside.  He returned from the hospital on hospice.  He reports he is having no pain but does get it once in a while and the pain medications are helpful. .  He does have morphine SoluTab's available if needed.  He recently was with pneumonia on 8/25/2022 however today he denies feeling short of breath he denied chest pain.  He is moving his bowels.  Fingersticks were reviewed and he has been stable over the last few days however he has had multiple readings in the 200s.  A1Con 5/4/22 was 7.9.   He is on a fast acting sliding scale and also Lantus sliding scale.    ALLERGIES:Actos [pioglitazone], Atorvastatin, Donepezil, Gabapentin, Niacin, and Simvastatin    PAST MEDICAL HISTORY:   Past Medical History:   Diagnosis Date     Acute lower GI bleeding 1/24/2022    Melena turning to bright red  blood with hemoglobin stable at 12.0, lower GI bleed suspected at ER evaluation     Anemia in chronic kidney disease      B12 deficiency 11/09/2017     Bilateral hearing loss 05/09/2016     Bursitis, hip, left 03/02/2017     Carotid artery stenosis, symptomatic, left 07/22/2021    Left carotid artery repair with conduit 2021 following CVA     Chest pain     Normal GXT 2006     Chronic kidney disease, stage 3 (moderate)     Worsening with hyperkalemia on Relafen-discontinued August 2016     Chronic kidney disease, stage 3a (H) 05/07/2021     Closed displaced fracture of left clavicle 09/21/2017     Cognitive changes 11/30/2018    SLUMS 20/30 Nov 2018     Cognitive impairment 07/22/2021     Complete tear of left rotator cuff 02/15/2018    Associated with fall and clavicular fracture that occurred 2017, evaluated by orthopedics, given cortisone injection and physical therapy recommended     Degenerative disc disease, lumbar      Dementia without behavioral disturbance (H) 02/21/2020     Depression      Diabetic peripheral neuropathy (H) 11/30/2018     Erectile dysfunction      Essential hypertension 10/05/2021     Fatigue 07/06/2017     Forearm tendonitis 11/29/2016     Glaucoma      Gout attack     Left foot     History of CVA (cerebrovascular accident) 10/05/2021    Hospitalized July 2021 with acute CVA presenting with confusion and right-sided weakness.  Received thrombolytics and discharged to TCU.  Subsequent left carotid endarterectomy     History of recurrent TIAs 05/07/2020     HTN (hypertension)      Hyperlipidemia      Ingrown toenail 07/06/2017     Intermittent asthma without complication 11/09/2017     Left leg cellulitis 2014     Low back pain      Moderate major depression (H) 06/03/2019     Morbid obesity (H)      QUETA on CPAP      Osteoarthritis      Osteoarthritis of both hands      Osteoarthritis of both knees     Right TKA     Pain of right heel 05/11/2018     Peripheral edema 05/07/2021      Peripheral neuropathy 10/09/2018     Recurrent falls 03/05/2021     Rib fracture 2005     Right-sided chest pain 05/09/2016     S/P left carotid endarterectomy 09/13/2021     Screening     No AAA on CT scan 2009     Sepsis secondary to UTI (H) 03/05/2021    Hospitalized with urosepsis January 2021 and rehospitalized after fall found to have recurrent UTI treated with 30 days of Omnicef for acute prostatitis     Sick sinus syndrome (H) 05/07/2020    Abnormal event monitor with 2.8-second sinus pause     Squamous cell skin cancer 11/02/2015     TIA (transient ischemic attack) 09/06/2019    CT head showing chronic lacunar infarcts.  Carotid ultrasound with atherosclerotic plaque but no severe stenosis.     Traumatic subarachnoid hemorrhage with loss of consciousness of 30 minutes or less (H) 09/21/2017    Fall down the stairs following alcohol use with traumatic subarachnoid hemorrhage treated conservatively at [facility] followed by stay at TCU with no residual neurologic deficits     Trochanteric bursitis of right hip 08/24/2018     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)      Unsteady gait 03/04/2019     Vascular dementia without behavioral disturbance (H) 12/07/2021     Vitamin D deficiency 11/09/2017       PAST SURGICAL HISTORY:   has a past surgical history that includes IR Biliary Tube Change (7/25/2000); IR Lumbar Epidural Steroid Injection (3/23/2012); IR Lumbar Epidural Steroid Injection (4/13/2012); REMOVAL GALLBLADDER; REVISE MEDIAN N/CARPAL TUNNEL SURG; EXCIS STOMACH ULCER,LESN;LOCAL; Total Knee Arthroplasty (Right, 2014); Release carpal tunnel (Bilateral); Eye surgery; Colonoscopy (2003); and Blepharoplasty (2018).    FAMILY HISTORY: family history is not on file.    SOCIAL HISTORY:  reports that he has quit smoking. He has never used smokeless tobacco. He reports current alcohol use of about 14.0 standard drinks of alcohol per week. He reports that he does not use  drugs.    ROS:  Constitutional: Positive  for activity change, appetite change, fatigue and fever.   HENT: Negative for congestion.    Respiratory: Negative for cough, shortness of breath and wheezing.    Cardiovascular: Negative for chest pain and  Positive leg swelling.   Gastrointestinal: Negative for abdominal distention, abdominal pain, constipation, diarrhea and nausea.   Genitourinary: Negative for dysuria.   Musculoskeletal: Negative for arthralgia. Negative for back pain.   Skin: Negative for color change and wound.   Neurological: Negative for dizziness.   Psychiatric/Behavioral: Negative for agitation, behavioral problems and confusion.     Physical Exam:  Constitutional:       Appearance: Patient is well-developed.   HENT:      Head: Normocephalic.   Eyes:      Conjunctiva/sclera: Conjunctivae normal.   Neck:      Musculoskeletal: Normal range of motion.   Cardiovascular:      Rate and Rhythm: Normal rate and regular rhythm.   Pulmonary:      Effort: No respiratory distress.      Breath sounds: Normal breath sounds. No wheezing or rales.   Abdominal:      General: Bowel sounds are normal. There is no distension.      Palpations: Abdomen is soft.      Tenderness: There is no abdominal tenderness.   Musculoskeletal:       Normal range of motion.     Skin:General:        Skin is warm.   Neurological:         Mental Status: Patient is alert and oriented to person, place, and time.   Psychiatric:         Behavior: Behavior normal.     Vitals:BP (!) 149/52   Pulse 83   Temp 97.5  F (36.4  C)   Resp 18   Ht 1.829 m (6')   Wt 133.4 kg (294 lb)   SpO2 95%   BMI 39.87 kg/m   and Body mass index is 39.87 kg/m .    Lab/Diagnostic data:   Recent Results (from the past 240 hour(s))   COVID-19 VIRUS (CORONAVIRUS) BY PCR (EXTERNAL RESULT)    Collection Time: 09/16/22  6:28 AM   Result Value Ref Range    COVID-19 Virus by PCR (External Result) Negative Negative       MEDICATIONS:     Review of your medicines           Accurate as of September 20, 2022 10:52 AM. If you have any questions, ask your nurse or doctor.            CONTINUE these medicines which may have CHANGED, or have new prescriptions. If we are uncertain of the size of tablets/capsules you have at home, strength may be listed as something that might have changed.      Dose / Directions   insulin glargine 100 UNIT/ML pen  Commonly known as: LANTUS PEN  This may have changed: Another medication with the same name was removed. Continue taking this medication, and follow the directions you see here.  Changed by: Lizzie White CNP      ) Inject as per sliding scale: if 91 -  120 = 15 units Give 15 units if blood glucose is less  than 120 and hold the dose if blood glucose is less  than 90; 121+ = 30 units, subcutaneously at bedtime  related to TYPE 2 DIABETES MELLITUS WITHOUT  COMPLICATIONS (E11.9) Give 15 units if blood  glucose is less than 120 and hold the dose if blood  glucose is less than 9  Refills: 0        CONTINUE these medicines which have NOT CHANGED      Dose / Directions   acetaminophen 500 MG tablet  Commonly known as: TYLENOL      Dose: 1,000 mg  Take 1,000 mg by mouth every 8 hours as needed  Refills: 0     citalopram 20 MG tablet  Commonly known as: celeXA      Dose: 10 mg  Take 10 mg by mouth daily  Refills: 0     diclofenac 1 % topical gel  Commonly known as: VOLTAREN      Dose: 2 g  Apply 2 g topically daily as needed for moderate pain  Refills: 0     furosemide 40 MG tablet  Commonly known as: LASIX      Dose: 40 mg  Take 40 mg by mouth 2 times daily  Refills: 0     Global Ease Inject Pen Needles 31G X 8 MM miscellaneous  Used for: Type 2 diabetes mellitus with diabetic polyneuropathy, with long-term current use of insulin (H)  Generic drug: insulin pen needle      USE AS DIRECTED THREE (3) TIMES DAILY  Quantity: 300 each  Refills: 3     latanoprost 0.005 % ophthalmic solution  Commonly known as: XALATAN      Dose: 1 drop  [LATANOPROST (XALATAN)  0.005 % OPHTHALMIC SOLUTION] Administer 1 drop to both eyes at bedtime.  Refills: 0     morphine 2.5 MG solu-tab      Dose: 2.5-5 mg  Take 2.5-5 mg by mouth every 4 hours as needed for shortness of breath / dyspnea or moderate to severe pain  Refills: 0     NovoLOG FLEXPEN 100 UNIT/ML pen  Generic drug: insulin aspart      Inject as per sliding scale: if  150 - 199 = 1; 200 - 249 = 2; 250 - 299 = 3; 300 - 349  = 4; 350 - 399 = 5; 400+ = 6 400 or greater give 6  units and call MD, subcutaneously three times a day  for Type 2 Diabetes Check blood glucose 3 times  daily before meals. <70 see Hypoglycemia Protocol,   No insulin, give prandial insulin if ordered.  Refills: 0     ondansetron 4 MG tablet  Commonly known as: ZOFRAN      Dose: 4 mg  Take 4 mg by mouth every 8 hours as needed for nausea  Refills: 0     ONETOUCH VERIO IQ test strip  Used for: Type 2 diabetes mellitus with diabetic polyneuropathy, with long-term current use of insulin (H)  Generic drug: blood glucose      Use to test blood glucose 3 times a day  Quantity: 300 strip  Refills: 3     polyethylene glycol 17 g packet  Commonly known as: MIRALAX      Dose: 1 packet  Take 1 packet by mouth daily as needed for constipation  Refills: 0     pregabalin 50 MG capsule  Commonly known as: LYRICA  Used for: Diabetic peripheral neuropathy (H)      Dose: 50 mg  Take 1 capsule (50 mg) by mouth At Bedtime  Quantity: 30 capsule  Refills: 0     senna 8.6 MG tablet  Commonly known as: SENOKOT      Dose: 2 tablet  Take 2 tablets by mouth daily  Refills: 0        STOP taking    amLODIPine 5 MG tablet  Commonly known as: NORVASC  Stopped by: Lizzie White CNP        aspirin 81 MG EC tablet  Commonly known as: ASA  Stopped by: Lizzie White CNP        cholecalciferol 50 MCG (2000 UT) tablet  Stopped by: Lizzie White CNP        hydrochlorothiazide 25 MG tablet  Commonly known as: HYDRODIURIL  Stopped by: Lizzie White CNP        losartan 100 MG tablet  Commonly  known as: COZAAR  Stopped by: Lizzie White CNP        magnesium oxide 400 MG tablet  Commonly known as: MAG-OX  Stopped by: Lizzie White CNP        metFORMIN 500 MG 24 hr tablet  Commonly known as: GLUCOPHAGE XR  Stopped by: Lizzie White CNP        pravastatin 20 MG tablet  Commonly known as: PRAVACHOL  Stopped by: Lizzie White CNP        vitamin B-12 1000 MCG tablet  Commonly known as: CYANOCOBALAMIN  Stopped by: Lizzie White CNP               ASSESSMENT/PLAN  Encounter Diagnosis   Name Primary?     Hospice care patient Yes     Hospice Care patient  Meds as ordered     Type 2 diabetes- On  Lantus and Novolog sliding scale    Multiple medications have been previously discontinued    Pain management - On morphine solutabs, Tylenol, Diclofenac gel  And Pregabalin     Electronically signed by: Lizzie White CNP        Sincerely,        Lizzie White CNP

## 2022-09-20 NOTE — PROGRESS NOTES
[facility] GERIATRIC SERVICES  Chief Complaint   Patient presents with     Hospital F/U     [facility] Medical Record Number:  [id number]  Place of Service where encounter took place:  [facility] () [[id number]]  Code Status: DNR/DNI Hospice    HISTORY:      HPI:  Robert E Schamberger  is 88 year old (4/14/1934) Residing in the long-term care at [facility]. He is with Past medical history includes obesity, obstructive sleep apnea, diabetes mellitus type 2, atrial fibrillation, stroke (July 2021 with right sided weakness, treatment with left carotid endarterectomy), depression, dysphagia, weakness with falls, diastolic CHF. He Is with a complex medical history and was hospitalized at [facility] from 9/5 to 9/16/2022 with bilateral MRSA pneumonia complicated by renal failure.  Per hospital records he is terminally ill due to progressive weakness and respiratory failure.  He is now on [facility] hospice      Today he is seen to review vital signs,  routine visit and  hospital follow-up.  Today he was seen at the bedside.  He returned from the hospital on hospice.  He reports he is having no pain but does get it once in a while and the pain medications are helpful. .  He does have morphine SoluTab's available if needed.  He recently was with pneumonia on 8/25/2022 however today he denies feeling short of breath he denied chest pain.  He is moving his bowels.  Fingersticks were reviewed and he has been stable over the last few days however he has had multiple readings in the 200s.  A1Con 5/4/22 was 7.9.   He is on a fast acting sliding scale and also Lantus sliding scale.    ALLERGIES:Actos [pioglitazone], Atorvastatin, Donepezil, Gabapentin, Niacin, and Simvastatin    PAST MEDICAL HISTORY:   Past Medical History:   Diagnosis Date     Acute lower GI bleeding 1/24/2022    Melena turning to bright red blood with hemoglobin stable at 12.0, lower GI bleed suspected at ER evaluation     Anemia in  chronic kidney disease      B12 deficiency 11/09/2017     Bilateral hearing loss 05/09/2016     Bursitis, hip, left 03/02/2017     Carotid artery stenosis, symptomatic, left 07/22/2021    Left carotid artery repair with conduit 2021 following CVA     Chest pain     Normal GXT 2006     Chronic kidney disease, stage 3 (moderate)     Worsening with hyperkalemia on Relafen-discontinued August 2016     Chronic kidney disease, stage 3a (H) 05/07/2021     Closed displaced fracture of left clavicle 09/21/2017     Cognitive changes 11/30/2018    SLUMS 20/30 Nov 2018     Cognitive impairment 07/22/2021     Complete tear of left rotator cuff 02/15/2018    Associated with fall and clavicular fracture that occurred 2017, evaluated by orthopedics, given cortisone injection and physical therapy recommended     Degenerative disc disease, lumbar      Dementia without behavioral disturbance (H) 02/21/2020     Depression      Diabetic peripheral neuropathy (H) 11/30/2018     Erectile dysfunction      Essential hypertension 10/05/2021     Fatigue 07/06/2017     Forearm tendonitis 11/29/2016     Glaucoma      Gout attack     Left foot     History of CVA (cerebrovascular accident) 10/05/2021    Hospitalized July 2021 with acute CVA presenting with confusion and right-sided weakness.  Received thrombolytics and discharged to TCU.  Subsequent left carotid endarterectomy     History of recurrent TIAs 05/07/2020     HTN (hypertension)      Hyperlipidemia      Ingrown toenail 07/06/2017     Intermittent asthma without complication 11/09/2017     Left leg cellulitis 2014     Low back pain      Moderate major depression (H) 06/03/2019     Morbid obesity (H)      QUETA on CPAP      Osteoarthritis      Osteoarthritis of both hands      Osteoarthritis of both knees     Right TKA     Pain of right heel 05/11/2018     Peripheral edema 05/07/2021     Peripheral neuropathy 10/09/2018     Recurrent falls 03/05/2021     Rib fracture 2005     Right-sided  chest pain 05/09/2016     S/P left carotid endarterectomy 09/13/2021     Screening     No AAA on CT scan 2009     Sepsis secondary to UTI (H) 03/05/2021    Hospitalized with urosepsis January 2021 and rehospitalized after fall found to have recurrent UTI treated with 30 days of Omnicef for acute prostatitis     Sick sinus syndrome (H) 05/07/2020    Abnormal event monitor with 2.8-second sinus pause     Squamous cell skin cancer 11/02/2015     TIA (transient ischemic attack) 09/06/2019    CT head showing chronic lacunar infarcts.  Carotid ultrasound with atherosclerotic plaque but no severe stenosis.     Traumatic subarachnoid hemorrhage with loss of consciousness of 30 minutes or less (H) 09/21/2017    Fall down the stairs following alcohol use with traumatic subarachnoid hemorrhage treated conservatively at [facility] followed by stay at TCU with no residual neurologic deficits     Trochanteric bursitis of right hip 08/24/2018     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)      Unsteady gait 03/04/2019     Vascular dementia without behavioral disturbance (H) 12/07/2021     Vitamin D deficiency 11/09/2017       PAST SURGICAL HISTORY:   has a past surgical history that includes IR Biliary Tube Change (7/25/2000); IR Lumbar Epidural Steroid Injection (3/23/2012); IR Lumbar Epidural Steroid Injection (4/13/2012); REMOVAL GALLBLADDER; REVISE MEDIAN N/CARPAL TUNNEL SURG; EXCIS STOMACH ULCER,LESN;LOCAL; Total Knee Arthroplasty (Right, 2014); Release carpal tunnel (Bilateral); Eye surgery; Colonoscopy (2003); and Blepharoplasty (2018).    FAMILY HISTORY: family history is not on file.    SOCIAL HISTORY:  reports that he has quit smoking. He has never used smokeless tobacco. He reports current alcohol use of about 14.0 standard drinks of alcohol per week. He reports that he does not use drugs.    ROS:  Constitutional: Positive  for activity change, appetite change, fatigue and fever.   HENT: Negative for  congestion.    Respiratory: Negative for cough, shortness of breath and wheezing.    Cardiovascular: Negative for chest pain and  Positive leg swelling.   Gastrointestinal: Negative for abdominal distention, abdominal pain, constipation, diarrhea and nausea.   Genitourinary: Negative for dysuria.   Musculoskeletal: Negative for arthralgia. Negative for back pain.   Skin: Negative for color change and wound.   Neurological: Negative for dizziness.   Psychiatric/Behavioral: Negative for agitation, behavioral problems and confusion.     Physical Exam:  Constitutional:       Appearance: Patient is well-developed.   HENT:      Head: Normocephalic.   Eyes:      Conjunctiva/sclera: Conjunctivae normal.   Neck:      Musculoskeletal: Normal range of motion.   Cardiovascular:      Rate and Rhythm: Normal rate and regular rhythm.   Pulmonary:      Effort: No respiratory distress.      Breath sounds: Normal breath sounds. No wheezing or rales.   Abdominal:      General: Bowel sounds are normal. There is no distension.      Palpations: Abdomen is soft.      Tenderness: There is no abdominal tenderness.   Musculoskeletal:       Normal range of motion.     Skin:General:        Skin is warm.   Neurological:         Mental Status: Patient is alert and oriented to person, place, and time.   Psychiatric:         Behavior: Behavior normal.     Vitals:BP (!) 149/52   Pulse 83   Temp 97.5  F (36.4  C)   Resp 18   Ht 1.829 m (6')   Wt 133.4 kg (294 lb)   SpO2 95%   BMI 39.87 kg/m   and Body mass index is 39.87 kg/m .    Lab/Diagnostic data:   Recent Results (from the past 240 hour(s))   COVID-19 VIRUS (CORONAVIRUS) BY PCR (EXTERNAL RESULT)    Collection Time: 09/16/22  6:28 AM   Result Value Ref Range    COVID-19 Virus by PCR (External Result) Negative Negative       MEDICATIONS:     Review of your medicines          Accurate as of September 20, 2022 10:52 AM. If you have any questions, ask your nurse or doctor.            CONTINUE  these medicines which may have CHANGED, or have new prescriptions. If we are uncertain of the size of tablets/capsules you have at home, strength may be listed as something that might have changed.      Dose / Directions   insulin glargine 100 UNIT/ML pen  Commonly known as: LANTUS PEN  This may have changed: Another medication with the same name was removed. Continue taking this medication, and follow the directions you see here.  Changed by: Lizzie White CNP      ) Inject as per sliding scale: if 91 -  120 = 15 units Give 15 units if blood glucose is less  than 120 and hold the dose if blood glucose is less  than 90; 121+ = 30 units, subcutaneously at bedtime  related to TYPE 2 DIABETES MELLITUS WITHOUT  COMPLICATIONS (E11.9) Give 15 units if blood  glucose is less than 120 and hold the dose if blood  glucose is less than 9  Refills: 0        CONTINUE these medicines which have NOT CHANGED      Dose / Directions   acetaminophen 500 MG tablet  Commonly known as: TYLENOL      Dose: 1,000 mg  Take 1,000 mg by mouth every 8 hours as needed  Refills: 0     citalopram 20 MG tablet  Commonly known as: celeXA      Dose: 10 mg  Take 10 mg by mouth daily  Refills: 0     diclofenac 1 % topical gel  Commonly known as: VOLTAREN      Dose: 2 g  Apply 2 g topically daily as needed for moderate pain  Refills: 0     furosemide 40 MG tablet  Commonly known as: LASIX      Dose: 40 mg  Take 40 mg by mouth 2 times daily  Refills: 0     Global Ease Inject Pen Needles 31G X 8 MM miscellaneous  Used for: Type 2 diabetes mellitus with diabetic polyneuropathy, with long-term current use of insulin (H)  Generic drug: insulin pen needle      USE AS DIRECTED THREE (3) TIMES DAILY  Quantity: 300 each  Refills: 3     latanoprost 0.005 % ophthalmic solution  Commonly known as: XALATAN      Dose: 1 drop  [LATANOPROST (XALATAN) 0.005 % OPHTHALMIC SOLUTION] Administer 1 drop to both eyes at bedtime.  Refills: 0     morphine 2.5 MG solu-tab       Dose: 2.5-5 mg  Take 2.5-5 mg by mouth every 4 hours as needed for shortness of breath / dyspnea or moderate to severe pain  Refills: 0     NovoLOG FLEXPEN 100 UNIT/ML pen  Generic drug: insulin aspart      Inject as per sliding scale: if  150 - 199 = 1; 200 - 249 = 2; 250 - 299 = 3; 300 - 349  = 4; 350 - 399 = 5; 400+ = 6 400 or greater give 6  units and call MD, subcutaneously three times a day  for Type 2 Diabetes Check blood glucose 3 times  daily before meals. <70 see Hypoglycemia Protocol,   No insulin, give prandial insulin if ordered.  Refills: 0     ondansetron 4 MG tablet  Commonly known as: ZOFRAN      Dose: 4 mg  Take 4 mg by mouth every 8 hours as needed for nausea  Refills: 0     ONETOUCH VERIO IQ test strip  Used for: Type 2 diabetes mellitus with diabetic polyneuropathy, with long-term current use of insulin (H)  Generic drug: blood glucose      Use to test blood glucose 3 times a day  Quantity: 300 strip  Refills: 3     polyethylene glycol 17 g packet  Commonly known as: MIRALAX      Dose: 1 packet  Take 1 packet by mouth daily as needed for constipation  Refills: 0     pregabalin 50 MG capsule  Commonly known as: LYRICA  Used for: Diabetic peripheral neuropathy (H)      Dose: 50 mg  Take 1 capsule (50 mg) by mouth At Bedtime  Quantity: 30 capsule  Refills: 0     senna 8.6 MG tablet  Commonly known as: SENOKOT      Dose: 2 tablet  Take 2 tablets by mouth daily  Refills: 0        STOP taking    amLODIPine 5 MG tablet  Commonly known as: NORVASC  Stopped by: Lizzie White CNP        aspirin 81 MG EC tablet  Commonly known as: ASA  Stopped by: Lizzie White CNP        cholecalciferol 50 MCG (2000 UT) tablet  Stopped by: Lizzie White CNP        hydrochlorothiazide 25 MG tablet  Commonly known as: HYDRODIURIL  Stopped by: Lizzie White CNP        losartan 100 MG tablet  Commonly known as: COZAAR  Stopped by: Lizzie White CNP        magnesium oxide 400 MG tablet  Commonly known as: MAG-OX  Stopped  by: Lizzie White CNP        metFORMIN 500 MG 24 hr tablet  Commonly known as: GLUCOPHAGE XR  Stopped by: Lizzie White CNP        pravastatin 20 MG tablet  Commonly known as: PRAVACHOL  Stopped by: Lizzie White CNP        vitamin B-12 1000 MCG tablet  Commonly known as: CYANOCOBALAMIN  Stopped by: Lizzie White CNP               ASSESSMENT/PLAN  Encounter Diagnosis   Name Primary?     Hospice care patient Yes     Hospice Care patient  Meds as ordered     Type 2 diabetes- On  Lantus and Novolog sliding scale    Multiple medications have been previously discontinued    Pain management - On morphine solutabs, Tylenol, Diclofenac gel  And Pregabalin     Electronically signed by: Lizzie White CNP

## 2022-09-27 NOTE — CONFIDENTIAL NOTE
[facility] Triage Nurse Telephone Encounter    Provider: PASCUAL Welch CNP   Facility: [facility] Facility Type:  LTC    Caller: Andrea ([facility] hospice nurse)  Call Back Number: [phone]    Allergies:    Allergies   Allergen Reactions     Actos [Pioglitazone] Swelling     Edema     Atorvastatin Unknown     Back pain     Donepezil Unknown and Diarrhea     Diarrhea     Gabapentin Diarrhea     Diarrhea     Niacin Unknown     Hyperglycemia     Simvastatin Unknown     Back pain        Reason for call: Hospice nurse called to report low blood sugar readings and is concerned that patient is getting too much insulin while having a poor appetite.  BS readings today were 71 and 85.  9/26-72, 72, 98, 148.  9/25-88, 107, 207, and 174.  Patient is on a scale of Novolog and Lantus.        Verbal Order/Direction given by Provider: Discontinue Lantus and Novolog but continue blood sugar checks and NP will assess readings on Thursday and speak with wife.    Provider giving Order:  PASCUAL Welch CNP     Verbal Order given to: Andrea Cerna RN

## 2022-09-29 NOTE — LETTER
9/29/2022        RE: Robert E Schamberger  [address] [place] [address]        Erroneous encounter, disregard        Sincerely,        PASCUAL Welch CNP

## 2022-10-03 NOTE — LETTER
10/3/2022        RE: Robert E Schamberger  [address] [place] [address]        [facility] GERIATRICS    Chief Complaint   Patient presents with     Nursing Home Acute     HPI:  Robert E Schamberger is a 88 year old  (4/14/1934), who is being seen today for an episodic care visit at: [facility] () [[id number]].     Patient who resides in SNF/LTC with recent hospitalization for PNA, paraneumic pleural effusion s/p thoracentesis, MARTY, bradycardia. PMH notable for vascular dementia, h/o CVA, afib, diastolic CHF, weakness and falls, DM2, QUETA and obesity and depression. Ultimately patient and family decided on hospice and comfort care goals. Seen today for recheck.     Recently BGL noted to be running on low side- . Oral intake and appetite have been variable. Lantus insulin 5 units stopped last week and requested nursing continue with BGL to assess. Discussed with hospice today and decision made to continue to monitor for another week before making changes again.    Patient found in dining area sitting with wife. Is in Broda chair. Alert, but sleepy and periodically closes eyes with a smile. Appears quite comfortable and wife agrees and reports patient has been comfortable. States patient is eating but appetite varies. Denies concerns.         Allergies, and PMH/PSH reviewed in [employer] today.  REVIEW OF SYSTEMS:  Unobtainable secondary to cognitive impairment, sleepiness    Objective:   /52   Pulse 60   Temp 97  F (36.1  C)   Resp 19   Ht 1.829 m (6')   Wt 131.5 kg (290 lb)   SpO2 98%   BMI 39.33 kg/m      Resp: Effort WNL, LSCTA   CV: VS as above  Abd- soft, nontender, BS +  Musc- in Broda W/C  Psych- alert, calm, pleasant      Recent labs in EPIC reviewed by me today.     Assessment/Plan:  Type 1 diabetes mellitus with diabetic chronic kidney disease, unspecified CKD stage (H)  HGB A1c last 7.9- some hypoglycemia noted since readmission. Was on low dose Lantus at 5  units. Is on hospice. Decision made o discontinue insulin and continue to assess BGL readings for a bit. Now patient BGL running 200-300s. Discussed with hospice who was also on unit today. Will continue to monitor BGL for a week and if remain elevated add back low dose Lantus.    Vascular dementia without behavioral disturbance (H)  History of CVA (cerebrovascular accident)  - resides in LTC/SNF where has been for a while. Is w/c bound.  - continue supportive cares and treatments  - per POLST- is DNR/DNI with comfort care goals. Now enrolled in hospice on comfort kit medications and appears comfortable    Paroxysmal atrial fibrillation (H)  - bradycardia noted inpatient with discussion around possible pacer insertion. Ultimately patient and family declined this and decided on hospice.   - no bradycardia noted and HR controlled 60-70s per VS review in facility EMR      Heart failure with preserved ejection fraction, NYHA class I (H)  EF 55-60 % inpatient  Primary hypertension  - chronic-recent BPs 120-150/40-50s  Continues on lasix 40 mg BID  -monitor weights, VS    Hospice care patient  - as above,with comfort care goals      Post Medication Reconciliation Status: Discharge medications reconciled and changed, see notes/orders          Electronically signed by: PASCUAL Welch CNP           Sincerely,        PASCUAL Welch CNP

## 2022-10-03 NOTE — PROGRESS NOTES
[facility] GERIATRICS    Chief Complaint   Patient presents with     Nursing Home Acute     HPI:  Robert E Schamberger is a 88 year old  (4/14/1934), who is being seen today for an episodic care visit at: [facility] () [[id number]].     Patient who resides in SNF/LTC with recent hospitalization for PNA, paraneumic pleural effusion s/p thoracentesis, MARTY, bradycardia. PMH notable for vascular dementia, h/o CVA, afib, diastolic CHF, weakness and falls, DM2, QUETA and obesity and depression. Ultimately patient and family decided on hospice and comfort care goals. Seen today for recheck.     Recently BGL noted to be running on low side- . Oral intake and appetite have been variable. Lantus insulin 5 units stopped last week and requested nursing continue with BGL to assess. Discussed with hospice today and decision made to continue to monitor for another week before making changes again.    Patient found in dining area sitting with wife. Is in Broda chair. Alert, but sleepy and periodically closes eyes with a smile. Appears quite comfortable and wife agrees and reports patient has been comfortable. States patient is eating but appetite varies. Denies concerns.         Allergies, and PMH/PSH reviewed in [employer] today.  REVIEW OF SYSTEMS:  Unobtainable secondary to cognitive impairment, sleepiness    Objective:   /52   Pulse 60   Temp 97  F (36.1  C)   Resp 19   Ht 1.829 m (6')   Wt 131.5 kg (290 lb)   SpO2 98%   BMI 39.33 kg/m      Resp: Effort WNL, LSCTA   CV: VS as above  Abd- soft, nontender, BS +  Musc- in Broda W/C  Psych- alert, calm, pleasant      Recent labs in EPIC reviewed by me today.     Assessment/Plan:  Type 1 diabetes mellitus with diabetic chronic kidney disease, unspecified CKD stage (H)  HGB A1c last 7.9- some hypoglycemia noted since readmission. Was on low dose Lantus at 5 units. Is on hospice. Decision made o discontinue insulin and continue to assess BGL  readings for a bit. Now patient BGL running 200-300s. Discussed with hospice who was also on unit today. Will continue to monitor BGL for a week and if remain elevated add back low dose Lantus.    Vascular dementia without behavioral disturbance (H)  History of CVA (cerebrovascular accident)  - resides in LTC/SNF where has been for a while. Is w/c bound.  - continue supportive cares and treatments  - per POLST- is DNR/DNI with comfort care goals. Now enrolled in hospice on comfort kit medications and appears comfortable    Paroxysmal atrial fibrillation (H)  - bradycardia noted inpatient with discussion around possible pacer insertion. Ultimately patient and family declined this and decided on hospice.   - no bradycardia noted and HR controlled 60-70s per VS review in facility EMR      Heart failure with preserved ejection fraction, NYHA class I (H)  EF 55-60 % inpatient  Primary hypertension  - chronic-recent BPs 120-150/40-50s  Continues on lasix 40 mg BID  -monitor weights, VS    Hospice care patient  - as above,with comfort care goals      Post Medication Reconciliation Status: Discharge medications reconciled and changed, see notes/orders          Electronically signed by: PASCUAL Welch CNP

## 2022-10-04 NOTE — LETTER
10/4/2022        RE: Robert E Schamberger  [address] [place] [address]        [facility] GERIATRICS  REGULATORY VISIT  October 4, 2022    [facility] Medical Record Number:  [id number]  Place of Service where encounter took place:  [facility] () [[id number]]    Chief Complaint   Patient presents with     prison Regulatory       HPI:    Robert E Schamberger is a 88 year old  (4/14/1934), who is being seen today for a federally mandated E/M visit at [facility]. HPI information obtained from: facility chart records, facility staff and [facility] chart review.    Since I last saw him, he had a significant hospitalization at [place] in September with pneumonia, thoracentesis of parapneumonic effusion, MARTY, bradycardia with possible need for pacemaker and worsening anemia.  Given multiple significant acute issues on top of his comorbidities, family elected to transition him to hospice.    Today, Mr Schamberger is seen in his room reclining in a Broda chair.  He was sound asleep and very peaceful. I elected not to wake him up.  I talked with nursing, and she notes some of the higher blood sugars and that he is no longer on insulin -she asked about frequency of checking sugars given his overall goals of care, we discussed that really is a decision between his family and hospice.  Medications reconciled between point click care in [facility].  Vital signs reviewed.       ALLERGIES:    Allergies   Allergen Reactions     Actos [Pioglitazone] Swelling     Edema     Atorvastatin Unknown     Back pain     Donepezil Unknown and Diarrhea     Diarrhea     Gabapentin Diarrhea     Diarrhea     Niacin Unknown     Hyperglycemia     Simvastatin Unknown     Back pain        Past Medical, Surgical, Family and Social History: Reviewed and updated in EPIC.    MEDICATIONS:  Post Discharge Medication Reconciliation Status: discharge medications reconciled and changed, per note/orders.    Current Outpatient Medications   Medication Sig Dispense Refill     acetaminophen (TYLENOL) 500 MG tablet Take 1,000 mg by mouth 2 times daily       bisacodyl (DULCOLAX) 10 MG suppository Place 10 mg rectally daily as needed for constipation       citalopram (CELEXA) 20 MG tablet Take 10 mg by mouth daily       diclofenac (VOLTAREN) 1 % topical gel Apply 2 g topically daily as needed for moderate pain        furosemide (LASIX) 40 MG tablet Take 40 mg by mouth 2 times daily       latanoprost (XALATAN) 0.005 % ophthalmic solution [LATANOPROST (XALATAN) 0.005 % OPHTHALMIC SOLUTION] Administer 1 drop to both eyes at bedtime.       morphine 2.5 MG solu-tab Take 2.5 mg by mouth every hour as needed for shortness of breath / dyspnea or moderate to severe pain       ondansetron (ZOFRAN) 4 MG tablet Take 4 mg by mouth every 8 hours as needed for nausea       polyethylene glycol (MIRALAX) 17 g packet Take 1 packet by mouth daily as needed for constipation       pregabalin (LYRICA) 50 MG capsule Take 1 capsule (50 mg) by mouth At Bedtime 30 capsule 0     senna (SENOKOT) 8.6 MG tablet Take 1 tablet by mouth 2 times daily And 2 tabs BID PRN       Medications reviewed:  Medications reconciled to facility chart and changes were made to reflect current medications as identified as above med list. Below are the changes that were made:   Medications stopped since last EPIC medication reconciliation:   Medications Discontinued During This Encounter   Medication Reason     ONETOUCH VERIO IQ test strip Medication Reconciliation Clean Up     GLOBAL EASE INJECT PEN NEEDLES 31G X 8 MM miscellaneous Medication Reconciliation Clean Up     Medications started since last [facility] medication reconciliation:  Orders Placed This Encounter   Medications     bisacodyl (DULCOLAX) 10 MG suppository     Sig: Place 10 mg rectally daily as needed for constipation     REVIEW OF SYSTEMS:  Unable to be obtained due to cognitive impairment or aphasia.      PHYSICAL EXAM:  /52   Pulse 60   Temp 98.1  F (36.7  C)   Resp 19   Ht 1.829 m (6')   Wt 131.5 kg (290 lb)   SpO2 94%   BMI 39.33 kg/m    Gen: sitting in Broda chair sound asleep and in no acute distress  Resp: breathing unlabored, no tachypnea  Ext: Decreased muscle tone      LABS[facility]IMAGING: [facility]    ASSESSMENT / PLAN:    DM, Type II  Hgb A1c 7.9 in May.  Sugars 120s-290s, most <150 (am), 120s-280 (noon), 180-340 - labile (candis) and 170-290 - labile (hs)  -- no longer on insulin or metformin given hospice.   -- still having glucose checks - defer to family and hospice on when to discontinue     HTN  SBPs 140s  -- furosemide 40 mg daily  -- follow BPs and clinical status     Vascular Dementia Without Behavioral Disturbance  History of CVA  Hospice Care Patient  BIMS 12/15  -- citalopram 10 mg daily  -- ongoing 24/7 nursing and supportive cares  -- appreciate the cares of the hospice team     Electronically signed by  Milly Rubio MD                Sincerely,        Milly Rubio MD

## 2022-10-04 NOTE — PROGRESS NOTES
[facility] GERIATRICS  REGULATORY VISIT  October 4, 2022    [facility] Medical Record Number:  [id number]  Place of Service where encounter took place:  [facility] () [[id number]]    Chief Complaint   Patient presents with     group home Regulatory       HPI:    Robert E Schamberger is a 88 year old  (4/14/1934), who is being seen today for a federally mandated E/M visit at [facility]. HPI information obtained from: facility chart records, facility staff and [facility] chart review.    Since I last saw him, he had a significant hospitalization at [place] in September with pneumonia, thoracentesis of parapneumonic effusion, MARTY, bradycardia with possible need for pacemaker and worsening anemia.  Given multiple significant acute issues on top of his comorbidities, family elected to transition him to hospice.    Today, Mr Schamberger is seen in his room reclining in a Broda chair.  He was sound asleep and very peaceful. I elected not to wake him up.  I talked with nursing, and she notes some of the higher blood sugars and that he is no longer on insulin -she asked about frequency of checking sugars given his overall goals of care, we discussed that really is a decision between his family and hospice.  Medications reconciled between point click care in epic.  Vital signs reviewed.       ALLERGIES:    Allergies   Allergen Reactions     Actos [Pioglitazone] Swelling     Edema     Atorvastatin Unknown     Back pain     Donepezil Unknown and Diarrhea     Diarrhea     Gabapentin Diarrhea     Diarrhea     Niacin Unknown     Hyperglycemia     Simvastatin Unknown     Back pain        Past Medical, Surgical, Family and Social History: Reviewed and updated in EPIC.    MEDICATIONS:  Post Discharge Medication Reconciliation Status: discharge medications reconciled and changed, per note/orders.   Current Outpatient Medications   Medication Sig Dispense Refill     acetaminophen (TYLENOL)  500 MG tablet Take 1,000 mg by mouth 2 times daily       bisacodyl (DULCOLAX) 10 MG suppository Place 10 mg rectally daily as needed for constipation       citalopram (CELEXA) 20 MG tablet Take 10 mg by mouth daily       diclofenac (VOLTAREN) 1 % topical gel Apply 2 g topically daily as needed for moderate pain        furosemide (LASIX) 40 MG tablet Take 40 mg by mouth 2 times daily       latanoprost (XALATAN) 0.005 % ophthalmic solution [LATANOPROST (XALATAN) 0.005 % OPHTHALMIC SOLUTION] Administer 1 drop to both eyes at bedtime.       morphine 2.5 MG solu-tab Take 2.5 mg by mouth every hour as needed for shortness of breath / dyspnea or moderate to severe pain       ondansetron (ZOFRAN) 4 MG tablet Take 4 mg by mouth every 8 hours as needed for nausea       polyethylene glycol (MIRALAX) 17 g packet Take 1 packet by mouth daily as needed for constipation       pregabalin (LYRICA) 50 MG capsule Take 1 capsule (50 mg) by mouth At Bedtime 30 capsule 0     senna (SENOKOT) 8.6 MG tablet Take 1 tablet by mouth 2 times daily And 2 tabs BID PRN       Medications reviewed:  Medications reconciled to facility chart and changes were made to reflect current medications as identified as above med list. Below are the changes that were made:   Medications stopped since last EPIC medication reconciliation:   Medications Discontinued During This Encounter   Medication Reason     ONETOUCH VERIO IQ test strip Medication Reconciliation Clean Up     GLOBAL EASE INJECT PEN NEEDLES 31G X 8 MM miscellaneous Medication Reconciliation Clean Up     Medications started since last [facility] medication reconciliation:  Orders Placed This Encounter   Medications     bisacodyl (DULCOLAX) 10 MG suppository     Sig: Place 10 mg rectally daily as needed for constipation     REVIEW OF SYSTEMS:  Unable to be obtained due to cognitive impairment or aphasia.     PHYSICAL EXAM:  /52   Pulse 60   Temp 98.1  F (36.7  C)   Resp 19   Ht 1.829 m (6')    Wt 131.5 kg (290 lb)   SpO2 94%   BMI 39.33 kg/m    Gen: sitting in Broda chair sound asleep and in no acute distress  Resp: breathing unlabored, no tachypnea  Ext: Decreased muscle tone      LABS/IMAGING: Reviewed as per Epic and/or [facility]    ASSESSMENT / PLAN:    DM, Type II  Hgb A1c 7.9 in May.  Sugars 120s-290s, most <150 (am), 120s-280 (noon), 180-340 - labile (candis) and 170-290 - labile (hs)  -- no longer on insulin or metformin given hospice.   -- still having glucose checks - defer to family and hospice on when to discontinue     HTN  SBPs 140s  -- furosemide 40 mg daily  -- follow BPs and clinical status     Vascular Dementia Without Behavioral Disturbance  History of CVA  Hospice Care Patient  BIM[facility] 12/15  -- citalopram 10 mg daily  -- ongoing 24/7 nursing and supportive cares  -- appreciate the cares of the hospice team     Electronically signed by  Milly Rubio MD

## 2022-10-12 NOTE — TELEPHONE ENCOUNTER
[facility] Triage Nurse Telephone Encounter    Provider: PASCUAL Welch CNP   Facility: [facility] Facility Type:  LTC    Caller: Alannah  Call Back Number: [phone]    Allergies:    Allergies   Allergen Reactions     Actos [Pioglitazone] Swelling     Edema     Atorvastatin Unknown     Back pain     Donepezil Unknown and Diarrhea     Diarrhea     Gabapentin Diarrhea     Diarrhea     Niacin Unknown     Hyperglycemia     Simvastatin Unknown     Back pain        Reason for call: Nurse is reporting that patient's BG prior to dinner is reading 'hi'.  Patient receives Lantus 7 units Q AM.  Nurse states that patient has been eating 50-75% for his last 3 meals.      Verbal Order/Direction given by Provider: Novolog 5 units x 1 now.  NP to f/u tomorrow.      Provider giving Order:  Milly Hurst MD    Verbal Order given to: Alannah Guevara RN

## 2022-10-12 NOTE — TELEPHONE ENCOUNTER
[facility] Triage Nurse Telephone Encounter    Provider: PASCUAL Welch CNP   Facility: [facility] Facility Type:  LTC    Caller: Andrea   Call Back Number: [phone]    Allergies:    Allergies   Allergen Reactions     Actos [Pioglitazone] Swelling     Edema     Atorvastatin Unknown     Back pain     Donepezil Unknown and Diarrhea     Diarrhea     Gabapentin Diarrhea     Diarrhea     Niacin Unknown     Hyperglycemia     Simvastatin Unknown     Back pain        Reason for call: Pt is a hospice pt and was having low blood glucose at the end of September, his lantus of 30units (15units if BS less than 120) were discontinued as well as his s/s insulin. Pt the has only been eating 25-50% of his meals. The hospice nurse is concerned that his blood glucose levels are too elevated.   Recent 48h blood glucose as follows- 447, 505, 474, 452, 522, 580, 438, 465.    Pt did restart Lantus of 7 units every day.   Nursing would like to stay conservative as the pt is on hospice but is thinking maybe the Lantus needs to be increased.     Verbal Order/Direction given by Provider: BOOO, NP to see this pt tomorrow      Provider giving Order:  PASCUAL Welch CNP     Verbal Order given to: Andrea Barron RN

## 2022-10-13 NOTE — LETTER
10/13/2022        RE: Robert E Schamberger  [address] [place] [address]        [facility] GERIATRICS    Chief Complaint   Patient presents with     Nursing Home Acute     HPI:  Robert E Schamberger is a 88 year old  (4/14/1934), who is being seen today for an episodic care visit at: [facility] () [[id number]]. Today's concern is: elevated BGL.     Lantus recently added back at 7 units. BGL remain elevated- reviewed in facility EMR.     Patient found in room today. Sitting up in w/c sleeping. Opens eyes to soft verbal stimuli. Denies pain or concerns    Allergies, and PMH/PSH reviewed in EPIC today.  REVIEW OF SYSTEMS:  Limited secondary to cognitive impairment but today pt reports as above    Objective:   BP (!) 143/58   Pulse 69   Temp 97.5  F (36.4  C)   Resp 17   Ht 1.829 m (6')   Wt 131.5 kg (290 lb)   SpO2 94%   BMI 39.33 kg/m      Resp: Effort WNL,   CV: VS as above- chronic LE edema present. No wounds/weeping noted  Abd- soft, nontender, BS +  Musc- PEREZ- w/c  Psych- alert, calm, pleasant- sleepy      Recent labs in EPIC reviewed by me today.     Assessment/Plan:  Type 2 diabetes mellitus with other specified complication, without long-term current use of insulin (H)  - uncontrolled  - increase insulin glargine (LANTUS PEN) 100 UNIT/ML pen; Inject 12 Units Subcutaneous At Bedtime  - monitor BGL ac/HS yet for now  Hospice care patient  - limit polypharm and interventions that do not contribute to patient comfort          Orders:  Written on unit. Increase Lantus to 12 units daily. Conservative management given comfort care goals    Electronically signed by: PASCUAL Welch CNP           Sincerely,        PASCUAL Welch CNP

## 2022-10-13 NOTE — TELEPHONE ENCOUNTER
[facility] Triage Nurse Telephone Encounter    Provider: PASCUAL Welch CNP   Facility: [facility] Facility Type:  LTC    Caller: Alannah  Call Back Number: [phone]    Allergies:    Allergies   Allergen Reactions     Actos [Pioglitazone] Swelling     Edema     Atorvastatin Unknown     Back pain     Donepezil Unknown and Diarrhea     Diarrhea     Gabapentin Diarrhea     Diarrhea     Niacin Unknown     Hyperglycemia     Simvastatin Unknown     Back pain        Reason for call: Nurse is reporting that BG at dinner is reading 'Hi'.  Patient's Lantus was increased to 12 units today.      Verbal Order/Direction given by Provider: Give Novolog 5 units x 1 dose now.      Provider giving Order:  PASCUAL Welch CNP     Verbal Order given to: Alannah Guevara RN

## 2022-10-13 NOTE — PROGRESS NOTES
[facility] GERIATRICS    Chief Complaint   Patient presents with     Nursing Home Acute     HPI:  Robert E Schamberger is a 88 year old  (4/14/1934), who is being seen today for an episodic care visit at: [facility] () [[id number]]. Today's concern is: elevated BGL.     Lantus recently added back at 7 units. BGL remain elevated- reviewed in facility EMR.     Patient found in room today. Sitting up in w/c sleeping. Opens eyes to soft verbal stimuli. Denies pain or concerns    Allergies, and PMH/PSH reviewed in [facility] today.  REVIEW OF SYSTEMS:  Limited secondary to cognitive impairment but today pt reports as above    Objective:   BP (!) 143/58   Pulse 69   Temp 97.5  F (36.4  C)   Resp 17   Ht 1.829 m (6')   Wt 131.5 kg (290 lb)   SpO2 94%   BMI 39.33 kg/m      Resp: Effort WNL,   CV: VS as above- chronic LE edema present. No wounds/weeping noted  Abd- soft, nontender, BS +  Musc- PEREZ- w/c  Psych- alert, calm, pleasant- sleepy      Recent labs in EPIC reviewed by me today.     Assessment/Plan:  Type 2 diabetes mellitus with other specified complication, without long-term current use of insulin (H)  - uncontrolled  - increase insulin glargine (LANTUS PEN) 100 UNIT/ML pen; Inject 12 Units Subcutaneous At Bedtime  - monitor BGL ac/HS yet for now  Hospice care patient  - limit polypharm and interventions that do not contribute to patient comfort          Orders:  Written on unit. Increase Lantus to 12 units daily. Conservative management given comfort care goals    Electronically signed by: PASCUAL Welch CNP

## 2022-10-15 NOTE — TELEPHONE ENCOUNTER
Resident is to have a certain dressing packed in wound and the wrong alginate is being used.    Nursing asked if able to switch the dressing packing until they can get the right supply in.  Hydro kristen betito will be used for now until correct packing is ordered on Monday.      Electronically signed by Muna Machado RN, CNP

## 2022-10-24 NOTE — LETTER
10/24/2022        RE: Robert E Schamberger  [address] [place] [address]        [facility] GERIATRICS    Chief Complaint   Patient presents with     Nursing Home Acute     HPI:  Robert E Schamberger is a 88 year old  (4/14/1934), who is being seen today for an episodic care visit at: [facility] () [[id number]]     Patient seen today in LTC for recheck of multiple acute and chronic medical conditions:    1. Type 2 diabetes mellitus with diabetic polyneuropathy, with long-term current use of insulin (H)    2. Vascular dementia without behavioral disturbance (H)    3. History of CVA (cerebrovascular accident)    4. Paroxysmal atrial fibrillation (H)    5. Heart failure with preserved ejection fraction, NYHA class I (H)    6. Primary hypertension    7. Hospice care patient      Today patient found in room sleeping in recliner. Awoke with soft verbal stimuli. Reports is comfortable. Denies pain. States ate a good breakfast. Denies n/v.    VS and BGLs reviewed in facility EMR    Allergies, and PMH/PSH reviewed in EPIC today.  REVIEW OF SYSTEMS:  Limited secondary to cognitive impairment but today pt reports as above    Objective:   BP (!) 143/60   Pulse 71   Temp 98.3  F (36.8  C)   Resp 20   Ht 1.829 m (6')   Wt 131.5 kg (290 lb)   SpO2 98%   BMI 39.33 kg/m      Resp: Effort WNL, LS decreased in bases  CV: VS as above, some chronic LE edema  Abd- soft, nontender, BS +  Musc- PEREZ- w/c  Psych- alert, calm, pleasant      Recent labs in EPIC reviewed by me today.     Assessment/Plan:    Type 1 diabetes mellitus with diabetic chronic kidney disease, unspecified CKD stage (H)  HGB A1c last 7.9- some hypoglycemia immediately following readmit. Is on hospice. Decision made to discontinue insulin and continue to assess BGL readings for a bit but patient BGL became very elevated. Lantus resumed at 7 units, BGL remain high so increased to 12 with some improvement but yet elevated.  -  will increase Lantus to 15 units and continue sliding scale insulin for now. Plan to discontinue sliding scale insulin once in better control with Lantus.  - continue to monitor BGL     Vascular dementia without behavioral disturbance (H)  History of CVA (cerebrovascular accident)  - resides in LTC/SNF where has been for a while. Is w/c bound. Appears comfortable  - continue with supportive cares and treatments  - per POLST- is DNR/DNI with comfort care goals. Now in hospice on comfort kit medications      Paroxysmal atrial fibrillation (H)  - bradycardia noted inpatient with discussion around possible pacer insertion. Ultimately patient and family declined this and decided on hospice. Here HR 60-70s  - monitor VS        Heart failure with preserved ejection fraction, NYHA class I (H)  EF 55-60 % inpatient- weights very stable. Some chronic LE edema. Continues with compression  Primary hypertensio  - chronic- controlled 120-140s/60-70s  Continues on lasix 40 mg BID  -continue to monitor weights, VS     Hospice care patient  - as above,with comfort care goals         Orders written on unit    Electronically signed by: PASCUAL Welch CNP           Sincerely,        PASCUAL Welch CNP

## 2022-10-24 NOTE — PROGRESS NOTES
[facility] GERIATRICS    Chief Complaint   Patient presents with     Nursing Home Acute     HPI:  Robert E Schamberger is a 88 year old  (4/14/1934), who is being seen today for an episodic care visit at: [facility] () [[id number]]     Patient seen today in LTC for recheck of multiple acute and chronic medical conditions:    1. Type 2 diabetes mellitus with diabetic polyneuropathy, with long-term current use of insulin (H)    2. Vascular dementia without behavioral disturbance (H)    3. History of CVA (cerebrovascular accident)    4. Paroxysmal atrial fibrillation (H)    5. Heart failure with preserved ejection fraction, NYHA class I (H)    6. Primary hypertension    7. Hospice care patient      Today patient found in room sleeping in recliner. Awoke with soft verbal stimuli. Reports is comfortable. Denies pain. States ate a good breakfast. Denies n/v.    VS and BGLs reviewed in facility EMR    Allergies, and PMH/PSH reviewed in EPIC today.  REVIEW OF SYSTEMS:  Limited secondary to cognitive impairment but today pt reports as above    Objective:   BP (!) 143/60   Pulse 71   Temp 98.3  F (36.8  C)   Resp 20   Ht 1.829 m (6')   Wt 131.5 kg (290 lb)   SpO2 98%   BMI 39.33 kg/m      Resp: Effort WNL, LS decreased in bases  CV: VS as above, some chronic LE edema  Abd- soft, nontender, BS +  Musc- PEREZ- w/c  Psych- alert, calm, pleasant      Recent labs in EPIC reviewed by me today.     Assessment/Plan:    Type 1 diabetes mellitus with diabetic chronic kidney disease, unspecified CKD stage (H)  HGB A1c last 7.9- some hypoglycemia immediately following readmit. Is on hospice. Decision made to discontinue insulin and continue to assess BGL readings for a bit but patient BGL became very elevated. Lantus resumed at 7 units, BGL remain high so increased to 12 with some improvement but yet elevated.  - will increase Lantus to 15 units and continue sliding scale insulin for now. Plan to  discontinue sliding scale insulin once in better control with Lantus.  - continue to monitor BGL     Vascular dementia without behavioral disturbance (H)  History of CVA (cerebrovascular accident)  - resides in LTC/SNF where has been for a while. Is w/c bound. Appears comfortable  - continue with supportive cares and treatments  - per POLST- is DNR/DNI with comfort care goals. Now in hospice on comfort kit medications      Paroxysmal atrial fibrillation (H)  - bradycardia noted inpatient with discussion around possible pacer insertion. Ultimately patient and family declined this and decided on hospice. Here HR 60-70s  - monitor VS        Heart failure with preserved ejection fraction, NYHA class I (H)  EF 55-60 % inpatient- weights very stable. Some chronic LE edema. Continues with compression  Primary hypertensio  - chronic- controlled 120-140s/60-70s  Continues on lasix 40 mg BID  -continue to monitor weights, VS     Hospice care patient  - as above,with comfort care goals         Orders written on unit    Electronically signed by: PASCUAL Welch CNP

## 2022-10-31 NOTE — LETTER
10/31/2022        RE: Robert E Schamberger  [address] [place] [address]        Erroneous encounter, disregard          Sincerely,        PASCUAL Welch CNP

## 2024-03-28 NOTE — PROGRESS NOTES
Clinic Care Coordination Contact    Patient in TCU- CCC RN to call TCU. CHW scheduled TCU follow up with CCC RN on 9/8/21 @ 1:00.     Statement Selected